# Patient Record
Sex: FEMALE | Race: WHITE | Employment: OTHER | URBAN - METROPOLITAN AREA
[De-identification: names, ages, dates, MRNs, and addresses within clinical notes are randomized per-mention and may not be internally consistent; named-entity substitution may affect disease eponyms.]

---

## 2017-01-17 RX ORDER — FOLIC ACID 1 MG/1
TABLET ORAL
Qty: 90 TABLET | Refills: 3 | Status: SHIPPED | OUTPATIENT
Start: 2017-01-17 | End: 2017-07-10 | Stop reason: SDUPTHER

## 2017-01-17 RX ORDER — FOLIC ACID 1 MG/1
TABLET ORAL
Qty: 90 TABLET | Refills: 3 | Status: SHIPPED | OUTPATIENT
Start: 2017-01-17 | End: 2017-01-17 | Stop reason: SDUPTHER

## 2017-02-06 ENCOUNTER — TELEPHONE (OUTPATIENT)
Dept: FAMILY MEDICINE CLINIC | Age: 77
End: 2017-02-06

## 2017-02-06 RX ORDER — OSELTAMIVIR PHOSPHATE 75 MG/1
75 CAPSULE ORAL DAILY
Qty: 10 CAPSULE | Refills: 0 | Status: SHIPPED | OUTPATIENT
Start: 2017-02-06 | End: 2017-02-16

## 2017-03-06 RX ORDER — LOSARTAN POTASSIUM AND HYDROCHLOROTHIAZIDE 12.5; 5 MG/1; MG/1
TABLET ORAL
Qty: 90 TABLET | Refills: 1 | Status: SHIPPED | OUTPATIENT
Start: 2017-03-06 | End: 2017-07-10 | Stop reason: SDUPTHER

## 2017-04-24 RX ORDER — LEVOTHYROXINE SODIUM 0.03 MG/1
TABLET ORAL
Qty: 90 TABLET | Refills: 1 | Status: SHIPPED | OUTPATIENT
Start: 2017-04-24 | End: 2017-07-10 | Stop reason: SDUPTHER

## 2017-06-14 ENCOUNTER — OFFICE VISIT (OUTPATIENT)
Dept: ORTHOPEDIC SURGERY | Age: 77
End: 2017-06-14

## 2017-06-14 VITALS
SYSTOLIC BLOOD PRESSURE: 138 MMHG | HEART RATE: 74 BPM | BODY MASS INDEX: 20.39 KG/M2 | WEIGHT: 122.36 LBS | DIASTOLIC BLOOD PRESSURE: 76 MMHG | HEIGHT: 65 IN

## 2017-06-14 DIAGNOSIS — M06.9 RHEUMATOID ARTHRITIS INVOLVING MULTIPLE JOINTS (HCC): Primary | ICD-10-CM

## 2017-06-14 DIAGNOSIS — L84 FOOT CALLUS: ICD-10-CM

## 2017-06-14 PROCEDURE — G8427 DOCREV CUR MEDS BY ELIG CLIN: HCPCS | Performed by: PODIATRIST

## 2017-06-14 PROCEDURE — 1123F ACP DISCUSS/DSCN MKR DOCD: CPT | Performed by: PODIATRIST

## 2017-06-14 PROCEDURE — 1036F TOBACCO NON-USER: CPT | Performed by: PODIATRIST

## 2017-06-14 PROCEDURE — G8420 CALC BMI NORM PARAMETERS: HCPCS | Performed by: PODIATRIST

## 2017-06-14 PROCEDURE — 1090F PRES/ABSN URINE INCON ASSESS: CPT | Performed by: PODIATRIST

## 2017-06-14 PROCEDURE — 11055 PARING/CUTG B9 HYPRKER LES 1: CPT | Performed by: PODIATRIST

## 2017-06-14 PROCEDURE — 99202 OFFICE O/P NEW SF 15 MIN: CPT | Performed by: PODIATRIST

## 2017-06-14 PROCEDURE — G8400 PT W/DXA NO RESULTS DOC: HCPCS | Performed by: PODIATRIST

## 2017-06-14 PROCEDURE — 4040F PNEUMOC VAC/ADMIN/RCVD: CPT | Performed by: PODIATRIST

## 2017-06-20 ENCOUNTER — TELEPHONE (OUTPATIENT)
Dept: FAMILY MEDICINE CLINIC | Age: 77
End: 2017-06-20

## 2017-06-20 DIAGNOSIS — M06.9 RHEUMATOID ARTHRITIS INVOLVING MULTIPLE JOINTS (HCC): Primary | ICD-10-CM

## 2017-06-26 ENCOUNTER — TELEPHONE (OUTPATIENT)
Dept: ORTHOPEDIC SURGERY | Age: 77
End: 2017-06-26

## 2017-06-28 ENCOUNTER — HOSPITAL ENCOUNTER (OUTPATIENT)
Dept: OTHER | Age: 77
Discharge: HOME | End: 2017-06-28
Attending: FAMILY MEDICINE | Admitting: FAMILY MEDICINE

## 2017-06-28 LAB
A/G RATIO: 1.1 (ref 1.1–2.2)
ALBUMIN SERPL-MCNC: 3.8 G/DL (ref 3.4–5)
ALP BLD-CCNC: 72 U/L (ref 40–129)
ALT SERPL-CCNC: 15 U/L (ref 10–40)
ANION GAP SERPL CALCULATED.3IONS-SCNC: 14 MMOL/L (ref 3–16)
AST SERPL-CCNC: 18 U/L (ref 15–37)
BASOPHILS ABSOLUTE: 0 K/UL (ref 0–0.2)
BASOPHILS RELATIVE PERCENT: 0.6 %
BILIRUB SERPL-MCNC: 0.3 MG/DL (ref 0–1)
BUN BLDV-MCNC: 13 MG/DL (ref 7–20)
C-REACTIVE PROTEIN: 6.7 MG/L (ref 0–5.1)
CALCIUM SERPL-MCNC: 9.4 MG/DL (ref 8.3–10.6)
CHLORIDE BLD-SCNC: 98 MMOL/L (ref 99–110)
CO2: 26 MMOL/L (ref 21–32)
CREAT SERPL-MCNC: 0.7 MG/DL (ref 0.6–1.2)
EOSINOPHILS ABSOLUTE: 0.3 K/UL (ref 0–0.6)
EOSINOPHILS RELATIVE PERCENT: 4.3 %
GFR AFRICAN AMERICAN: >60
GFR NON-AFRICAN AMERICAN: >60
GLOBULIN: 3.6 G/DL
GLUCOSE BLD-MCNC: 84 MG/DL (ref 70–99)
HCT VFR BLD CALC: 39.2 % (ref 36–48)
HEMOGLOBIN: 13.1 G/DL (ref 12–16)
LYMPHOCYTES ABSOLUTE: 1.4 K/UL (ref 1–5.1)
LYMPHOCYTES RELATIVE PERCENT: 23.2 %
MCH RBC QN AUTO: 33.6 PG (ref 26–34)
MCHC RBC AUTO-ENTMCNC: 33.4 G/DL (ref 31–36)
MCV RBC AUTO: 100.5 FL (ref 80–100)
MONOCYTES ABSOLUTE: 0.7 K/UL (ref 0–1.3)
MONOCYTES RELATIVE PERCENT: 11.9 %
NEUTROPHILS ABSOLUTE: 3.6 K/UL (ref 1.7–7.7)
NEUTROPHILS RELATIVE PERCENT: 60 %
PDW BLD-RTO: 14.2 % (ref 12.4–15.4)
PLATELET # BLD: 255 K/UL (ref 135–450)
PMV BLD AUTO: 8 FL (ref 5–10.5)
POTASSIUM SERPL-SCNC: 3.9 MMOL/L (ref 3.5–5.1)
RBC # BLD: 3.9 M/UL (ref 4–5.2)
SEDIMENTATION RATE, ERYTHROCYTE: 29 MM/HR (ref 0–30)
SODIUM BLD-SCNC: 138 MMOL/L (ref 136–145)
TOTAL PROTEIN: 7.4 G/DL (ref 6.4–8.2)
WBC # BLD: 5.9 K/UL (ref 4–11)

## 2017-07-10 RX ORDER — LOSARTAN POTASSIUM AND HYDROCHLOROTHIAZIDE 12.5; 5 MG/1; MG/1
TABLET ORAL
Qty: 90 TABLET | Refills: 1 | Status: SHIPPED | OUTPATIENT
Start: 2017-07-10 | End: 2018-01-09 | Stop reason: SDUPTHER

## 2017-07-10 RX ORDER — FOLIC ACID 1 MG/1
TABLET ORAL
Qty: 90 TABLET | Refills: 1 | Status: SHIPPED | OUTPATIENT
Start: 2017-07-10 | End: 2018-02-12 | Stop reason: SDUPTHER

## 2017-07-10 RX ORDER — LEVOTHYROXINE SODIUM 0.03 MG/1
TABLET ORAL
Qty: 90 TABLET | Refills: 1 | Status: SHIPPED | OUTPATIENT
Start: 2017-07-10 | End: 2018-01-18 | Stop reason: SDUPTHER

## 2017-10-24 ENCOUNTER — HOSPITAL ENCOUNTER (OUTPATIENT)
Dept: MAMMOGRAPHY | Age: 77
Discharge: HOME | End: 2017-10-24
Attending: FAMILY MEDICINE | Admitting: FAMILY MEDICINE

## 2017-10-24 DIAGNOSIS — Z12.31 ENCOUNTER FOR SCREENING MAMMOGRAM FOR BREAST CANCER: ICD-10-CM

## 2017-11-28 RX ORDER — TRETINOIN 0.5 MG/G
CREAM TOPICAL
Qty: 45 G | Refills: 1 | Status: SHIPPED | OUTPATIENT
Start: 2017-11-28 | End: 2021-04-07

## 2017-11-29 ENCOUNTER — TELEPHONE (OUTPATIENT)
Dept: FAMILY MEDICINE CLINIC | Age: 77
End: 2017-11-29

## 2018-01-08 ENCOUNTER — TELEPHONE (OUTPATIENT)
Dept: FAMILY MEDICINE CLINIC | Age: 78
End: 2018-01-08

## 2018-01-09 RX ORDER — LOSARTAN POTASSIUM AND HYDROCHLOROTHIAZIDE 12.5; 5 MG/1; MG/1
TABLET ORAL
Qty: 90 TABLET | Refills: 1 | Status: SHIPPED | OUTPATIENT
Start: 2018-01-09 | End: 2018-08-06 | Stop reason: SDUPTHER

## 2018-01-10 RX ORDER — LEVOTHYROXINE SODIUM 0.03 MG/1
TABLET ORAL
Qty: 90 TABLET | Refills: 1 | OUTPATIENT
Start: 2018-01-10

## 2018-01-15 RX ORDER — LEVOTHYROXINE SODIUM 0.03 MG/1
TABLET ORAL
Qty: 90 TABLET | Refills: 1 | OUTPATIENT
Start: 2018-01-15

## 2018-01-18 RX ORDER — LEVOTHYROXINE SODIUM 0.03 MG/1
TABLET ORAL
Qty: 90 TABLET | Refills: 1 | OUTPATIENT
Start: 2018-01-18

## 2018-01-18 RX ORDER — LEVOTHYROXINE SODIUM 0.03 MG/1
TABLET ORAL
Qty: 30 TABLET | Refills: 0 | Status: SHIPPED | OUTPATIENT
Start: 2018-01-18 | End: 2018-02-15 | Stop reason: SDUPTHER

## 2018-02-12 RX ORDER — FOLIC ACID 1 MG/1
TABLET ORAL
Qty: 90 TABLET | Refills: 1 | Status: SHIPPED | OUTPATIENT
Start: 2018-02-12 | End: 2018-08-06 | Stop reason: SDUPTHER

## 2018-02-15 RX ORDER — LEVOTHYROXINE SODIUM 0.03 MG/1
TABLET ORAL
Qty: 30 TABLET | Refills: 0 | Status: SHIPPED | OUTPATIENT
Start: 2018-02-15 | End: 2018-02-21 | Stop reason: SDUPTHER

## 2018-02-20 ENCOUNTER — OFFICE VISIT (OUTPATIENT)
Dept: FAMILY MEDICINE CLINIC | Age: 78
End: 2018-02-20

## 2018-02-20 VITALS
TEMPERATURE: 98.6 F | DIASTOLIC BLOOD PRESSURE: 82 MMHG | HEART RATE: 64 BPM | WEIGHT: 135 LBS | OXYGEN SATURATION: 97 % | BODY MASS INDEX: 22.49 KG/M2 | HEIGHT: 65 IN | SYSTOLIC BLOOD PRESSURE: 138 MMHG

## 2018-02-20 DIAGNOSIS — E03.9 HYPOTHYROIDISM (ACQUIRED): ICD-10-CM

## 2018-02-20 DIAGNOSIS — I10 ESSENTIAL HYPERTENSION: ICD-10-CM

## 2018-02-20 DIAGNOSIS — E78.00 PURE HYPERCHOLESTEROLEMIA: ICD-10-CM

## 2018-02-20 DIAGNOSIS — M06.9 RHEUMATOID ARTHRITIS INVOLVING MULTIPLE JOINTS (HCC): Primary | ICD-10-CM

## 2018-02-20 DIAGNOSIS — Z23 NEED FOR PNEUMOCOCCAL VACCINATION: ICD-10-CM

## 2018-02-20 LAB
A/G RATIO: 1.3 (ref 1.1–2.2)
ALBUMIN SERPL-MCNC: 4.2 G/DL (ref 3.4–5)
ALP BLD-CCNC: 70 U/L (ref 40–129)
ALT SERPL-CCNC: 16 U/L (ref 10–40)
ANION GAP SERPL CALCULATED.3IONS-SCNC: 15 MMOL/L (ref 3–16)
AST SERPL-CCNC: 21 U/L (ref 15–37)
BASOPHILS ABSOLUTE: 0 K/UL (ref 0–0.2)
BASOPHILS RELATIVE PERCENT: 0.3 %
BILIRUB SERPL-MCNC: 0.7 MG/DL (ref 0–1)
BUN BLDV-MCNC: 12 MG/DL (ref 7–20)
C-REACTIVE PROTEIN: 6.3 MG/L (ref 0–5.1)
CALCIUM SERPL-MCNC: 9.7 MG/DL (ref 8.3–10.6)
CHLORIDE BLD-SCNC: 99 MMOL/L (ref 99–110)
CO2: 24 MMOL/L (ref 21–32)
CREAT SERPL-MCNC: 0.5 MG/DL (ref 0.6–1.2)
EOSINOPHILS ABSOLUTE: 0.1 K/UL (ref 0–0.6)
EOSINOPHILS RELATIVE PERCENT: 1.4 %
GFR AFRICAN AMERICAN: >60
GFR NON-AFRICAN AMERICAN: >60
GLOBULIN: 3.2 G/DL
GLUCOSE BLD-MCNC: 107 MG/DL (ref 70–99)
HCT VFR BLD CALC: 41.2 % (ref 36–48)
HEMOGLOBIN: 13.8 G/DL (ref 12–16)
LYMPHOCYTES ABSOLUTE: 1.2 K/UL (ref 1–5.1)
LYMPHOCYTES RELATIVE PERCENT: 16 %
MCH RBC QN AUTO: 32.9 PG (ref 26–34)
MCHC RBC AUTO-ENTMCNC: 33.4 G/DL (ref 31–36)
MCV RBC AUTO: 98.5 FL (ref 80–100)
MONOCYTES ABSOLUTE: 0.7 K/UL (ref 0–1.3)
MONOCYTES RELATIVE PERCENT: 9.2 %
NEUTROPHILS ABSOLUTE: 5.6 K/UL (ref 1.7–7.7)
NEUTROPHILS RELATIVE PERCENT: 73.1 %
PDW BLD-RTO: 14.4 % (ref 12.4–15.4)
PLATELET # BLD: 259 K/UL (ref 135–450)
PMV BLD AUTO: 8.1 FL (ref 5–10.5)
POTASSIUM SERPL-SCNC: 4 MMOL/L (ref 3.5–5.1)
RBC # BLD: 4.18 M/UL (ref 4–5.2)
SEDIMENTATION RATE, ERYTHROCYTE: 24 MM/HR (ref 0–30)
SODIUM BLD-SCNC: 138 MMOL/L (ref 136–145)
TOTAL PROTEIN: 7.4 G/DL (ref 6.4–8.2)
TSH REFLEX: 2.67 UIU/ML (ref 0.27–4.2)
WBC # BLD: 7.7 K/UL (ref 4–11)

## 2018-02-20 PROCEDURE — G8510 SCR DEP NEG, NO PLAN REQD: HCPCS | Performed by: FAMILY MEDICINE

## 2018-02-20 PROCEDURE — G0009 ADMIN PNEUMOCOCCAL VACCINE: HCPCS | Performed by: FAMILY MEDICINE

## 2018-02-20 PROCEDURE — 36415 COLL VENOUS BLD VENIPUNCTURE: CPT | Performed by: FAMILY MEDICINE

## 2018-02-20 PROCEDURE — 3288F FALL RISK ASSESSMENT DOCD: CPT | Performed by: FAMILY MEDICINE

## 2018-02-20 PROCEDURE — 90670 PCV13 VACCINE IM: CPT | Performed by: FAMILY MEDICINE

## 2018-02-20 PROCEDURE — 99214 OFFICE O/P EST MOD 30 MIN: CPT | Performed by: FAMILY MEDICINE

## 2018-02-20 ASSESSMENT — PATIENT HEALTH QUESTIONNAIRE - PHQ9
SUM OF ALL RESPONSES TO PHQ9 QUESTIONS 1 & 2: 0
2. FEELING DOWN, DEPRESSED OR HOPELESS: 0
1. LITTLE INTEREST OR PLEASURE IN DOING THINGS: 0
SUM OF ALL RESPONSES TO PHQ QUESTIONS 1-9: 0

## 2018-02-20 NOTE — PATIENT INSTRUCTIONS
Please read the healthy family handout that you were given and share it with your family. Please compare this printed medication list with your medications at home to be sure they are the same. If you have any medications that are different please contact us immediately at [phone]. Also review your allergies that we have listed, these may also include medications that you have not been able to tolerate, make sure everything listed is correct. If you have any allergies that are different please contact us immediately at [phone].

## 2018-02-20 NOTE — PROGRESS NOTES
Subjective:   She presents for follow up of her rheumatoid arthritis high blood pressure thyroid disease high cholesterol. She has not seen a rheumatologist for over year since she is no longer in [place]. She wants to go back to the rheumatologist she used to see an [facility] Dr. Leonie Vazquez. She is still methotrexate weekly. Her son states rheumatoid is getting worse in her hands and he wants her to try something different. She also has had some loose stools lately but otherwise doing fine blood pressure and thyroid have been fine she forgot to fast for cholesterol        She has No Known Allergies. She   reports that she has quit smoking. She has never used smokeless tobacco. Review of systems negative for chest pain shortness breath wheezing abdominal pain rectal bleeding positive for some swelling in her ankles and hand joints  Objective:   /82 (Site: Left Arm, Position: Sitting, Cuff Size: Large Adult)   Pulse 64   Temp 98.6 °F (37 °C) (Oral)   Ht 5' 4.75\" (1.645 m) Comment: With shoes  Wt 135 lb (61.2 kg)   SpO2 97%   BMI 22.64 kg/m²   BP Readings from Last 3 Encounters:   02/20/18 138/82   06/14/17 138/76   12/05/16 134/72     Physical Exam   Constitutional: She is oriented to person, place, and time. She appears well-developed and well-nourished. Non-toxic appearance. HENT:   Head: Normocephalic. Eyes: Conjunctivae are normal. Right eye exhibits no discharge. Left eye exhibits no discharge. No scleral icterus. Neck: Neck supple. Carotid bruit is not present. No thyroid mass and no thyromegaly present. Cardiovascular: Normal rate, regular rhythm and normal heart sounds. No murmur heard. Pulmonary/Chest: Breath sounds normal. No respiratory distress. She has no wheezes. She has no rales. Abdominal: Soft. She exhibits no distension and no mass. There is no hepatosplenomegaly. There is no tenderness. There is no rebound and no guarding. Musculoskeletal: She exhibits no edema.

## 2018-02-21 ENCOUNTER — TELEPHONE (OUTPATIENT)
Dept: FAMILY MEDICINE CLINIC | Age: 78
End: 2018-02-21

## 2018-02-21 RX ORDER — LEVOTHYROXINE SODIUM 0.03 MG/1
TABLET ORAL
Qty: 90 TABLET | Refills: 3 | Status: SHIPPED | OUTPATIENT
Start: 2018-02-21 | End: 2019-03-11 | Stop reason: SDUPTHER

## 2018-08-06 RX ORDER — LOSARTAN POTASSIUM AND HYDROCHLOROTHIAZIDE 12.5; 5 MG/1; MG/1
TABLET ORAL
Qty: 90 TABLET | Refills: 1 | Status: SHIPPED | OUTPATIENT
Start: 2018-08-06 | End: 2019-01-22 | Stop reason: SDUPTHER

## 2018-08-06 RX ORDER — FOLIC ACID 1 MG/1
TABLET ORAL
Qty: 90 TABLET | Refills: 1 | Status: SHIPPED | OUTPATIENT
Start: 2018-08-06 | End: 2019-03-29 | Stop reason: SDUPTHER

## 2018-08-09 ENCOUNTER — TELEPHONE (OUTPATIENT)
Dept: FAMILY MEDICINE CLINIC | Age: 78
End: 2018-08-09

## 2018-08-09 NOTE — TELEPHONE ENCOUNTER
Patient notified, she will talk with her daughter and see if she can take her to urgent care to be seen

## 2018-08-09 NOTE — TELEPHONE ENCOUNTER
Patient c/o diarrhea for over a week now. She has been taking Imodium but it does not seem to be improving. She wanted to see if you could call something in for her, she states she does not have a way to come in for appt today.

## 2018-12-04 ENCOUNTER — OFFICE VISIT (OUTPATIENT)
Dept: FAMILY MEDICINE CLINIC | Age: 78
End: 2018-12-04
Payer: COMMERCIAL

## 2018-12-04 VITALS
DIASTOLIC BLOOD PRESSURE: 76 MMHG | SYSTOLIC BLOOD PRESSURE: 130 MMHG | WEIGHT: 135 LBS | OXYGEN SATURATION: 97 % | BODY MASS INDEX: 22.64 KG/M2 | HEART RATE: 60 BPM | TEMPERATURE: 97.2 F

## 2018-12-04 DIAGNOSIS — I10 ESSENTIAL HYPERTENSION: ICD-10-CM

## 2018-12-04 DIAGNOSIS — M06.9 RHEUMATOID ARTHRITIS INVOLVING MULTIPLE JOINTS (HCC): Primary | ICD-10-CM

## 2018-12-04 DIAGNOSIS — E03.9 HYPOTHYROIDISM (ACQUIRED): ICD-10-CM

## 2018-12-04 LAB — TSH REFLEX: 2.83 UIU/ML (ref 0.27–4.2)

## 2018-12-04 PROCEDURE — 36415 COLL VENOUS BLD VENIPUNCTURE: CPT | Performed by: FAMILY MEDICINE

## 2018-12-04 PROCEDURE — 99214 OFFICE O/P EST MOD 30 MIN: CPT | Performed by: FAMILY MEDICINE

## 2018-12-04 NOTE — PROGRESS NOTES
Subjective:   She presents for follow up of her rheumatoid arthritis hypertension and hypothyroidism she is going to Dr. Jacy Romo used on all of her blood work for her rheumatoid but she wanted to come here for her thyroid labs she saw Dr. Jacy Romo back in May and I was able to see his lab work on care everywhere. She had one liver enzymes slightly elevated she states she still drinks some alcohol on the weekends and her daughter and Dr. Jacy Romo told her not to drink alcohol but she states she likes to have a beer or glass of wine on the weekend. She is still on methotrexate for her rheumatoid. Her CRP was noted. She has not had a TSH since last February. Her blood pressures have been good on the current blood pressure medication. Her  passed away about one and half years ago so she lives with her son now. She has No Known Allergies. She   reports that she has quit smoking. She has never used smokeless tobacco. Review of systems negative for chest pain swelling shortness of breath wheezing abdominal pain and rectal bleeding  Objective:   /76   Pulse 60   Temp 97.2 °F (36.2 °C) (Oral)   Wt 135 lb (61.2 kg)   SpO2 97%   BMI 22.64 kg/m²   BP Readings from Last 3 Encounters:   12/04/18 130/76   02/20/18 138/82   06/14/17 138/76     Physical Exam   Constitutional: She is oriented to person, place, and time. She appears well-developed and well-nourished. Non-toxic appearance. HENT:   Head: Normocephalic. Eyes: Conjunctivae are normal. Right eye exhibits no discharge. Left eye exhibits no discharge. No scleral icterus. Neck: Neck supple. Carotid bruit is not present. No thyroid mass and no thyromegaly present. Cardiovascular: Normal rate, regular rhythm and normal heart sounds. No murmur heard. Pulmonary/Chest: Breath sounds normal. No respiratory distress. She has no wheezes. She has no rales. Abdominal: Soft. She exhibits no distension and no mass. There is no hepatosplenomegaly.

## 2019-01-16 ENCOUNTER — OFFICE VISIT (OUTPATIENT)
Dept: ORTHOPEDIC SURGERY | Age: 79
End: 2019-01-16
Payer: COMMERCIAL

## 2019-01-16 VITALS — BODY MASS INDEX: 22.48 KG/M2 | WEIGHT: 134.92 LBS | HEIGHT: 65 IN

## 2019-01-16 DIAGNOSIS — M20.12 VALGUS DEFORMITY OF BOTH GREAT TOES: ICD-10-CM

## 2019-01-16 DIAGNOSIS — M20.41 HAMMERTOES OF BOTH FEET: ICD-10-CM

## 2019-01-16 DIAGNOSIS — L84 FOOT CALLUS: Primary | ICD-10-CM

## 2019-01-16 DIAGNOSIS — M20.42 HAMMERTOES OF BOTH FEET: ICD-10-CM

## 2019-01-16 DIAGNOSIS — M06.9 RHEUMATOID ARTHRITIS INVOLVING MULTIPLE JOINTS (HCC): ICD-10-CM

## 2019-01-16 DIAGNOSIS — M20.11 VALGUS DEFORMITY OF BOTH GREAT TOES: ICD-10-CM

## 2019-01-16 PROCEDURE — 99213 OFFICE O/P EST LOW 20 MIN: CPT | Performed by: PODIATRIST

## 2019-01-16 RX ORDER — LEVOTHYROXINE SODIUM 0.03 MG/1
25 TABLET ORAL
COMMUNITY
End: 2019-03-11 | Stop reason: SDUPTHER

## 2019-01-16 RX ORDER — FOLIC ACID 1 MG/1
1000 TABLET ORAL
COMMUNITY
End: 2019-10-01 | Stop reason: SDUPTHER

## 2019-01-23 RX ORDER — LOSARTAN POTASSIUM AND HYDROCHLOROTHIAZIDE 12.5; 5 MG/1; MG/1
TABLET ORAL
Qty: 90 TABLET | Refills: 0 | Status: SHIPPED | OUTPATIENT
Start: 2019-01-23 | End: 2019-04-24 | Stop reason: SDUPTHER

## 2019-03-11 RX ORDER — LEVOTHYROXINE SODIUM 0.03 MG/1
TABLET ORAL
Qty: 30 TABLET | Refills: 2 | Status: SHIPPED | OUTPATIENT
Start: 2019-03-11 | End: 2019-05-15 | Stop reason: SDUPTHER

## 2019-03-21 ENCOUNTER — TELEPHONE (OUTPATIENT)
Dept: FAMILY MEDICINE CLINIC | Age: 79
End: 2019-03-21

## 2019-03-22 RX ORDER — BENZONATATE 200 MG/1
CAPSULE ORAL
Qty: 30 CAPSULE | Refills: 0 | Status: SHIPPED | OUTPATIENT
Start: 2019-03-22 | End: 2019-10-01

## 2019-03-28 ENCOUNTER — TELEPHONE (OUTPATIENT)
Dept: FAMILY MEDICINE CLINIC | Age: 79
End: 2019-03-28

## 2019-03-29 RX ORDER — FOLIC ACID 1 MG/1
TABLET ORAL
Qty: 90 TABLET | Refills: 1 | Status: SHIPPED | OUTPATIENT
Start: 2019-03-29 | End: 2019-09-29 | Stop reason: SDUPTHER

## 2019-04-24 RX ORDER — LOSARTAN POTASSIUM AND HYDROCHLOROTHIAZIDE 12.5; 5 MG/1; MG/1
TABLET ORAL
Qty: 90 TABLET | Refills: 0 | Status: SHIPPED | OUTPATIENT
Start: 2019-04-24 | End: 2019-05-07 | Stop reason: RX

## 2019-04-24 NOTE — TELEPHONE ENCOUNTER
Refilled medication per verbal order from provider.   Future appt scheduled 06/18/2019  Last appt 12/04/2018

## 2019-05-06 NOTE — TELEPHONE ENCOUNTER
Patient is on Hyzaar and it is now on long term back order and needs a different medication called in. Patient is completely out of her medication.

## 2019-05-07 RX ORDER — VALSARTAN AND HYDROCHLOROTHIAZIDE 80; 12.5 MG/1; MG/1
1 TABLET, FILM COATED ORAL EVERY MORNING
Qty: 30 TABLET | Refills: 5 | Status: SHIPPED | OUTPATIENT
Start: 2019-05-07 | End: 2019-09-03 | Stop reason: SDUPTHER

## 2019-05-15 RX ORDER — LEVOTHYROXINE SODIUM 0.03 MG/1
TABLET ORAL
Qty: 90 TABLET | Refills: 1 | Status: SHIPPED | OUTPATIENT
Start: 2019-05-15 | End: 2019-09-03 | Stop reason: SDUPTHER

## 2019-06-18 ENCOUNTER — OFFICE VISIT (OUTPATIENT)
Dept: FAMILY MEDICINE CLINIC | Age: 79
End: 2019-06-18
Payer: COMMERCIAL

## 2019-06-18 VITALS
TEMPERATURE: 97.7 F | HEART RATE: 63 BPM | BODY MASS INDEX: 22.55 KG/M2 | DIASTOLIC BLOOD PRESSURE: 77 MMHG | OXYGEN SATURATION: 92 % | SYSTOLIC BLOOD PRESSURE: 125 MMHG | WEIGHT: 136 LBS

## 2019-06-18 DIAGNOSIS — Z51.81 MEDICATION MONITORING ENCOUNTER: ICD-10-CM

## 2019-06-18 DIAGNOSIS — H10.32 ACUTE BACTERIAL CONJUNCTIVITIS OF LEFT EYE: ICD-10-CM

## 2019-06-18 DIAGNOSIS — E03.9 HYPOTHYROIDISM (ACQUIRED): Primary | ICD-10-CM

## 2019-06-18 DIAGNOSIS — E55.9 VITAMIN D DEFICIENCY: ICD-10-CM

## 2019-06-18 DIAGNOSIS — M06.9 RHEUMATOID ARTHRITIS INVOLVING MULTIPLE JOINTS (HCC): ICD-10-CM

## 2019-06-18 DIAGNOSIS — E78.00 PURE HYPERCHOLESTEROLEMIA: ICD-10-CM

## 2019-06-18 LAB
ALBUMIN SERPL-MCNC: 4.3 G/DL (ref 3.4–5)
ALP BLD-CCNC: 70 U/L (ref 40–129)
ALT SERPL-CCNC: 15 U/L (ref 10–40)
ANION GAP SERPL CALCULATED.3IONS-SCNC: 16 MMOL/L (ref 3–16)
AST SERPL-CCNC: 21 U/L (ref 15–37)
BASOPHILS ABSOLUTE: 0 K/UL (ref 0–0.2)
BASOPHILS RELATIVE PERCENT: 0.4 %
BILIRUB SERPL-MCNC: 0.5 MG/DL (ref 0–1)
BILIRUBIN DIRECT: <0.2 MG/DL (ref 0–0.3)
BILIRUBIN, INDIRECT: NORMAL MG/DL (ref 0–1)
BUN BLDV-MCNC: 10 MG/DL (ref 7–20)
C-REACTIVE PROTEIN: 54.1 MG/L (ref 0–5.1)
CALCIUM SERPL-MCNC: 9.9 MG/DL (ref 8.3–10.6)
CHLORIDE BLD-SCNC: 97 MMOL/L (ref 99–110)
CHOLESTEROL, TOTAL: 185 MG/DL (ref 0–199)
CO2: 22 MMOL/L (ref 21–32)
CREAT SERPL-MCNC: <0.5 MG/DL (ref 0.6–1.2)
EOSINOPHILS ABSOLUTE: 0.2 K/UL (ref 0–0.6)
EOSINOPHILS RELATIVE PERCENT: 3.3 %
FOLATE: >20 NG/ML (ref 4.78–24.2)
GFR AFRICAN AMERICAN: >60
GFR NON-AFRICAN AMERICAN: >60
GLUCOSE BLD-MCNC: 93 MG/DL (ref 70–99)
HCT VFR BLD CALC: 39.3 % (ref 36–48)
HDLC SERPL-MCNC: 58 MG/DL (ref 40–60)
HEMOGLOBIN: 13.5 G/DL (ref 12–16)
LDL CHOLESTEROL CALCULATED: 109 MG/DL
LYMPHOCYTES ABSOLUTE: 1.2 K/UL (ref 1–5.1)
LYMPHOCYTES RELATIVE PERCENT: 18.1 %
MCH RBC QN AUTO: 34.2 PG (ref 26–34)
MCHC RBC AUTO-ENTMCNC: 34.3 G/DL (ref 31–36)
MCV RBC AUTO: 99.7 FL (ref 80–100)
MONOCYTES ABSOLUTE: 0.7 K/UL (ref 0–1.3)
MONOCYTES RELATIVE PERCENT: 10.5 %
NEUTROPHILS ABSOLUTE: 4.6 K/UL (ref 1.7–7.7)
NEUTROPHILS RELATIVE PERCENT: 67.7 %
PDW BLD-RTO: 13.7 % (ref 12.4–15.4)
PLATELET # BLD: 310 K/UL (ref 135–450)
PMV BLD AUTO: 7.7 FL (ref 5–10.5)
POTASSIUM SERPL-SCNC: 4.3 MMOL/L (ref 3.5–5.1)
RBC # BLD: 3.94 M/UL (ref 4–5.2)
SEDIMENTATION RATE, ERYTHROCYTE: 64 MM/HR (ref 0–30)
SODIUM BLD-SCNC: 135 MMOL/L (ref 136–145)
TOTAL PROTEIN: 7.8 G/DL (ref 6.4–8.2)
TRIGL SERPL-MCNC: 91 MG/DL (ref 0–150)
TSH REFLEX: 3.33 UIU/ML (ref 0.27–4.2)
VITAMIN B-12: 1559 PG/ML (ref 211–911)
VITAMIN D 25-HYDROXY: 69.4 NG/ML
VLDLC SERPL CALC-MCNC: 18 MG/DL
WBC # BLD: 6.8 K/UL (ref 4–11)

## 2019-06-18 PROCEDURE — 36415 COLL VENOUS BLD VENIPUNCTURE: CPT | Performed by: FAMILY MEDICINE

## 2019-06-18 PROCEDURE — 3288F FALL RISK ASSESSMENT DOCD: CPT | Performed by: FAMILY MEDICINE

## 2019-06-18 PROCEDURE — G8510 SCR DEP NEG, NO PLAN REQD: HCPCS | Performed by: FAMILY MEDICINE

## 2019-06-18 PROCEDURE — 99214 OFFICE O/P EST MOD 30 MIN: CPT | Performed by: FAMILY MEDICINE

## 2019-06-18 RX ORDER — TOBRAMYCIN 3 MG/ML
2 SOLUTION/ DROPS OPHTHALMIC EVERY 4 HOURS
Qty: 5 ML | Refills: 0 | Status: SHIPPED | OUTPATIENT
Start: 2019-06-18 | End: 2019-06-25

## 2019-06-18 ASSESSMENT — PATIENT HEALTH QUESTIONNAIRE - PHQ9
2. FEELING DOWN, DEPRESSED OR HOPELESS: 0
1. LITTLE INTEREST OR PLEASURE IN DOING THINGS: 0
SUM OF ALL RESPONSES TO PHQ QUESTIONS 1-9: 0
SUM OF ALL RESPONSES TO PHQ QUESTIONS 1-9: 0
SUM OF ALL RESPONSES TO PHQ9 QUESTIONS 1 & 2: 0

## 2019-06-18 NOTE — PROGRESS NOTES
Subjective:   She presents for follow up of her thyroid disease rheumatoid arthritis high cholesterol vitamin D deficiency. She is also having some drainage and redness of left eye more than her usual allergies. She was seen in urgent care for constipation and gas issues but she states her stomach seems to be getting better she is starting yogurt and trying to increase fiber intake she denies hard stools or bleeding last colonoscopy was 15 years ago she states. She is still on 8 methotrexate once a week. Still on her thyroid medication and vitamin D supplements as directed still taking folic acid. She has No Known Allergies. She   reports that she has quit smoking. She has never used smokeless tobacco. Review of systems negative for chest pain swelling shortness of breath wheezing rectal bleeding  Objective:   /77   Pulse 63   Temp 97.7 °F (36.5 °C) (Oral)   Wt 136 lb (61.7 kg)   SpO2 92%   BMI 22.55 kg/m²   BP Readings from Last 3 Encounters:   06/18/19 125/77   12/04/18 130/76   02/20/18 138/82     Physical Exam   Constitutional: She is oriented to person, place, and time. She appears well-developed and well-nourished. Non-toxic appearance. HENT:   Head: Normocephalic. Eyes: Conjunctivae are normal. Right eye exhibits no discharge. Left eye exhibits no discharge. No scleral icterus. Neck: Neck supple. Carotid bruit is not present. No thyroid mass and no thyromegaly present. Cardiovascular: Normal rate, regular rhythm and normal heart sounds. No murmur heard. Pulmonary/Chest: Breath sounds normal. No respiratory distress. She has no wheezes. She has no rales. Abdominal: Soft. She exhibits no distension and no mass. There is no hepatosplenomegaly. There is no tenderness. There is no rebound and no guarding. Musculoskeletal: She exhibits no edema. Lymphadenopathy:     She has no cervical adenopathy. Right: No supraclavicular adenopathy present.    Neurological: She is alert and oriented to person, place, and time. Skin: Skin is warm. No cyanosis. Psychiatric: She has a normal mood and affect. Her behavior is normal. Judgment and thought content normal.   Vitals reviewed. For many of finger secondary to rheumatoid arthritis  Left eye conjunctival inflammation  Assessment and Plan:   Diagnosis Orders   1. Hypothyroidism (acquired)  TSH with Reflex   2. Rheumatoid arthritis involving multiple joints (HCC)  Sedimentation Rate    C-Reactive Protein    CBC Auto Differential   3. Medication monitoring encounter  Basic Metabolic Panel    Hepatic Function Panel    Vitamin B12    Folate   4. Pure hypercholesterolemia  Lipid Panel   5. Vitamin D deficiency  Vitamin D 25 Hydroxy   6. Acute bacterial conjunctivitis of left eye  tobramycin (TOBREX) 0.3 % ophthalmic solution     The problems listed in the assessment are stable unless otherwise indicated. She  was instructed to continue their current medications and treatment for the aboveproblems unless otherwise indicated above. Age-specific preventative medicine recommendations were reviewed with patient today and the Healthy Family Handout was given to patient. Avoid tobacco products exposure. Follow up in 6mo. Call or return to office for any problems that develop before the next scheduled follow-up appointment. Mena Ceja M.D. Parts of this note were completed using voice recognition transcription. Every effort was made to ensure accuracy; however, inadvertent transcription errors may be present.

## 2019-06-18 NOTE — PATIENT INSTRUCTIONS
Please read the healthy family handout that you were given and share it with your family. Please compare this printed medication list with your medications at home to be sure they are the same. If you have any medications that are different please contact us immediately at [phone]. Also review your allergies that we have listed, these may also include medications that you have not been able to tolerate, make sure everything listed is correct. If you have any allergies that are different please contact us immediately at [phone].

## 2019-06-27 ENCOUNTER — TELEPHONE (OUTPATIENT)
Dept: FAMILY MEDICINE CLINIC | Age: 79
End: 2019-06-27

## 2019-06-27 NOTE — TELEPHONE ENCOUNTER
Patient received a copy of her blood work, she wanted to know why her b12 level is so high. She said she takes a gummy vitamin but there is not much in it.

## 2019-09-03 RX ORDER — LEVOTHYROXINE SODIUM 0.03 MG/1
TABLET ORAL
Qty: 90 TABLET | Refills: 0 | Status: SHIPPED | OUTPATIENT
Start: 2019-09-03 | End: 2019-11-27 | Stop reason: SDUPTHER

## 2019-09-03 RX ORDER — VALSARTAN AND HYDROCHLOROTHIAZIDE 80; 12.5 MG/1; MG/1
TABLET, FILM COATED ORAL
Qty: 90 TABLET | Refills: 1 | Status: SHIPPED | OUTPATIENT
Start: 2019-09-03 | End: 2019-12-03 | Stop reason: ALTCHOICE

## 2019-10-01 ENCOUNTER — HOSPITAL ENCOUNTER (EMERGENCY)
Age: 79
Discharge: HOME | End: 2019-10-02
Attending: EMERGENCY MEDICINE
Payer: COMMERCIAL

## 2019-10-01 ENCOUNTER — APPOINTMENT (OUTPATIENT)
Dept: GENERAL RADIOLOGY | Age: 79
End: 2019-10-01
Payer: COMMERCIAL
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
## 2019-10-01 DIAGNOSIS — R74.8 ELEVATED CK: ICD-10-CM

## 2019-10-01 DIAGNOSIS — R78.0 ELEVATED BLOOD ALCOHOL LEVEL, BLOOD ALCOHOL LEVEL NOT SPECIFIED: ICD-10-CM

## 2019-10-01 DIAGNOSIS — T07.XXXA MULTIPLE CONTUSIONS: Primary | ICD-10-CM

## 2019-10-01 LAB
A/G RATIO: 1.1 (ref 1.1–2.2)
ALBUMIN SERPL-MCNC: 4.1 G/DL (ref 3.4–5)
ALP BLD-CCNC: 72 U/L (ref 40–129)
ALT SERPL-CCNC: 19 U/L (ref 10–40)
ANION GAP SERPL CALCULATED.3IONS-SCNC: 14 MMOL/L (ref 3–16)
AST SERPL-CCNC: 40 U/L (ref 15–37)
BASOPHILS ABSOLUTE: 0 K/UL (ref 0–0.2)
BASOPHILS RELATIVE PERCENT: 0.2 %
BILIRUB SERPL-MCNC: 0.4 MG/DL (ref 0–1)
BILIRUBIN URINE: NEGATIVE
BLOOD, URINE: ABNORMAL
BUN BLDV-MCNC: 9 MG/DL (ref 7–20)
CALCIUM SERPL-MCNC: 9.3 MG/DL (ref 8.3–10.6)
CHLORIDE BLD-SCNC: 97 MMOL/L (ref 99–110)
CLARITY: CLEAR
CO2: 22 MMOL/L (ref 21–32)
COLOR: YELLOW
CREAT SERPL-MCNC: <0.5 MG/DL (ref 0.6–1.2)
EOSINOPHILS ABSOLUTE: 0 K/UL (ref 0–0.6)
EOSINOPHILS RELATIVE PERCENT: 0.3 %
ETHANOL: 156 MG/DL (ref 0–0.08)
GFR AFRICAN AMERICAN: >60
GFR NON-AFRICAN AMERICAN: >60
GLOBULIN: 3.6 G/DL
GLUCOSE BLD-MCNC: 126 MG/DL (ref 70–99)
GLUCOSE URINE: NEGATIVE MG/DL
HCT VFR BLD CALC: 40.3 % (ref 36–48)
HEMOGLOBIN: 13.5 G/DL (ref 12–16)
KETONES, URINE: NEGATIVE MG/DL
LEUKOCYTE ESTERASE, URINE: NEGATIVE
LYMPHOCYTES ABSOLUTE: 1 K/UL (ref 1–5.1)
LYMPHOCYTES RELATIVE PERCENT: 12.2 %
MCH RBC QN AUTO: 33.4 PG (ref 26–34)
MCHC RBC AUTO-ENTMCNC: 33.6 G/DL (ref 31–36)
MCV RBC AUTO: 99.4 FL (ref 80–100)
MICROSCOPIC EXAMINATION: YES
MONOCYTES ABSOLUTE: 0.5 K/UL (ref 0–1.3)
MONOCYTES RELATIVE PERCENT: 6.4 %
NEUTROPHILS ABSOLUTE: 6.9 K/UL (ref 1.7–7.7)
NEUTROPHILS RELATIVE PERCENT: 80.9 %
NITRITE, URINE: NEGATIVE
PDW BLD-RTO: 14.4 % (ref 12.4–15.4)
PH UA: 6 (ref 5–8)
PLATELET # BLD: 253 K/UL (ref 135–450)
PMV BLD AUTO: 7.2 FL (ref 5–10.5)
POTASSIUM SERPL-SCNC: 3.8 MMOL/L (ref 3.5–5.1)
PROTEIN UA: 30 MG/DL
RBC # BLD: 4.05 M/UL (ref 4–5.2)
RBC UA: NORMAL /HPF (ref 0–2)
SODIUM BLD-SCNC: 133 MMOL/L (ref 136–145)
SPECIFIC GRAVITY UA: <=1.005 (ref 1–1.03)
TOTAL CK: 834 U/L (ref 26–192)
TOTAL PROTEIN: 7.7 G/DL (ref 6.4–8.2)
URINE REFLEX TO CULTURE: ABNORMAL
URINE TYPE: ABNORMAL
UROBILINOGEN, URINE: 0.2 E.U./DL
WBC # BLD: 8.5 K/UL (ref 4–11)
WBC UA: NORMAL /HPF (ref 0–5)

## 2019-10-01 PROCEDURE — 2580000003 HC RX 258: Performed by: EMERGENCY MEDICINE

## 2019-10-01 PROCEDURE — 96360 HYDRATION IV INFUSION INIT: CPT

## 2019-10-01 PROCEDURE — 81001 URINALYSIS AUTO W/SCOPE: CPT

## 2019-10-01 PROCEDURE — 85025 COMPLETE CBC W/AUTO DIFF WBC: CPT

## 2019-10-01 PROCEDURE — 93005 ELECTROCARDIOGRAM TRACING: CPT | Performed by: EMERGENCY MEDICINE

## 2019-10-01 PROCEDURE — 73501 X-RAY EXAM HIP UNI 1 VIEW: CPT

## 2019-10-01 PROCEDURE — G0480 DRUG TEST DEF 1-7 CLASSES: HCPCS

## 2019-10-01 PROCEDURE — 99284 EMERGENCY DEPT VISIT MOD MDM: CPT

## 2019-10-01 PROCEDURE — 82550 ASSAY OF CK (CPK): CPT

## 2019-10-01 PROCEDURE — 80053 COMPREHEN METABOLIC PANEL: CPT

## 2019-10-01 RX ORDER — FOLIC ACID 1 MG/1
TABLET ORAL
Qty: 90 TABLET | Refills: 0 | Status: SHIPPED | OUTPATIENT
Start: 2019-10-01 | End: 2020-01-08

## 2019-10-01 RX ORDER — 0.9 % SODIUM CHLORIDE 0.9 %
1000 INTRAVENOUS SOLUTION INTRAVENOUS ONCE
Status: COMPLETED | OUTPATIENT
Start: 2019-10-01 | End: 2019-10-02

## 2019-10-01 RX ADMIN — SODIUM CHLORIDE 1000 ML: 9 INJECTION, SOLUTION INTRAVENOUS at 23:40

## 2019-10-01 ASSESSMENT — PAIN DESCRIPTION - PAIN TYPE: TYPE: ACUTE PAIN

## 2019-10-01 ASSESSMENT — PAIN DESCRIPTION - DESCRIPTORS: DESCRIPTORS: ACHING

## 2019-10-01 ASSESSMENT — PAIN DESCRIPTION - ORIENTATION: ORIENTATION: RIGHT

## 2019-10-01 ASSESSMENT — PAIN SCALES - WONG BAKER: WONGBAKER_NUMERICALRESPONSE: 0

## 2019-10-01 ASSESSMENT — PAIN SCALES - GENERAL: PAINLEVEL_OUTOF10: 5

## 2019-10-01 ASSESSMENT — PAIN DESCRIPTION - LOCATION: LOCATION: HIP

## 2019-10-01 ASSESSMENT — PAIN DESCRIPTION - FREQUENCY: FREQUENCY: CONTINUOUS

## 2019-10-02 ENCOUNTER — APPOINTMENT (OUTPATIENT)
Dept: GENERAL RADIOLOGY | Age: 79
End: 2019-10-02
Payer: COMMERCIAL

## 2019-10-02 VITALS
HEIGHT: 65 IN | TEMPERATURE: 97.4 F | OXYGEN SATURATION: 95 % | BODY MASS INDEX: 21.66 KG/M2 | WEIGHT: 130 LBS | HEART RATE: 65 BPM | SYSTOLIC BLOOD PRESSURE: 133 MMHG | DIASTOLIC BLOOD PRESSURE: 68 MMHG | RESPIRATION RATE: 14 BRPM

## 2019-10-02 LAB
EKG ATRIAL RATE: 57 BPM
EKG DIAGNOSIS: NORMAL
EKG P AXIS: 88 DEGREES
EKG P-R INTERVAL: 144 MS
EKG Q-T INTERVAL: 496 MS
EKG QRS DURATION: 138 MS
EKG QTC CALCULATION (BAZETT): 482 MS
EKG R AXIS: 51 DEGREES
EKG T AXIS: 112 DEGREES
EKG VENTRICULAR RATE: 57 BPM

## 2019-10-02 PROCEDURE — 93010 ELECTROCARDIOGRAM REPORT: CPT | Performed by: INTERNAL MEDICINE

## 2019-10-02 PROCEDURE — 73030 X-RAY EXAM OF SHOULDER: CPT

## 2019-10-02 PROCEDURE — 73080 X-RAY EXAM OF ELBOW: CPT

## 2019-11-27 RX ORDER — LEVOTHYROXINE SODIUM 0.03 MG/1
TABLET ORAL
Qty: 90 TABLET | Refills: 0 | Status: SHIPPED | OUTPATIENT
Start: 2019-11-27 | End: 2020-02-25

## 2019-12-02 ENCOUNTER — TELEPHONE (OUTPATIENT)
Dept: FAMILY MEDICINE CLINIC | Age: 79
End: 2019-12-02

## 2019-12-03 RX ORDER — OLMESARTAN MEDOXOMIL AND HYDROCHLOROTHIAZIDE 40/12.5 40; 12.5 MG/1; MG/1
1 TABLET ORAL DAILY
Qty: 30 TABLET | Refills: 5 | Status: SHIPPED | OUTPATIENT
Start: 2019-12-03 | End: 2020-06-26

## 2020-01-06 RX ORDER — FOLIC ACID 1 MG/1
TABLET ORAL
Qty: 90 TABLET | Refills: 0 | OUTPATIENT
Start: 2020-01-06

## 2020-01-08 ENCOUNTER — OFFICE VISIT (OUTPATIENT)
Dept: ORTHOPEDIC SURGERY | Age: 80
End: 2020-01-08
Payer: MEDICARE

## 2020-01-08 ENCOUNTER — TELEPHONE (OUTPATIENT)
Dept: FAMILY MEDICINE CLINIC | Age: 80
End: 2020-01-08

## 2020-01-08 VITALS — BODY MASS INDEX: 21.67 KG/M2 | HEIGHT: 65 IN | WEIGHT: 130.07 LBS

## 2020-01-08 PROBLEM — M75.101 ROTATOR CUFF TEAR ARTHROPATHY OF RIGHT SHOULDER: Status: ACTIVE | Noted: 2020-01-08

## 2020-01-08 PROBLEM — M19.011 ARTHRITIS OF RIGHT ACROMIOCLAVICULAR JOINT: Status: ACTIVE | Noted: 2020-01-08

## 2020-01-08 PROBLEM — M12.811 ROTATOR CUFF TEAR ARTHROPATHY OF RIGHT SHOULDER: Status: ACTIVE | Noted: 2020-01-08

## 2020-01-08 PROCEDURE — 99214 OFFICE O/P EST MOD 30 MIN: CPT | Performed by: ORTHOPAEDIC SURGERY

## 2020-01-08 PROCEDURE — 20611 DRAIN/INJ JOINT/BURSA W/US: CPT | Performed by: ORTHOPAEDIC SURGERY

## 2020-01-08 RX ORDER — FOLIC ACID 1 MG/1
TABLET ORAL
Qty: 90 TABLET | Refills: 0 | Status: SHIPPED | OUTPATIENT
Start: 2020-01-08 | End: 2021-02-24 | Stop reason: SDUPTHER

## 2020-01-08 RX ORDER — AZITHROMYCIN 250 MG/1
TABLET, FILM COATED ORAL
Qty: 6 TABLET | Refills: 0 | Status: SHIPPED | OUTPATIENT
Start: 2020-01-08 | End: 2020-05-29 | Stop reason: ALTCHOICE

## 2020-01-08 RX ORDER — METHYLPREDNISOLONE ACETATE 40 MG/ML
80 INJECTION, SUSPENSION INTRA-ARTICULAR; INTRALESIONAL; INTRAMUSCULAR; SOFT TISSUE ONCE
Status: COMPLETED | OUTPATIENT
Start: 2020-01-08 | End: 2020-01-08

## 2020-01-08 RX ADMIN — METHYLPREDNISOLONE ACETATE 80 MG: 40 INJECTION, SUSPENSION INTRA-ARTICULAR; INTRALESIONAL; INTRAMUSCULAR; SOFT TISSUE at 14:18

## 2020-01-08 NOTE — PROGRESS NOTES
CHIEF COMPLAINT:    Chief Complaint   Patient presents with    Shoulder Pain     RIGHT SHOULDER PAIN, [address] IN OCTOBER. HX OF RA. HISTORY OF PRESENT ILLNESS:                The patient is a 78 y.o. female to clinic for evaluation of right shoulder pain. She states she is had pain in the shoulder for many years but has had worsening symptoms over the past several months. She did have a fall back in October which seemed to aggravate her symptoms. She was seen in the emergency room and had normal x-rays at that time. She does have severe rheumatoid arthritis affecting multiple joints.     Past Medical History:   Diagnosis Date    Allergic rhinitis     Hyperlipidemia     Hypertension     Hypothyroidism     probable autoimmune thyroiditis    LBBB (left bundle branch block)     ON EKG, had normal GXT    Rheumatoid arthritis(714.0)     Rheumatologist in [place]        The pain assessment was noted & is as follows:  Pain Assessment  Location of Pain: Shoulder  Location Modifiers: Right  Severity of Pain: 8  Quality of Pain: Aching  Duration of Pain: Persistent  Frequency of Pain: Constant]      Work Status/Functionality:     Past Medical History: Medical history form was reviewed today & can be found in the media tab  Past Medical History:   Diagnosis Date    Allergic rhinitis     Hyperlipidemia     Hypertension     Hypothyroidism     probable autoimmune thyroiditis    LBBB (left bundle branch block)     ON EKG, had normal GXT    Rheumatoid arthritis(714.0)     Rheumatologist in [place]      Past Surgical History:     Past Surgical History:   Procedure Laterality Date     SECTION      x2    COLONOSCOPY  2005    JOINT REPLACEMENT Left 13    left total knee replacement     Current Medications:     Current Outpatient Medications:     olmesartan-hydrochlorothiazide (BENICAR HCT) 40-12.5 MG per tablet, Take 1 tablet by mouth daily Discontinue the Diovan, Disp: 30 tablet, Rfl: 5   levothyroxine (SYNTHROID) 25 MCG tablet, TAKE 1 TABLET BY MOUTH EVERY DAY, Disp: 90 tablet, Rfl: 0    folic acid (FOLVITE) 1 MG tablet, TAKE 1 TABLET BY MOUTH EVERY DAY, Disp: 90 tablet, Rfl: 0    methotrexate (RHEUMATREX) 2.5 MG chemo tablet, TAKE 10 TABLETS EVERY MONDAY, Disp: 130 tablet, Rfl: 3    KRILL OIL PO, Take by mouth, Disp: , Rfl:     tretinoin (RETIN-A) 0.05 % cream, APPLY TOPICALLY NIGHTLY, Disp: 45 g, Rfl: 1    fish oil-omega-3 fatty acids 1000 MG capsule, Take 5 g by mouth daily. , Disp: , Rfl:   Allergies:  Patient has no known allergies. Social History:    reports that she has quit smoking. She has never used smokeless tobacco. She reports current alcohol use of about 2.0 standard drinks of alcohol per week. She reports that she does not use drugs. Family History:   Family History   Problem Relation Age of Onset    Heart Disease Mother         very slow heart beat    Thyroid Disease Father     Heart Disease Father        REVIEW OF SYSTEMS:   For new problems, a full review of systems will be found scanned in the patient's chart. CONSTITUTIONAL: Denies unexplained weight loss, fevers, chills   NEUROLOGICAL: Denies unsteady gait or progressive weakness  SKIN: Denies skin changes, delayed healing, rash, itching       PHYSICAL EXAM:    Vitals: Height 5' 5\" (1.651 m), weight 130 lb 1.1 oz (59 kg). GENERAL EXAM:  · General Apparence: Patient is adequately groomed with no evidence of malnutrition. · Orientation: The patient is oriented to time, place and person. · Mood & Affect:The patient's mood and affect are appropriate       Right shoulder PHYSICAL EXAMINATION:  · Inspection: She has visible rheumatoid deformities of her hands.   No visible deformity of the shoulder    · Palpation: Tenderness to palpation at the subacromial space and acromioclavicular joint    · Range of Motion: She is able to get her arm overhead however, has to perform passive motion with assistance from her other hand during overhead motion. · Strength: Severe supraspinatus weakness    · Special Tests: Positive drop arm testing. No pain with crossarm testing. · Skin:  There are no rashes, ulcerations or lesions. · There are no dysvascular changes     Gait & station: Normal gait      Additional Examinations:        Left Upper Extremity: Examination of the left upper extremity does not show any tenderness, deformity or injury. Range of motion is unremarkable. There is no gross instability. There are no rashes, ulcerations or lesions. Strength and tone are normal.      Diagnostic Testing: The following x rays were read and interpreted by myself      1.  4 x-rays of the right shoulder were taken today and reveal a high riding humeral head with significant acromioclavicular arthritic changes    Orders     Orders Placed This Encounter   Procedures    XR SHOULDER RIGHT (MIN 2 VIEWS)     Standing Status:   Future     Number of Occurrences:   1     Standing Expiration Date:   1/8/2021         Assessment / Treatment Plan:     1. Right shoulder rotator cuff tear arthropathy    2. Right shoulder acromioclavicular arthritis    I discussed with the patient the chronic nature of this injury and treatment options. She is interested in attempting a cortisone injection to help with her pain. She understands that this will likely not help improve her function however, she does not seem terribly limited with this. She will return to the clinic if her symptoms are not improved with this treatment. I discussed in detail the risk, benefits, and complications of an injection which included but are not limited to infection, skin reactions, hot swollen joints, and anaphylaxis with the patient. The patient verbalized good understanding and gave informed consent for the injection. The skin was prepped using sterile alcohol solution.  A sterile 22-gauge needle was inserted into the subacromial space and a mixture of 4 mL of 2% Carbocaine, 4 mL of 0.25% Marcaine, and 80 mg of Depo-Medrol was injected under sterile technique. The needle was withdrawn and the puncture site sealed with a Band-Aid. Technique: Under sterile conditions a SonKiio ultrasound unit with a variable frequency (6.0-15.0 MHz) linear transducer was used to localize the placement of a 22-gauge needle into the right subacromial space. Findings: Successful needle placement for  subacromial space injection. Final images were taken and saved for permanent record. The patient tolerated the injection well. The patient was instructed to call the office immediately if there is any pain, redness, warmth, fever, or chills. I have personally performed and/or participated in the history, exam and medical decision making and agree with all pertinent clinical information. I have also reviewed and agree with the past medical, family and social history unless otherwise noted. This dictation was performed with a verbal recognition program (DRAGON) and it was checked for errors. It is possible that there are still dictated errors within this office note. If so, please bring any errors to my attention for an addendum. All efforts were made to ensure that this office note is accurate.           Riky Ty MD

## 2020-01-08 NOTE — PROGRESS NOTES
Administrations This Visit     methylPREDNISolone acetate (DEPO-MEDROL) injection 80 mg     Admin Date  01/08/2020  14:18 Action  Given Dose  80 mg Route  Intra-articular Site  Shoulder Right Administered By  Jordi Britton MD    Ordering Provider:  Jordi Britton MD    NDC:  8996-0502-27    Lot#:  [id number]    :  Alicia Wang. Patient Supplied?:  No    Comments:  Lavena Adri QEU:[id number]  GZX#:[id number]    EXP:11/2022LIDOCAINE  PAB:6829-2897-26   LOT#:[id number]    EXP:2/2021

## 2020-01-17 ENCOUNTER — TELEPHONE (OUTPATIENT)
Dept: FAMILY MEDICINE CLINIC | Age: 80
End: 2020-01-17

## 2020-01-17 RX ORDER — AMOXICILLIN AND CLAVULANATE POTASSIUM 875; 125 MG/1; MG/1
1 TABLET, FILM COATED ORAL 2 TIMES DAILY
Qty: 20 TABLET | Refills: 0 | Status: SHIPPED | OUTPATIENT
Start: 2020-01-17 | End: 2020-01-27

## 2020-01-17 NOTE — TELEPHONE ENCOUNTER
Patient still with cough, congestion, head congestion, she did get a little better with zpak, she still has thick green drainage and still has cough. She is unable to come in because of living in [place] and has to depend on her grand daughter to bring her in.

## 2020-02-13 ENCOUNTER — TELEPHONE (OUTPATIENT)
Dept: ORTHOPEDIC SURGERY | Age: 80
End: 2020-02-13

## 2020-02-25 RX ORDER — LEVOTHYROXINE SODIUM 0.03 MG/1
TABLET ORAL
Qty: 90 TABLET | Refills: 0 | Status: SHIPPED | OUTPATIENT
Start: 2020-02-25 | End: 2020-05-19 | Stop reason: SDUPTHER

## 2020-03-05 RX ORDER — VALSARTAN AND HYDROCHLOROTHIAZIDE 80; 12.5 MG/1; MG/1
TABLET, FILM COATED ORAL
Qty: 90 TABLET | Refills: 1 | OUTPATIENT
Start: 2020-03-05

## 2020-03-10 RX ORDER — LEVOTHYROXINE SODIUM 0.03 MG/1
TABLET ORAL
Qty: 30 TABLET | Refills: 2 | OUTPATIENT
Start: 2020-03-10

## 2020-03-31 RX ORDER — VALSARTAN AND HYDROCHLOROTHIAZIDE 80; 12.5 MG/1; MG/1
TABLET, FILM COATED ORAL
Qty: 90 TABLET | Refills: 1 | OUTPATIENT
Start: 2020-03-31

## 2020-04-06 ENCOUNTER — TELEPHONE (OUTPATIENT)
Dept: FAMILY MEDICINE CLINIC | Age: 80
End: 2020-04-06

## 2020-04-07 NOTE — TELEPHONE ENCOUNTER
Pt called and stated she thinks she missed a call from her provider, I explained her provider is at lunch currently, however I will leave message for him to try her again.

## 2020-05-19 RX ORDER — LEVOTHYROXINE SODIUM 0.03 MG/1
TABLET ORAL
Qty: 90 TABLET | Refills: 0 | Status: SHIPPED | OUTPATIENT
Start: 2020-05-19 | End: 2020-08-17

## 2020-05-19 RX ORDER — LEVOTHYROXINE SODIUM 0.03 MG/1
TABLET ORAL
Qty: 90 TABLET | Refills: 0 | OUTPATIENT
Start: 2020-05-19

## 2020-05-29 ENCOUNTER — VIRTUAL VISIT (OUTPATIENT)
Dept: FAMILY MEDICINE CLINIC | Age: 80
End: 2020-05-29
Payer: MEDICARE

## 2020-05-29 PROCEDURE — 99214 OFFICE O/P EST MOD 30 MIN: CPT | Performed by: FAMILY MEDICINE

## 2020-05-29 NOTE — PROGRESS NOTES
2020    TELEHEALTH EVALUATION -- Audio/Visual (During XAHGK-27 public health emergency) using [employer]. me video visit. Patient identification was verified at the start of this visit. History:    Kevyn Chapin (:  1940) has requested an audio/video evaluation for follow up of her rheumatoid arthritis hypertension hypothyroidism and hyperlipidemia her last blood work was 1 year ago she no longer lives in [facility] so it is difficult for her to come in for a visit and blood work. Overall she is doing well. She needed refill of her rheumatoid medication. She still sees rheumatology occasionally but not recently. She has occasional issues with allergies otherwise doing okay  Her medication list, problem list, allergies and most recent labs were reviewed. She  reports that she has quit smoking. She has never used smokeless tobacco.   She is taking her medications as directed without side effects     Review of systems:                  Chest pain or discomfort - no                  Shortness of breath - no                  Wheezing - no                  New swelling - no    PHYSICAL EXAMINATION:    Vital Signs: (As obtained by patient/caregiver or practitioner observation) No vitals taken today    Constitutional:   Appears well-developed and well-nourished   No apparent distress                          Mental status:  Alert and awake   Oriented to person/place/time   Able to follow commands      Psychiatric:              mood and affect are normal               speech and thought processes are normal.  No hallucinations. No bizarre ideation. appearance and behavior are unremarkable  Eyes:  EOM are intact  Sclera, lids and lashes are clear                 No discharge noted    HENT:   Normocephalic, atraumatic.     Mouth/Throat: Mucous membranes appear moist.     External Ears:  Normal      Neck:   No visualized mass     Pulmonary/Chest:   Respiratory effort normal, no visualized signs

## 2020-06-26 RX ORDER — OLMESARTAN MEDOXOMIL AND HYDROCHLOROTHIAZIDE 40/12.5 40; 12.5 MG/1; MG/1
1 TABLET ORAL DAILY
Qty: 90 TABLET | Refills: 1 | Status: SHIPPED | OUTPATIENT
Start: 2020-06-26 | End: 2020-12-11 | Stop reason: SDUPTHER

## 2020-08-05 RX ORDER — FEXOFENADINE HCL 180 MG/1
180 TABLET ORAL DAILY
Qty: 30 TABLET | Refills: 1 | Status: SHIPPED | OUTPATIENT
Start: 2020-08-05 | End: 2020-09-04

## 2020-08-12 ENCOUNTER — OFFICE VISIT (OUTPATIENT)
Dept: ORTHOPEDIC SURGERY | Age: 80
End: 2020-08-12
Payer: MEDICARE

## 2020-08-12 VITALS — HEIGHT: 65 IN | WEIGHT: 130 LBS | BODY MASS INDEX: 21.66 KG/M2

## 2020-08-12 PROCEDURE — 99212 OFFICE O/P EST SF 10 MIN: CPT | Performed by: PODIATRIST

## 2020-08-12 NOTE — PROGRESS NOTES
This is a follow-up for bilateral forefoot pain. She is redeveloped pain with a callus primarily on her right foot. She denies any open sore drainage. This is aggravated with activity and resolved mainly with rest.    She has a classic rheumatoid foot. She has severe flexion contractures and divergence of the toes on both feet. She has severe hallux valgus deformities with a subluxed hallux bilateral.  No open lesions are noted bilateral.  She has a well nucleated hyperkeratotic lesion underneath the third MTP of the right foot. She has palpable pedal pulses bilateral.  Her sensation is grossly intact bilateral.    Rheumatoid arthritis, hammertoes with severe hallux valgus bilateral, foot callus    We discussed the regular maintenance of the callus and accommodative padding options. I also recommended use of a custom foot orthotic. We briefly discussed surgical treatment. I will see her back as needed.

## 2020-08-17 RX ORDER — LEVOTHYROXINE SODIUM 0.03 MG/1
TABLET ORAL
Qty: 90 TABLET | Refills: 1 | Status: SHIPPED | OUTPATIENT
Start: 2020-08-17 | End: 2020-12-11 | Stop reason: SDUPTHER

## 2020-08-17 NOTE — TELEPHONE ENCOUNTER
Future appt scheduled due in 11/19  Last appt 5/29  Refilled medication per verbal order from provider.

## 2020-09-10 RX ORDER — FEXOFENADINE HCL 180 MG/1
TABLET ORAL
Qty: 30 TABLET | Refills: 5 | Status: SHIPPED | OUTPATIENT
Start: 2020-09-10 | End: 2020-12-11 | Stop reason: SDUPTHER

## 2020-09-18 ENCOUNTER — TELEPHONE (OUTPATIENT)
Dept: FAMILY MEDICINE CLINIC | Age: 80
End: 2020-09-18

## 2020-09-18 RX ORDER — AZITHROMYCIN 250 MG/1
250 TABLET, FILM COATED ORAL SEE ADMIN INSTRUCTIONS
Qty: 6 TABLET | Refills: 0 | Status: SHIPPED | OUTPATIENT
Start: 2020-09-18 | End: 2020-09-23

## 2020-09-18 NOTE — TELEPHONE ENCOUNTER
Called pt to get more info. Pt states she has had a dry cough that seems to be getting a little worse over past couple days. Yesterday was sneezing, and has runny/dripping nose. Pt stated her throat has begun to become irritated. Pt has taken/used no otc meds. Pt is asking for advise from her pcp. Please Advise. Thank you.

## 2020-09-18 NOTE — TELEPHONE ENCOUNTER
----- Message from Bettie Doug sent at 9/18/2020 10:49 AM EDT -----  Subject: Message to Provider    QUESTIONS  Information for Provider? pt refused to be transferred to nurse triage and   has a cough and runny nose and would like to speak to   ---------------------------------------------------------------------------  --------------  CALL BACK INFO  What is the best way for the office to contact you? OK to leave message on   voicemail  Preferred Call Back Phone Number? [phone]  ---------------------------------------------------------------------------  --------------  SCRIPT ANSWERS  Relationship to Patient?  Self

## 2020-09-25 ENCOUNTER — TELEPHONE (OUTPATIENT)
Dept: FAMILY MEDICINE CLINIC | Age: 80
End: 2020-09-25

## 2020-09-25 RX ORDER — FLUTICASONE PROPIONATE 50 MCG
1 SPRAY, SUSPENSION (ML) NASAL DAILY
Qty: 1 BOTTLE | Refills: 0 | Status: SHIPPED | OUTPATIENT
Start: 2020-09-25 | End: 2020-10-09 | Stop reason: SDUPTHER

## 2020-09-25 NOTE — TELEPHONE ENCOUNTER
----- Message from Mita Neerajks sent at 9/25/2020 10:06 AM EDT -----  Subject: Message to Provider    QUESTIONS  Information for Provider? Patient has completed antibiotics on 9/22/2020   and is still coughing up clear phlegm. She wants to know if she should be   seen or given more antibiotics.  ---------------------------------------------------------------------------  --------------  CALL BACK INFO  What is the best way for the office to contact you? OK to leave message on   voicemail  Preferred Call Back Phone Number? [phone]  ---------------------------------------------------------------------------  --------------  SCRIPT ANSWERS  Relationship to Patient?  Self

## 2020-10-05 NOTE — TELEPHONE ENCOUNTER
Patient concerned if she should take her methotrexate because it can lower her immune system and she has allergies.

## 2020-10-06 NOTE — TELEPHONE ENCOUNTER
She should stay on it because her RA will flare up off of it.   She needs to avoid being around others except her close family until pandemic better

## 2020-10-07 ENCOUNTER — TELEPHONE (OUTPATIENT)
Dept: FAMILY MEDICINE CLINIC | Age: 80
End: 2020-10-07

## 2020-10-09 RX ORDER — FLUTICASONE PROPIONATE 50 MCG
1-2 SPRAY, SUSPENSION (ML) NASAL DAILY
Qty: 1 BOTTLE | Refills: 2 | Status: SHIPPED | OUTPATIENT
Start: 2020-10-09 | End: 2020-11-10

## 2020-10-19 ENCOUNTER — TELEPHONE (OUTPATIENT)
Dept: FAMILY MEDICINE CLINIC | Age: 80
End: 2020-10-19

## 2020-10-19 NOTE — TELEPHONE ENCOUNTER
Patient wanted you to know. She is going to test for COVID because of a family member that lives with her tested positive. She also stated she was going to hold off this week on taking her methotrexate.

## 2020-11-10 RX ORDER — FLUTICASONE PROPIONATE 50 MCG
SPRAY, SUSPENSION (ML) NASAL
Qty: 1 BOTTLE | Refills: 3 | Status: SHIPPED | OUTPATIENT
Start: 2020-11-10 | End: 2020-11-16

## 2020-11-10 NOTE — TELEPHONE ENCOUNTER
Refilled medication per verbal order from provider.   Future appt scheduled 12/11/2020  Last appt 05/29/2020

## 2020-11-16 RX ORDER — FLUTICASONE PROPIONATE 50 MCG
SPRAY, SUSPENSION (ML) NASAL
Qty: 1 BOTTLE | Refills: 2 | Status: SHIPPED | OUTPATIENT
Start: 2020-11-16 | End: 2020-12-11 | Stop reason: SDUPTHER

## 2020-12-07 ENCOUNTER — TELEPHONE (OUTPATIENT)
Dept: FAMILY MEDICINE CLINIC | Age: 80
End: 2020-12-07

## 2020-12-07 NOTE — TELEPHONE ENCOUNTER
----- Message from Natasha Wolf sent at 12/7/2020 12:48 PM EST -----  Subject: Message to Provider    QUESTIONS  Information for Provider? Pt would like to see if apt on 12/11 at 11am can   be switched to a VV apt. if so pt would like to do that instead of coming   in the office. If not pt would like to reschedule for 12/16 in the am if   anything is available. Call back [facility] at [phone]. VV   ---------------------------------------------------------------------------  --------------  CALL BACK INFO  What is the best way for the office to contact you? OK to leave message on   voicemail  Preferred Call Back Phone Number? [phone]  ---------------------------------------------------------------------------  --------------  SCRIPT ANSWERS  Relationship to Patient? Other  Representative Name? [place]  Is the Representative on the appropriate HIPAA document in Epic?  Yes

## 2020-12-09 ENCOUNTER — TELEPHONE (OUTPATIENT)
Dept: FAMILY MEDICINE CLINIC | Age: 80
End: 2020-12-09

## 2020-12-09 NOTE — TELEPHONE ENCOUNTER
----- Message from Leonardo Lugo sent at 12/9/2020 12:29 PM EST -----  Subject: Message to Provider    QUESTIONS  Information for Provider? Deysi Solo - Pt would like for Dr. Keila Alexander to give   her a call on her cell. Pt would like to talk to him prior to seeing Chandutyrell Izquierdo   on Friday 12/11 at [address] [facility] [phone]  ---------------------------------------------------------------------------  --------------  Roya Grass INFO  What is the best way for the office to contact you? OK to leave message on   voicemail  Preferred Call Back Phone Number? [phone]  ---------------------------------------------------------------------------  --------------  SCRIPT ANSWERS  Relationship to Patient? Other  Representative Name? Deysi Solo  Is the Representative on the appropriate HIPAA document in Epic?  Yes

## 2020-12-09 NOTE — TELEPHONE ENCOUNTER
----- Message from Iron Nagel sent at 12/9/2020 12:29 PM EST -----  Subject: Message to Provider    QUESTIONS  Information for Provider? Meghan Castro - Pt would like for Dr. Catalina Martino to give   her a call on her cell. Pt would like to talk to him prior to seeing Howard Iyernino   on Friday 12/11 at [address] [facility] [phone]  ---------------------------------------------------------------------------  --------------  Adelaide COSTELLO  What is the best way for the office to contact you? OK to leave message on   voicemail  Preferred Call Back Phone Number? [phone]  ---------------------------------------------------------------------------  --------------  SCRIPT ANSWERS  Relationship to Patient? Other  Representative Name? Meghan Castro  Is the Representative on the appropriate HIPAA document in Epic?  Yes

## 2020-12-11 ENCOUNTER — VIRTUAL VISIT (OUTPATIENT)
Dept: FAMILY MEDICINE CLINIC | Age: 80
End: 2020-12-11
Payer: MEDICARE

## 2020-12-11 PROCEDURE — 99214 OFFICE O/P EST MOD 30 MIN: CPT | Performed by: FAMILY MEDICINE

## 2020-12-11 RX ORDER — FLUTICASONE PROPIONATE 50 MCG
SPRAY, SUSPENSION (ML) NASAL
Qty: 3 BOTTLE | Refills: 2 | Status: SHIPPED | OUTPATIENT
Start: 2020-12-11 | End: 2021-01-15 | Stop reason: SDUPTHER

## 2020-12-11 RX ORDER — OLMESARTAN MEDOXOMIL AND HYDROCHLOROTHIAZIDE 40/12.5 40; 12.5 MG/1; MG/1
1 TABLET ORAL DAILY
Qty: 90 TABLET | Refills: 1 | Status: SHIPPED | OUTPATIENT
Start: 2020-12-11 | End: 2020-12-18

## 2020-12-11 RX ORDER — LEVOTHYROXINE SODIUM 0.03 MG/1
TABLET ORAL
Qty: 90 TABLET | Refills: 1 | Status: SHIPPED | OUTPATIENT
Start: 2020-12-11 | End: 2021-01-15 | Stop reason: SDUPTHER

## 2020-12-11 RX ORDER — FEXOFENADINE HCL 180 MG/1
TABLET ORAL
Qty: 90 TABLET | Refills: 3 | Status: SHIPPED | OUTPATIENT
Start: 2020-12-11 | End: 2021-01-15 | Stop reason: SDUPTHER

## 2020-12-11 ASSESSMENT — PATIENT HEALTH QUESTIONNAIRE - PHQ9
SUM OF ALL RESPONSES TO PHQ QUESTIONS 1-9: 0
1. LITTLE INTEREST OR PLEASURE IN DOING THINGS: 0
SUM OF ALL RESPONSES TO PHQ9 QUESTIONS 1 & 2: 0
SUM OF ALL RESPONSES TO PHQ QUESTIONS 1-9: 0
SUM OF ALL RESPONSES TO PHQ QUESTIONS 1-9: 0
2. FEELING DOWN, DEPRESSED OR HOPELESS: 0

## 2020-12-11 NOTE — PATIENT INSTRUCTIONS
Please read the healthy family handout that you were given and share it with your family. Please compare this printed medication list with your medications at home to be sure they are the same. If you have any medications that are different please contact us immediately at [phone]. Also review your allergies that we have listed, these may also include medications that you have not been able to tolerate, make sure everything listed is correct. If you have any allergies that are different please contact us immediately at [phone].

## 2020-12-11 NOTE — PROGRESS NOTES
2020    TELEHEALTH EVALUATION -- Audio/Visual (During [id number]-60 public health emergency) using [employer]. me video visit. Patient identification was verified at the start of this visit. History:    Connie Chapin (:  1940) has requested an audio/video evaluation for follow up of rheumatoid arthritis hypertension and hypothyroidism. She had blood work back in  and she states she saw Dr. Yao Shaw her rheumatologist in October and he did blood work on her again for her rheumatoid arthritis. Overall she is doing well her daughter is concerned about her going up and down stairs because she cannot  with her hands because of her severe deformities from rheumatoid arthritis. Dr. Yao Shaw back in  told her she could go to Dr. Markel Spivey the hand surgeon at [facility] to possibly have surgery but they have been holding off of that because of pandemic. They are trying to fix the second floor of the home where she lives so that she can have a small kitchen and bathroom and everything on one floor to avoid going up and down stairs. She currently lives with daughter. And granddaughter. Her medication list, problem list, allergies and most recent labs were reviewed. She  reports that she has quit smoking.  She has never used smokeless tobacco.   She is taking her medications as directed without side effects     Review of systems:                  Chest pain or discomfort - no                  Shortness of breath - no                  Wheezing - no                  New swelling - no    PHYSICAL EXAMINATION:    Vital Signs: (As obtained by patient/caregiver or practitioner observation) No vitals taken today    Constitutional:   Appears well-developed and well-nourished   No apparent distress                          Mental status:  Alert and awake   Oriented to person/place/time   Able to follow commands      Psychiatric:              mood and affect are normal               speech and thought processes are normal.  No hallucinations. No bizarre ideation. appearance and behavior are unremarkable  Eyes:  EOM are intact  Sclera, lids and lashes are clear                 No discharge noted    HENT:   Normocephalic, atraumatic. Mouth/Throat: Mucous membranes appear moist.     External Ears:  Normal      Neck:   No visualized mass     Pulmonary/Chest:   Respiratory effort normal, no visualized signs of difficulty breathing or respiratory distress              Neurological:          No Facial Asymmetry (Cranial nerve 7 motor function) (limited exam to video visit)                              No gaze palsy              Skin:   No significant exanthematous lesions or discoloration noted on facial skin        Other pertinent observable physical exam findings: Severe deformity and rheumatoid nodules noted on hand exam over video. Due to this being a TeleHealth encounter, evaluation of the following organ systems is limited: Vitals/Constitutional/EENT/Resp/CV/GI//MS/Neuro/Skin/Heme-Lymph-Imm. Assessment  1. Rheumatoid arthritis involving multiple joints ([address]    2. Hypothyroidism (acquired)    3. Essential hypertension      Worsening of her debility due to rheumatoid arthritis. Plan  Discussed use, benefit, and side effects of prescribed medications. Barriers to medication compliance addressed. All patient questions answered. Pt voiced understanding. Avoid tobacco products. Health maintenance and preventive medicine recommendations were reviewed with the patient. She was advised to call the office for any problems which may occur before the next scheduled appointment.    We will need to get lab work done before next visit  Orders Placed This Encounter   Medications    methotrexate (RHEUMATREX) 2.5 MG chemo tablet     Sig: TAKE 8 TABLETS EVERY MONDAY     Dispense:  96 tablet     Refill:  1    fluticasone (FLONASE) 50 MCG/ACT nasal spray     Sig: USE 1 SPRAY BY EACH NOSTRIL ROUTE DAILY

## 2020-12-18 RX ORDER — OLMESARTAN MEDOXOMIL AND HYDROCHLOROTHIAZIDE 40/12.5 40; 12.5 MG/1; MG/1
1 TABLET ORAL DAILY
Qty: 90 TABLET | Refills: 1 | Status: SHIPPED | OUTPATIENT
Start: 2020-12-18 | End: 2021-01-15 | Stop reason: SDUPTHER

## 2020-12-18 NOTE — TELEPHONE ENCOUNTER
Refilled medication per verbal order from provider.   Future appt is due 06/2021  Last appt 12/11/2020

## 2021-01-14 DIAGNOSIS — I10 ESSENTIAL HYPERTENSION: ICD-10-CM

## 2021-01-14 DIAGNOSIS — M06.9 RHEUMATOID ARTHRITIS INVOLVING MULTIPLE JOINTS (HCC): ICD-10-CM

## 2021-01-14 DIAGNOSIS — E03.9 HYPOTHYROIDISM (ACQUIRED): ICD-10-CM

## 2021-01-15 RX ORDER — FLUTICASONE PROPIONATE 50 MCG
SPRAY, SUSPENSION (ML) NASAL
Qty: 3 BOTTLE | Refills: 1 | Status: SHIPPED | OUTPATIENT
Start: 2021-01-15 | End: 2021-02-12 | Stop reason: SDUPTHER

## 2021-01-15 RX ORDER — OLMESARTAN MEDOXOMIL AND HYDROCHLOROTHIAZIDE 40/12.5 40; 12.5 MG/1; MG/1
1 TABLET ORAL DAILY
Qty: 90 TABLET | Refills: 1 | Status: SHIPPED | OUTPATIENT
Start: 2021-01-15 | End: 2021-02-12 | Stop reason: SDUPTHER

## 2021-01-15 RX ORDER — FEXOFENADINE HCL 180 MG/1
TABLET ORAL
Qty: 90 TABLET | Refills: 1 | Status: SHIPPED | OUTPATIENT
Start: 2021-01-15 | End: 2021-02-12 | Stop reason: SDUPTHER

## 2021-01-15 RX ORDER — LEVOTHYROXINE SODIUM 0.03 MG/1
TABLET ORAL
Qty: 90 TABLET | Refills: 1 | Status: SHIPPED | OUTPATIENT
Start: 2021-01-15 | End: 2021-02-12 | Stop reason: SDUPTHER

## 2021-01-18 ENCOUNTER — TELEPHONE (OUTPATIENT)
Dept: FAMILY MEDICINE CLINIC | Age: 81
End: 2021-01-18

## 2021-01-20 ENCOUNTER — TELEPHONE (OUTPATIENT)
Dept: FAMILY MEDICINE CLINIC | Age: 81
End: 2021-01-20

## 2021-01-20 NOTE — LETTER
[address] [id number]  Phone: [phone]  Fax: [phone]          January 20, 2021     Patient: Kal Chapin   YOB: 1940   Date of Visit: 1/20/2021       To Whom It May Concern: It is my medical opinion that Mary Chapin requires a disability parking placard for the following reasons:  She cannot walk 200 feet without stopping to rest.  Duration of need: 5 years    If you have any questions or concerns, please don't hesitate to call.     Sincerely,          Marcelo Keyes, CNP

## 2021-02-11 DIAGNOSIS — I10 ESSENTIAL HYPERTENSION: ICD-10-CM

## 2021-02-11 RX ORDER — OLMESARTAN MEDOXOMIL AND HYDROCHLOROTHIAZIDE 40/12.5 40; 12.5 MG/1; MG/1
1 TABLET ORAL DAILY
Qty: 90 TABLET | Refills: 1 | OUTPATIENT
Start: 2021-02-11

## 2021-02-12 DIAGNOSIS — I10 ESSENTIAL HYPERTENSION: ICD-10-CM

## 2021-02-12 DIAGNOSIS — E03.9 HYPOTHYROIDISM (ACQUIRED): ICD-10-CM

## 2021-02-12 DIAGNOSIS — M06.9 RHEUMATOID ARTHRITIS INVOLVING MULTIPLE JOINTS (HCC): ICD-10-CM

## 2021-02-12 RX ORDER — FLUTICASONE PROPIONATE 50 MCG
SPRAY, SUSPENSION (ML) NASAL
Qty: 3 BOTTLE | Refills: 5 | Status: SHIPPED | OUTPATIENT
Start: 2021-02-12 | End: 2021-02-24 | Stop reason: DRUGHIGH

## 2021-02-12 RX ORDER — LEVOTHYROXINE SODIUM 0.03 MG/1
TABLET ORAL
Qty: 90 TABLET | Refills: 5 | Status: SHIPPED | OUTPATIENT
Start: 2021-02-12 | End: 2021-02-24 | Stop reason: SDUPTHER

## 2021-02-12 RX ORDER — FEXOFENADINE HCL 180 MG/1
TABLET ORAL
Qty: 90 TABLET | Refills: 5 | Status: SHIPPED | OUTPATIENT
Start: 2021-02-12 | End: 2021-02-24 | Stop reason: SDUPTHER

## 2021-02-12 RX ORDER — OLMESARTAN MEDOXOMIL AND HYDROCHLOROTHIAZIDE 40/12.5 40; 12.5 MG/1; MG/1
1 TABLET ORAL DAILY
Qty: 90 TABLET | Refills: 5 | Status: SHIPPED | OUTPATIENT
Start: 2021-02-12 | End: 2021-02-24 | Stop reason: DRUGHIGH

## 2021-02-24 DIAGNOSIS — E03.9 HYPOTHYROIDISM (ACQUIRED): ICD-10-CM

## 2021-02-24 DIAGNOSIS — I10 ESSENTIAL HYPERTENSION: ICD-10-CM

## 2021-02-24 RX ORDER — FLUTICASONE PROPIONATE 50 MCG
SPRAY, SUSPENSION (ML) NASAL
Qty: 3 BOTTLE | Refills: 1 | Status: SHIPPED
Start: 2021-02-24 | End: 2021-04-07

## 2021-02-24 RX ORDER — LEVOTHYROXINE SODIUM 0.03 MG/1
TABLET ORAL
Qty: 90 TABLET | Refills: 1 | Status: SHIPPED | OUTPATIENT
Start: 2021-02-24 | End: 2021-05-14 | Stop reason: SDUPTHER

## 2021-02-24 RX ORDER — LEVOTHYROXINE SODIUM 0.03 MG/1
TABLET ORAL
Qty: 10 TABLET | Refills: 0 | Status: SHIPPED | OUTPATIENT
Start: 2021-02-24 | End: 2021-02-24

## 2021-02-24 RX ORDER — FOLIC ACID 1 MG/1
TABLET ORAL
Qty: 90 TABLET | Refills: 1 | Status: SHIPPED | OUTPATIENT
Start: 2021-02-24 | End: 2021-05-14 | Stop reason: SDUPTHER

## 2021-02-24 RX ORDER — FEXOFENADINE HCL 180 MG/1
TABLET ORAL
Qty: 90 TABLET | Refills: 1 | Status: SHIPPED | OUTPATIENT
Start: 2021-02-24 | End: 2021-07-09 | Stop reason: DRUGHIGH

## 2021-02-24 RX ORDER — OLMESARTAN MEDOXOMIL AND HYDROCHLOROTHIAZIDE 40/12.5 40; 12.5 MG/1; MG/1
1 TABLET ORAL DAILY
Qty: 90 TABLET | Refills: 1 | Status: ON HOLD
Start: 2021-02-24 | End: 2021-03-20 | Stop reason: HOSPADM

## 2021-02-24 NOTE — TELEPHONE ENCOUNTER
Future appt scheduled 0 appt scheduled-Return in about 6 months (around 6/11/2021) for Fasting. Last appt 12/11/2020      Last Written 02/24/2021    levothyroxine (SYNTHROID) 25 MCG tablet  #10 tablets   0 RF         Previous sent was only for #10          Refilled medication per verbal order from provider.

## 2021-03-18 ENCOUNTER — APPOINTMENT (OUTPATIENT)
Dept: CT IMAGING | Age: 81
DRG: 392 | End: 2021-03-18
Payer: MEDICARE

## 2021-03-18 ENCOUNTER — HOSPITAL ENCOUNTER (INPATIENT)
Age: 81
LOS: 2 days | Discharge: HOME | DRG: 392 | End: 2021-03-20
Attending: EMERGENCY MEDICINE | Admitting: HOSPITALIST
Payer: MEDICARE

## 2021-03-18 ENCOUNTER — APPOINTMENT (OUTPATIENT)
Dept: GENERAL RADIOLOGY | Age: 81
DRG: 392 | End: 2021-03-18
Payer: MEDICARE

## 2021-03-18 DIAGNOSIS — N73.9 PELVIC ABSCESS IN FEMALE: ICD-10-CM

## 2021-03-18 DIAGNOSIS — R19.00 PELVIC MASS: Primary | ICD-10-CM

## 2021-03-18 PROBLEM — K61.1 PERIRECTAL ABSCESS: Status: ACTIVE | Noted: 2021-03-18

## 2021-03-18 PROBLEM — N94.89 ADNEXAL MASS: Status: ACTIVE | Noted: 2021-03-18

## 2021-03-18 PROBLEM — N94.9 ADNEXAL CYST: Status: ACTIVE | Noted: 2021-03-18

## 2021-03-18 PROBLEM — E87.1 HYPONATREMIA: Status: ACTIVE | Noted: 2021-03-18

## 2021-03-18 PROBLEM — K65.1 INTRA-ABDOMINAL ABSCESS (HCC): Status: ACTIVE | Noted: 2021-03-18

## 2021-03-18 LAB
A/G RATIO: 1 (ref 1.1–2.2)
ABO/RH: NORMAL
ALBUMIN SERPL-MCNC: 4 G/DL (ref 3.4–5)
ALP BLD-CCNC: 72 U/L (ref 40–129)
ALT SERPL-CCNC: 15 U/L (ref 10–40)
ANION GAP SERPL CALCULATED.3IONS-SCNC: 12 MMOL/L (ref 3–16)
ANTIBODY SCREEN: NORMAL
APTT: 26.7 SEC (ref 24.2–36.2)
AST SERPL-CCNC: 21 U/L (ref 15–37)
BASOPHILS ABSOLUTE: 0 K/UL (ref 0–0.2)
BASOPHILS RELATIVE PERCENT: 0.1 %
BILIRUB SERPL-MCNC: 0.9 MG/DL (ref 0–1)
BILIRUBIN URINE: NEGATIVE
BLOOD, URINE: NEGATIVE
BUN BLDV-MCNC: 10 MG/DL (ref 7–20)
C DIFF TOXIN/ANTIGEN: NORMAL
CALCIUM SERPL-MCNC: 9.6 MG/DL (ref 8.3–10.6)
CHLORIDE BLD-SCNC: 92 MMOL/L (ref 99–110)
CLARITY: CLEAR
CO2: 24 MMOL/L (ref 21–32)
COLOR: YELLOW
CREAT SERPL-MCNC: <0.5 MG/DL (ref 0.6–1.2)
EOSINOPHILS ABSOLUTE: 0 K/UL (ref 0–0.6)
EOSINOPHILS RELATIVE PERCENT: 0.2 %
GFR AFRICAN AMERICAN: >60
GFR NON-AFRICAN AMERICAN: >60
GLOBULIN: 3.9 G/DL
GLUCOSE BLD-MCNC: 122 MG/DL (ref 70–99)
GLUCOSE URINE: NEGATIVE MG/DL
HCT VFR BLD CALC: 38.5 % (ref 36–48)
HEMOGLOBIN: 12.9 G/DL (ref 12–16)
INR BLD: 1.21 (ref 0.86–1.14)
KETONES, URINE: 40 MG/DL
LACTIC ACID: 0.8 MMOL/L (ref 0.4–2)
LEUKOCYTE ESTERASE, URINE: NEGATIVE
LIPASE: 25 U/L (ref 13–60)
LYMPHOCYTES ABSOLUTE: 0.7 K/UL (ref 1–5.1)
LYMPHOCYTES RELATIVE PERCENT: 5.2 %
MCH RBC QN AUTO: 32.7 PG (ref 26–34)
MCHC RBC AUTO-ENTMCNC: 33.5 G/DL (ref 31–36)
MCV RBC AUTO: 97.7 FL (ref 80–100)
MICROSCOPIC EXAMINATION: ABNORMAL
MONOCYTES ABSOLUTE: 1.1 K/UL (ref 0–1.3)
MONOCYTES RELATIVE PERCENT: 8.4 %
NEUTROPHILS ABSOLUTE: 11.6 K/UL (ref 1.7–7.7)
NEUTROPHILS RELATIVE PERCENT: 86.1 %
NITRITE, URINE: NEGATIVE
PDW BLD-RTO: 14.2 % (ref 12.4–15.4)
PH UA: 6.5 (ref 5–8)
PLATELET # BLD: 323 K/UL (ref 135–450)
PMV BLD AUTO: 6.8 FL (ref 5–10.5)
POTASSIUM SERPL-SCNC: 3.8 MMOL/L (ref 3.5–5.1)
PROCALCITONIN: 0.06 NG/ML (ref 0–0.15)
PROTEIN UA: NEGATIVE MG/DL
PROTHROMBIN TIME: 14.1 SEC (ref 10–13.2)
RBC # BLD: 3.95 M/UL (ref 4–5.2)
SODIUM BLD-SCNC: 128 MMOL/L (ref 136–145)
SPECIFIC GRAVITY UA: 1.01 (ref 1–1.03)
TOTAL PROTEIN: 7.9 G/DL (ref 6.4–8.2)
URINE TYPE: ABNORMAL
UROBILINOGEN, URINE: 0.2 E.U./DL
WBC # BLD: 13.4 K/UL (ref 4–11)
WHITE BLOOD CELLS (WBC), STOOL: ABNORMAL

## 2021-03-18 PROCEDURE — G0378 HOSPITAL OBSERVATION PER HR: HCPCS

## 2021-03-18 PROCEDURE — 87449 NOS EACH ORGANISM AG IA: CPT

## 2021-03-18 PROCEDURE — 96366 THER/PROPH/DIAG IV INF ADDON: CPT

## 2021-03-18 PROCEDURE — 86850 RBC ANTIBODY SCREEN: CPT

## 2021-03-18 PROCEDURE — 84145 PROCALCITONIN (PCT): CPT

## 2021-03-18 PROCEDURE — 36415 COLL VENOUS BLD VENIPUNCTURE: CPT

## 2021-03-18 PROCEDURE — 86900 BLOOD TYPING SEROLOGIC ABO: CPT

## 2021-03-18 PROCEDURE — 85610 PROTHROMBIN TIME: CPT

## 2021-03-18 PROCEDURE — 96361 HYDRATE IV INFUSION ADD-ON: CPT

## 2021-03-18 PROCEDURE — 6360000002 HC RX W HCPCS: Performed by: EMERGENCY MEDICINE

## 2021-03-18 PROCEDURE — 81003 URINALYSIS AUTO W/O SCOPE: CPT

## 2021-03-18 PROCEDURE — 6360000004 HC RX CONTRAST MEDICATION: Performed by: EMERGENCY MEDICINE

## 2021-03-18 PROCEDURE — 2580000003 HC RX 258: Performed by: EMERGENCY MEDICINE

## 2021-03-18 PROCEDURE — 71045 X-RAY EXAM CHEST 1 VIEW: CPT

## 2021-03-18 PROCEDURE — 74177 CT ABD & PELVIS W/CONTRAST: CPT

## 2021-03-18 PROCEDURE — 83690 ASSAY OF LIPASE: CPT

## 2021-03-18 PROCEDURE — 1200000000 HC SEMI PRIVATE

## 2021-03-18 PROCEDURE — 83605 ASSAY OF LACTIC ACID: CPT

## 2021-03-18 PROCEDURE — 87040 BLOOD CULTURE FOR BACTERIA: CPT

## 2021-03-18 PROCEDURE — 83630 LACTOFERRIN FECAL (QUAL): CPT

## 2021-03-18 PROCEDURE — 86901 BLOOD TYPING SEROLOGIC RH(D): CPT

## 2021-03-18 PROCEDURE — 85730 THROMBOPLASTIN TIME PARTIAL: CPT

## 2021-03-18 PROCEDURE — 96365 THER/PROPH/DIAG IV INF INIT: CPT

## 2021-03-18 PROCEDURE — 83930 ASSAY OF BLOOD OSMOLALITY: CPT

## 2021-03-18 PROCEDURE — 85025 COMPLETE CBC W/AUTO DIFF WBC: CPT

## 2021-03-18 PROCEDURE — 99284 EMERGENCY DEPT VISIT MOD MDM: CPT

## 2021-03-18 PROCEDURE — 87505 NFCT AGENT DETECTION GI: CPT

## 2021-03-18 PROCEDURE — 86304 IMMUNOASSAY TUMOR CA 125: CPT

## 2021-03-18 PROCEDURE — 80053 COMPREHEN METABOLIC PANEL: CPT

## 2021-03-18 PROCEDURE — 2580000003 HC RX 258: Performed by: HOSPITALIST

## 2021-03-18 PROCEDURE — 87324 CLOSTRIDIUM AG IA: CPT

## 2021-03-18 RX ORDER — SODIUM CHLORIDE, SODIUM LACTATE, POTASSIUM CHLORIDE, CALCIUM CHLORIDE 600; 310; 30; 20 MG/100ML; MG/100ML; MG/100ML; MG/100ML
1000 INJECTION, SOLUTION INTRAVENOUS ONCE
Status: COMPLETED | OUTPATIENT
Start: 2021-03-18 | End: 2021-03-18

## 2021-03-18 RX ORDER — SODIUM CHLORIDE 9 MG/ML
INJECTION, SOLUTION INTRAVENOUS CONTINUOUS
Status: ACTIVE | OUTPATIENT
Start: 2021-03-18 | End: 2021-03-19

## 2021-03-18 RX ORDER — FLUTICASONE PROPIONATE 50 MCG
1 SPRAY, SUSPENSION (ML) NASAL DAILY
Status: DISCONTINUED | OUTPATIENT
Start: 2021-03-18 | End: 2021-03-20 | Stop reason: HOSPADM

## 2021-03-18 RX ORDER — ACETAMINOPHEN 500 MG
1000 TABLET ORAL EVERY 6 HOURS PRN
Status: DISCONTINUED | OUTPATIENT
Start: 2021-03-18 | End: 2021-03-19

## 2021-03-18 RX ORDER — LEVOTHYROXINE SODIUM 0.03 MG/1
25 TABLET ORAL DAILY
Status: DISCONTINUED | OUTPATIENT
Start: 2021-03-18 | End: 2021-03-20 | Stop reason: HOSPADM

## 2021-03-18 RX ORDER — MORPHINE SULFATE 2 MG/ML
2 INJECTION, SOLUTION INTRAMUSCULAR; INTRAVENOUS
Status: DISCONTINUED | OUTPATIENT
Start: 2021-03-18 | End: 2021-03-20 | Stop reason: HOSPADM

## 2021-03-18 RX ADMIN — SODIUM CHLORIDE, POTASSIUM CHLORIDE, SODIUM LACTATE AND CALCIUM CHLORIDE 1000 ML: 600; 310; 30; 20 INJECTION, SOLUTION INTRAVENOUS at 14:36

## 2021-03-18 RX ADMIN — IOPAMIDOL 75 ML: 755 INJECTION, SOLUTION INTRAVENOUS at 12:56

## 2021-03-18 RX ADMIN — PIPERACILLIN AND TAZOBACTAM 3375 MG: 3; .375 INJECTION, POWDER, LYOPHILIZED, FOR SOLUTION INTRAVENOUS at 14:36

## 2021-03-18 RX ADMIN — SODIUM CHLORIDE: 9 INJECTION, SOLUTION INTRAVENOUS at 18:58

## 2021-03-18 ASSESSMENT — PAIN DESCRIPTION - PAIN TYPE: TYPE: ACUTE PAIN

## 2021-03-18 ASSESSMENT — ENCOUNTER SYMPTOMS
RHINORRHEA: 0
VOMITING: 0
SHORTNESS OF BREATH: 0
WHEEZING: 0
COUGH: 0
CONSTIPATION: 1
ABDOMINAL DISTENTION: 0
BACK PAIN: 0
PHOTOPHOBIA: 0
DIARRHEA: 1
NAUSEA: 0

## 2021-03-18 ASSESSMENT — PAIN DESCRIPTION - LOCATION: LOCATION: ABDOMEN

## 2021-03-18 NOTE — ED NOTES
Called general surgery consult @[phone]  Re: pararectal abscess vs phlegmon per [email or web address] [facility] [employer]  03/18/21 9933

## 2021-03-18 NOTE — H&P
HOSPITALISTS HISTORY AND PHYSICAL    3/18/2021 11:16 PM    Patient Information:  Naresh Ba is a 80 y.o. female [id number]  PCP:  Whit Barney MD (Tel: [phone] )    Chief complaint:    Chief Complaint   Patient presents with    Abdominal Pain     per squad report pt c/o abdominal pain x 2 weeks. pt denies any vomiting but reports she has had some diarrhea. took immodium x 1 week ago. Pt reports \"I shouldnt have done that because now I have hard turds\"     Diarrhea        History of Present Illness:  Basil Chapin is a 80 y.o. female who presented to the ED to be evaluated for increasing abdominal pain ongoing x2 weeks PTA. Patient endorses initial diarrhea but now reports constipation with decreased stool output following Imodium usage. CT scan of the abdomen pelvis was obtained and notable for possible abscess and phlegmon with soft tissue mass; perforated diverticulitis or rectal mass suspected  Additional notation of 4 cm bilateral cystic adnexal masses in this postmenopausal patient make gynecologic origin another possibility. Labs were obtained and notable for hyponatremia as well as leukocytosis with left shift. Blood and urine cultures were collected, and patient was placed on IV Zosyn. Of note, patient is receiving MTX for RA therefore has an immunocompromise status. Consultation placed to general surgery with plans to admit to hospitalist team for intra-abdominal abscess. History obtained from patient and review of Epic chart      REVIEW OF SYSTEMS:   Constitutional: Negative for fever,chills,weight loss, and generalized weakness  ENT: Negative for rhinorrhea, epistaxis, hoarseness, and sore throat.   Respiratory: Negative for shortness of breath, wheezing, and cough  Cardiovascular: Negative for chest pain, palpitations, and peripheral edema; no orthopnea or PND  Gastrointestinal: Positive abdominal pain with overflow liquid stool with tenesmus; no hematemesis, hematochezia, or melena; no anorexia  Genitourinary: Negative for dysuria, frequency, hesitancy, and urgency; no incontinence  Hematologic/Lymphatic: Negative for bleeding tendency, excessive bruising, and enlarged LN  Musculoskeletal: Positive for myalgias and arthalgias; able to ambulate without difficulty  Neurologic: Negative for LOC, seizure activity, paresthesias, dysarthria, vertigo, and gait disturbance  Skin: Negative for itching,rash  Psychiatric: Negative for depression,anxiety, and agitation; denies SI/HI  Endocrine: Negative for polyuria/polydipsia/polyphagia; no heat/cold intolerance    Past Medical History:   has a past medical history of Allergic rhinitis, Hyperlipidemia, Hypertension, Hypothyroidism, LBBB (left bundle branch block), and Rheumatoid arthritis(714.0). Past Surgical History:   has a past surgical history that includes Colonoscopy ();  section; and joint replacement (Left, 13). Medications:  No current facility-administered medications on file prior to encounter.       Current Outpatient Medications on File Prior to Encounter   Medication Sig Dispense Refill    levothyroxine (SYNTHROID) 25 MCG tablet TAKE 1 TABLET BY MOUTH EVERY DAY 90 tablet 1    olmesartan-hydroCHLOROthiazide (BENICAR HCT) 40-12.5 MG per tablet Take 1 tablet by mouth daily Discontinue the Diovan 90 tablet 1    fluticasone (FLONASE) 50 MCG/ACT nasal spray USE 1 SPRAY BY EACH NOSTRIL ROUTE DAILY 3 Bottle 1    folic acid (FOLVITE) 1 MG tablet TAKE 1 TABLET BY MOUTH EVERY DAY 90 tablet 1    fexofenadine (ALLEGRA) 180 MG tablet TAKE 1 TABLET BY MOUTH EVERY DAY 90 tablet 1    methotrexate (RHEUMATREX) 2.5 MG chemo tablet TAKE 8 TABLETS EVERY MONDAY 96 tablet 5    diphenhydrAMINE HCl (ALLERGY MED PO) Take by mouth      VITAMIN D PO Take by mouth      Probiotic Product (PROBIOTIC PO) Take by mouth      Ascorbic Acid (VITAMIN C PO) Take by mouth      fish oil-omega-3 fatty acids 1000 MG capsule Take 5 g by mouth daily.  tretinoin (RETIN-A) 0.05 % cream APPLY TOPICALLY NIGHTLY (Patient taking differently: APPLY TOPICALLY NIGHTLY. TAKING PRN) 45 g 1       Allergies:  No Known Allergies     Social History:   reports that she has quit smoking. She has never used smokeless tobacco. She reports current alcohol use of about 2.0 standard drinks of alcohol per week. She reports that she does not use drugs. Family History:  family history includes Heart Disease in her father and mother; Thyroid Disease in her father.      Physical Exam:  /80   Pulse 80   Temp 97.9 °F (36.6 °C) (Oral)   Resp 16   Ht 5' 6\" (1.676 m)   Wt 130 lb (59 kg)   SpO2 97%   BMI 20.98 kg/m²     General appearance: Frail elderly female who appears mildly uncomfortable  Eyes: Sclera clear without conjunctival injection; PERRLA; EOMI  ENT: Mucous membranes moist without thrush; normal dentition  Neck: Supple without meningismus; no goiter; no carotid bruit bilaterally  Cardiovascular: Regular rhythm without ectopy; normal S1-S2 with no murmurs; no peripheral edema; no JVD  Respiratory: No tachypnea; CTAB with adequate air exchange, no wheeze, rhonchi or rales; I:E intact  Gastrointestinal: Abdomen with B LQ tenderness; no rebound or guarding;  bowel sounds normal   Musculoskeletal: FROM spine and extremities x4; no gross deformity  Neurology: A&O x3; cranial nerves 2-12 grossly intact; motor 5/5  BUE/BLE  Psychiatry: Appropriate affect  Skin: Warm, dry, normal turgor, no rash  PV: 2/4 radial and dorsalis pedis bilaterally; brisk capillary refill    Labs:  CBC:   Lab Results   Component Value Date    WBC 13.4 03/18/2021    RBC 3.95 03/18/2021    HGB 12.9 03/18/2021    HCT 38.5 03/18/2021    MCV 97.7 03/18/2021    MCH 32.7 03/18/2021    MCHC 33.5 03/18/2021    RDW 14.2 03/18/2021     03/18/2021    MPV 6.8 03/18/2021     BMP:    Lab Results   Component Value Date     03/18/2021    K 3.8 03/18/2021    CL 92 03/18/2021    CO2 24 03/18/2021    BUN 10 03/18/2021    CREATININE <0.5 03/18/2021    CALCIUM 9.6 03/18/2021    GFRAA >60 03/18/2021    GFRAA >60 03/01/2013    LABGLOM >60 03/18/2021    GLUCOSE 122 03/18/2021     XR CHEST PORTABLE   Final Result   Clear lungs. Cardiomegaly. No acute cardiopulmonary abnormality. CT ABDOMEN PELVIS W IV CONTRAST Additional Contrast? None   Final Result   1. Complex collection in the pelvis is nonspecific. This likely represents a   combination of abscess and phlegmon or abscess and soft tissue mass. The   exact origin is unclear. Perforated diverticulitis or perforated rectal mass   is suspected. Gynecologic origin cannot be excluded. Surgical evaluation is   suggested. 2. 4 cm bilateral adnexal cysts. In this age group this is an abnormal   finding. Further evaluation with pelvic ultrasound and gynecologic   evaluation is suggested. EKG: Pending at time of dictation    I visualized CXR images and EKG strips personally and agree with documented interpretation    Discussed case  with ED provider    Problem List  Principal Problem:    Intra-abdominal abscess ([address]  Active Problems:    Essential hypertension    Adnexal mass    Hyponatremia    Hypothyroidism (acquired)    Rheumatoid arthritis involving multiple joints ([address]  Resolved Problems:    * No resolved hospital problems.  *        Assessment/Plan:     Intra-abdominal abscess/phlegmon with concerns for colonic perforation  -Patient admitted to telemetry floor for continuous observation and monitoring  -Appropriate isolation measures in place  -Crystalloid IVF resuscitation at initiated in ED and will be continued overnight r  -Strict I's and O's   -Blood, urine, and stool cultures ordered STAT  -Patient initiated on IV Zosyn empirically to provide broad spread coverage  -IV morphine scheduled sparingly for pain control  -Serial CBC, CMP, lactate, procalcitonin, and other inflammatory markers to monitor treatment progression  -Consultation placed to general surgery for further recommendations; patient n.p.o.     Bilateral cystic adnexal masses in postmenopausal patient  -Consultation placed to gynecology/oncology for further recommendations  -CA-125 level added to blood work    Hyponatremia  -Likely due to home HCTZ therapy which has been placed on temporary hold  -Strict I's and O's  -Serum/urine osmolality as well as urine electrolytes pending  -Normal saline administered overnight for repletion    Hypothyroidism  -TSH and free T4 pending  -Adjust levothyroxine replacement accordingly      DVT prophylaxis--Lovenox 40 mg subcu daily  Code status-full code  Diet-n.p.o. until cleared by surgery  IV access-PIV established in ED      Admit as inpatient. I anticipate hospitalization spanning more than two midnights for investigation and treatment of the above medically necessary diagnoses. Please note that some part of this chart was generated using Dragon dictation software. Although every effort was made to ensure the accuracy of this automated transcription, some errors in transcription may have occurred inadvertently. If you may need any clarification, please do not hesitate to contact me through [facility].        Nenita Gorman MD    3/18/2021 11:16 PM

## 2021-03-18 NOTE — ED NOTES
Bed: 07  Expected date:   Expected time:   Means of arrival: CONNOR  Comments:  [address], RN  03/18/21 1010

## 2021-03-18 NOTE — ED PROVIDER NOTES
Emergency Department Provider Note  Location: [address] [place] [address]  ED  3/18/2021     Patient Identification  Kayley Chapin is a 80 y.o. female    Chief Complaint  Abdominal Pain (per squad report pt c/o abdominal pain x 2 weeks. pt denies any vomiting but reports she has had some diarrhea. took immodium x 1 week ago. Pt reports \"I shouldnt have done that because now I have hard turds\" ) and Diarrhea           HPI  (History provided by patient)  Patient is an 59-year-old female with history of hypertension, thyroid disease, rheumatoid arthritis on methotrexate diverticulosis, who presents with watery stools and pelvic discomfort over the past 2 to 3 weeks. Patient reports she feels like she is constipated however she is has a water coming out. Feels that she has a strain. She denies any urinary symptoms no nausea no vomiting no other abdominal pain other than pelvic discomfort. No abnormal vaginal bleeding. I have reviewed the following nursing documentation:  Allergies: No Known Allergies    Past medical history:  has a past medical history of Allergic rhinitis, Hyperlipidemia, Hypertension, Hypothyroidism, LBBB (left bundle branch block), and Rheumatoid arthritis(714.0). Past surgical history:  has a past surgical history that includes Colonoscopy ();  section; and joint replacement (Left, 13). Home medications:   Prior to Admission medications    Medication Sig Start Date End Date Taking?  Authorizing Provider   levothyroxine (SYNTHROID) 25 MCG tablet TAKE 1 TABLET BY MOUTH EVERY DAY 21   Marcelino Hoffman MD   olmesartan-hydroCHLOROthiazide [facility] HCT) 40-12.5 MG per tablet Take 1 tablet by mouth daily Discontinue the Diovan 21   Marcelino Hoffman MD   fluticasone [facility]) 50 MCG/ACT nasal spray USE 1 SPRAY BY EACH NOSTRIL ROUTE DAILY 21   Marcelino Hoffman MD   folic acid (FOLVITE) 1 MG tablet TAKE 1 TABLET BY MOUTH EVERY DAY 21   Bobby Villafuerte Martha Alvarado MD   fexofenadine [facility]) 180 MG tablet TAKE 1 TABLET BY MOUTH EVERY DAY 2/24/21   Estella Aponte MD   methotrexate ([address]) 2.5 MG chemo tablet TAKE 8 TABLETS EVERY MONDAY 2/12/21   Estella Aponte MD   diphenhydrAMINE HCl (ALLERGY MED PO) Take by mouth    Nery Provider, MD   VITAMIN D PO Take by mouth    Historical Provider, MD   Probiotic Product (PROBIOTIC PO) Take by mouth    Historical Provider, MD   Ascorbic Acid (VITAMIN C PO) Take by mouth    Historical Provider, MD   tretinoin (RETIN-A) 0.05 % cream APPLY TOPICALLY NIGHTLY  Patient taking differently: APPLY TOPICALLY NIGHTLY. TAKING PRN 11/28/17   Estella Aponte MD   fish oil-omega-3 fatty acids 1000 MG capsule Take 5 g by mouth daily. Historical Provider, MD       Social history:  reports that she has quit smoking. She has never used smokeless tobacco. She reports current alcohol use of about 2.0 standard drinks of alcohol per week. She reports that she does not use drugs. Family history:    Family History   Problem Relation Age of Onset    Heart Disease Mother         very slow heart beat    Thyroid Disease Father     Heart Disease Father          ROS  Review of Systems   Constitutional: Negative for chills and fever. HENT: Negative for congestion and rhinorrhea. Eyes: Negative for photophobia and visual disturbance. Respiratory: Negative for cough, shortness of breath and wheezing. Cardiovascular: Negative for chest pain and palpitations. Gastrointestinal: Positive for constipation and diarrhea. Negative for abdominal distention, nausea and vomiting. Genitourinary: Positive for pelvic pain. Negative for dysuria, hematuria, menstrual problem, vaginal bleeding, vaginal discharge and vaginal pain. Musculoskeletal: Negative for back pain and neck pain. Skin: Negative for rash and wound. Neurological: Negative for syncope and weakness.    Psychiatric/Behavioral: Negative for agitation and confusion. Exam  ED Triage Vitals   BP Temp Temp Source Pulse Resp SpO2 Height Weight   03/18/21 1018 03/18/21 1018 03/18/21 1018 03/18/21 1018 03/18/21 1018 03/18/21 1018 03/18/21 1015 03/18/21 1015   (!) 123/56 98.3 °F (36.8 °C) Oral 71 18 95 % 5' 6\" (1.676 m) 130 lb (59 kg)       Physical Exam  Vitals signs and nursing note reviewed. Constitutional:       General: She is not in acute distress. Appearance: She is well-developed. HENT:      Head: Normocephalic and atraumatic. Nose: Nose normal. No congestion. Eyes:      Extraocular Movements: Extraocular movements intact. Pupils: Pupils are equal, round, and reactive to light. Neck:      Musculoskeletal: Normal range of motion and neck supple. Cardiovascular:      Rate and Rhythm: Normal rate and regular rhythm. Heart sounds: No murmur. Pulmonary:      Effort: Pulmonary effort is normal.      Breath sounds: Normal breath sounds. Abdominal:      General: There is no distension. Palpations: Abdomen is soft. Tenderness: There is no abdominal tenderness. Comments: Mild lower quadrant tenderness without guarding or rebound   Musculoskeletal: Normal range of motion. General: No deformity. Skin:     General: Skin is warm. Findings: No rash. Neurological:      Mental Status: She is alert and oriented to person, place, and time. Motor: No abnormal muscle tone.       Coordination: Coordination normal.   Psychiatric:         Mood and Affect: Mood normal.         Behavior: Behavior normal.           ED Course    ED Medication Orders (From admission, onward)    Start Ordered     Status Ordering Provider    03/18/21 1400 03/18/21 1349  lactated ringers infusion 1,000 mL  ONCE      Last MAR action: New Bag - by Omar Hiss on 03/18/21 at 1436 [facility]    03/18/21 1400 03/18/21 1349  piperacillin-tazobactam (ZOSYN) 3,375 mg in dextrose 5 % 50 mL IVPB (mini-bag)  ONCE     Question: Antimicrobial Indications  Answer:  Intra-Abdominal Infection    Last MAR action: New Bag - by Evangelista Whitehead on 03/18/21 at 1436 [facility]    03/18/21 1247 03/18/21 1247  iopamidol (ISOVUE-370) 76 % injection 75 mL  IMG ONCE PRN      Last MAR action: Given - by Clarice Tran on 03/18/21 at 1765 [facility]            Radiology  Ct Abdomen Pelvis W Iv Contrast Additional Contrast? None    Result Date: 3/18/2021  EXAMINATION: CT OF THE ABDOMEN AND PELVIS WITH CONTRAST 3/18/2021 12:47 pm TECHNIQUE: CT of the abdomen and pelvis was performed with the administration of intravenous contrast. Multiplanar reformatted images are provided for review. Dose modulation, iterative reconstruction, and/or weight based adjustment of the mA/kV was utilized to reduce the radiation dose to as low as reasonably achievable. COMPARISON: None HISTORY: ORDERING SYSTEM PROVIDED HISTORY: rectal pain, ?constipation TECHNOLOGIST PROVIDED HISTORY: Additional Contrast?->None Reason for exam:->rectal pain, ?constipation Decision Support Exception->Emergency Medical Condition (MA) Reason for Exam: abd pain, rectal pain, feels gassy Acuity: Acute Type of Exam: Initial Relevant Medical/Surgical History: c section x 2, high bp FINDINGS: Lower Chest:The lung bases are clear. Organs: The liver, spleen, adrenal glands, kidneys, and pancreas demonstrate no acute abnormality. Pelvis:  No abnormally dilated loops of small are seen. There is extensive sigmoid diverticulosis. There is a complex fluid and soft tissue density collection adjacent to rectum and sigmoid. This is difficult to measure but measures approximately 7 x 6 cm. Additionally there are cystic structures in the adnexa bilaterally measuring nearly 4 cm each. 1. Complex collection in the pelvis is nonspecific. This likely represents a combination of abscess and phlegmon or abscess and soft tissue mass. The exact origin is unclear.   Perforated diverticulitis or perforated rectal mass is suspected. Gynecologic origin cannot be excluded. Surgical evaluation is suggested. 2. 4 cm bilateral adnexal cysts. In this age group this is an abnormal finding. Further evaluation with pelvic ultrasound and gynecologic evaluation is suggested.          Labs  Results for orders placed or performed during the hospital encounter of 03/18/21   CBC auto differential   Result Value Ref Range    WBC 13.4 (H) 4.0 - 11.0 K/uL    RBC 3.95 (L) 4.00 - 5.20 M/uL    Hemoglobin 12.9 12.0 - 16.0 g/dL    Hematocrit 38.5 36.0 - 48.0 %    MCV 97.7 80.0 - 100.0 fL    MCH 32.7 26.0 - 34.0 pg    MCHC 33.5 31.0 - 36.0 g/dL    RDW 14.2 12.4 - 15.4 %    Platelets 576 073 - 867 K/uL    MPV 6.8 5.0 - 10.5 fL    Neutrophils % 86.1 %    Lymphocytes % 5.2 %    Monocytes % 8.4 %    Eosinophils % 0.2 %    Basophils % 0.1 %    Neutrophils Absolute 11.6 (H) 1.7 - 7.7 K/uL    Lymphocytes Absolute 0.7 (L) 1.0 - 5.1 K/uL    Monocytes Absolute 1.1 0.0 - 1.3 K/uL    Eosinophils Absolute 0.0 0.0 - 0.6 K/uL    Basophils Absolute 0.0 0.0 - 0.2 K/uL   Comprehensive metabolic panel   Result Value Ref Range    Sodium 128 (L) 136 - 145 mmol/L    Potassium 3.8 3.5 - 5.1 mmol/L    Chloride 92 (L) 99 - 110 mmol/L    CO2 24 21 - 32 mmol/L    Anion Gap 12 3 - 16    Glucose 122 (H) 70 - 99 mg/dL    BUN 10 7 - 20 mg/dL    CREATININE <0.5 (L) 0.6 - 1.2 mg/dL    GFR Non-African American >60 >60    GFR African American >60 >60    Calcium 9.6 8.3 - 10.6 mg/dL    Total Protein 7.9 6.4 - 8.2 g/dL    Albumin 4.0 3.4 - 5.0 g/dL    Albumin/Globulin Ratio 1.0 (L) 1.1 - 2.2    Total Bilirubin 0.9 0.0 - 1.0 mg/dL    Alkaline Phosphatase 72 40 - 129 U/L    ALT 15 10 - 40 U/L    AST 21 15 - 37 U/L    Globulin 3.9 g/dL   Lipase   Result Value Ref Range    Lipase 25.0 13.0 - 60.0 U/L   Urinalysis, reflex to microscopic   Result Value Ref Range    Color, UA Yellow Straw/Yellow    Clarity, UA Clear Clear    Glucose, Ur Negative Negative mg/dL Bilirubin Urine Negative Negative    Ketones, Urine 40 (A) Negative mg/dL    Specific Gravity, UA 1.015 1.005 - 1.030    Blood, Urine Negative Negative    pH, UA 6.5 5.0 - 8.0    Protein, UA Negative Negative mg/dL    Urobilinogen, Urine 0.2 <2.0 E.U./dL    Nitrite, Urine Negative Negative    Leukocyte Esterase, Urine Negative Negative    Microscopic Examination Not Indicated     Urine Type NotGiven          [facility]  Patient seen and evaluated. Relevant records reviewed. 80-year-old female who presents with stool changes and pelvic discomfort. On exam she is overall well-appearing no acute distress her vitals are reassuring. She has some lower quadrant tenderness. JOSE insufficient stool sample no stool impaction noted. Sent for CT abdomen and pelvis which shows a large mass questionable abscess versus phlegmon. In the setting of her diverticulitis could be a complication of previous diverticulitis. She has mild leukocytosis. We will plan to start IV antibiotics. Discussed case with Dr. Duane Muscat general surgery who reviewed the images feels that patient would benefit from surgical and GI consult inpatient. Agrees with antibiotics. May be candidate for IR intervention in the future. Plan to admit to medicine. Discussed with Dr. Gosia Villeda. Clinical Impression:  1. Pelvic mass    2. Pelvic abscess in female          Disposition:  Admit to med/surg floor in stable condition. Blood pressure (!) 123/56, pulse 71, temperature 98.3 °F (36.8 °C), temperature source Oral, resp. rate 18, height 5' 6\" (1.676 m), weight 130 lb (59 kg), SpO2 100 %. Patient was given scripts for the following medications. I counseled patient how to take these medications. New Prescriptions    No medications on file       Disposition referral (if applicable):  No follow-up provider specified. Total critical care time is 10 minutes, which excludes separately billable procedures and updating family.  Time spent is specifically for management of the presenting complaint and symptoms initially, direct bedside care, reevaluation, review of records, and consultation. There was a high probability of clinically significant life-threatening deterioration in the patient's condition, which required my urgent intervention. This chart was generated in part by using Dragon Dictation system and may contain errors related to that system including errors in grammar, punctuation, and spelling, as well as words and phrases that may be inappropriate. If there are any questions or concerns please feel free to contact the dictating provider for clarification.      Deion Leo MD  [address] MD  03/18/21 0310

## 2021-03-19 PROBLEM — R19.00 PELVIC MASS: Status: ACTIVE | Noted: 2021-03-18

## 2021-03-19 LAB
ANION GAP SERPL CALCULATED.3IONS-SCNC: 9 MMOL/L (ref 3–16)
BUN BLDV-MCNC: 13 MG/DL (ref 7–20)
CA 125: 13 U/ML (ref 0–35)
CALCIUM SERPL-MCNC: 8.9 MG/DL (ref 8.3–10.6)
CEA: 1.1 NG/ML (ref 0–5)
CHLORIDE BLD-SCNC: 100 MMOL/L (ref 99–110)
CO2: 24 MMOL/L (ref 21–32)
CREAT SERPL-MCNC: <0.5 MG/DL (ref 0.6–1.2)
EKG ATRIAL RATE: 63 BPM
EKG DIAGNOSIS: NORMAL
EKG P AXIS: 82 DEGREES
EKG P-R INTERVAL: 152 MS
EKG Q-T INTERVAL: 510 MS
EKG QRS DURATION: 144 MS
EKG QTC CALCULATION (BAZETT): 521 MS
EKG R AXIS: 16 DEGREES
EKG T AXIS: 65 DEGREES
EKG VENTRICULAR RATE: 63 BPM
GFR AFRICAN AMERICAN: >60
GFR NON-AFRICAN AMERICAN: >60
GLUCOSE BLD-MCNC: 94 MG/DL (ref 70–99)
HCT VFR BLD CALC: 33.7 % (ref 36–48)
HEMOGLOBIN: 11.3 G/DL (ref 12–16)
MAGNESIUM: 2.1 MG/DL (ref 1.8–2.4)
MCH RBC QN AUTO: 32.6 PG (ref 26–34)
MCHC RBC AUTO-ENTMCNC: 33.5 G/DL (ref 31–36)
MCV RBC AUTO: 97.2 FL (ref 80–100)
OCCULT BLOOD SCREENING: ABNORMAL
OSMOLALITY: 278 MOSM/KG (ref 280–301)
PDW BLD-RTO: 13.7 % (ref 12.4–15.4)
PLATELET # BLD: 280 K/UL (ref 135–450)
PMV BLD AUTO: 6.7 FL (ref 5–10.5)
POTASSIUM REFLEX MAGNESIUM: 3.3 MMOL/L (ref 3.5–5.1)
RBC # BLD: 3.47 M/UL (ref 4–5.2)
SODIUM BLD-SCNC: 133 MMOL/L (ref 136–145)
WBC # BLD: 8.7 K/UL (ref 4–11)

## 2021-03-19 PROCEDURE — G0378 HOSPITAL OBSERVATION PER HR: HCPCS

## 2021-03-19 PROCEDURE — 6360000002 HC RX W HCPCS: Performed by: HOSPITALIST

## 2021-03-19 PROCEDURE — 1200000000 HC SEMI PRIVATE

## 2021-03-19 PROCEDURE — 2580000003 HC RX 258: Performed by: HOSPITALIST

## 2021-03-19 PROCEDURE — 96366 THER/PROPH/DIAG IV INF ADDON: CPT

## 2021-03-19 PROCEDURE — 85027 COMPLETE CBC AUTOMATED: CPT

## 2021-03-19 PROCEDURE — 93005 ELECTROCARDIOGRAM TRACING: CPT | Performed by: HOSPITALIST

## 2021-03-19 PROCEDURE — 6370000000 HC RX 637 (ALT 250 FOR IP): Performed by: HOSPITALIST

## 2021-03-19 PROCEDURE — 96372 THER/PROPH/DIAG INJ SC/IM: CPT

## 2021-03-19 PROCEDURE — 93010 ELECTROCARDIOGRAM REPORT: CPT | Performed by: INTERNAL MEDICINE

## 2021-03-19 PROCEDURE — 83735 ASSAY OF MAGNESIUM: CPT

## 2021-03-19 PROCEDURE — 99222 1ST HOSP IP/OBS MODERATE 55: CPT | Performed by: SURGERY

## 2021-03-19 PROCEDURE — 82378 CARCINOEMBRYONIC ANTIGEN: CPT

## 2021-03-19 PROCEDURE — 80048 BASIC METABOLIC PNL TOTAL CA: CPT

## 2021-03-19 PROCEDURE — 36415 COLL VENOUS BLD VENIPUNCTURE: CPT

## 2021-03-19 PROCEDURE — G0328 FECAL BLOOD SCRN IMMUNOASSAY: HCPCS

## 2021-03-19 RX ORDER — SENNA PLUS 8.6 MG/1
1 TABLET ORAL DAILY PRN
Status: DISCONTINUED | OUTPATIENT
Start: 2021-03-19 | End: 2021-03-20 | Stop reason: HOSPADM

## 2021-03-19 RX ORDER — ACETAMINOPHEN 650 MG/1
650 SUPPOSITORY RECTAL EVERY 6 HOURS PRN
Status: DISCONTINUED | OUTPATIENT
Start: 2021-03-19 | End: 2021-03-20 | Stop reason: HOSPADM

## 2021-03-19 RX ORDER — ONDANSETRON 2 MG/ML
4 INJECTION INTRAMUSCULAR; INTRAVENOUS EVERY 6 HOURS PRN
Status: DISCONTINUED | OUTPATIENT
Start: 2021-03-19 | End: 2021-03-20 | Stop reason: HOSPADM

## 2021-03-19 RX ORDER — ACETAMINOPHEN 325 MG/1
650 TABLET ORAL EVERY 6 HOURS PRN
Status: DISCONTINUED | OUTPATIENT
Start: 2021-03-19 | End: 2021-03-20 | Stop reason: HOSPADM

## 2021-03-19 RX ORDER — POTASSIUM CHLORIDE 7.45 MG/ML
10 INJECTION INTRAVENOUS PRN
Status: DISCONTINUED | OUTPATIENT
Start: 2021-03-19 | End: 2021-03-20 | Stop reason: HOSPADM

## 2021-03-19 RX ORDER — POTASSIUM CHLORIDE 20 MEQ/1
40 TABLET, EXTENDED RELEASE ORAL PRN
Status: DISCONTINUED | OUTPATIENT
Start: 2021-03-19 | End: 2021-03-20 | Stop reason: HOSPADM

## 2021-03-19 RX ORDER — SODIUM CHLORIDE 0.9 % (FLUSH) 0.9 %
10 SYRINGE (ML) INJECTION EVERY 12 HOURS SCHEDULED
Status: DISCONTINUED | OUTPATIENT
Start: 2021-03-19 | End: 2021-03-20 | Stop reason: HOSPADM

## 2021-03-19 RX ORDER — SODIUM CHLORIDE 0.9 % (FLUSH) 0.9 %
10 SYRINGE (ML) INJECTION PRN
Status: DISCONTINUED | OUTPATIENT
Start: 2021-03-19 | End: 2021-03-20 | Stop reason: HOSPADM

## 2021-03-19 RX ORDER — PROMETHAZINE HYDROCHLORIDE 25 MG/1
12.5 TABLET ORAL EVERY 6 HOURS PRN
Status: DISCONTINUED | OUTPATIENT
Start: 2021-03-19 | End: 2021-03-20 | Stop reason: HOSPADM

## 2021-03-19 RX ORDER — MAGNESIUM SULFATE IN WATER 40 MG/ML
2000 INJECTION, SOLUTION INTRAVENOUS PRN
Status: DISCONTINUED | OUTPATIENT
Start: 2021-03-19 | End: 2021-03-20 | Stop reason: HOSPADM

## 2021-03-19 RX ORDER — SODIUM CHLORIDE 9 MG/ML
INJECTION, SOLUTION INTRAVENOUS CONTINUOUS
Status: ACTIVE | OUTPATIENT
Start: 2021-03-19 | End: 2021-03-19

## 2021-03-19 RX ADMIN — PIPERACILLIN SODIUM,TAZOBACTAM SODIUM 4500 MG: 4; .5 INJECTION, POWDER, FOR SOLUTION INTRAVENOUS at 15:35

## 2021-03-19 RX ADMIN — ENOXAPARIN SODIUM 40 MG: 40 INJECTION SUBCUTANEOUS at 11:10

## 2021-03-19 RX ADMIN — LEVOTHYROXINE SODIUM 25 MCG: 0.03 TABLET ORAL at 11:10

## 2021-03-19 RX ADMIN — ACETAMINOPHEN 325 MG: 325 TABLET ORAL at 16:50

## 2021-03-19 RX ADMIN — SODIUM CHLORIDE: 9 INJECTION, SOLUTION INTRAVENOUS at 14:24

## 2021-03-19 RX ADMIN — Medication 10 ML: at 11:11

## 2021-03-19 RX ADMIN — PIPERACILLIN SODIUM,TAZOBACTAM SODIUM 4500 MG: 4; .5 INJECTION, POWDER, FOR SOLUTION INTRAVENOUS at 02:48

## 2021-03-19 NOTE — CONSULTS
Juan Carlos Hardin MD                                                  Gynecologic Oncology                                                        ([phone]      Asked to evaluate this 80 y.o admitted with diarrhea and abdominal with finding of bilateral simple ovarian cysts. More pertinent to her admission, she has a pelvic abscess and a WBC of 18.4  Chart and imaging reviewed. The cysts appear to be well circumscribed and simple on CT. The likelihood that they are malignant is low. Pelvic ultrasound might be of benefit for further definition  Spoke with Dr. Trev Rocha and we agree that this picture is most likely diverticulitis with abscess and that the ovarian cysts are most likely incidental and unrelated to the cause of the patient's complaints. At this point the plan is for IR drainage, antibiotics and not to go to the operating room for primary management. The ovaries can be addressed if and/or when the patient goes to the operating room for management of her diverticular disease. If her treatment proceeds without surgical intervention, follow-up on the ovarian cysts can be discussed and arranged in the office    Will follow      Skeeter Dancer.  Bismark Hardin M.D., [address] [facility]  Consultative Gynecology  [phone]

## 2021-03-19 NOTE — PLAN OF CARE
Problem: Safety:  Goal: Free from accidental physical injury  Description: Free from accidental physical injury  Outcome: Ongoing  Goal: Free from intentional harm  Description: Free from intentional harm  Outcome: Ongoing     Problem: Safety:  Intervention: Assess risk factors for falls  Note: Pt will remain free from falls    No falls sustained thus far this shift. Intervention: Provide a safe environment  Note: Call light within reach. Bed side table within reach. Wheels locked. Bed in lowest position. Bed check in place. Pt instructed to call out for assistance. Pt expressed understanding & calls out appropriately.

## 2021-03-19 NOTE — CARE COORDINATION
CASE MANAGEMENT INITIAL ASSESSMENT      Reviewed chart and completed assessment with:patient  Explained Case Management role/services. Primary [facility] information[facility]    Health Care Decision Maker :     [place]      Can this person be reached and be able to respond quickly, such as within a few minutes or hours? Yes  Who would be your back-up decision maker? Name Roman Alcantara  Phone Number:[phone]    Admit date/status:3/18/21  Diagnosis:perirectal abscess   Is this a Readmission?:  No      Insurance:BCBS   Precert required for SNF: Yes       3 night stay required: No    Living arrangements, Adls, care needs, prior to admission:lives in 2 story home with son    Roly Díaz at home:  Walker__Cane__RTS__ BSC__Shower Chair__  02__ HHN__ CPAP__  BiPap__  Hospital Bed__ W/C___ Other__________    Services in the home and/or outpatient, prior to admission:private pay aide 3x/week x1hr. Personal care    Dialysis Facility (if applicable)   · Name:  · Address:  · Dialysis Schedule:  · Phone:  · Fax:    PT/OT recs:none    Hospital Exemption Notification (HEN):needed for SNF    Barriers to discharge:none    Plan/comments:spoke with patient. Reports lives in her sons home. IPTA. Daughter provides transportation. Denied any DCP needs. Please notify should needs arise.  Nguyen Palacio RN      ECOC on chart for MD signature

## 2021-03-19 NOTE — CONSULTS
Department of General Surgery Consult    PATIENT NAME: Saint Der Mullinnix   YOB: 1940    ADMISSION DATE: 3/18/2021 10:13 AM      TODAY'S DATE: 3/19/2021    Reason for Consult:  Pelvic abscess     Chief Complaint: Abdominal pain; Diarrhea     Requesting Physician:  Dr. Sohan Galindo:  The patient is a 80 y.o. female who presents with 2 weeks of abdominal pain and diarrhea. She states that her son had diarrhea and then she developed the same symptoms. She denies any blood in her stool. She then noted to have constipation. She states her stool has been very hard. She has some abdominal pain with bowel movements. The pain is located in the pelvis bilaterally. She describes this as a pressure. She denies any fevers. Denies any nausea or vomiting. She has had some chills.  Colonoscopy done approximately 15 years ago in [place] which was normal.        Past Medical History:        Diagnosis Date    Allergic rhinitis     Hyperlipidemia     Hypertension     Hypothyroidism     probable autoimmune thyroiditis    LBBB (left bundle branch block)     ON EKG, had normal GXT    Rheumatoid arthritis(714.0)     Rheumatologist in [place]       Past Surgical History:        Procedure Laterality Date     SECTION      x2    COLONOSCOPY  2005    JOINT REPLACEMENT Left 13    left total knee replacement       Current Medications:   Current Facility-Administered Medications: 0.9 % sodium chloride infusion, , Intravenous, Continuous  sodium chloride flush 0.9 % injection 10 mL, 10 mL, Intravenous, 2 times per day  sodium chloride flush 0.9 % injection 10 mL, 10 mL, Intravenous, PRN  potassium chloride (KLOR-CON M) extended release tablet 40 mEq, 40 mEq, Oral, PRN **OR** potassium bicarb-citric acid (EFFER-K) effervescent tablet 40 mEq, 40 mEq, Oral, PRN **OR** potassium chloride 10 mEq/100 mL IVPB (Peripheral Line), 10 mEq, Intravenous, PRN  magnesium sulfate 2000 mg in 50 mL IVPB premix, 2,000 mg, Intravenous, PRN  enoxaparin (LOVENOX) injection 40 mg, 40 mg, Subcutaneous, Daily  promethazine (PHENERGAN) tablet 12.5 mg, 12.5 mg, Oral, Q6H PRN **OR** ondansetron (ZOFRAN) injection 4 mg, 4 mg, Intravenous, Q6H PRN  senna (SENOKOT) tablet 8.6 mg, 1 tablet, Oral, Daily PRN  acetaminophen (TYLENOL) tablet 650 mg, 650 mg, Oral, Q6H PRN **OR** acetaminophen (TYLENOL) suppository 650 mg, 650 mg, Rectal, Q6H PRN  levothyroxine (SYNTHROID) tablet 25 mcg, 25 mcg, Oral, Daily  fluticasone (FLONASE) 50 MCG/ACT nasal spray 1 spray, 1 spray, Each Nostril, Daily  piperacillin-tazobactam (ZOSYN) 4,500 mg in dextrose 5 % 100 mL IVPB (mini-bag), 4,500 mg, Intravenous, Q12H  acetaminophen (TYLENOL) tablet 1,000 mg, 1,000 mg, Oral, Q6H PRN  morphine (PF) injection 2 mg, 2 mg, Intravenous, Q3H PRN  Prior to Admission medications    Medication Sig Start Date End Date Taking?  Authorizing Provider   levothyroxine (SYNTHROID) 25 MCG tablet TAKE 1 TABLET BY MOUTH EVERY DAY 2/24/21  Yes Patricia Carrillo MD   olmesartan-hydroCHLOROthiazide [facility] HCT) 40-12.5 MG per tablet Take 1 tablet by mouth daily Discontinue the Diovan 2/24/21  Yes Patricia Carrillo MD   fluticasone [facility]) 50 MCG/ACT nasal spray USE 1 SPRAY BY EACH NOSTRIL ROUTE DAILY 2/24/21  Yes Patricia Carrillo MD   folic acid (FOLVITE) 1 MG tablet TAKE 1 TABLET BY MOUTH EVERY DAY 2/24/21  Yes Patricia Carrillo MD   fexofenadine (ALLEGRA) 180 MG tablet TAKE 1 TABLET BY MOUTH EVERY DAY 2/24/21  Yes Patricia Carrillo MD   methotrexate ([address]) 2.5 MG chemo tablet TAKE 8 TABLETS EVERY MONDAY 2/12/21  Yes Patricia Carrillo MD   diphenhydrAMINE HCl (ALLERGY MED PO) Take by mouth   Yes Historical Provider, MD   VITAMIN D PO Take by mouth   Yes Historical Provider, MD   Probiotic Product (PROBIOTIC PO) Take by mouth   Yes Historical Provider, MD   Ascorbic Acid (VITAMIN C PO) Take by mouth   Yes Historical Provider, MD   fish oil-omega-3 fatty acids 1000 MG capsule Take 5 g by mouth daily. Yes Historical Provider, MD   tretinoin (RETIN-A) 0.05 % cream APPLY TOPICALLY NIGHTLY  Patient taking differently: APPLY TOPICALLY NIGHTLY. TAKING PRN 11/28/17   Honey Hines MD        Allergies:  Patient has no known allergies. Social History:   TOBACCO:  Former smoker  ETOH:  Social  DRUGS:  Never used recreational drugs    Family History:        Problem Relation Age of Onset    Heart Disease Mother         very slow heart beat    Thyroid Disease Father     Heart Disease Father        REVIEW OF SYSTEMS:  CONSTITUTIONAL:  negative  HEENT:  negative  RESPIRATORY:  negative  CARDIOVASCULAR:  negative  GASTROINTESTINAL:  negative except for constipation and abdominal pain  GENITOURINARY:  negative  HEMATOLOGIC/LYMPHATIC:  negative  NEUROLOGICAL:  Negative  * All other ROS reviewed and negative. PHYSICAL EXAM:  VITALS:  /66   Pulse 76   Temp 98.1 °F (36.7 °C) (Oral)   Resp 16   Ht 5' 6\" (1.676 m)   Wt 130 lb (59 kg)   SpO2 96%   BMI 20.98 kg/m²   24HR INTAKE/OUTPUT:    I/O last 3 completed shifts:  In: -   Out: 100 [Urine:100]  No intake/output data recorded.     CONSTITUTIONAL:  alert, no apparent distress and normal weight  EYES:  PERRL, sclera clear  ENT:  Normocephalic,atraumatic, without obvious abnormality  NECK:  supple, symmetrical, trachea midline  LUNGS: Resp effort easy and unlabored, symmetric chest rise  CARDIOVASCULAR:  NO JVD, regular rate and rhythm   ABDOMEN: soft, non-distended, non-tender, voluntary guarding absent, no masses palpated  MUSCULOSKELETAL: No clubbing or cyanosis, 0+ pitting edema lower extremities  NEUROLOGIC:  Mental Status Exam:  Level of Alertness:   awake  PSYCHIATRIC:   person, place, time  SKIN:  normal skin color, texture, turgor    DATA:    CBC:   Recent Labs     03/18/21  1026   WBC 13.4*   HGB 12.9   HCT 38.5        BMP:    Recent Labs     03/18/21  1026   *   K 3.8   CL 92*   CO2 24 BUN 10   CREATININE <0.5*   GLUCOSE 122*     Hepatic:   Recent Labs     03/18/21  1026   AST 21   ALT 15   BILITOT 0.9   ALKPHOS 72     Mag:    No results for input(s): MG in the last 72 hours. Phos:   No results for input(s): PHOS in the last 72 hours. INR:   Recent Labs     03/18/21 2004   INR 1.21*       Radiology Review: Images personally reviewed by me. CT AP 3/18/2021  1. Complex collection in the pelvis is nonspecific.  This likely represents a   combination of abscess and phlegmon or abscess and soft tissue mass.  The   exact origin is unclear.  Perforated diverticulitis or perforated rectal mass   is suspected.  Gynecologic origin cannot be excluded.  Surgical evaluation is   suggested. 2. 4 cm bilateral adnexal cysts.  In this age group this is an abnormal   finding.  Further evaluation with pelvic ultrasound and gynecologic   evaluation is suggested. IMPRESSION/RECOMMENDATIONS:    Patient is an 80year old female who presented with constipation and pelvic pressure. CT A/P shows a complex fluid collection in the pelvis which could be secondary to perforated diverticulitis although history does not sound consistent with this. Will continue antibiotics and hold off on drainage for now. Okay for diet. Electronically signed by Alvaro Stark, [address] [facility] Surgery  [facility]  Patient seen and agree with above. Two weeks of diarrhea and minor pelvic pain. No blood in stool but thinner in size. No fevers or chills. Reports prior diverticulitis and colonoscopy 15 years ago. Possible causes include diverticulitis and malignancy. Currently not amenable to perc drainage and small fluid collection so will continue with abx. Ok for liquid diet. If symptoms remain minimal would continue with course of abx and repeat CT in the future. Ideally would get colonoscopy at some point to rule out malignancy.     Adeola Shields MD

## 2021-03-20 VITALS
WEIGHT: 130 LBS | HEIGHT: 66 IN | OXYGEN SATURATION: 98 % | TEMPERATURE: 97.4 F | RESPIRATION RATE: 16 BRPM | SYSTOLIC BLOOD PRESSURE: 145 MMHG | BODY MASS INDEX: 20.89 KG/M2 | DIASTOLIC BLOOD PRESSURE: 89 MMHG | HEART RATE: 79 BPM

## 2021-03-20 LAB
A/G RATIO: 1.1 (ref 1.1–2.2)
ALBUMIN SERPL-MCNC: 3.1 G/DL (ref 3.4–5)
ALP BLD-CCNC: 62 U/L (ref 40–129)
ALT SERPL-CCNC: 11 U/L (ref 10–40)
ANION GAP SERPL CALCULATED.3IONS-SCNC: 8 MMOL/L (ref 3–16)
AST SERPL-CCNC: 16 U/L (ref 15–37)
BASOPHILS ABSOLUTE: 0 K/UL (ref 0–0.2)
BASOPHILS RELATIVE PERCENT: 0.4 %
BILIRUB SERPL-MCNC: 0.5 MG/DL (ref 0–1)
BUN BLDV-MCNC: 8 MG/DL (ref 7–20)
CALCIUM SERPL-MCNC: 8.4 MG/DL (ref 8.3–10.6)
CHLORIDE BLD-SCNC: 103 MMOL/L (ref 99–110)
CO2: 24 MMOL/L (ref 21–32)
CREAT SERPL-MCNC: <0.5 MG/DL (ref 0.6–1.2)
EOSINOPHILS ABSOLUTE: 0.2 K/UL (ref 0–0.6)
EOSINOPHILS RELATIVE PERCENT: 3.6 %
GFR AFRICAN AMERICAN: >60
GFR NON-AFRICAN AMERICAN: >60
GI BACTERIAL PATHOGENS BY PCR: NORMAL
GLOBULIN: 2.9 G/DL
GLUCOSE BLD-MCNC: 122 MG/DL (ref 70–99)
HCT VFR BLD CALC: 31.8 % (ref 36–48)
HEMOGLOBIN: 11 G/DL (ref 12–16)
LYMPHOCYTES ABSOLUTE: 1.1 K/UL (ref 1–5.1)
LYMPHOCYTES RELATIVE PERCENT: 17.9 %
MAGNESIUM: 2 MG/DL (ref 1.8–2.4)
MCH RBC QN AUTO: 33.4 PG (ref 26–34)
MCHC RBC AUTO-ENTMCNC: 34.5 G/DL (ref 31–36)
MCV RBC AUTO: 97 FL (ref 80–100)
MONOCYTES ABSOLUTE: 0.7 K/UL (ref 0–1.3)
MONOCYTES RELATIVE PERCENT: 12.1 %
NEUTROPHILS ABSOLUTE: 3.9 K/UL (ref 1.7–7.7)
NEUTROPHILS RELATIVE PERCENT: 66 %
PDW BLD-RTO: 14.1 % (ref 12.4–15.4)
PLATELET # BLD: 250 K/UL (ref 135–450)
PMV BLD AUTO: 7.1 FL (ref 5–10.5)
POTASSIUM REFLEX MAGNESIUM: 3.3 MMOL/L (ref 3.5–5.1)
RBC # BLD: 3.28 M/UL (ref 4–5.2)
SODIUM BLD-SCNC: 135 MMOL/L (ref 136–145)
TOTAL PROTEIN: 6 G/DL (ref 6.4–8.2)
TSH REFLEX: 3.09 UIU/ML (ref 0.27–4.2)
WBC # BLD: 6 K/UL (ref 4–11)

## 2021-03-20 PROCEDURE — 83735 ASSAY OF MAGNESIUM: CPT

## 2021-03-20 PROCEDURE — 85025 COMPLETE CBC W/AUTO DIFF WBC: CPT

## 2021-03-20 PROCEDURE — 84443 ASSAY THYROID STIM HORMONE: CPT

## 2021-03-20 PROCEDURE — 6370000000 HC RX 637 (ALT 250 FOR IP): Performed by: HOSPITALIST

## 2021-03-20 PROCEDURE — 80053 COMPREHEN METABOLIC PANEL: CPT

## 2021-03-20 PROCEDURE — 96372 THER/PROPH/DIAG INJ SC/IM: CPT

## 2021-03-20 PROCEDURE — 6360000002 HC RX W HCPCS: Performed by: HOSPITALIST

## 2021-03-20 PROCEDURE — 99232 SBSQ HOSP IP/OBS MODERATE 35: CPT | Performed by: SURGERY

## 2021-03-20 PROCEDURE — 2580000003 HC RX 258: Performed by: HOSPITALIST

## 2021-03-20 PROCEDURE — 96366 THER/PROPH/DIAG IV INF ADDON: CPT

## 2021-03-20 PROCEDURE — 36415 COLL VENOUS BLD VENIPUNCTURE: CPT

## 2021-03-20 PROCEDURE — G0378 HOSPITAL OBSERVATION PER HR: HCPCS

## 2021-03-20 RX ORDER — AMOXICILLIN AND CLAVULANATE POTASSIUM 875; 125 MG/1; MG/1
1 TABLET, FILM COATED ORAL 2 TIMES DAILY
Qty: 14 TABLET | Refills: 0 | Status: SHIPPED | OUTPATIENT
Start: 2021-03-20 | End: 2021-03-27

## 2021-03-20 RX ORDER — POTASSIUM CHLORIDE 20 MEQ/1
40 TABLET, EXTENDED RELEASE ORAL 2 TIMES DAILY
Status: DISCONTINUED | OUTPATIENT
Start: 2021-03-20 | End: 2021-03-20 | Stop reason: HOSPADM

## 2021-03-20 RX ADMIN — POTASSIUM BICARBONATE 40 MEQ: 782 TABLET, EFFERVESCENT ORAL at 08:21

## 2021-03-20 RX ADMIN — PIPERACILLIN SODIUM,TAZOBACTAM SODIUM 4500 MG: 4; .5 INJECTION, POWDER, FOR SOLUTION INTRAVENOUS at 03:22

## 2021-03-20 RX ADMIN — PIPERACILLIN SODIUM,TAZOBACTAM SODIUM 4500 MG: 4; .5 INJECTION, POWDER, FOR SOLUTION INTRAVENOUS at 13:46

## 2021-03-20 RX ADMIN — Medication 10 ML: at 08:17

## 2021-03-20 RX ADMIN — LEVOTHYROXINE SODIUM 25 MCG: 0.03 TABLET ORAL at 08:15

## 2021-03-20 RX ADMIN — ENOXAPARIN SODIUM 40 MG: 40 INJECTION SUBCUTANEOUS at 08:16

## 2021-03-20 NOTE — PROGRESS NOTES
[facility] SURGERY    PATIENT NAME: Donovan Chapin     TODAY'S DATE: 3/20/2021    CHIEF COMPLAINT: Abdominal pain    INTERVAL HISTORY/HPI:    Pt states her pain is better today. She is tolerating liquids without nausea or vomiting. Cara Fonseca REVIEW OF SYSTEMS:  Pertinent positives and negatives as per interval history section    OBJECTIVE:  VITALS:  BP (!) 145/69   Pulse 71   Temp 97.6 °F (36.4 °C) (Oral)   Resp 16   Ht 5' 6\" (1.676 m)   Wt 130 lb (59 kg)   SpO2 99%   BMI 20.98 kg/m²     INTAKE/OUTPUT:    I/O last 3 completed shifts: In: 1355 [I.V.:1387]  Out: -   I/O this shift:  In: 10 [I.V.:10]  Out: -     CONSTITUTIONAL:  awake and alert  LUNGS:  Respirations easy and unlabored, clear to auscultation  CARD:  regular rate and rhythm  ABDOMEN:  normal bowel sounds, soft, non-distended, non-tender     Data:  CBC:   Recent Labs     03/18/21  1026 03/19/21  1228 03/20/21  0605   WBC 13.4* 8.7 6.0   HGB 12.9 11.3* 11.0*   HCT 38.5 33.7* 31.8*    280 250     BMP:    Recent Labs     03/18/21  1026 03/19/21  1228 03/20/21  0606   * 133* 135*   K 3.8 3.3* 3.3*   CL 92* 100 103   CO2 24 24 24   BUN 10 13 8   CREATININE <0.5* <0.5* <0.5*   GLUCOSE 122* 94 122*     Hepatic:   Recent Labs     03/18/21  1026 03/20/21  0606   AST 21 16   ALT 15 11   BILITOT 0.9 0.5   ALKPHOS 72 62     Mag:      Recent Labs     03/19/21  1228 03/20/21  0606   MG 2.10 2.00      Phos:   No results for input(s): PHOS in the last 72 hours. INR:   Recent Labs     03/18/21 2004   INR 1.21*       Radiology Review:  *Imaging personally reviewed by me. No new imaging      ASSESSMENT AND PLAN:  Small pelvic abscess not amenable to percutaneous drainage. She is clinically stable with no fevers overnight. Advance diet. Okay for discharge home from surgical standpoint with office follow-up in outpatient CT.   She will need to get a colonoscopy prior to any surgical intervention     Electronically signed by Alla HERRERA,

## 2021-03-20 NOTE — DISCHARGE SUMMARY
Hospital Medicine Discharge Summary    Patient ID: Jesica Chapin      Patient's PCP: Idris Davis MD    Admit Date: 3/18/2021     Discharge Date: 3/20/2021      Admitting Physician: Nenita Gorman MD     Discharge Physician: Eliazbeth Quintanilla MD     Discharge Diagnoses: Active Hospital Problems    Diagnosis    Pelvic mass [R19.00]    Hyponatremia [E87.1]    Intra-abdominal abscess (HCC) [K65.1]    Essential hypertension [I10]    Rheumatoid arthritis involving multiple joints (HCC) [M06.9]    Hypothyroidism (acquired) [E03.9]       The patient was seen and examined on day of discharge and this discharge summary is in conjunction with any daily progress note from day of discharge. Hospital Course:   1. This is a 80-year-old female with a history of her rheumatoid arthritis on methotrexate admitted with abdominal pain and diarrhea CT of the abdomen and pelvis with diverticulitis with abscess versus phlegmon general surgery consulted continue with Zosyn leukocytosis improving, blood cultures no growth. 2.  Bilateral cystic adnexal mass in postmenopausal patient GYN/ONC was consulted patient was seen evaluated CA-1 25 normal, recommended to follow-up on ovarian cyst as outpatient. 3.  Hyponatremia present on admission improving. Hydrochlorothiazide on hold. 4.  Hypokalemia supplement with the p.o. potassium. 5.  Hypothyroidism continue with the Synthroid. 6.  Rheumatoid arthritis with hand deformity followed by rheumatologist on methotrexate. 7.  Hypertension stable home medication on hold. Benicar/hydrochlorothiazide were discontinued for hypokalemia, hyponatremia on admission blood pressure has remained stable. Further adjustment of antihypertensive medication per PCP.   Per surgery recommendation patient is okay to be discharged home on p.o. antibiotic to follow-up with surgery and GYN/oncology as instructed to follow-up on ovarian cyst.   8.  History of rheumatoid arthritis ABDOMEN PELVIS W IV CONTRAST Additional Contrast? None   Final Result   1. Complex collection in the pelvis is nonspecific. This likely represents a   combination of abscess and phlegmon or abscess and soft tissue mass. The   exact origin is unclear. Perforated diverticulitis or perforated rectal mass   is suspected. Gynecologic origin cannot be excluded. Surgical evaluation is   suggested. 2. 4 cm bilateral adnexal cysts. In this age group this is an abnormal   finding. Further evaluation with pelvic ultrasound and gynecologic   evaluation is suggested.                 Consults:     IP CONSULT TO GENERAL SURGERY  IP CONSULT TO HOSPITALIST  IP CONSULT TO GYNECOLOGIC ONCOLOGY    Disposition: Home    Condition at Discharge: Stable    Discharge Instructions/Follow-up: Follow-up with surgery and Gyn/ oncology as instructed    Code Status:  Full Code     Activity: activity as tolerated    Diet: Low fiber      Discharge Medications:     Discharge Medication List as of 3/20/2021  2:49 PM           Details   amoxicillin-clavulanate (AUGMENTIN) 875-125 MG per tablet Take 1 tablet by mouth 2 times daily for 7 days, Disp-14 tablet, R-0Normal              Details   levothyroxine (SYNTHROID) 25 MCG tablet TAKE 1 TABLET BY MOUTH EVERY DAY, Disp-90 tablet, R-1Normal      fluticasone (FLONASE) 50 MCG/ACT nasal spray USE 1 SPRAY BY EACH NOSTRIL ROUTE DAILY, Disp-3 Bottle, [id number] Sig      folic acid (FOLVITE) 1 MG tablet TAKE 1 TABLET BY MOUTH EVERY DAY, Disp-90 tablet, R-1Normal      fexofenadine (ALLEGRA) 180 MG tablet TAKE 1 TABLET BY MOUTH EVERY DAY, Disp-90 tablet, R-1Normal      methotrexate (RHEUMATREX) 2.5 MG chemo tablet TAKE 8 TABLETS EVERY MONDAY, Disp-96 tablet, R-5Normal      diphenhydrAMINE HCl (ALLERGY MED PO) Take by mouthHistorical Med      VITAMIN D PO Take by mouthHistorical Med      Probiotic Product (PROBIOTIC PO) Take by mouthHistorical Med      Ascorbic Acid (VITAMIN C PO) Take by mouthHistorical Med tretinoin (RETIN-A) 0.05 % cream APPLY TOPICALLY NIGHTLY, Disp-45 g, R-1, Normal      fish oil-omega-3 fatty acids 1000 MG capsule Take 5 g by mouth daily. Time Spent on discharge is more than 35 minutes in the examination, evaluation, counseling and review of medications and discharge plan. Signed:    Elizabeth Quintanilla MD   3/20/2021      Thank you [place] MD Ryan for the opportunity to be involved in this patient's care. If you have any questions or concerns please feel free to contact me at [phone].

## 2021-03-20 NOTE — PROGRESS NOTES
Hospitalist Progress Note      PCP: Seema Acosta MD    Date of Admission: 3/18/2021    Chief Complaint: Abdominal pain and diarrhea    Hospital Course: This is a 55-year-old female with a history of rheumatoid arthritis, hypertension admitted with a abdominal pain for 2 weeks, diarrhea CT of the abdomen for possible abscess and phlegmon with a soft tissue mass, perforated diverticulitis or rectus mass suspected with additional notation of 4 cm bilateral cystic adnexal masses, gynecology consulted less likely gynecological cancer, surgery consulted and following treated with IV Zosyn. She with a history of rheumatoid arthritis treated with methotrexate    Subjective: Patient denies any abdominal pain no nausea vomiting no chest pain no shortness of breath planes of some diarrhea. Medications:  Reviewed    Infusion Medications   Scheduled Medications    potassium chloride  40 mEq Oral BID    sodium chloride flush  10 mL Intravenous 2 times per day    enoxaparin  40 mg Subcutaneous Daily    levothyroxine  25 mcg Oral Daily    fluticasone  1 spray Each Nostril Daily    piperacillin-tazobactam  4,500 mg Intravenous Q12H     PRN Meds: sodium chloride flush, potassium chloride **OR** potassium alternative oral replacement **OR** potassium chloride, magnesium sulfate, promethazine **OR** ondansetron, senna, acetaminophen **OR** acetaminophen, morphine      Intake/Output Summary (Last 24 hours) at 3/20/2021 1352  Last data filed at 3/20/2021 1321  Gross per 24 hour   Intake 1587 ml   Output --   Net 1587 ml       Physical Exam Performed:    BP (!) 145/69   Pulse 71   Temp 97.6 °F (36.4 °C) (Oral)   Resp 16   Ht 5' 6\" (1.676 m)   Wt 130 lb (59 kg)   SpO2 99%   BMI 20.98 kg/m²     General appearance: No apparent distress, appears stated age and cooperative. HEENT: Pupils equal, round, and reactive to light. Conjunctivae/corneas clear. Neck: Supple, with full range of motion.  No jugular venous distention. Trachea midline. Respiratory:  Normal respiratory effort. Clear to auscultation, bilaterally without Rales/Wheezes/Rhonchi. Cardiovascular: Regular rate and rhythm with normal S1/S2 without murmurs, rubs or gallops. Abdomen: Soft, non-tender, non-distended with normal bowel sounds. Musculoskeletal: No clubbing, cyanosis or edema bilaterally. Full range of motion without deformity. Rheumatoid arthritis associated deformity and bilateral hands. Skin: Skin color, texture, turgor normal.  No rashes or lesions. Neurologic:  Neurovascularly intact without any focal sensory/motor deficits. Cranial nerves: II-XII intact, grossly non-focal.  Psychiatric: Alert and oriented, thought content appropriate, normal insight  Capillary Refill: Brisk,< 3 seconds   Peripheral Pulses: +2 palpable, equal bilaterally       Labs:   Recent Labs     03/18/21  1026 03/19/21  1228 03/20/21  0605   WBC 13.4* 8.7 6.0   HGB 12.9 11.3* 11.0*   HCT 38.5 33.7* 31.8*    280 250     Recent Labs     03/18/21  1026 03/19/21  1228 03/20/21  0606   * 133* 135*   K 3.8 3.3* 3.3*   CL 92* 100 103   CO2 24 24 24   BUN 10 13 8   CREATININE <0.5* <0.5* <0.5*   CALCIUM 9.6 8.9 8.4     Recent Labs     03/18/21  1026 03/20/21  0606   AST 21 16   ALT 15 11   BILITOT 0.9 0.5   ALKPHOS 72 62     Recent Labs     03/18/21 2004   INR 1.21*     No results for input(s): Lee Scrivener in the last 72 hours. Urinalysis:      Lab Results   Component Value Date    NITRU Negative 03/18/2021    WBCUA None seen 10/01/2019    RBCUA 0-2 10/01/2019    BLOODU Negative 03/18/2021    SPECGRAV 1.015 03/18/2021    GLUCOSEU Negative 03/18/2021       Radiology:  XR CHEST PORTABLE   Final Result   Clear lungs. Cardiomegaly. No acute cardiopulmonary abnormality. CT ABDOMEN PELVIS W IV CONTRAST Additional Contrast? None   Final Result   1. Complex collection in the pelvis is nonspecific.   This likely represents a   combination of abscess and phlegmon or abscess and soft tissue mass. The   exact origin is unclear. Perforated diverticulitis or perforated rectal mass   is suspected. Gynecologic origin cannot be excluded. Surgical evaluation is   suggested. 2. 4 cm bilateral adnexal cysts. In this age group this is an abnormal   finding. Further evaluation with pelvic ultrasound and gynecologic   evaluation is suggested. Assessment/Plan:    Active Hospital Problems    Diagnosis    Pelvic mass [R19.00]    Hyponatremia [E87.1]    Intra-abdominal abscess (HCC) [K65.1]    Essential hypertension [I10]    Rheumatoid arthritis involving multiple joints (HCC) [M06.9]    Hypothyroidism (acquired) [E03.9]     1. This is a 80-year-old female with a history of her rheumatoid arthritis on methotrexate admitted with abdominal pain and diarrhea CT of the abdomen and pelvis with diverticulitis with abscess versus phlegmon general surgery consulted continue with Zosyn leukocytosis improving, blood cultures no growth. 2.  Bilateral cystic adnexal mass in postmenopausal patient GYN/ONC was consulted patient was seen evaluated CA-1 25 normal, recommended to follow-up on ovarian cyst as outpatient. 3.  Hyponatremia present on admission improving. Hydrochlorothiazide on hold. 4.  Hypokalemia supplement with the p.o. potassium. 5.  Hypothyroidism continue with the Synthroid. 6.  Rheumatoid arthritis with hand deformity followed by rheumatologist on methotrexate. 7.  Hypertension stable home medication on hold.     DVT Prophylaxis: Lovenox subcu  Diet: DIET LOW FIBER;  Code Status: Full Code    PT/OT Eval Status:     Sahara Geronimo MD

## 2021-03-20 NOTE — PROGRESS NOTES
Pt a/o. Pt ate 50% of solid lunch. MD Omari Chinchilla notified. Complains of no pain, no nausea. No emesis. Bathed. Ambulating around room. Call light within reach; will continue to monitor.

## 2021-03-20 NOTE — PROGRESS NOTES
Hospitalist Progress Note      PCP: Nataliia Lozano MD    Date of Admission: 3/18/2021    Chief Complaint: Abdominal pain, diarrhea     Hospital Course:   Audrey Chapin is a 80 y.o. female who presented to the ED to be evaluated for increasing abdominal pain ongoing x2 weeks PTA. Patient endorses initial diarrhea but now reports constipation with decreased stool output following Imodium usage. CT scan of the abdomen pelvis was obtained and notable for possible abscess and phlegmon with soft tissue mass; perforated diverticulitis or rectal mass suspected with additional notation of 4 cm bilateral cystic adnexal masses in this postmenopausal patient make gynecologic origin another possibility. Labs were obtained and notable for hyponatremia as well as leukocytosis with left shift. Blood and urine cultures were collected, and patient was placed on IV Zosyn. Of note, patient is receiving MTX for RA therefore has an immunocompromise status. Consultation placed to general surgery with plans to admit to hospitalist team for intra-abdominal abscess. Subjective:   Pt is on RA. Afebrile. VSS. No complaints currently. Pt's daughter is at bedside.      Medications:  Reviewed    Infusion Medications   Scheduled Medications    sodium chloride flush  10 mL Intravenous 2 times per day    enoxaparin  40 mg Subcutaneous Daily    levothyroxine  25 mcg Oral Daily    fluticasone  1 spray Each Nostril Daily    piperacillin-tazobactam  4,500 mg Intravenous Q12H     PRN Meds: sodium chloride flush, potassium chloride **OR** potassium alternative oral replacement **OR** potassium chloride, magnesium sulfate, promethazine **OR** ondansetron, senna, acetaminophen **OR** acetaminophen, acetaminophen, morphine      Intake/Output Summary (Last 24 hours) at 3/19/2021 2129  Last data filed at 3/19/2021 1424  Gross per 24 hour   Intake 1387 ml   Output --   Net 1387 ml       Physical Exam Performed:    /73   Pulse 72 Temp 98 °F (36.7 °C) (Oral)   Resp 16   Ht 5' 6\" (1.676 m)   Wt 130 lb (59 kg)   SpO2 99%   BMI 20.98 kg/m²     General appearance: No apparent distress, appears stated age and cooperative. HEENT: Pupils equal, round, and reactive to light. Conjunctivae/corneas clear. Neck: Supple, with full range of motion. No jugular venous distention. Trachea midline. Respiratory:  Normal respiratory effort. Clear to auscultation, bilaterally without Rales/Wheezes/Rhonchi. Cardiovascular: Regular rate and rhythm with normal S1/S2 without murmurs, rubs or gallops. Abdomen: Soft, non-tender, non-distended with normal bowel sounds. Musculoskeletal: No clubbing, cyanosis or edema bilaterally. RA changes to hands. Skin: Skin color, texture, turgor normal.  No rashes or lesions. Neurologic:  Neurovascularly intact without any focal sensory/motor deficits. Cranial nerves: II-XII intact, grossly non-focal.  Psychiatric: Alert and oriented, thought content appropriate, normal insight  Capillary Refill: Brisk,< 3 seconds   Peripheral Pulses: +2 palpable, equal bilaterally       Labs:   Recent Labs     03/18/21  1026 03/19/21  1228   WBC 13.4* 8.7   HGB 12.9 11.3*   HCT 38.5 33.7*    280     Recent Labs     03/18/21  1026 03/19/21  1228   * 133*   K 3.8 3.3*   CL 92* 100   CO2 24 24   BUN 10 13   CREATININE <0.5* <0.5*   CALCIUM 9.6 8.9     Recent Labs     03/18/21  1026   AST 21   ALT 15   BILITOT 0.9   ALKPHOS 72     Recent Labs     03/18/21 2004   INR 1.21*     Urinalysis:      Lab Results   Component Value Date    NITRU Negative 03/18/2021    WBCUA None seen 10/01/2019    RBCUA 0-2 10/01/2019    BLOODU Negative 03/18/2021    SPECGRAV 1.015 03/18/2021    GLUCOSEU Negative 03/18/2021       Radiology:  XR CHEST PORTABLE   Final Result   Clear lungs. Cardiomegaly. No acute cardiopulmonary abnormality. CT ABDOMEN PELVIS W IV CONTRAST Additional Contrast? None   Final Result   1.  Complex collection in the pelvis is nonspecific. This likely represents a   combination of abscess and phlegmon or abscess and soft tissue mass. The   exact origin is unclear. Perforated diverticulitis or perforated rectal mass   is suspected. Gynecologic origin cannot be excluded. Surgical evaluation is   suggested. 2. 4 cm bilateral adnexal cysts. In this age group this is an abnormal   finding. Further evaluation with pelvic ultrasound and gynecologic   evaluation is suggested. Assessment/Plan:    Active Hospital Problems    Diagnosis    Pelvic mass [R19.00]    Hyponatremia [E87.1]    Intra-abdominal abscess (HCC) [K65.1]    Essential hypertension [I10]    Rheumatoid arthritis involving multiple joints (HCC) [M06.9]    Hypothyroidism (acquired) [E03.9]       Diverticulitis with abscess vs phlegmon   - General surgery consulted/following. Plan to continue IV Zosyn. Fluid collection not amenable to drainage. Blood cx's are NGTD. Potential plan to repeat CT in the near future with eventual colonoscopy. Defer diet advancement to surgery.      Bilateral cystic adnexal masses in postmenopausal patient  - Gyn/Onc consulted. Plan for outpt follow-up. - CA-125 was normal.     Hyponatremia (improved)  - Likely due to home HCTZ therapy which has been placed on temporary hold. - Improved with IV fluid hydration.  - Repeat BMP in am.      Hypothyroidism  - Continue levothyroxine. Check TSH.        DVT Prophylaxis: Lovenox   Diet: DIET FULL LIQUID;  Code Status: Full Code    PT/OT Eval Status: Not indicated     Dispo - 1-2 days pending clinical course     GRACIELA Quiñonez - CNP

## 2021-03-20 NOTE — PROGRESS NOTES
Pt a/o. PIV removed. Discharge instructions given to pt and pt's daughter. All questions answered. Pt has discharge paperwork and belongings. Daughter driving pt home. Pt discharged home with daughter in stable condition.

## 2021-03-22 ENCOUNTER — CARE COORDINATION (OUTPATIENT)
Dept: CASE MANAGEMENT | Age: 81
End: 2021-03-22

## 2021-03-22 ENCOUNTER — TELEPHONE (OUTPATIENT)
Dept: FAMILY MEDICINE CLINIC | Age: 81
End: 2021-03-22

## 2021-03-22 LAB
BLOOD CULTURE, ROUTINE: NORMAL
CULTURE, BLOOD 2: NORMAL

## 2021-03-22 NOTE — ADT AUTH CERT
Abdominal Pain, Undiagnosed - Care Day 3 (3/20/2021) by Erickson Lamar, RN       Review Status Review Entered   Completed 3/22/2021 13:46      Criteria Review      Care Day: 3 Care Date: 3/20/2021 Level of Care: Inpatient Floor    Guideline Day 2    Level Of Care    (X) Floor to discharge    (X) Complete discharge planning    Clinical Status    (X) * Hemodynamic stability    (X) * Pain absent or managed    (X) * Etiology or finding requiring inpatient treatment absent    3/22/2021 1:46 PM EDT by Yaneth Ozuna      K 3.3  Na 135  hgb 11.0  wbc 6.0    97.4  16  79  145/89  98%ra    (X) * Liquid or advanced diet tolerated    (X) * Discharge plans and education understood    3/22/2021 1:46 PM EDT by Yaneth Ozuna      dc'd home today    Activity    (X) * Ambulatory or acceptable for next level of care [D]    Routes    (X) * Oral hydration, medications, and diet    (X) Liquid or advanced diet    3/22/2021 1:46 PM EDT by Yaneth Ozuna      adv to low fiber diet    * Milestone   Additional Notes   3/20   Per Hospitalist:   1.  This is a 57-year-old female with a history of her rheumatoid arthritis on methotrexate admitted with abdominal pain and diarrhea CT of the abdomen and pelvis with diverticulitis with abscess versus phlegmon general surgery consulted continue with Zosyn leukocytosis improving, blood cultures no growth. 2.  Bilateral cystic adnexal mass in postmenopausal patient GYN/ONC was consulted patient was seen evaluated CA-1 25 normal, recommended to follow-up on ovarian cyst as outpatient. 3.  Hyponatremia present on admission improving.  Hydrochlorothiazide on hold. 4.  Hypokalemia supplement with the p.o. potassium. 5.  Hypothyroidism continue with the Synthroid. 6.  Rheumatoid arthritis with hand deformity followed by rheumatologist on methotrexate.    7.  Hypertension stable home medication on hold.      Abdominal Pain, Undiagnosed - Care Day 2 (3/19/2021) by Erickson Lamar, RN       Review Status Review Entered   Completed 3/22/2021 13:43      Criteria Review      Care Day: 2 Care Date: 3/19/2021 Level of Care: Inpatient Floor    Guideline Day 2    Clinical Status    (X) * Hemodynamic stability    3/22/2021 1:43 PM EDT by Misha Ramos      98.3  16  70  119/58  97%ra    rates pain 5/10    (X) * Pain absent or managed    ( ) * Etiology or finding requiring inpatient treatment absent    3/22/2021 1:43 PM EDT by Misha Ramos      Na 133  K  3.3wbc 8.7  hgb  11.3    gynecology consult    (X) * Liquid or advanced diet tolerated    3/22/2021 1:43 PM EDT by Misha Ramos      full  liquids    ( ) * Discharge plans and education understood    Activity    (X) * Ambulatory or acceptable for next level of care [D]    Routes    (X) * Oral hydration, medications, and diet    3/22/2021 1:43 PM EDT by Misha Ramos      zosyn 4500mg iv q8hrs  lovenox 40 mg sc qd    ivf @ 75 cc/hr    (X) Liquid or advanced diet    * Milestone   Additional Notes   3/19   Per Hospitalist:   Diverticulitis with abscess vs phlegmon    - General surgery consulted/following. Plan to continue IV Zosyn. Fluid collection not amenable to drainage. Blood cx's are NGTD. Potential plan to repeat CT in the near future with eventual colonoscopy. Defer diet advancement to surgery.        Bilateral cystic adnexal masses in postmenopausal patient   - Gyn/Onc consulted. Plan for outpt follow-up. - CA-125 was normal.       Hyponatremia (improved)   - Likely due to home HCTZ therapy which has been placed on temporary hold. - Improved with IV fluid hydration.   - Repeat BMP in am.        Hypothyroidism   - Continue levothyroxine. Check TSH.

## 2021-03-22 NOTE — CARE COORDINATION
Patient contacted regarding [facility]. Call within 2 business days of discharge: Yes  Discharge Date: 3/20/21   RARS: Readmission Risk Score: 14       Discussed COVID-19 related testing which was not done at this time. Patient has following risk factors of: immunocompromised. Care Transition Nurse contacted the patient by telephone to perform post discharge assessment. Verified name and  with patient as identifiers. Provided introduction to self, and explanation of the CTN role, and reason for call due to risk factors for infection and/or exposure to COVID-19. Symptoms reviewed with patient who verbalized the following symptoms: no new symptoms and no worsening symptoms. Due to no new or worsening symptoms encounter was not routed to provider for escalation. CTN reviewed discharge instructions, medical action plan and red flags such as increased shortness of breath, increasing fever and signs of decompensation with patient who verbalized understanding. Reviewed and educated patient on any new and changed medications related to discharge diagnosis. Discussed exposure protocols and quarantine with CDC Guidelines What to do if you are sick with coronavirus disease .  Patient was given an opportunity for questions and concerns. The patient can contact the [facility] exposure line [phone], [facility] [place]: ([phone]) and/or the PCP office for questions related to their healthcare. Advance Care Planning:   Does patient have an Advance Directive:  reviewed and current. Discussed follow-up appointments. If no appointment was previously scheduled, appointment scheduling offered: No  [address] follow up appointment(s): No future appointments.   Non-[facility] follow up appointment(s): NA    Non-face-to-face services provided:  Obtained and reviewed discharge summary and/or continuity of care documents  Assessment and support for treatment adherence and medication management-[id number] completed    Feels better. No longer with diarrhea. Tolerating PO. Denies fever, chills. Eating small meals. Holding her methotrexate while on abx. She will call PCP for follow up appointment. States she already completed both steps of COVID-19 vaccine about a month ago. Denies needs. CTN provided contact information for future needs. Plan for follow-up call in 7-14 days\" based on severity of symptoms and risk factors.     Kaylie Villalpando RN  Care Transitions Nurse  [phone] mobile

## 2021-03-22 NOTE — TELEPHONE ENCOUNTER
Patient's daughter ask that you call her regarding her Mom being in the hospital. Her Mom had diverituclitis and developed an abscess. Also a ovarian cyst. She needs a hopsital follow up. I have no available openings.

## 2021-03-24 ENCOUNTER — TELEPHONE (OUTPATIENT)
Dept: SURGERY | Age: 81
End: 2021-03-24

## 2021-03-24 DIAGNOSIS — K57.92 DIVERTICULITIS: Primary | ICD-10-CM

## 2021-03-24 NOTE — TELEPHONE ENCOUNTER
Spoke with the patient, states that her daughter will deal with the scheduling since she is transporting her. Spoke with the patient's daughter who states that she will call scheduling today and will bring her mother in next Thursday for a follow up.

## 2021-03-24 NOTE — TELEPHONE ENCOUNTER
Will need follow up CT this week and a follow up next week in office. 86M PMH DM, HLD presents to ED with near syncope and fall.  EKG, labs, CT head,neck, CXR, wrist and hand xray, and lumbar xray, tetanus, reassess. 86M PMH DM, HLD presents to ED with near syncope and fall.  EKG, labs, CT head, neck, CXR, wrist and hand xray, and lumbar xray, tetanus, reassess.

## 2021-03-25 NOTE — TELEPHONE ENCOUNTER
I discussed with daughter patient's recent hospitalization and issue she is having. She is having a repeat CAT scan coming up to make sure her abscess has resolved and she has to follow-up with GI for possible colonoscopy. Unfortunately her hands have gotten worse and daughter states she has problems just pulling up her pants or opening things to eat. She is still living with her son and grandchildren but is becoming more of an issue and daughter is worried she will have to go into a extended care facility at some point but she has been resistant to this. The daughter will make sure she goes to specialist follow-ups and keep me up-to-date on her status.

## 2021-03-30 ENCOUNTER — HOSPITAL ENCOUNTER (OUTPATIENT)
Dept: CT IMAGING | Age: 81
Discharge: HOME | End: 2021-03-30
Payer: MEDICARE

## 2021-03-30 DIAGNOSIS — K57.92 DIVERTICULITIS: ICD-10-CM

## 2021-03-30 PROCEDURE — 6360000004 HC RX CONTRAST MEDICATION: Performed by: SURGERY

## 2021-03-30 PROCEDURE — 74177 CT ABD & PELVIS W/CONTRAST: CPT

## 2021-03-30 RX ADMIN — IOPAMIDOL 75 ML: 755 INJECTION, SOLUTION INTRAVENOUS at 12:44

## 2021-03-30 RX ADMIN — IOHEXOL 50 ML: 240 INJECTION, SOLUTION INTRATHECAL; INTRAVASCULAR; INTRAVENOUS; ORAL at 12:45

## 2021-04-01 ENCOUNTER — OFFICE VISIT (OUTPATIENT)
Dept: SURGERY | Age: 81
End: 2021-04-01
Payer: MEDICARE

## 2021-04-01 VITALS
BODY MASS INDEX: 20.89 KG/M2 | HEIGHT: 66 IN | TEMPERATURE: 97.2 F | SYSTOLIC BLOOD PRESSURE: 129 MMHG | DIASTOLIC BLOOD PRESSURE: 72 MMHG | HEART RATE: 71 BPM | WEIGHT: 130 LBS

## 2021-04-01 DIAGNOSIS — K57.92 DIVERTICULITIS: Primary | ICD-10-CM

## 2021-04-01 DIAGNOSIS — N94.9 ADNEXAL CYST: ICD-10-CM

## 2021-04-01 PROCEDURE — 99213 OFFICE O/P EST LOW 20 MIN: CPT | Performed by: SURGERY

## 2021-04-01 NOTE — PROGRESS NOTES
HPI: Nursing notes reviewed. Patient reports no pain. Some diarrhea with abx. No nausea. No fevers or chills. Energy good. ROS:  10 point review of systems performed with pertinent positives in HPI    Phys:    Abd - soft, nontender, nondistended       Assesment: 81 yo with pelvic abscesses    Plan: 1. Fluid collections and inflammation resolved on follow up CT. At this point will refer to GI for colonoscopy as it has been 15 years since last one and initial CT said that mass could not be ruled out as cause (none seen on current imaging). If colonoscopy is negative for mass then would not plan on operation for diverticulitis. Discussed dietary changes. 2.  Referral to Gynecology made as persistent bilateral adnexal cysts seen on both CTs.

## 2021-04-02 ENCOUNTER — CARE COORDINATION (OUTPATIENT)
Dept: CASE MANAGEMENT | Age: 81
End: 2021-04-02

## 2021-04-02 NOTE — CARE COORDINATION
[facility] Transitions Follow Up Call    2021    Patient: Rudy Chapin  Patient : 1940   MRN: [id number]  Reason for Admission: Diarrhea   Discharge Date: 3/20/21 RARS: Readmission Risk Score: 14         Spoke with: Kalyan Chapin    Spoke with patient who reported she is doing great and everything has resolved. Patient had follow up apt and reported her abscess has resolved and denied any concerns at this time. Patient denied any further needs at this time. CTN advised patient of use of urgent care or physicians 24 hr access line if assistance is needed after hours. Care Transitions Subsequent and Final Call    Subsequent and Final Calls  Do you have any ongoing symptoms?: No  Have your medications changed?: No  Do you have any questions related to your medications?: No  Do you currently have any active services?: No  Do you have any needs or concerns that I can assist you with?: No  Identified Barriers: None  Care Transitions Interventions  Other Interventions: Follow Up  No future appointments.     Kan Solomon RN

## 2021-04-07 ENCOUNTER — TELEPHONE (OUTPATIENT)
Dept: PHARMACY | Facility: CLINIC | Age: 81
End: 2021-04-07

## 2021-04-07 DIAGNOSIS — R19.00 PELVIC MASS: Primary | ICD-10-CM

## 2021-04-07 PROCEDURE — 1111F DSCHRG MED/CURRENT MED MERGE: CPT

## 2021-04-07 RX ORDER — FLUTICASONE PROPIONATE 50 MCG
1 SPRAY, SUSPENSION (ML) NASAL DAILY PRN
COMMUNITY

## 2021-04-07 RX ORDER — TRETINOIN 0.5 MG/G
CREAM TOPICAL NIGHTLY
COMMUNITY
End: 2021-09-13

## 2021-04-07 NOTE — TELEPHONE ENCOUNTER
[facility] CLINICAL PHARMACY REVIEW: Post-Discharge Transitions of Care (MAGAN)  Subjective/Objective:  Dana Chapin is a 80 y.o. female. Patient was discharged from [facility] on 3/20/21. Patient outreach to review discharge medications and provide medication review and management. Spoke with patient. No Known Allergies    Discharge Medications (as per discharging medication list found in AVS): There are NEW medications for you. START taking them after you leave the hospital:  Augmentin 875-125 mg tablet: Take 1 tablet PO daily for 7 days. · States had picked up medication and completed. You told us you were taking these medications at home, but the amount or how often you take this medication has CHANGED:   tretinoin (RETIN-A) 0.05 % cream APPLY TOPICALLY NIGHTLY (Patient taking differently: APPLY TOPICALLY NIGHTLY. TAKING PRN) States she takes Prn rash     These are medications you told us you were taking at home, CONTINUE taking them after you leave the hospital:  Medication Sig Notes    levothyroxine (SYNTHROID) 25 MCG tablet TAKE 1 TABLET BY MOUTH EVERY DAY     fluticasone (FLONASE) 50 MCG/ACT nasal spray USE 1 SPRAY BY EACH NOSTRIL ROUTE DAILY Prn allergies    folic acid (FOLVITE) 1 MG tablet TAKE 1 TABLET BY MOUTH EVERY DAY     fexofenadine (ALLEGRA) 180 MG tablet TAKE 1 TABLET BY MOUTH EVERY DAY     methotrexate (RHEUMATREX) 2.5 MG chemo tablet TAKE 8 TABLETS EVERY MONDAY     diphenhydrAMINE HCl (ALLERGY MED PO) Take by mouth States does not take this medication and can remove from list.    VITAMIN D PO Take by mouth Patient unsure the strength she is taking, did not want to reference bottle.  Probiotic Product (PROBIOTIC PO) Take by mouth     Ascorbic Acid (VITAMIN C PO) Take by mouth Patient unsure the strength she is taking, did not want to reference bottle.  fish oil-omega-3 fatty acids 1000 MG capsule Take 5 g by mouth daily.    States 4 grams     These are the medications you have told us you were taking at home, STOP taking them after you leave the hospital:  olmesartan-hydroCHLOROthiazide 40-12.5 MG per  tablet (BENICAR HCT)    Additional Medications:   None reported    CrCl cannot be calculated (This lab value cannot be used to calculate CrCl because it is not a number: <0.5). Assessment/Plan:  - Medication reconciliation completed. Patient has filled new medications ordered after this hospital discharge and is taking medications as directed by discharging provider. - Instructions per discharge list provided except per below documentation. Identified medication discrepancies/issues:   · Medication list updated as appropriate/noted    - Identified Potential Medication Interactions: No clinically significant interactions identified via Pipedrive Interaction Analysis as category D or higher.    - Renal Dosing: No renal adjustments necessary.     Conchis Pal, PharmD, [facility], [address]  Direct: [phone]  Department, toll free: [phone], option 7    CLINICAL PHARMACY NOTE   POST-DISCHARGE TELEPHONE FOLLOW-UP ADDENDUM    For Pharmacy Admin Tracking Only    TCM Call Made?: No  [facility]) Select Patient?: Yes  Total # of Interventions Recommended: 1 -   - Updated Order #: 1  Total # Interventions Accepted: 1  Intervention Severity:   - Level 1 Intervention Present?: No   - Level 2 #: 0   - Level 3 #: 0  Outreach Status: Review Complete  Care Coordinator Outreach to Patient?: Yes  Provider Contacted?: No  Time Spent (min): 10

## 2021-04-07 NOTE — TELEPHONE ENCOUNTER
CLINICAL PHARMACY NOTE  Post-Discharge Transitions of Care [facility])    Patient appears to have been discharged from [facility]   on 3/20/21. Please follow-up with patient to review medications.       30 days since discharge = 4/19/21     [address]  [phone] or [phone] (Option 7)

## 2021-04-30 ENCOUNTER — TELEPHONE (OUTPATIENT)
Dept: FAMILY MEDICINE CLINIC | Age: 81
End: 2021-04-30

## 2021-04-30 NOTE — TELEPHONE ENCOUNTER
Tell her we would need to see the patient and examine her first if patient is refusing to go to assisted living since they do not have guardianship.

## 2021-04-30 NOTE — TELEPHONE ENCOUNTER
Spoke to daughter and advise her that patient needs an appt to be seen.  She will call back and schedule an appt

## 2021-04-30 NOTE — TELEPHONE ENCOUNTER
Daughter is calling and wanting to speak to you regarding her mother. She is wanting to move her into a senior assisted home because her arthritis is really bad and she is having a lot of trouble with her hands. She is asking about a letter from you to execute the [facility]. However we do not have a copy of the POA.   I believe she is wanting to do declare incompentence on her mother

## 2021-05-12 ENCOUNTER — TELEPHONE (OUTPATIENT)
Dept: FAMILY MEDICINE CLINIC | Age: 81
End: 2021-05-12

## 2021-05-12 DIAGNOSIS — I10 ESSENTIAL HYPERTENSION: ICD-10-CM

## 2021-05-12 RX ORDER — OLMESARTAN MEDOXOMIL AND HYDROCHLOROTHIAZIDE 40/12.5 40; 12.5 MG/1; MG/1
0.5 TABLET ORAL DAILY
Qty: 90 TABLET | Refills: 1
Start: 2021-05-12 | End: 2021-05-24 | Stop reason: ALTCHOICE

## 2021-05-12 NOTE — TELEPHONE ENCOUNTER
Patient called about her BP was 144/88. P 107. She has been sweating a lot. Denies Chest pain or headache, she is sob. She had been on BenicarHCT 40/12.5mg daily but that was stopped because BP were running low. She is scheduled to see Dr. Jose Juan Roman on Friday. She wants to know if she should take a Benicar?

## 2021-05-12 NOTE — TELEPHONE ENCOUNTER
Recommend she start on 1/2 tab of the Benicar/HCTZ and f/u with Dr. Elenita Brannon as scheduled.  Thank you

## 2021-05-14 ENCOUNTER — OFFICE VISIT (OUTPATIENT)
Dept: FAMILY MEDICINE CLINIC | Age: 81
End: 2021-05-14
Payer: MEDICARE

## 2021-05-14 VITALS
TEMPERATURE: 97.7 F | HEART RATE: 100 BPM | BODY MASS INDEX: 20.27 KG/M2 | WEIGHT: 125.6 LBS | SYSTOLIC BLOOD PRESSURE: 128 MMHG | OXYGEN SATURATION: 97 % | DIASTOLIC BLOOD PRESSURE: 71 MMHG

## 2021-05-14 DIAGNOSIS — M06.9 RHEUMATOID ARTHRITIS INVOLVING MULTIPLE JOINTS (HCC): ICD-10-CM

## 2021-05-14 DIAGNOSIS — I10 ESSENTIAL HYPERTENSION: Primary | ICD-10-CM

## 2021-05-14 DIAGNOSIS — E03.9 HYPOTHYROIDISM (ACQUIRED): ICD-10-CM

## 2021-05-14 DIAGNOSIS — R94.31 ABNORMAL EKG: ICD-10-CM

## 2021-05-14 DIAGNOSIS — R06.02 SOB (SHORTNESS OF BREATH) ON EXERTION: ICD-10-CM

## 2021-05-14 DIAGNOSIS — R53.82 CHRONIC FATIGUE: ICD-10-CM

## 2021-05-14 PROCEDURE — 36415 COLL VENOUS BLD VENIPUNCTURE: CPT | Performed by: FAMILY MEDICINE

## 2021-05-14 PROCEDURE — 99214 OFFICE O/P EST MOD 30 MIN: CPT | Performed by: FAMILY MEDICINE

## 2021-05-14 PROCEDURE — 93000 ELECTROCARDIOGRAM COMPLETE: CPT | Performed by: FAMILY MEDICINE

## 2021-05-14 RX ORDER — FOLIC ACID 1 MG/1
TABLET ORAL
Qty: 90 TABLET | Refills: 1 | Status: SHIPPED | OUTPATIENT
Start: 2021-05-14 | End: 2021-07-09

## 2021-05-14 RX ORDER — LEVOTHYROXINE SODIUM 0.03 MG/1
TABLET ORAL
Qty: 90 TABLET | Refills: 1 | Status: SHIPPED | OUTPATIENT
Start: 2021-05-14 | End: 2021-07-16 | Stop reason: SDUPTHER

## 2021-05-14 ASSESSMENT — PATIENT HEALTH QUESTIONNAIRE - PHQ9
SUM OF ALL RESPONSES TO PHQ QUESTIONS 1-9: 0
1. LITTLE INTEREST OR PLEASURE IN DOING THINGS: 0

## 2021-05-14 NOTE — PROGRESS NOTES
She presents for follow up of her hypertension, rheumatoid arthritis and hypothyroidism and she has been having episodes of shortness of breath with exertion recently she denies any chest pain. Sometimes she feels like her heart is going fast.  She has broken out in a sweat also with exertion. She was in the hospital back in March for an abdominal abscess which they felt was related to diverticulitis which resolved on repeat CAT scan. There was also ovarian cyst noted and they recommended she follow-up with GYN but patient has been reluctant to do that because of her age. They also recommended follow-up colonoscopy because there is thickening of the sigmoid colon and her daughter states they will schedule that. Her daughter talked about her moving to [facility] in June. Patient is somewhat reluctant to do this. She is currently living with son. She has significant impairment using her hands because of deformity from rheumatoid arthritis. He did get injection in her knee by orthopedic surgeon which did help her. She is back on half of Benicar daily for her blood pressure. Her blood pressure was good today here in the office. Tests and documents reviewed: EKG last blood work hospital notes CT scan report     Objective:   /71   Pulse 100   Temp 97.7 °F (36.5 °C) (Oral)   Wt 125 lb 9.6 oz (57 kg)   SpO2 97%   BMI 20.27 kg/m²   BP Readings from Last 3 Encounters:   05/14/21 128/71   04/01/21 129/72   03/20/21 (!) 145/89     Physical Exam  Vitals signs reviewed. Constitutional:       Appearance: She is well-developed. She is not toxic-appearing. HENT:      Head: Normocephalic. Neck:      Musculoskeletal: Neck supple. Thyroid: No thyroid mass or thyromegaly. Cardiovascular:      Rate and Rhythm: Normal rate and regular rhythm. Heart sounds: Normal heart sounds. No murmur. Pulmonary:      Effort: No respiratory distress.       Breath sounds: Normal breath sounds. No wheezing or rales. Abdominal:      General: There is no distension. Palpations: Abdomen is soft. There is no mass. Tenderness: There is no abdominal tenderness. There is no guarding or rebound. Lymphadenopathy:      Cervical: No cervical adenopathy. Upper Body:      Right upper body: No supraclavicular adenopathy. Skin:     General: Skin is warm. Neurological:      Mental Status: She is alert and oriented to person, place, and time. Psychiatric:         Behavior: Behavior normal.         Thought Content: Thought content normal.         Judgment: Judgment normal.     EKG today shows left bundle branch block and possible atrial fibrillation however difficult to read with some artifect. Assessment and Plan:   Diagnosis Orders   1. Essential hypertension  Comprehensive Metabolic Panel    Lipid Panel   2. Hypothyroidism (acquired)  levothyroxine (SYNTHROID) 25 MCG tablet    TSH with Reflex   3. Rheumatoid arthritis involving multiple joints (HCC)  CBC Auto Differential    Sedimentation Rate    C-Reactive Protein   4. SOB (shortness of breath) on exertion  EKG 12 Lead    Raya Colunga MD, Cardiology, [facility]   5. Chronic fatigue  Vitamin B12   6. Abnormal EKG  Kingsley Pandya MD, Cardiology, [facility]   Given shortness of breath and abnormal EKG I recommended that she be evaluated by cardiology. Patient's pulse seems regular on exam and she does not appear to be in any acute distress currently. They would like to see cardiology down at [facility]. I also recommended she follow-up for colonoscopy after seeing cardiology. We will check labs today to rule out other causes of her shortness of breath and fatigue. The problems listed in the assessment are stable unless otherwise indicated. She  was instructed to continue their current medications and treatment for the above problems unless otherwise indicated above.    Age-specific preventative medicine recommendations were reviewed with patient today and the Healthy Family Handout was given to patient. Avoid tobacco products exposure. I have recommended that the patient follow CDC guidelines for prevention of COVID-19 infection. Follow up in 6mo. Call or return to office for any problems that develop before the next scheduled follow-up appointment. Frank Wilder M.D. Parts of this note were completed using voice recognition transcription. Every effort was made to ensure accuracy; however, inadvertent transcription errors may be present.

## 2021-05-15 LAB
A/G RATIO: 1.6 (ref 1.1–2.2)
ALBUMIN SERPL-MCNC: 4.2 G/DL (ref 3.4–5)
ALP BLD-CCNC: 96 U/L (ref 40–129)
ALT SERPL-CCNC: 78 U/L (ref 10–40)
ANION GAP SERPL CALCULATED.3IONS-SCNC: 15 MMOL/L (ref 3–16)
AST SERPL-CCNC: 52 U/L (ref 15–37)
BASOPHILS ABSOLUTE: 0 K/UL (ref 0–0.2)
BASOPHILS RELATIVE PERCENT: 0.4 %
BILIRUB SERPL-MCNC: 0.3 MG/DL (ref 0–1)
BUN BLDV-MCNC: 13 MG/DL (ref 7–20)
C-REACTIVE PROTEIN: 12.6 MG/L (ref 0–5.1)
CALCIUM SERPL-MCNC: 9.6 MG/DL (ref 8.3–10.6)
CHLORIDE BLD-SCNC: 100 MMOL/L (ref 99–110)
CHOLESTEROL, TOTAL: 201 MG/DL (ref 0–199)
CO2: 24 MMOL/L (ref 21–32)
CREAT SERPL-MCNC: 0.7 MG/DL (ref 0.6–1.2)
EOSINOPHILS ABSOLUTE: 0.2 K/UL (ref 0–0.6)
EOSINOPHILS RELATIVE PERCENT: 3.8 %
GFR AFRICAN AMERICAN: >60
GFR NON-AFRICAN AMERICAN: >60
GLOBULIN: 2.7 G/DL
GLUCOSE BLD-MCNC: 105 MG/DL (ref 70–99)
HCT VFR BLD CALC: 40.9 % (ref 36–48)
HDLC SERPL-MCNC: 82 MG/DL (ref 40–60)
HEMOGLOBIN: 13.8 G/DL (ref 12–16)
LDL CHOLESTEROL CALCULATED: 103 MG/DL
LYMPHOCYTES ABSOLUTE: 1.5 K/UL (ref 1–5.1)
LYMPHOCYTES RELATIVE PERCENT: 23.3 %
MCH RBC QN AUTO: 34 PG (ref 26–34)
MCHC RBC AUTO-ENTMCNC: 33.8 G/DL (ref 31–36)
MCV RBC AUTO: 100.7 FL (ref 80–100)
MONOCYTES ABSOLUTE: 0.5 K/UL (ref 0–1.3)
MONOCYTES RELATIVE PERCENT: 7.7 %
NEUTROPHILS ABSOLUTE: 4.2 K/UL (ref 1.7–7.7)
NEUTROPHILS RELATIVE PERCENT: 64.8 %
PDW BLD-RTO: 16.8 % (ref 12.4–15.4)
PLATELET # BLD: 239 K/UL (ref 135–450)
PMV BLD AUTO: 8.1 FL (ref 5–10.5)
POTASSIUM SERPL-SCNC: 4.2 MMOL/L (ref 3.5–5.1)
RBC # BLD: 4.06 M/UL (ref 4–5.2)
SEDIMENTATION RATE, ERYTHROCYTE: 14 MM/HR (ref 0–30)
SODIUM BLD-SCNC: 139 MMOL/L (ref 136–145)
TOTAL PROTEIN: 6.9 G/DL (ref 6.4–8.2)
TRIGL SERPL-MCNC: 78 MG/DL (ref 0–150)
TSH REFLEX: 3.06 UIU/ML (ref 0.27–4.2)
VITAMIN B-12: 750 PG/ML (ref 211–911)
VLDLC SERPL CALC-MCNC: 16 MG/DL
WBC # BLD: 6.4 K/UL (ref 4–11)

## 2021-05-17 DIAGNOSIS — R06.02 SHORTNESS OF BREATH: Primary | ICD-10-CM

## 2021-05-17 DIAGNOSIS — R94.31 ABNORMAL EKG: ICD-10-CM

## 2021-05-17 DIAGNOSIS — I10 ESSENTIAL HYPERTENSION: ICD-10-CM

## 2021-05-18 ENCOUNTER — TELEPHONE (OUTPATIENT)
Dept: FAMILY MEDICINE CLINIC | Age: 81
End: 2021-05-18

## 2021-05-18 NOTE — TELEPHONE ENCOUNTER
Patient is asking to speak with you, she said she needs to ask you a quick question about her labs  Her phone number is [phone]

## 2021-05-24 ENCOUNTER — VIRTUAL VISIT (OUTPATIENT)
Dept: FAMILY MEDICINE CLINIC | Age: 81
End: 2021-05-24
Payer: MEDICARE

## 2021-05-24 ENCOUNTER — TELEPHONE (OUTPATIENT)
Dept: FAMILY MEDICINE CLINIC | Age: 81
End: 2021-05-24

## 2021-05-24 DIAGNOSIS — L50.9 HIVES: Primary | ICD-10-CM

## 2021-05-24 PROCEDURE — 99213 OFFICE O/P EST LOW 20 MIN: CPT | Performed by: FAMILY MEDICINE

## 2021-05-24 RX ORDER — DILTIAZEM HYDROCHLORIDE 120 MG/1
120 CAPSULE, EXTENDED RELEASE ORAL DAILY
Status: ON HOLD | COMMUNITY
Start: 2021-05-19 | End: 2021-06-03

## 2021-05-24 RX ORDER — PREDNISONE 20 MG/1
20 TABLET ORAL 2 TIMES DAILY
Qty: 14 TABLET | Refills: 0 | Status: SHIPPED | OUTPATIENT
Start: 2021-05-24 | End: 2021-05-31

## 2021-05-24 NOTE — TELEPHONE ENCOUNTER
Tell her I am with patients and if her SOB and wheezing have gotten worse she needs to go to ER for acute eval

## 2021-05-24 NOTE — PROGRESS NOTES
2021    TELEHEALTH EVALUATION -- Audio/Visual (During IXCFU-26 public health emergency) using Krush. me video visit. Patient identification was verified at the start of the visit. History:    Joesph Chapin (:  1940) has requested an audio/video evaluation for the following concern(s): hives and some wheezing. She was prescribed Eliquis and diltiazem by cardiology but she states she did not start those medicines until about 2 days ago and this started before that so she does not think it is related to her new medicines she tried Benadryl but it makes her too sleepy. She states that it is on her legs arms and back. Her medication list, allergies and problem list were reviewed. She  reports that she has quit smoking. She has never used smokeless tobacco.  She recently was diagnosed with new onset atrial fibrillation being treated by cardiology    Review of systems:                  positive for mild wheezing. PHYSICAL EXAMINATION:    Vital Signs: (As obtained by patient/caregiver or practitioner observation) Bp 137/91 and Pulse 101    Constitutional:   Appears well-developed and well-nourished   No apparent distress                          Mental status:  Alert and awake   Oriented to person/place/time   Able to follow commands      Psychiatric:              mood and affect are normal               speech and thought processes are normal.  No hallucinations. No bizarre ideation. appearance and behavior are unremarkable  Eyes:  EOM are intact  Sclera, lids and lashes are clear                 No discharge noted    HENT:   Normocephalic, atraumatic.     Mouth/Throat: Mucous membranes appear moist.     External Ears:  Normal      Neck:   No visualized mass     Pulmonary/Chest:   Respiratory effort normal, no visualized signs of difficulty breathing or respiratory distress              Neurological:          No Facial Asymmetry (Cranial nerve 7 motor function) (limited exam to video visit)                              No gaze palsy              Skin:   Red bumps notes on legs            Due to this being a TeleHealth encounter, evaluation of the following organ systems is limited: Vitals/Constitutional/EENT/Resp/CV/GI//MS/Neuro/Skin/Heme-Lymph-Imm. Assessment  1. Hives        Plan  Patient did not tolerate Benadryl because sedating we will try short course of prednisone 20 mg twice daily for 7 days to see if helps rash. Discussed use, benefit, and side effects of prescribed medications. All patient questions answered. Pt voiced understanding. She was advised to call the office for any problems which may occur before the next scheduled appointment. Orders Placed This Encounter   Medications    predniSONE (DELTASONE) 20 MG tablet     Sig: Take 1 tablet by mouth 2 times daily for 7 days     Dispense:  14 tablet     Refill:  0        Follow up in office if no better. Pursuant to the emergency declaration under the [address] [place] [address], Counts include 234 beds at the [facility] waiver authority and the [employer] and [employer] Act, this virtual visit was conducted, with the patient's consent, through a video synchronous encounter. Total time spent on this encounter: 16 minutes  --Micah López MD on 5/24/2021 at 12:20 PM    An  electronic signature was used to authenticate this note.

## 2021-06-03 ENCOUNTER — HOSPITAL ENCOUNTER (INPATIENT)
Age: 81
LOS: 2 days | Discharge: HOME | DRG: 308 | End: 2021-06-05
Attending: EMERGENCY MEDICINE | Admitting: INTERNAL MEDICINE
Payer: MEDICARE

## 2021-06-03 ENCOUNTER — APPOINTMENT (OUTPATIENT)
Dept: GENERAL RADIOLOGY | Age: 81
DRG: 308 | End: 2021-06-03
Payer: MEDICARE

## 2021-06-03 DIAGNOSIS — I48.91 ATRIAL FIBRILLATION WITH RVR (HCC): Primary | ICD-10-CM

## 2021-06-03 DIAGNOSIS — R06.02 SHORTNESS OF BREATH: ICD-10-CM

## 2021-06-03 DIAGNOSIS — I48.20 CHRONIC ATRIAL FIBRILLATION (HCC): ICD-10-CM

## 2021-06-03 LAB
A/G RATIO: 1.3 (ref 1.1–2.2)
ALBUMIN SERPL-MCNC: 3.5 G/DL (ref 3.4–5)
ALP BLD-CCNC: 73 U/L (ref 40–129)
ALT SERPL-CCNC: 121 U/L (ref 10–40)
ANION GAP SERPL CALCULATED.3IONS-SCNC: 8 MMOL/L (ref 3–16)
AST SERPL-CCNC: 90 U/L (ref 15–37)
BASOPHILS ABSOLUTE: 0 K/UL (ref 0–0.2)
BASOPHILS RELATIVE PERCENT: 0.1 %
BILIRUB SERPL-MCNC: 0.9 MG/DL (ref 0–1)
BILIRUBIN URINE: NEGATIVE
BLOOD, URINE: NEGATIVE
BUN BLDV-MCNC: 15 MG/DL (ref 7–20)
CALCIUM SERPL-MCNC: 8.6 MG/DL (ref 8.3–10.6)
CHLORIDE BLD-SCNC: 103 MMOL/L (ref 99–110)
CLARITY: CLEAR
CO2: 26 MMOL/L (ref 21–32)
COLOR: YELLOW
CREAT SERPL-MCNC: <0.5 MG/DL (ref 0.6–1.2)
EKG ATRIAL RATE: 340 BPM
EKG ATRIAL RATE: 58 BPM
EKG DIAGNOSIS: NORMAL
EKG DIAGNOSIS: NORMAL
EKG P AXIS: 60 DEGREES
EKG P-R INTERVAL: 132 MS
EKG Q-T INTERVAL: 372 MS
EKG Q-T INTERVAL: 452 MS
EKG QRS DURATION: 128 MS
EKG QRS DURATION: 132 MS
EKG QTC CALCULATION (BAZETT): 443 MS
EKG QTC CALCULATION (BAZETT): 507 MS
EKG R AXIS: 10 DEGREES
EKG R AXIS: 33 DEGREES
EKG T AXIS: 120 DEGREES
EKG T AXIS: 129 DEGREES
EKG VENTRICULAR RATE: 112 BPM
EKG VENTRICULAR RATE: 58 BPM
EOSINOPHILS ABSOLUTE: 0.1 K/UL (ref 0–0.6)
EOSINOPHILS RELATIVE PERCENT: 1.4 %
GFR AFRICAN AMERICAN: >60
GFR NON-AFRICAN AMERICAN: >60
GLOBULIN: 2.7 G/DL
GLUCOSE BLD-MCNC: 118 MG/DL (ref 70–99)
GLUCOSE URINE: NEGATIVE MG/DL
HCT VFR BLD CALC: 38.7 % (ref 36–48)
HEMOGLOBIN: 13 G/DL (ref 12–16)
KETONES, URINE: NEGATIVE MG/DL
LEUKOCYTE ESTERASE, URINE: NEGATIVE
LYMPHOCYTES ABSOLUTE: 1.5 K/UL (ref 1–5.1)
LYMPHOCYTES RELATIVE PERCENT: 18.7 %
MCH RBC QN AUTO: 33.9 PG (ref 26–34)
MCHC RBC AUTO-ENTMCNC: 33.7 G/DL (ref 31–36)
MCV RBC AUTO: 100.7 FL (ref 80–100)
MICROSCOPIC EXAMINATION: NORMAL
MONOCYTES ABSOLUTE: 0.9 K/UL (ref 0–1.3)
MONOCYTES RELATIVE PERCENT: 10.8 %
NEUTROPHILS ABSOLUTE: 5.7 K/UL (ref 1.7–7.7)
NEUTROPHILS RELATIVE PERCENT: 69 %
NITRITE, URINE: NEGATIVE
PDW BLD-RTO: 17.4 % (ref 12.4–15.4)
PH UA: 7 (ref 5–8)
PLATELET # BLD: 223 K/UL (ref 135–450)
PMV BLD AUTO: 7.3 FL (ref 5–10.5)
POTASSIUM SERPL-SCNC: 4 MMOL/L (ref 3.5–5.1)
PRO-BNP: 1216 PG/ML (ref 0–449)
PROTEIN UA: NEGATIVE MG/DL
RBC # BLD: 3.84 M/UL (ref 4–5.2)
SODIUM BLD-SCNC: 137 MMOL/L (ref 136–145)
SPECIFIC GRAVITY UA: 1.02 (ref 1–1.03)
TOTAL PROTEIN: 6.2 G/DL (ref 6.4–8.2)
TROPONIN: <0.01 NG/ML
TROPONIN: <0.01 NG/ML
URINE TYPE: NORMAL
UROBILINOGEN, URINE: 0.2 E.U./DL
WBC # BLD: 8.3 K/UL (ref 4–11)

## 2021-06-03 PROCEDURE — 6370000000 HC RX 637 (ALT 250 FOR IP): Performed by: REGISTERED NURSE

## 2021-06-03 PROCEDURE — 71045 X-RAY EXAM CHEST 1 VIEW: CPT

## 2021-06-03 PROCEDURE — 96374 THER/PROPH/DIAG INJ IV PUSH: CPT

## 2021-06-03 PROCEDURE — 2580000003 HC RX 258: Performed by: PHYSICIAN ASSISTANT

## 2021-06-03 PROCEDURE — 81003 URINALYSIS AUTO W/O SCOPE: CPT

## 2021-06-03 PROCEDURE — 6360000002 HC RX W HCPCS: Performed by: REGISTERED NURSE

## 2021-06-03 PROCEDURE — 93005 ELECTROCARDIOGRAM TRACING: CPT | Performed by: PHYSICIAN ASSISTANT

## 2021-06-03 PROCEDURE — 80053 COMPREHEN METABOLIC PANEL: CPT

## 2021-06-03 PROCEDURE — 93010 ELECTROCARDIOGRAM REPORT: CPT | Performed by: INTERNAL MEDICINE

## 2021-06-03 PROCEDURE — 2500000003 HC RX 250 WO HCPCS: Performed by: PHYSICIAN ASSISTANT

## 2021-06-03 PROCEDURE — 85025 COMPLETE CBC W/AUTO DIFF WBC: CPT

## 2021-06-03 PROCEDURE — 99285 EMERGENCY DEPT VISIT HI MDM: CPT

## 2021-06-03 PROCEDURE — 84484 ASSAY OF TROPONIN QUANT: CPT

## 2021-06-03 PROCEDURE — 93005 ELECTROCARDIOGRAM TRACING: CPT | Performed by: EMERGENCY MEDICINE

## 2021-06-03 PROCEDURE — 1200000000 HC SEMI PRIVATE

## 2021-06-03 PROCEDURE — 83880 ASSAY OF NATRIURETIC PEPTIDE: CPT

## 2021-06-03 PROCEDURE — 2580000003 HC RX 258: Performed by: REGISTERED NURSE

## 2021-06-03 RX ORDER — ONDANSETRON 4 MG/1
4 TABLET, ORALLY DISINTEGRATING ORAL EVERY 8 HOURS PRN
Status: DISCONTINUED | OUTPATIENT
Start: 2021-06-03 | End: 2021-06-05 | Stop reason: HOSPADM

## 2021-06-03 RX ORDER — POLYETHYLENE GLYCOL 3350 17 G/17G
17 POWDER, FOR SOLUTION ORAL DAILY PRN
Status: DISCONTINUED | OUTPATIENT
Start: 2021-06-03 | End: 2021-06-05 | Stop reason: HOSPADM

## 2021-06-03 RX ORDER — FLUTICASONE PROPIONATE 50 MCG
1 SPRAY, SUSPENSION (ML) NASAL DAILY PRN
Status: DISCONTINUED | OUTPATIENT
Start: 2021-06-03 | End: 2021-06-05 | Stop reason: HOSPADM

## 2021-06-03 RX ORDER — LEVOTHYROXINE SODIUM 0.03 MG/1
25 TABLET ORAL DAILY
Status: DISCONTINUED | OUTPATIENT
Start: 2021-06-03 | End: 2021-06-05 | Stop reason: HOSPADM

## 2021-06-03 RX ORDER — DILTIAZEM HYDROCHLORIDE 5 MG/ML
10 INJECTION INTRAVENOUS ONCE
Status: COMPLETED | OUTPATIENT
Start: 2021-06-03 | End: 2021-06-03

## 2021-06-03 RX ORDER — SODIUM CHLORIDE 9 MG/ML
25 INJECTION, SOLUTION INTRAVENOUS PRN
Status: DISCONTINUED | OUTPATIENT
Start: 2021-06-03 | End: 2021-06-05 | Stop reason: HOSPADM

## 2021-06-03 RX ORDER — OMEGA-3-ACID ETHYL ESTERS 1 G/1
1 CAPSULE, LIQUID FILLED ORAL DAILY
Status: DISCONTINUED | OUTPATIENT
Start: 2021-06-03 | End: 2021-06-05 | Stop reason: HOSPADM

## 2021-06-03 RX ORDER — SODIUM CHLORIDE 0.9 % (FLUSH) 0.9 %
5-40 SYRINGE (ML) INJECTION EVERY 12 HOURS SCHEDULED
Status: DISCONTINUED | OUTPATIENT
Start: 2021-06-03 | End: 2021-06-05 | Stop reason: HOSPADM

## 2021-06-03 RX ORDER — FOLIC ACID 1 MG/1
1 TABLET ORAL DAILY
Status: DISCONTINUED | OUTPATIENT
Start: 2021-06-03 | End: 2021-06-05 | Stop reason: HOSPADM

## 2021-06-03 RX ORDER — ACETAMINOPHEN 650 MG/1
650 SUPPOSITORY RECTAL EVERY 6 HOURS PRN
Status: DISCONTINUED | OUTPATIENT
Start: 2021-06-03 | End: 2021-06-05 | Stop reason: HOSPADM

## 2021-06-03 RX ORDER — ACETAMINOPHEN 325 MG/1
650 TABLET ORAL EVERY 6 HOURS PRN
Status: DISCONTINUED | OUTPATIENT
Start: 2021-06-03 | End: 2021-06-05 | Stop reason: HOSPADM

## 2021-06-03 RX ORDER — FUROSEMIDE 10 MG/ML
20 INJECTION INTRAMUSCULAR; INTRAVENOUS ONCE
Status: COMPLETED | OUTPATIENT
Start: 2021-06-03 | End: 2021-06-03

## 2021-06-03 RX ORDER — SODIUM CHLORIDE 0.9 % (FLUSH) 0.9 %
5-40 SYRINGE (ML) INJECTION PRN
Status: DISCONTINUED | OUTPATIENT
Start: 2021-06-03 | End: 2021-06-05 | Stop reason: HOSPADM

## 2021-06-03 RX ORDER — ONDANSETRON 2 MG/ML
4 INJECTION INTRAMUSCULAR; INTRAVENOUS EVERY 6 HOURS PRN
Status: DISCONTINUED | OUTPATIENT
Start: 2021-06-03 | End: 2021-06-05 | Stop reason: HOSPADM

## 2021-06-03 RX ADMIN — DILTIAZEM HYDROCHLORIDE 10 MG: 5 INJECTION INTRAVENOUS at 13:22

## 2021-06-03 RX ADMIN — APIXABAN 5 MG: 5 TABLET, FILM COATED ORAL at 20:56

## 2021-06-03 RX ADMIN — Medication 10 ML: at 20:56

## 2021-06-03 RX ADMIN — FUROSEMIDE 20 MG: 10 INJECTION, SOLUTION INTRAMUSCULAR; INTRAVENOUS at 21:43

## 2021-06-03 RX ADMIN — DILTIAZEM HYDROCHLORIDE 5 MG/HR: 5 INJECTION INTRAVENOUS at 13:27

## 2021-06-03 ASSESSMENT — ENCOUNTER SYMPTOMS
COUGH: 0
EYES NEGATIVE: 1
ABDOMINAL PAIN: 0
COLOR CHANGE: 0
BACK PAIN: 0
SHORTNESS OF BREATH: 1

## 2021-06-03 ASSESSMENT — PAIN SCALES - GENERAL
PAINLEVEL_OUTOF10: 0

## 2021-06-03 NOTE — PROGRESS NOTES
4 Eyes Skin Assessment     The patient is being assess for   Admission    I agree that 2 RN's have performed a thorough Head to Toe Skin Assessment on the patient. ALL assessment sites listed below have been assessed. Areas assessed for pressure by both nurses:   [x]   Head, Face, and Ears   [x]   Shoulders, Back, and Chest, Abdomen  [x]   Arms, Elbows, and Hands   [x]   Coccyx, Sacrum, and Ischium  [x]   Legs, Feet, and Heels        Skin Assessed Under all Medical Devices by both nurses:  none              All Mepilex Borders were peeled back and area peeked at by both nurses:  No: n/a  Please list where Mepilex Borders are located:  n/a             **SHARE this note so that the co-signing nurse is able to place an eSignature**    Co-signer eSignature: Electronically signed by Mushtaq Farmer RN on 6/4/21 at 3:15 AM EDT    Does the Patient have Skin Breakdown related to pressure?   No     (Insert Photo here n/a)         Alok Prevention initiated:  NA   Wound Care Orders initiated:  NA      WOC nurse consulted for Pressure Injury (Stage 3,4, Unstageable, DTI, NWPT, Complex wounds)and New or Established Ostomies:  NA      Primary Nurse eSignature: Electronically signed by Dino Moran RN on 6/3/21 at 4:49 PM EDT

## 2021-06-03 NOTE — ED NOTES
Bed: 19  Expected date:   Expected time:   Means of arrival:   Comments:  Paula Urias RN  06/03/21 [id number]

## 2021-06-03 NOTE — PROGRESS NOTES
Patient admitted to 355 from ER. Patient Aox4. Ambulates SBA due to IV pole. VS and assessment completed. Right AC infusing Dilt gtt per orders. HR is not well controlled- RN titrating per orders. Afib RVR on tele- CMU verified upon transfer. Skin assessment completed and WNL. All belongings placed in closet, black bag with the patient- no medications with the patient. Patient oriented to the room and POC, ordered dinner, updated Daughter as to the [address]. Call light and bedside table within reach, bed in lowest position and locked with 2/4 rails raised. Patient denies any needs. RN will continue to monitor.      Patient reports she had a LUCY with Cardioversion yesterday at [facility].

## 2021-06-03 NOTE — ED NOTES

## 2021-06-03 NOTE — ED NOTES
Repeat troponin drawn from saline lock after 5cc waste and sent to lab. Saline lock flushes easily. Pt bernardino well.        Pastor White RN  06/03/21 [id number]

## 2021-06-03 NOTE — PLAN OF CARE
Problem: Falls - Risk of:  Goal: Will remain free from falls  Description: Will remain free from falls  Outcome: Ongoing  Goal: Absence of physical injury  Description: Absence of physical injury  Outcome: Ongoing     Problem: Infection:  Goal: Will remain free from infection  Description: Will remain free from infection  Outcome: Ongoing     Problem: Safety:  Goal: Free from accidental physical injury  Description: Free from accidental physical injury  Outcome: Ongoing  Goal: Free from intentional harm  Description: Free from intentional harm  Outcome: Ongoing     Problem: Daily Care:  Goal: Daily care needs are met  Description: Daily care needs are met  Outcome: Ongoing     Problem: Pain:  Goal: Patient's pain/discomfort is manageable  Description: Patient's pain/discomfort is manageable  Outcome: Ongoing     Problem: Discharge Planning:  Goal: Patients continuum of care needs are met  Description: Patients continuum of care needs are met  Outcome: Ongoing

## 2021-06-03 NOTE — PROGRESS NOTES
RN went to ER to take patient to B3- patient does not want to be admitted. ER MD will be speaking with pt. ED RN will call if/when patient is ready to be taken to the unit.

## 2021-06-03 NOTE — ED NOTES
Bedside report given to [facility] from B-3. CMU confirmed tele box 56.        Brian Rajput RN  06/03/21 [id number]

## 2021-06-03 NOTE — H&P
Hospital Medicine History & Physical      PCP: Radha Yun MD    Date of Admission: 6/3/2021    Date of Service: Pt seen/examined on 6/3/2021 and Admitted to Inpatient with expected LOS greater than two midnights due to medical therapy. Chief Complaint:  SOB    History Of Present Illness:     80 y.o. female with PMH of HLD, HTN and RA who presented to [facility] with complaints of SOB that started this morning after she woke up. SOB has been ongoing for the last several weeks but was acutely worse this morning. No chest pain or palpitations. No cough/sputum production. No fever/chills. No N/V/D. No lower extremity swelling. Pt is a limited historian. History obtained from the EMR. Pt has been having SOB for several weeks, she saw her PCP on . EKG obtained which showed atrial tachycardia vs atrial flutter. She was referred to Cardiology. She was seen By Dr. Celi Barron at [place] and was recommended to start taking cardizem and eliquis with plan for LUCY/cardioversion in which she underwent yesterday. ED course: CXR showed pulmonary vascular congestion. Lab work significant for elevated Pro-BNP, mild transaminitis. EKG showed Afib with RVR. She was started on cardizem infusion. Hospitalist service was consulted for admission. Past Medical History:          Diagnosis Date    Allergic rhinitis     Hyperlipidemia     Hypertension     Hypothyroidism     probable autoimmune thyroiditis    LBBB (left bundle branch block)     ON EKG, had normal GXT    Rheumatoid arthritis(714.0)     Rheumatologist in [place]       Past Surgical History:          Procedure Laterality Date     SECTION      x2    COLONOSCOPY  2005    JOINT REPLACEMENT Left 13    left total knee replacement       Medications Prior to Admission:      Prior to Admission medications    Medication Sig Start Date End Date Taking?  Authorizing Provider   apixaban (ELIQUIS) 5 MG TABS tablet Take 5 mg by mouth 2 times daily 5/19/21   Historical Provider, MD   dilTIAZem [facility]) 120 MG extended release capsule Take 120 mg by mouth daily 5/19/21   Historical Provider, MD   folic acid (Elise James) 1 MG tablet TAKE 1 TABLET BY MOUTH EVERY DAY  Patient taking differently: TAKE 1 TABLET BY MOUTH EVERY DAY EXCEPT ON MONDAY 5/14/21   Bárbara Garcia MD   levothyroxine (SYNTHROID) 25 MCG tablet TAKE 1 TABLET BY MOUTH EVERY DAY 5/14/21   Bárbara Garcia MD   tretinoin (RETIN-A) 0.05 % cream Apply topically nightly Apply topically nightly as needed for rash    Historical Provider, MD   fluticasone (FLONASE) 50 MCG/ACT nasal spray 1 spray by Each Nostril route daily as needed for Allergies    Historical Provider, MD   fexofenadine (ALLEGRA) 180 MG tablet TAKE 1 TABLET BY MOUTH EVERY DAY  Patient taking differently: TAKE 1 TABLET BY MOUTH EVERY DAY taking prn 2/24/21   Bárbara Garcia MD   methotrexate (RHEUMATREX) 2.5 MG chemo tablet TAKE 8 TABLETS EVERY MONDAY 2/12/21   Bárbara Garcia MD   VITAMIN D PO Take by mouth    Historical Provider, MD   Probiotic Product (PROBIOTIC PO) Take by mouth    Historical Provider, MD   Ascorbic Acid (VITAMIN C PO) Take by mouth    Historical Provider, MD   fish oil-omega-3 fatty acids 1000 MG capsule Take 4 g by mouth daily     Historical Provider, MD       Allergies:  Patient has no known allergies. Social History:      The patient currently lives at home. TOBACCO:   reports that she has quit smoking. She has never used smokeless tobacco.  ETOH:   reports current alcohol use of about 2.0 standard drinks of alcohol per week. E-Cigarettes/Vaping Use     Questions Responses    E-Cigarette/Vaping Use Never User    Start Date     Passive Exposure     Quit Date     Counseling Given     Comments             Family History:      Reviewed in detail.  Positive as follows:        Problem Relation Age of Onset    Heart Disease Mother         very slow heart beat    Thyroid Disease Father     Heart Disease Father        REVIEW OF SYSTEMS COMPLETED:   Pertinent positives as noted in the HPI. All other systems reviewed and negative. PHYSICAL EXAM PERFORMED:    BP (!) 122/111   Pulse 108   Temp 98 °F (36.7 °C) (Oral)   Resp 21   Wt 126 lb (57.2 kg)   SpO2 93%   BMI 20.34 kg/m²     General appearance:  No apparent distress, appears stated age and cooperative. HEENT:  Normal cephalic, atraumatic without obvious deformity. Pupils equal, round, and reactive to light. Extra ocular muscles intact. Conjunctivae/corneas clear. Neck: Supple, with full range of motion. No jugular venous distention. Trachea midline. Respiratory:  Normal respiratory effort. Diminished bilateral breath sounds. Cardiovascular:  Irregular rate and rhythm with normal S1/S2 without murmurs, rubs or gallops. Abdomen: Soft, non-tender, non-distended with normal bowel sounds. Musculoskeletal:  No clubbing, cyanosis or edema bilaterally. Full range of motion without deformity. Skin: Skin color, texture, turgor normal.  No rashes or lesions. Neurologic:  Neurovascularly intact without any focal sensory/motor deficits. Cranial nerves: II-XII intact, grossly non-focal.  Psychiatric:  Alert and oriented, thought content appropriate, normal insight  Capillary Refill: Brisk,3 seconds, normal  Peripheral Pulses: +2 palpable, equal bilaterally       Labs:     Recent Labs     06/03/21  0820   WBC 8.3   HGB 13.0   HCT 38.7        Recent Labs     06/03/21  0820      K 4.0      CO2 26   BUN 15   CREATININE <0.5*   CALCIUM 8.6     Recent Labs     06/03/21  0820   AST 90*   *   BILITOT 0.9   ALKPHOS 73     No results for input(s): INR in the last 72 hours.   Recent Labs     06/03/21  0820 06/03/21  1152   TROPONINI <0.01 <0.01       Urinalysis:      Lab Results   Component Value Date    NITRU Negative 06/03/2021    WBCUA None seen 10/01/2019    RBCUA 0-2 10/01/2019    BLOODU Negative 06/03/2021    SPECGRAV 1.020 06/03/2021    GLUCOSEU Negative 06/03/2021       Radiology:     XR CHEST PORTABLE   Final Result   Pulmonary vascular congestion/interstitial edema likely superimposed on   chronic interstitial changes. Cardiomegaly. Small left pleural effusion and/or basilar atelectasis. ASSESSMENT/PLAN:    Active Hospital Problems    Diagnosis Date Noted   Lynnann Burkitt [facility]) [I48.91] 06/03/2021       Atrial fibrillation with RVR  - Status post cardioversion at [facility] on 6/2.  - Pt started on cardizem infusion in the ED.   - Continue her home eliquis. - Consult Cardiology/EP for further assistance. - Monitor pt on telemetry.   - Recent TSH was normal.   - Obtain ECHO. Dyspnea:  - Possibly due to CHF, ? Tachycardia induced from above. - CXR showed findings consistent with CHF.   - Start gentle diuresis with IV lasix. - Obtain ECHO.   - Daily weights, close I's and O's. Hypothyroidism:  - Continue levothyroxine.   - Recent TSH was normal.     Hypertension:  - Sub-optimal control.   - Continue her home meds. - Monitor closely. Rheumatoid arthritis:  - Stable. On methotrexate. DVT Prophylaxis: On eliquis   Diet: No diet orders on file  Code Status: Prior    PT/OT Eval Status: Not indicated     Dispo - > 2 days        [address], APRN - CNP    Thank you Becky Mcknight MD for the opportunity to be involved in this patient's care. If you have any questions or concerns please feel free to contact me at [phone].

## 2021-06-03 NOTE — PROGRESS NOTES
Consult called to [email or web address] [email or web address] and left a message on voice mail  RN aware of it  Batsheva Medeiros

## 2021-06-03 NOTE — ED PROVIDER NOTES
[address] [facility]  ED  EMERGENCY DEPARTMENT ENCOUNTER        Pt Name: Phoebe Mccormack  MRN: [id number]  [place] 1940  Date of evaluation: 6/3/2021  Provider: Lavern Kee PA-C  PCP: Rafat Pickett MD  ED Attending: Dorothea Benavides MD      This patient was was seen by the attending provider     History provided by the patient    CHIEF COMPLAINT:     Chief Complaint   Patient presents with    Shortness of Breath     cardioverted yesterday at [facility], feeling sob today       HISTORY OF PRESENT ILLNESS:      Teresa Chapin is a 80 y.o. female who arrives to the ED by [facility]. Patient is somewhat of a poor historian when it comes to her medical history. She is here reporting shortness of breath that she states has been present for about 3 weeks. Symptoms are more often present with activity and are rarely noticed at rest.  She has no chest pain, dizziness or nausea. Patient was at [facility] yesterday with atrial fibrillation and was cardioverted. She is on Eliquis and is to start metoprolol but is not taking. Patient states that she told her her doctors about that the nurse about her shortness of breath and she states they did a thorough work-up without the testing. She was told she was in good health. She had another episode of shortness of breath at 5:30 AM today however which is what prompted her visit to the ED. Nursing Notes were reviewed     REVIEW OF SYSTEMS:     Review of Systems   Constitutional: Negative for activity change, appetite change, chills and fever. HENT: Negative. Eyes: Negative. Respiratory: Positive for shortness of breath. Negative for cough. Cardiovascular: Negative for chest pain and leg swelling. Gastrointestinal: Negative for abdominal pain. Genitourinary: Negative. Musculoskeletal: Negative for back pain and neck pain. Skin: Negative for color change. Neurological: Negative for dizziness, weakness and headaches.    All other systems reviewed and are negative. Except as noted above in the ROS, all other systems were reviewed and negative. PAST MEDICAL HISTORY:     Past Medical History:   Diagnosis Date    Allergic rhinitis     Hyperlipidemia     Hypertension     Hypothyroidism     probable autoimmune thyroiditis    LBBB (left bundle branch block)     ON EKG, had normal GXT    Rheumatoid arthritis(714.0)     Rheumatologist in [address] [place], [place] [address]vard:      Past Surgical History:   Procedure Laterality Date     SECTION      x2    COLONOSCOPY  2005    JOINT REPLACEMENT Left 13    left total knee replacement         CURRENT MEDICATIONS:       Previous Medications    APIXABAN (ELIQUIS) 5 MG TABS TABLET    Take 5 mg by mouth 2 times daily    ASCORBIC ACID (VITAMIN C PO)    Take by mouth    DILTIAZEM (TIAZAC) 120 MG EXTENDED RELEASE CAPSULE    Take 120 mg by mouth daily    FEXOFENADINE (ALLEGRA) 180 MG TABLET    TAKE 1 TABLET BY MOUTH EVERY DAY    FISH OIL-OMEGA-3 FATTY ACIDS 1000 MG CAPSULE    Take 4 g by mouth daily     FLUTICASONE (FLONASE) 50 MCG/ACT NASAL SPRAY    1 spray by Each Nostril route daily as needed for Allergies    FOLIC ACID (FOLVITE) 1 MG TABLET    TAKE 1 TABLET BY MOUTH EVERY DAY    LEVOTHYROXINE (SYNTHROID) 25 MCG TABLET    TAKE 1 TABLET BY MOUTH EVERY DAY    METHOTREXATE (RHEUMATREX) 2.5 MG CHEMO TABLET    TAKE 8 TABLETS EVERY MONDAY    PROBIOTIC PRODUCT (PROBIOTIC PO)    Take by mouth    TRETINOIN (RETIN-A) 0.05 % CREAM    Apply topically nightly Apply topically nightly as needed for rash    VITAMIN D PO    Take by mouth         ALLERGIES:    Patient has no known allergies. FAMILY HISTORY:       Family History   Problem Relation Age of Onset    Heart Disease Mother         very slow heart beat    Thyroid Disease Father     Heart Disease Father           SOCIAL HISTORY:       Social History     Socioeconomic History    Marital status:       Spouse name: None    icterus. PERRL. EOM's grossly intact. NECK: Supple. Normal ROM. CARDIOVASCULAR: Initial exam reveals the patient to have a normal rate with irregularly irregular rhythm. No Murmer. Intact distal pulses. PULMONARY/CHEST WALL: Effort normal. No tachypnea. Lungs clear to ausculation. ABDOMEN: Normal BS. Soft. Nondistended. No tenderness to palpate. No guarding. /ANORECTAL: Not assessed  MUSKULOSKELETAL: Normal ROM. No acute deformities. No edema. No tenderness to palpate. SKIN: Warm and dry. No rash. NEUROLOGICAL: Alert and oriented x 3. GCS 15. CN II-XII grossly intact. Strength is 5/5 in all extremities and sensation is intact. Normal gait.    PSYCHIATRIC: Normal affect        DIAGNOSTICRESULTS:     LABS:    Results for orders placed or performed during the hospital encounter of 06/03/21   CBC Auto Differential   Result Value Ref Range    WBC 8.3 4.0 - 11.0 K/uL    RBC 3.84 (L) 4.00 - 5.20 M/uL    Hemoglobin 13.0 12.0 - 16.0 g/dL    Hematocrit 38.7 36.0 - 48.0 %    .7 (H) 80.0 - 100.0 fL    MCH 33.9 26.0 - 34.0 pg    MCHC 33.7 31.0 - 36.0 g/dL    RDW 17.4 (H) 12.4 - 15.4 %    Platelets 213 687 - 026 K/uL    MPV 7.3 5.0 - 10.5 fL    Neutrophils % 69.0 %    Lymphocytes % 18.7 %    Monocytes % 10.8 %    Eosinophils % 1.4 %    Basophils % 0.1 %    Neutrophils Absolute 5.7 1.7 - 7.7 K/uL    Lymphocytes Absolute 1.5 1.0 - 5.1 K/uL    Monocytes Absolute 0.9 0.0 - 1.3 K/uL    Eosinophils Absolute 0.1 0.0 - 0.6 K/uL    Basophils Absolute 0.0 0.0 - 0.2 K/uL   Comprehensive Metabolic Panel   Result Value Ref Range    Sodium 137 136 - 145 mmol/L    Potassium 4.0 3.5 - 5.1 mmol/L    Chloride 103 99 - 110 mmol/L    CO2 26 21 - 32 mmol/L    Anion Gap 8 3 - 16    Glucose 118 (H) 70 - 99 mg/dL    BUN 15 7 - 20 mg/dL    CREATININE <0.5 (L) 0.6 - 1.2 mg/dL    GFR Non-African American >60 >60    GFR African American >60 >60    Calcium 8.6 8.3 - 10.6 mg/dL    Total Protein 6.2 (L) 6.4 - 8.2 g/dL    Albumin 3.5 3.4 - 5.0 g/dL    Albumin/Globulin Ratio 1.3 1.1 - 2.2    Total Bilirubin 0.9 0.0 - 1.0 mg/dL    Alkaline Phosphatase 73 40 - 129 U/L     (H) 10 - 40 U/L    AST 90 (H) 15 - 37 U/L    Globulin 2.7 g/dL   Urinalysis   Result Value Ref Range    Color, UA Yellow Straw/Yellow    Clarity, UA Clear Clear    Glucose, Ur Negative Negative mg/dL    Bilirubin Urine Negative Negative    Ketones, Urine Negative Negative mg/dL    Specific Gravity, UA 1.020 1.005 - 1.030    Blood, Urine Negative Negative    pH, UA 7.0 5.0 - 8.0    Protein, UA Negative Negative mg/dL    Urobilinogen, Urine 0.2 <2.0 E.U./dL    Nitrite, Urine Negative Negative    Leukocyte Esterase, Urine Negative Negative    Microscopic Examination Not Indicated     Urine Type NotGiven    Troponin   Result Value Ref Range    Troponin <0.01 <0.01 ng/mL   Brain Natriuretic Peptide   Result Value Ref Range    Pro-BNP 1,216 (H) 0 - 449 pg/mL   Troponin   Result Value Ref Range    Troponin <0.01 <0.01 ng/mL   EKG 12 Lead   Result Value Ref Range    Ventricular Rate 58 BPM    Atrial Rate 58 BPM    P-R Interval 132 ms    QRS Duration 128 ms    Q-T Interval 452 ms    QTc Calculation (Bazett) 443 ms    P Axis 60 degrees    R Axis 33 degrees    T Axis 120 degrees    Diagnosis       Sinus bradycardia with marked sinus arrhythmiaLeft bundle branch blockAbnormal ECGWhen compared with ECG of 19-MAR-2021 06:07,QT has shortened   EKG 12 Lead   Result Value Ref Range    Ventricular Rate 112 BPM    Atrial Rate 340 BPM    QRS Duration 132 ms    Q-T Interval 372 ms    QTc Calculation (Bazett) 507 ms    R Axis 10 degrees    T Axis 129 degrees    Diagnosis       Atrial fibrillation with rapid ventricular responseNon-specific intra-ventricular conduction blockCannot rule out Septal infarct , age undeterminedAbnormal ECGWhen compared with ECG of 03-JUN-2021 08:14,Atrial fibrillation has replaced Sinus rhythmVent.  rate has increased BY  54 BPMNonspecific T wave abnormality, improved in Lateral leads         RADIOLOGY:  All x-ray studies areviewed/reviewed by me. Formal interpretations per the radiologist are as follows:      XR CHEST PORTABLE    Result Date: 6/3/2021  EXAMINATION: ONE XRAY VIEW OF THE CHEST 6/3/2021 8:14 am COMPARISON: Chest x-ray dated 03/18/2021 HISTORY: ORDERING SYSTEM PROVIDED HISTORY: chest pain TECHNOLOGIST PROVIDED HISTORY: Reason for exam:->chest pain Reason for Exam: chest and upper belly pain Acuity: Unknown Type of Exam: Unknown FINDINGS: HEART/MEDIASTINUM: The cardiac silhouette is enlarged, but stable. The mediastinal silhouette is stable. PLEURA/LUNGS: There are increased interstitial markings reflecting pulmonary vascular congestion/interstitial edema superimposed on chronic interstitial changes. Small left pleural effusion and/or basilar atelectasis. There is no appreciable pneumothorax. BONES/SOFT TISSUE: No acute abnormality. Pulmonary vascular congestion/interstitial edema likely superimposed on chronic interstitial changes. Cardiomegaly. Small left pleural effusion and/or basilar atelectasis. EKG: The Ekg interpreted by me in the absence of a cardiologist shows. Atrial fibrillation with rate 58 bpm    See also interpretation by Elias Knowles MD.    Repeat EKG shows A. fib with RVR, rate 112 bpm      PROCEDURES:   N/A    CRITICAL CARE TIME:       Due to the immediate potential for life-threatening deterioration due to A. fib with RVR requiring Cardizem bolus and infusion, I spent 32 minutes providing critical care. This time is excluding time spent performing procedures.       CONSULTS:  IP CONSULT TO HOSPITALIST      EMERGENCY DEPARTMENT COURSE and DIFFERENTIAL DIAGNOSIS/MDM:   Vitals:    Vitals:    06/03/21 1109 06/03/21 1212 06/03/21 1246 06/03/21 1309   BP: (!) 140/75 (!) 140/85  (!) 122/111   Pulse: 64 82 126 108   Resp: 25 24  21   Temp:       TempSrc:       SpO2: 94% 94%  93%   Weight:           Patient was given the following medications:  Medications   dilTIAZem injection 10 mg (has no administration in time range)     Followed by   dilTIAZem 125 mg in dextrose 5 % 125 mL infusion (has no administration in time range)         Patient was evaluated by both myself and Julissa Salinas MD.   Old records were reviewed. Patient arrives to the emergency department by EMS with shortness of breath. She is a history of A. fib. She was cardioverted yesterday. On arrival patient has an EKG done that shows bradycardia but patient appears to be in atrial fibrillation. Throughout most of her stay her rate has been controlled in the 60s primarily. Still, due to her shortness of breath a cardiac work-up was initiated. CBC White count 8.3 with H&H 13.0 and 38.7  CMP reveals mild transaminitis with  and AST 90  Troponin <0.01 x2 draws, 3 hours apart  proBNP 1216  Urinalysis normal  Portable chest x-ray shows pulmonary vascular congestion/interstitial edema likely superimposed on chronic interstitial changes. Cardiomegaly. Small left pleural effusion and right basilar atelectasis. Patient has been hemodynamically stable in the majority of her time in the ED. I went to reassess her a third time and plans of discharging her after 2 - troponins. However upon reassessing the patient she is now in atrial fibrillation with RVR. When I was in the room heart rate was mainly in the 120s to 130s. Given this finding/developments since arrival she warrants hospitalization to further manage the rate. I spoke with [facility] hospitalists. We thoroughly discussed the history, physical exam, laboratory and imaging studies, as well as, emergency department course. Based upon that discussion, we've decided to admit Mary Chapin for further observation and evaluation of Cara Chapin's shortness of breath and atrial fibrillation with RVR.   As I have deemed necessary from their history, physical, and studies, I have considered and evaluated Mary Sunshine TI Chapin for the following diagnoses:  ACUTE CORONARY SYNDROME, PERICARDIAL TAMPONADE, CHF, SEPSIS, COPD EXACERBATION, PNEUMONIA, PNEUMOTHORAX, PULMONARY EMBOLISM, and THORACIC DISSECTION. FINAL IMPRESSION:      1. Atrial fibrillation with RVR ([address]    2.  Shortness of breath          DISPOSITION/PLAN:   DISPOSITION Decision To Admit                   (Please note thatportions of this note were completed with a voice recognition program.  Efforts were made to edit the dictations, but occasionally words are mis-transcribed.)    Mehran Alicia PA-C (electronicallysigned)              VOLODYMYR Pérez  06/03/21 3398

## 2021-06-04 LAB
A/G RATIO: 1.2 (ref 1.1–2.2)
ALBUMIN SERPL-MCNC: 3.2 G/DL (ref 3.4–5)
ALP BLD-CCNC: 62 U/L (ref 40–129)
ALT SERPL-CCNC: 144 U/L (ref 10–40)
ANION GAP SERPL CALCULATED.3IONS-SCNC: 8 MMOL/L (ref 3–16)
AST SERPL-CCNC: 92 U/L (ref 15–37)
BILIRUB SERPL-MCNC: 0.8 MG/DL (ref 0–1)
BUN BLDV-MCNC: 9 MG/DL (ref 7–20)
CALCIUM SERPL-MCNC: 8.4 MG/DL (ref 8.3–10.6)
CHLORIDE BLD-SCNC: 103 MMOL/L (ref 99–110)
CO2: 25 MMOL/L (ref 21–32)
CREAT SERPL-MCNC: <0.5 MG/DL (ref 0.6–1.2)
GFR AFRICAN AMERICAN: >60
GFR NON-AFRICAN AMERICAN: >60
GLOBULIN: 2.7 G/DL
GLUCOSE BLD-MCNC: 125 MG/DL (ref 70–99)
HCT VFR BLD CALC: 41 % (ref 36–48)
HEMOGLOBIN: 13.8 G/DL (ref 12–16)
MAGNESIUM: 2.3 MG/DL (ref 1.8–2.4)
MCH RBC QN AUTO: 33.8 PG (ref 26–34)
MCHC RBC AUTO-ENTMCNC: 33.7 G/DL (ref 31–36)
MCV RBC AUTO: 100.5 FL (ref 80–100)
PDW BLD-RTO: 16.8 % (ref 12.4–15.4)
PLATELET # BLD: 209 K/UL (ref 135–450)
PMV BLD AUTO: 7.7 FL (ref 5–10.5)
POTASSIUM REFLEX MAGNESIUM: 3.5 MMOL/L (ref 3.5–5.1)
RBC # BLD: 4.08 M/UL (ref 4–5.2)
SODIUM BLD-SCNC: 136 MMOL/L (ref 136–145)
TOTAL PROTEIN: 5.9 G/DL (ref 6.4–8.2)
WBC # BLD: 7.7 K/UL (ref 4–11)

## 2021-06-04 PROCEDURE — 1200000000 HC SEMI PRIVATE

## 2021-06-04 PROCEDURE — 6360000002 HC RX W HCPCS: Performed by: INTERNAL MEDICINE

## 2021-06-04 PROCEDURE — 80053 COMPREHEN METABOLIC PANEL: CPT

## 2021-06-04 PROCEDURE — 99223 1ST HOSP IP/OBS HIGH 75: CPT | Performed by: INTERNAL MEDICINE

## 2021-06-04 PROCEDURE — 85027 COMPLETE CBC AUTOMATED: CPT

## 2021-06-04 PROCEDURE — 2580000003 HC RX 258: Performed by: REGISTERED NURSE

## 2021-06-04 PROCEDURE — 83735 ASSAY OF MAGNESIUM: CPT

## 2021-06-04 PROCEDURE — 2500000003 HC RX 250 WO HCPCS: Performed by: REGISTERED NURSE

## 2021-06-04 PROCEDURE — 6370000000 HC RX 637 (ALT 250 FOR IP): Performed by: INTERNAL MEDICINE

## 2021-06-04 PROCEDURE — 6370000000 HC RX 637 (ALT 250 FOR IP): Performed by: REGISTERED NURSE

## 2021-06-04 PROCEDURE — 83036 HEMOGLOBIN GLYCOSYLATED A1C: CPT

## 2021-06-04 RX ORDER — FUROSEMIDE 10 MG/ML
40 INJECTION INTRAMUSCULAR; INTRAVENOUS ONCE
Status: COMPLETED | OUTPATIENT
Start: 2021-06-04 | End: 2021-06-04

## 2021-06-04 RX ORDER — POTASSIUM CHLORIDE 20 MEQ/1
20 TABLET, EXTENDED RELEASE ORAL ONCE
Status: COMPLETED | OUTPATIENT
Start: 2021-06-04 | End: 2021-06-04

## 2021-06-04 RX ORDER — METOPROLOL SUCCINATE 25 MG/1
25 TABLET, EXTENDED RELEASE ORAL 2 TIMES DAILY
Status: DISCONTINUED | OUTPATIENT
Start: 2021-06-04 | End: 2021-06-05 | Stop reason: HOSPADM

## 2021-06-04 RX ORDER — FUROSEMIDE 20 MG/1
20 TABLET ORAL DAILY
Status: DISCONTINUED | OUTPATIENT
Start: 2021-06-05 | End: 2021-06-05 | Stop reason: HOSPADM

## 2021-06-04 RX ADMIN — METOPROLOL SUCCINATE 25 MG: 25 TABLET, EXTENDED RELEASE ORAL at 12:01

## 2021-06-04 RX ADMIN — DILTIAZEM HYDROCHLORIDE 5 MG/HR: 5 INJECTION INTRAVENOUS at 04:33

## 2021-06-04 RX ADMIN — APIXABAN 2.5 MG: 2.5 TABLET, FILM COATED ORAL at 20:12

## 2021-06-04 RX ADMIN — Medication 10 ML: at 08:24

## 2021-06-04 RX ADMIN — LEVOTHYROXINE SODIUM 25 MCG: 0.03 TABLET ORAL at 08:23

## 2021-06-04 RX ADMIN — FOLIC ACID 1 MG: 1 TABLET ORAL at 08:23

## 2021-06-04 RX ADMIN — POTASSIUM CHLORIDE 20 MEQ: 1500 TABLET, EXTENDED RELEASE ORAL at 08:56

## 2021-06-04 RX ADMIN — METOPROLOL SUCCINATE 25 MG: 25 TABLET, EXTENDED RELEASE ORAL at 20:12

## 2021-06-04 RX ADMIN — OMEGA-3-ACID ETHYL ESTERS 1 CAPSULE: 1 CAPSULE, LIQUID FILLED ORAL at 08:23

## 2021-06-04 RX ADMIN — APIXABAN 5 MG: 5 TABLET, FILM COATED ORAL at 08:23

## 2021-06-04 RX ADMIN — FUROSEMIDE 40 MG: 10 INJECTION, SOLUTION INTRAMUSCULAR; INTRAVENOUS at 12:34

## 2021-06-04 ASSESSMENT — PAIN SCALES - GENERAL
PAINLEVEL_OUTOF10: 0

## 2021-06-04 NOTE — PLAN OF CARE
Problem: Falls - Risk of:  Goal: Will remain free from falls  Description: Will remain free from falls  6/4/2021 0701 by Lon Foster RN  Note: Fall risk band on patient. Orange light on outside of room. Non skid footwear in place. Alarms used appropriately. Patient instructed to call and wait for staff before getting up. Rounding done to anticipate needs. Appropriate safety devices used for transfers.        Problem: Daily Care:  Goal: Daily care needs are met  Description: Daily care needs are met  6/4/2021 0701 by Lon Foster RN  Outcome: Ongoing  6/3/2021 1715 by Lesa Crane RN  Outcome: Ongoing

## 2021-06-04 NOTE — CONSULTS
Electrophysiology Consultation   Date: 6/4/2021  Admit Date:  6/3/2021  Reason for Consultation: Paroxysmal atrial fibrillation  Consult Requesting Physician: Sukhdeep Sifuentes MD     Chief Complaint   Patient presents with    Shortness of Breath     cardioverted yesterday at [facility], feeling sob today     HPI:   Mrs. Quiroz is a pleasant 80year old female with a medical history significant for symptomatic paroxysmal atrial fibrillation, known left bundle branch block (negative stress test from [facility]), hypertension, rheumoatoid arthritis, hypothyroidism, and complicated diverticulitis who presents from home with symptomatic atrial fibrillation with RVR having recently undergone LUCY cardioversion. According to patient and her daughter she recently began to suffer from dyspnea on exertion and shortness of breath. She is unable to move from her bed to her recliner without becoming short of breath. She was seen by her PCP who directed her to a cardiologist.  Patient was found to be in atrial fibrillation with RVR. She underwent a LUCY cardioversion on 06/02/2021. She was discharged home on rate control and anticoagulation. Unfortunately patient began to suffer from worsening shortness of breath and dyspnea on exertion shortly thereafter and was brought to [facility] for further evaluation and care last night. Patient denies fevers, chest pain, orthopnea, PND, lower extremity edema, abdominal swelling, chills, visual changes, headaches, sore throat, cough, abdominal pain, nausea, vomiting, bleeding, bruising, dysuria, muscle/joint pain, confusion, depression, anxiety, skin lesions, etc.    Emergency Room/Hospital Course:  Patient was evaluated emergency room. Her CMP was reassuring outside of hypoalbuminemia and hypoproteinemia along with elevated AST and ALT. Her CBC was reassuring. Her BNP was elevated at 1216.   Her chest radiograph showed pulmonary vascular congestion with chronic changes, cardiomegaly, small left pleural effusion. Electrophysiology was consulted. Current rhythm: Atrial fibrillation with RVR  Known history of atrial fibrillation: yes -new diagnosis  Valvular disease: No  Associated symptoms: shortness of breath  Heart rate is not controlled  Previous cardioversion and/or ablation: yes - 2021  History of CAD: No  History of sleep apnea: No  History of ETOH abuse/drug abuse: No  History of thyroid disease: Yes  Elevated BNP: Yes  Left atrial size is Abnormal  Severely dilated    Past Medical History:   Diagnosis Date    Allergic rhinitis     Hyperlipidemia     Hypertension     Hypothyroidism     probable autoimmune thyroiditis    LBBB (left bundle branch block)     ON EKG, had normal GXT    Rheumatoid arthritis(714.0)     Rheumatologist in [place]        Past Surgical History:   Procedure Laterality Date     SECTION      x2    COLONOSCOPY  2005    JOINT REPLACEMENT Left 13    left total knee replacement       No Known Allergies    Social History:  Reviewed. reports that she has quit smoking. She has never used smokeless tobacco. She reports current alcohol use of about 2.0 standard drinks of alcohol per week. She reports that she does not use drugs. Family History:  Reviewed. family history includes Heart Disease in her father and mother; Thyroid Disease in her father. No premature CAD. Review of System:  All other systems reviewed except for that noted above.  Pertinent negatives and positives are:     · General: negative for fever, chills   · Ophthalmic ROS: negative for - eye pain or loss of vision  · ENT ROS: negative for - headaches, sore throat   · Respiratory: negative for - cough, sputum  · Cardiovascular: Reviewed in HPI  · Gastrointestinal: negative for - abdominal pain, diarrhea, N/V  · Hematology: negative for - bleeding, blood clots, bruising or jaundice  · Genito-Urinary:  negative for - Dysuria or incontinence  · Musculoskeletal: negative for - Joint swelling, muscle pain  · Neurological: negative for - confusion, dizziness, headaches   · Psychiatric: No anxiety, no depression. · Dermatological: negative for - rash    Physical Examination:  Vitals:    21 1130   BP: 126/88   Pulse: 94   Resp: 17   Temp: 98 °F (36.7 °C)   SpO2: 95%        Intake/Output Summary (Last 24 hours) at 2021 1153  Last data filed at 2021 0906  Gross per 24 hour   Intake 600 ml   Output 3175 ml   Net -2575 ml     In: 600 [P.O.:600]  Out: 3175    Wt Readings from Last 3 Encounters:   21 122 lb 1.6 oz (55.4 kg)   21 125 lb 9.6 oz (57 kg)   21 130 lb (59 kg)     Temp  Av °F (36.7 °C)  Min: 97.5 °F (36.4 °C)  Max: 98.6 °F (37 °C)  Pulse  Av.1  Min: 80  Max: 126  BP  Min: 117/81  Max: 142/69  SpO2  Av.8 %  Min: 92 %  Max: 97 %    · Telemetry: Atrial fibrillation with RVR    · Constitutional: Alert. Oriented to person, place, and time. No distress. · Head: Normocephalic and atraumatic. · Mouth/Throat: Lips appear moist. Oropharynx is clear and moist.  · Eyes: Conjunctivae normal. EOM are normal.   · Neck: Neck supple. No lymphadenopathy. No rigidity. No JVD present. · Cardiovascular: Normal rate, regular rhythm. Normal S1&S2. · Pulmonary/Chest: Bilateral respiratory sounds present. No respiratory accessory muscle use. · Abdominal: Soft. Normal bowel sounds present. No distension, No tenderness. No splenomegaly. No hernia. · Musculoskeletal: No tenderness. No edema    · Neurological: Alert and oriented. Cranial nerve II-XII grossly intact. · Skin: Skin is warm and dry. No rash, lesions, ulcerations noted. · Psychiatric: No anxiety nor agitation. Labs:  Reviewed.    Recent Labs     21  0820 21  0630    136   K 4.0 3.5    103   CO2 26 25   BUN 15 9   CREATININE <0.5* <0.5*     Recent Labs     21  0820 21  0630   WBC 8.3 7.7   HGB 13.0 13.8   HCT 38.7 41.0   .7* 100.5*   PLT 223 209     Lab Results   Component Value Date    DXNDSUN 257 10/01/2019    TROPONINI <0.01 06/03/2021     No results found for: BNP  Lab Results   Component Value Date    PROTIME 14.1 03/18/2021    PROTIME 9.8 02/21/2013    INR 1.21 03/18/2021    INR 0.85 02/21/2013     Lab Results   Component Value Date    CHOL 201 05/14/2021    HDL 82 05/14/2021    HDL 72.8 11/14/2011    TRIG 78 05/14/2021       Diagnostic and imaging results reviewed. ECG: Atrial fibrillation with RVR. LBBB. Echo: LUCY (06/02/2021)  Summary:   LUCY performed by [facility]   No left atrial clot detected. The left atrial size is severely dilated. The right atrium is severely dilated. The right ventricle is borderline dilated. Trace pericardial effusion. The left ventricular function is severely reduced. There is severe global hypokinesis of the left ventricle. There is mild mitral regurgitation. Cath: None (reportedly had normal stress test in [place] approximately 3 years ago). I independently reviewed the ECG and telemetry.     Scheduled Meds:   apixaban  2.5 mg Oral BID    omega-3 acid ethyl esters  1 capsule Oral Daily    folic acid  1 mg Oral Daily    levothyroxine  25 mcg Oral Daily    sodium chloride flush  5-40 mL Intravenous 2 times per day     Continuous Infusions:   dilTIAZem 5 mg/hr (06/04/21 0433)    sodium chloride       PRN Meds:.fluticasone, sodium chloride flush, sodium chloride, ondansetron **OR** ondansetron, polyethylene glycol, acetaminophen **OR** acetaminophen, perflutren lipid microspheres     Assessment:   Patient Active Problem List    Diagnosis Date Noted    A-fib ([address] 06/03/2021    Pelvic mass 03/18/2021    Hyponatremia 03/18/2021    Intra-abdominal abscess ([address] 03/18/2021    Arthritis of right acromioclavicular joint 01/08/2020    Rotator cuff tear arthropathy of right shoulder 01/08/2020    Hammertoes of both feet 01/16/2019    Valgus deformity of both great toes 01/16/2019    Foot callus 06/14/2017    Rheumatoid arthritis involving multiple joints ([facility] 12/05/2016    Essential hypertension 12/05/2016    Trigger finger, acquired 10/14/2013    left TKR 02/28/2013    LBBB (left bundle branch block)     Hyperlipidemia 12/07/2011    Hypothyroidism (acquired) 12/07/2011      Active Hospital Problems    Diagnosis Date Noted   [facility]) [I48.91] 06/03/2021     Mrs. Quiroz is a pleasant 80year old female with a medical history significant for symptomatic paroxysmal atrial fibrillation, known left bundle branch block (negative stress test from [facility]), hypertension, rheumoatoid arthritis, hypothyroidism, and complicated diverticulitis who presents from home with symptomatic atrial fibrillation with RVR having recently undergone LUCY cardioversion. Problem List:  1. Atrial fibrillation (new diagnosis). 2. Cardiomyopathy with severely depressed LVEF. 3. Hypertension. 4. Rheumatoid arthritis. Assessment and Plan:  1. Atrial fibrillation (new diagnosis). Patient is a pleasant 66-year-old female with a medical history significant for atrial fibrillation, hypertension, newly diagnosed cardiomyopathy, rheumatoid arthritis, and diverticulitis who presents from home with symptomatic atrial fibrillation with RVR. Patient follows at [address] [place] [address]. Work-up at [facility] unclear as far as etiology of her cardiomyopathy. Based on symptoms suspect most likely related to tachycardia cardiomyopathy however indices to be proved with ischemic evaluation. Afib risk factors including age, HTN, obesity, inactivity and AUNDREA were discussed with patient. Risk factor modification recommended. Patient's [id number] score is 5 with a stroke risk of 7.2%. We discussed oral anticoagulation to decrease the risk of thromboembolic events including stroke. Benefits and alternatives were discussed with patient. Risk of bleeding was discussed. Patient verbalized understanding.  Different forms of anticoagulants including warfarin (Coumadin), Dabigatran (Pradaxa), Rivaroxaban (Xarelto), and Apixaban (Eliquis) were discussed. Patient opted to continue with apixaban (age and weight dosing). Rate control strategy options including cardioversion, atrial fibrillation ablation, pacemaker with AVN ablation, anti-arrhythmic strategy, and rate control strategy were discussed with patient. Risks, benefits and alternative of each treatment options were explained. All questions were answered. Patient has opted for rate control.  - Start on metoprolol succinate 25 mg BID and wean diltiazem. - Hold ARB for now as titrate up rate control. Goal HR < 90.  - Continue apixaban 2.5 mg BID (age and weight dosing). - I will order hemoglobin A1c.  - I will order echocardiogram.   - Ischemic evaluation as outpatient. Has chronic LBBB but defer to primary cardiologist ischemic work up. - Two week monitor at time of discharge. - Happy to see as outpatient if patient and family would like or may follow at [facility]. - Outpatient referral to pulmonology for sleep apnea testing. 2. Cardiomyopathy with severely depressed LVEF. Etiology unclear. Likely tachycardia induced however requires ischemic evaluation.  - Outpatient ischemic evaluation. 3. Hypertension.  - Plan as per above. 4. Rheumatoid arthritis. - Stable. Thank you for allowing me to participate in the care of Mary Chapin . If you have any questions/comments, please do not hesitate to contact us.     Mitchell Jeffries MD  Cardiac Electrophysiology  [address] [facility]  [phone] [facility]

## 2021-06-04 NOTE — PROGRESS NOTES
Hospitalist Progress Note      PCP: Salbador Nelson MD    Date of Admission: 6/3/2021    Chief Complaint: SOB    Hospital Course:   80 y.o. female with PMH of HLD, HTN and RA who presented to [facility] with complaints of SOB that started this morning after she woke up. SOB has been ongoing for the last several weeks but was acutely worse this morning. No chest pain or palpitations. No cough/sputum production. No fever/chills. No N/V/D. No lower extremity swelling. Pt is a limited historian. History obtained from the EMR. Pt has been having SOB for several weeks, she saw her PCP on 5/14. EKG obtained which showed atrial tachycardia vs atrial flutter. She was referred to Cardiology. She was seen By Dr. Krys Ruiz at [address] [place] [address] and was recommended to start taking cardizem and eliquis with plan for LUCY/cardioversion in which she underwent yesterday. ED course: CXR showed pulmonary vascular congestion. Lab work significant for elevated Pro-BNP, mild transaminitis. EKG showed Afib with RVR. She was started on cardizem infusion. Hospitalist service was consulted for admission. Subjective:   Pt is on RA. Afebrile. VSS. No chest pain or dyspnea. No N/V/D. Is anxious to go home.      Medications:  Reviewed    Infusion Medications    dilTIAZem 5 mg/hr (06/04/21 0433)    sodium chloride       Scheduled Medications    apixaban  5 mg Oral BID    omega-3 acid ethyl esters  1 capsule Oral Daily    folic acid  1 mg Oral Daily    levothyroxine  25 mcg Oral Daily    sodium chloride flush  5-40 mL Intravenous 2 times per day     PRN Meds: fluticasone, sodium chloride flush, sodium chloride, ondansetron **OR** ondansetron, polyethylene glycol, acetaminophen **OR** acetaminophen, perflutren lipid microspheres      Intake/Output Summary (Last 24 hours) at 6/4/2021 0847  Last data filed at 6/4/2021 0307  Gross per 24 hour   Intake 240 ml   Output 2400 ml   Net -2160 ml       Physical Exam Performed:    /82 Pulse 90   Temp 97.5 °F (36.4 °C) (Oral)   Resp 17   Ht 5' 4\" (1.626 m)   Wt 122 lb 1.6 oz (55.4 kg)   SpO2 92%   BMI 20.96 kg/m²     General appearance: No apparent distress, appears stated age and cooperative. HEENT: Pupils equal, round, and reactive to light. Conjunctivae/corneas clear. Neck: Supple, with full range of motion. No jugular venous distention. Trachea midline. Respiratory:  Normal respiratory effort. Diminished bibasilar breath sounds. Cardiovascular: Irregular rate and rhythm with normal S1/S2 without murmurs, rubs or gallops. Abdomen: Soft, non-tender, non-distended with normal bowel sounds. Musculoskeletal: No clubbing, cyanosis or edema bilaterally. Full range of motion without deformity. Skin: Skin color, texture, turgor normal.  No rashes or lesions. Neurologic:  Neurovascularly intact without any focal sensory/motor deficits.  Cranial nerves: II-XII intact, grossly non-focal.  Psychiatric: Alert and oriented, thought content appropriate, normal insight  Capillary Refill: Brisk,3 seconds, normal   Peripheral Pulses: +2 palpable, equal bilaterally       Labs:   Recent Labs     06/03/21  0820 06/04/21  0630   WBC 8.3 7.7   HGB 13.0 13.8   HCT 38.7 41.0    209     Recent Labs     06/03/21  0820 06/04/21  0630    136   K 4.0 3.5    103   CO2 26 25   BUN 15 9   CREATININE <0.5* <0.5*   CALCIUM 8.6 8.4     Recent Labs     06/03/21  0820 06/04/21  0630   AST 90* 92*   * 144*   BILITOT 0.9 0.8   ALKPHOS 73 62     Recent Labs     06/03/21  0820 06/03/21  1152   TROPONINI <0.01 <0.01       Urinalysis:      Lab Results   Component Value Date    NITRU Negative 06/03/2021    WBCUA None seen 10/01/2019    RBCUA 0-2 10/01/2019    BLOODU Negative 06/03/2021    SPECGRAV 1.020 06/03/2021    GLUCOSEU Negative 06/03/2021       Radiology:  XR CHEST PORTABLE   Final Result   Pulmonary vascular congestion/interstitial edema likely superimposed on   chronic interstitial changes. Cardiomegaly. Small left pleural effusion and/or basilar atelectasis. Assessment/Plan:    Active Hospital Problems    Diagnosis     A-fib [facility]) [I48.91]        Atrial fibrillation with RVR POA, now with rate control:  - Status post cardioversion at [facility] on 6/2.  - Pt started on cardizem infusion in the ED.   - Continue her home eliquis. - Consult Cardiology/EP for further assistance. - Monitor pt on telemetry.   - Recent TSH was normal.      Acute on chronic systolic CHF:  - CXR showed findings consistent with CHF. Pro-BNP elevated. - LUCY on 6/2 showed reduced LVEF of 30% with several global kinesis of the left ventricle. - Status post x1 dose of IV lasix. - Monitor daily weights, close I's and O's, daily BMP. Elevated LFTs:  - Possibly due to hepatic congestion.   - Monitor closely.      Hypothyroidism:  - Continue levothyroxine.   - Recent TSH was normal.     Hypokalemia:  - Secondary to diuresis. - Monitor and replete as needed.      Hypertension:  - Sub-optimal control.   - Continue her home meds. - Monitor closely.     Rheumatoid arthritis:  - Stable. On methotrexate. DVT Prophylaxis: Lovenox   Diet: ADULT DIET;  Regular  Code Status: Full Code    PT/OT Eval Status: not indicated     Dispo - ~2 days pending cardiology recommendations     [address], APRN - CNP

## 2021-06-04 NOTE — CARE COORDINATION
CASE MANAGEMENT INITIAL ASSESSMENT      Reviewed chart and completed assessment at bedside with: patient  Explained Case Management role/services. Health Care Decision Maker :   Primary Decision Maker: Sorin Jerez - Child - [phone]    Secondary Decision Maker: Constantino Chapin - Child - [phone]          Can this person be reached and be able to respond quickly, such as within a few minutes or hours? No  Who would be your back-up decision maker? Name  Roman Goel  Phone Number: above    Admit date/status: 6/3; Inpatient  Diagnosis: A-Fib   Is this a Readmission?:  No      Insurance:  BCBS Medicare   Precert required for SNF: Yes       3 night stay required: No    Living arrangements, Adls, care needs, prior to admission:  Home by self. Has friend that helps with showers and cleaning who works for a home care company. Transportation: family     Durable Medical Equipment at home:  none    Services in the home and/or outpatient, prior to admission: none    Dialysis Facility (if applicable)   · Name:  · Address:  · Dialysis Schedule:  · Phone:  · Fax:    PT/OT recs: not ordered    Hospital Exemption Notification (HEN): if SNF needed    Barriers to discharge: none    Plan/comments: Pt lives at home, plans to return. Pt has assistance at home. Does not have any other needs for discharge at this time.       ECOC on chart for MD signature

## 2021-06-05 VITALS
HEART RATE: 75 BPM | DIASTOLIC BLOOD PRESSURE: 84 MMHG | OXYGEN SATURATION: 94 % | RESPIRATION RATE: 16 BRPM | BODY MASS INDEX: 20.84 KG/M2 | TEMPERATURE: 97.7 F | WEIGHT: 122.1 LBS | SYSTOLIC BLOOD PRESSURE: 128 MMHG | HEIGHT: 64 IN

## 2021-06-05 LAB
ALBUMIN SERPL-MCNC: 3.3 G/DL (ref 3.4–5)
ALP BLD-CCNC: 66 U/L (ref 40–129)
ALT SERPL-CCNC: 95 U/L (ref 10–40)
ANION GAP SERPL CALCULATED.3IONS-SCNC: 8 MMOL/L (ref 3–16)
AST SERPL-CCNC: 39 U/L (ref 15–37)
BILIRUB SERPL-MCNC: 0.5 MG/DL (ref 0–1)
BILIRUBIN DIRECT: <0.2 MG/DL (ref 0–0.3)
BILIRUBIN, INDIRECT: ABNORMAL MG/DL (ref 0–1)
BUN BLDV-MCNC: 16 MG/DL (ref 7–20)
CALCIUM SERPL-MCNC: 8.9 MG/DL (ref 8.3–10.6)
CHLORIDE BLD-SCNC: 103 MMOL/L (ref 99–110)
CO2: 24 MMOL/L (ref 21–32)
CREAT SERPL-MCNC: 0.6 MG/DL (ref 0.6–1.2)
ESTIMATED AVERAGE GLUCOSE: 125.5 MG/DL
GFR AFRICAN AMERICAN: >60
GFR NON-AFRICAN AMERICAN: >60
GLUCOSE BLD-MCNC: 109 MG/DL (ref 70–99)
HBA1C MFR BLD: 6 %
LV EF: 25 %
LVEF MODALITY: NORMAL
POTASSIUM REFLEX MAGNESIUM: 4.1 MMOL/L (ref 3.5–5.1)
SODIUM BLD-SCNC: 135 MMOL/L (ref 136–145)
TOTAL PROTEIN: 6 G/DL (ref 6.4–8.2)

## 2021-06-05 PROCEDURE — 99232 SBSQ HOSP IP/OBS MODERATE 35: CPT | Performed by: NURSE PRACTITIONER

## 2021-06-05 PROCEDURE — 2580000003 HC RX 258: Performed by: REGISTERED NURSE

## 2021-06-05 PROCEDURE — 93306 TTE W/DOPPLER COMPLETE: CPT

## 2021-06-05 PROCEDURE — 6370000000 HC RX 637 (ALT 250 FOR IP): Performed by: REGISTERED NURSE

## 2021-06-05 PROCEDURE — 36415 COLL VENOUS BLD VENIPUNCTURE: CPT

## 2021-06-05 PROCEDURE — 2500000003 HC RX 250 WO HCPCS: Performed by: REGISTERED NURSE

## 2021-06-05 PROCEDURE — 6370000000 HC RX 637 (ALT 250 FOR IP): Performed by: INTERNAL MEDICINE

## 2021-06-05 PROCEDURE — 80076 HEPATIC FUNCTION PANEL: CPT

## 2021-06-05 PROCEDURE — 80048 BASIC METABOLIC PNL TOTAL CA: CPT

## 2021-06-05 RX ORDER — METOPROLOL SUCCINATE 25 MG/1
25 TABLET, EXTENDED RELEASE ORAL 2 TIMES DAILY
Qty: 30 TABLET | Refills: 3 | Status: SHIPPED | OUTPATIENT
Start: 2021-06-05 | End: 2021-07-09 | Stop reason: DRUGHIGH

## 2021-06-05 RX ORDER — FUROSEMIDE 20 MG/1
20 TABLET ORAL DAILY
Qty: 60 TABLET | Refills: 3 | Status: ON HOLD | OUTPATIENT
Start: 2021-06-06 | End: 2021-06-18 | Stop reason: SDUPTHER

## 2021-06-05 RX ADMIN — OMEGA-3-ACID ETHYL ESTERS 1 CAPSULE: 1 CAPSULE, LIQUID FILLED ORAL at 07:59

## 2021-06-05 RX ADMIN — Medication 10 ML: at 07:59

## 2021-06-05 RX ADMIN — FUROSEMIDE 20 MG: 20 TABLET ORAL at 07:59

## 2021-06-05 RX ADMIN — DILTIAZEM HYDROCHLORIDE 5 MG/HR: 5 INJECTION INTRAVENOUS at 04:45

## 2021-06-05 RX ADMIN — FOLIC ACID 1 MG: 1 TABLET ORAL at 07:59

## 2021-06-05 RX ADMIN — LEVOTHYROXINE SODIUM 25 MCG: 0.03 TABLET ORAL at 07:59

## 2021-06-05 RX ADMIN — APIXABAN 2.5 MG: 2.5 TABLET, FILM COATED ORAL at 07:59

## 2021-06-05 RX ADMIN — METOPROLOL SUCCINATE 25 MG: 25 TABLET, EXTENDED RELEASE ORAL at 07:59

## 2021-06-05 ASSESSMENT — PAIN SCALES - GENERAL
PAINLEVEL_OUTOF10: 0
PAINLEVEL_OUTOF10: 0

## 2021-06-05 NOTE — DISCHARGE SUMMARY
Hospital Medicine Discharge Summary    Patient ID: Nicole Chapin      Patient's PCP: Marii De La Rosa MD    Admit Date: 6/3/2021     Discharge Date: 6/5/2021      Admitting Physician: Joe Anderson MD     Discharge Physician: GRACIELA Jaimes CNP     Discharge Diagnoses: Active Hospital Problems    Diagnosis     A-fib [facility]) [I48.91]        The patient was seen and examined on day of discharge and this discharge summary is in conjunction with any daily progress note from day of discharge. Hospital Course:   80 y.o. female with PMH of HLD, HTN and RA who presented to [facility] with complaints of SOB that started this morning after she woke up. SOB has been ongoing for the last several weeks but was acutely worse this morning. No chest pain or palpitations. No cough/sputum production. No fever/chills. No N/V/D. No lower extremity swelling. Pt is a limited historian. History obtained from the EMR. Pt has been having SOB for several weeks, she saw her PCP on 5/14. EKG obtained which showed atrial tachycardia vs atrial flutter. She was referred to Cardiology. She was seen By Dr. Caridad Connor at [place] and was recommended to start taking cardizem and eliquis with plan for LUCY/cardioversion in which she underwent yesterday. ED course: CXR showed pulmonary vascular congestion. Lab work significant for elevated Pro-BNP, mild transaminitis. EKG showed Afib with RVR. She was started on cardizem infusion. Hospitalist service was consulted for admission. Atrial fibrillation with RVR POA, now with rate control:  - Status post outpt cardioversion at [place] on 6/2.  - Pt started on cardizem infusion in the ED -- now dc'd and on PO metoprolol.   - Continue her home eliquis 2.5 mg BID (dose adjusted for age and weight). - Cardiology/EP consulted, recommended 2 week cardiac event monitor at [place]. - Recent TSH was normal.   - Needs outpt referral to sleep medicine for AUNDREA evaluation.    Acute systolic CHF / cardiomyopathy -- new diagnosis:  - CXR showed findings consistent with CHF. Pro-BNP elevated. - LUCY on 6/2 showed reduced LVEF of 30% with severe global kinesis of the left ventricle. - Etiology: unclear, possibly tachycardic induced cardiomyopathy secondary to above. Pt will need ischemic eval at some point, to be completed as an outpt per Cardiology. Pt has chronic LBBB, had stress test several years ago which was normal. She is to f/u with her primary cardiologist.  - Status post x2 doses of IV lasix. Started on 20 mg PO lasix daily.   - Obtained ECHO -- results pending at time of dc, however okay to dc from Cards standpoint.     Elevated LFTs:  - Possibly due to hepatic congestion from CHF. Stable/improved.     Hypothyroidism:  - Continue levothyroxine.   - Recent TSH was normal.      Hypokalemia:  - Secondary to diuresis. - Monitor and replete as needed.      Hypertension:  - Sub-optimal control on admission -- improved now. - Continue her home meds.      Rheumatoid arthritis:  - Stable. On methotrexate.       Physical Exam Performed:     /84   Pulse 75   Temp 97.7 °F (36.5 °C) (Oral)   Resp 16   Ht 5' 4\" (1.626 m)   Wt 122 lb 1.6 oz (55.4 kg)   SpO2 94%   BMI 20.96 kg/m²       General appearance:  Elderly female in no apparent distress, appears stated age and cooperative. HEENT:  Normal cephalic, atraumatic without obvious deformity. Pupils equal, round, and reactive to light. Extra ocular muscles intact. Conjunctivae/corneas clear. Neck: Supple, with full range of motion. No jugular venous distention. Trachea midline. Respiratory:  Normal respiratory effort. Diminished bibasilar breath sounds. Cardiovascular:  Regular rate and rhythm with normal S1/S2 without murmurs, rubs or gallops. Abdomen: Soft, non-tender, non-distended with normal bowel sounds. Musculoskeletal:  No clubbing, cyanosis or edema bilaterally. Arthritic changes noted to hands.    Skin: (SYNTHROID) 25 MCG tablet TAKE 1 TABLET BY MOUTH EVERY DAY, Disp-90 tablet, R-1Normal      tretinoin (RETIN-A) 0.05 % cream Apply topically nightly Apply topically nightly as needed for rash, Topical, NIGHTLY, Historical Med      fluticasone (FLONASE) 50 MCG/ACT nasal spray 1 spray by Each Nostril route daily as needed for AllergiesHistorical Med      fexofenadine (ALLEGRA) 180 MG tablet TAKE 1 TABLET BY MOUTH EVERY DAY, Disp-90 tablet, R-1Normal      methotrexate (RHEUMATREX) 2.5 MG chemo tablet TAKE 8 TABLETS EVERY MONDAY, Disp-96 tablet, R-5Normal      VITAMIN D PO Take by mouthHistorical Med      Probiotic Product (PROBIOTIC PO) Take by mouthHistorical Med      Ascorbic Acid (VITAMIN C PO) Take by mouthHistorical Med      fish oil-omega-3 fatty acids 1000 MG capsule Take 4 g by mouth daily Historical Med             Time Spent on discharge is more than 45 minutes in the examination, evaluation, counseling and review of medications and discharge plan. Signed:    GRACIELA Kimball - CNP   6/6/2021      Thank you Radha Yun MD for the opportunity to be involved in this patient's care. If you have any questions or concerns please feel free to contact me at [phone].

## 2021-06-05 NOTE — PROGRESS NOTES
Hospitalist Progress Note      PCP: Micah López MD    Date of Admission: 6/3/2021    Chief Complaint: SOB    Hospital Course:   80 y.o. female with PMH of HLD, HTN and RA who presented to [facility] with complaints of SOB that started this morning after she woke up. SOB has been ongoing for the last several weeks but was acutely worse this morning. No chest pain or palpitations. No cough/sputum production. No fever/chills. No N/V/D. No lower extremity swelling. Pt is a limited historian. History obtained from the EMR. Pt has been having SOB for several weeks, she saw her PCP on 5/14. EKG obtained which showed atrial tachycardia vs atrial flutter. She was referred to Cardiology. She was seen By Dr. Marc Fountain at [address] [place] [address] and was recommended to start taking cardizem and eliquis with plan for LUCY/cardioversion in which she underwent yesterday. ED course: CXR showed pulmonary vascular congestion. Lab work significant for elevated Pro-BNP, mild transaminitis. EKG showed Afib with RVR. She was started on cardizem infusion. Hospitalist service was consulted for admission. Subjective:   Pt is on RA. Afebrile. VSS. No chest pain or dyspnea. No N/V/D. Is anxious to go home.      Medications:  Reviewed    Infusion Medications    sodium chloride       Scheduled Medications    apixaban  2.5 mg Oral BID    metoprolol succinate  25 mg Oral BID    furosemide  20 mg Oral Daily    omega-3 acid ethyl esters  1 capsule Oral Daily    folic acid  1 mg Oral Daily    levothyroxine  25 mcg Oral Daily    sodium chloride flush  5-40 mL Intravenous 2 times per day     PRN Meds: perflutren lipid microspheres, fluticasone, sodium chloride flush, sodium chloride, ondansetron **OR** ondansetron, polyethylene glycol, acetaminophen **OR** acetaminophen, perflutren lipid microspheres      Intake/Output Summary (Last 24 hours) at 6/5/2021 1129  Last data filed at 6/5/2021 1120  Gross per 24 hour   Intake 480 ml   Output 1550 ml   Net -1070 ml       Physical Exam Performed:    /84   Pulse 75   Temp 97.7 °F (36.5 °C) (Oral)   Resp 16   Ht 5' 4\" (1.626 m)   Wt 122 lb 1.6 oz (55.4 kg)   SpO2 94%   BMI 20.96 kg/m²     General appearance: No apparent distress, appears stated age and cooperative. HEENT: Pupils equal, round, and reactive to light. Conjunctivae/corneas clear. Neck: Supple, with full range of motion. No jugular venous distention. Trachea midline. Respiratory:  Normal respiratory effort. Diminished bibasilar breath sounds. Cardiovascular: Irregular rate and rhythm with normal S1/S2 without murmurs, rubs or gallops. Abdomen: Soft, non-tender, non-distended with normal bowel sounds. Musculoskeletal: No clubbing, cyanosis or edema bilaterally. Full range of motion without deformity. Skin: Skin color, texture, turgor normal.  No rashes or lesions. Neurologic:  Neurovascularly intact without any focal sensory/motor deficits.  Cranial nerves: II-XII intact, grossly non-focal.  Psychiatric: Alert and oriented, thought content appropriate, normal insight  Capillary Refill: Brisk,3 seconds, normal   Peripheral Pulses: +2 palpable, equal bilaterally       Labs:   Recent Labs     06/03/21  0820 06/04/21  0630   WBC 8.3 7.7   HGB 13.0 13.8   HCT 38.7 41.0    209     Recent Labs     06/03/21  0820 06/04/21  0630    136   K 4.0 3.5    103   CO2 26 25   BUN 15 9   CREATININE <0.5* <0.5*   CALCIUM 8.6 8.4     Recent Labs     06/03/21  0820 06/04/21  0630   AST 90* 92*   * 144*   BILITOT 0.9 0.8   ALKPHOS 73 62     Recent Labs     06/03/21  0820 06/03/21  1152   TROPONINI <0.01 <0.01       Urinalysis:      Lab Results   Component Value Date    NITRU Negative 06/03/2021    WBCUA None seen 10/01/2019    RBCUA 0-2 10/01/2019    BLOODU Negative 06/03/2021    SPECGRAV 1.020 06/03/2021    GLUCOSEU Negative 06/03/2021       Radiology:  XR CHEST PORTABLE   Final Result   Pulmonary vascular congestion/interstitial edema likely superimposed on   chronic interstitial changes. Cardiomegaly. Small left pleural effusion and/or basilar atelectasis. Assessment/Plan:    Active Hospital Problems    Diagnosis     A-fib [facility]) [I48.91]        Atrial fibrillation with RVR POA, now with rate control:  - Status post outpt cardioversion at [facility] on 6/2.  - Pt started on cardizem infusion in the ED -- now dc'd and on PO metoprolol.   - Continue her home eliquis 2.5 mg BID (dose adjusted for age and weight). - Cardiology/EP consulted/following.   - Monitor pt on telemetry. Plan for 2 week cardiac event monitor after dc. - Recent TSH was normal.   - Needs outpt referral for sleep medicine for AUNDREA evaluation.      Acute systolic CHF / cardiomyopathy -- new diagnosis:  - CXR showed findings consistent with CHF. Pro-BNP elevated. - LUCY on 6/2 showed reduced LVEF of 30% with several global kinesis of the left ventricle. - Etiology: unclear, possibly tachycardic induced myopathy secondary to above. Pt will need ischemic eval at some point, to be completed as an outpt. Pt has chronic LBBB, had stress test several years ago which was normal.   - Status post x2 doses of IV lasix. Currently on 20 mg PO lasix daily.   - Monitor daily weights, close I's and O's, daily BMP.   - Obtain ECHO -- pending. Elevated LFTs:  - Possibly due to hepatic congestion.   - Monitor closely.      Hypothyroidism:  - Continue levothyroxine.   - Recent TSH was normal.     Hypokalemia:  - Secondary to diuresis. - Monitor and replete as needed.      Hypertension:  - Sub-optimal control on admission -- improved now. - Continue her home meds. - Monitor closely.     Rheumatoid arthritis:  - Stable. On methotrexate. DVT Prophylaxis: Lovenox   Diet: ADULT DIET; Regular  Code Status: Full Code    PT/OT Eval Status: not indicated     Dispo - Possibly today if okay from Cardiology standpoint.      Mauro Chappell

## 2021-06-05 NOTE — PROGRESS NOTES
[address]     Electrophysiology                                     Progress Note    Admission date:  6/3/2021    Reason for follow up visit: AF    HPI/CC: Pranay Chapin was admitted on 6/3/2021 with SOB. EKG showed AF with RVR. She was s/p LUCY/CV 2021 for similar complaints. Has also been treated for CHF. Rhythm has been AF. Subjective: Denies chest pain, palpitations, shortness of breath, and dizziness. Vitals:  Blood pressure 128/84, pulse 75, temperature 97.7 °F (36.5 °C), temperature source Oral, resp. rate 16, height 5' 4\" (1.626 m), weight 122 lb 1.6 oz (55.4 kg), SpO2 94 %.   Temp  Av °F (36.7 °C)  Min: 97.7 °F (36.5 °C)  Max: 98.2 °F (36.8 °C)  Pulse  Av.8  Min: 72  Max: 94  BP  Min: 115/72  Max: 132/85  SpO2  Av.8 %  Min: 94 %  Max: 98 %    24 hour I/O    Intake/Output Summary (Last 24 hours) at 2021 1406  Last data filed at 2021 1120  Gross per 24 hour   Intake 480 ml   Output 1300 ml   Net -820 ml     Current Facility-Administered Medications   Medication Dose Route Frequency Provider Last Rate Last Admin    apixaban (ELIQUIS) tablet 2.5 mg  2.5 mg Oral BID GRACIELA Gilbert CNP   2.5 mg at 21 0759    metoprolol succinate (TOPROL XL) extended release tablet 25 mg  25 mg Oral BID PAZ Perkins MD   25 mg at 21 0759    perflutren lipid microspheres (DEFINITY) injection 1.65 mg  1.5 mL Intravenous ONCE PRN PAZ Perkins MD        furosemide (LASIX) tablet 20 mg  20 mg Oral Daily PAZ Perkins MD   20 mg at 21 0759    omega-3 acid ethyl esters (LOVAZA) capsule 1 capsule  1 capsule Oral Daily GRACIELA Suárez CNP   1 capsule at 21 0759    fluticasone (FLONASE) 50 MCG/ACT nasal spray 1 spray  1 spray Each Nostril Daily PRN GRACIELA Gilbert CNP        folic acid (FOLVITE) tablet 1 mg  1 mg Oral Daily GRACIELA Suárez CNP   1 mg at 21 5635    levothyroxine (SYNTHROID) tablet 25 mcg  25 mcg Oral Daily [address]ian, APRN - CNP   25 mcg at 06/05/21 0759    sodium chloride flush 0.9 % injection 5-40 mL  5-40 mL Intravenous 2 times per day [facility], APRN - CNP   10 mL at 06/05/21 0759    sodium chloride flush 0.9 % injection 5-40 mL  5-40 mL Intravenous  [facility], APRN - CNP        0.9 % sodium chloride infusion  25 mL Intravenous  [facility], APRN - CNP        ondansetron (ZOFRAN-ODT) disintegrating tablet 4 mg  4 mg Oral Q8H PRN Muna Missy Dec, APRN - CNP        Or    ondansetron [facility]) injection 4 mg  4 mg Intravenous Q6H PRN Muna Missy Dec, APRN - CNP        polyethylene glycol (GLYCOLAX) packet 17 g  17 g Oral Daily PRN Muna Missy Dec, APRN - CNP        acetaminophen (TYLENOL) tablet 650 mg  650 mg Oral Q6H PRN Muna Missy Dec, APRN - CNP        Or    acetaminophen (TYLENOL) suppository 650 mg  650 mg Rectal Q6H PRN Muna Missy Dec, APRN - CNP        perflutren lipid microspheres (DEFINITY) injection 1.65 mg  1.5 mL Intravenous ONCE PRN Muna Missy Dec, APRN - CNP           Objective:     Telemetry monitor: AF    Physical Exam:  Constitutional and general appearance: alert, cooperative, no distress and appears stated age  [de-identified]: PERRL, no cervical lymphadenopathy. No masses palpable.  Normal oral mucosa  Respiratory:  · Normal excursion and expansion without use of accessory muscles  · Resp auscultation: Normal breath sounds without wheezing, rhonchi, and rales  Cardiovascular:  · The apical impulse is not displaced  · Heart tones are crisp and normal. irregular S1 and S2.  · Jugular venous pulsation Normal  · The carotid upstroke is normal in amplitude and contour without delay or bruit  · Peripheral pulses are symmetrical and full   Abdomen:  · No masses or tenderness  · Bowel sounds present  Extremities:  ·  No cyanosis or clubbing  ·  No lower extremity edema  ·  Skin: warm and dry  Neurological:  · Alert and oriented  · Moves all extremities well  · No abnormalities of mood, affect, memory, mentation, or behavior are noted    Data  EKG 6/3/2021:  AF with RVR, LBBB      LUCY 6/2/2021:  Echo: LUCY (06/02/2021)  Summary:   LUCY performed by Xiang   No left atrial clot detected. The left atrial size is severely dilated. The right atrium is severely dilated. The right ventricle is borderline dilated. Trace pericardial effusion. The left ventricular function is severely reduced. There is severe global hypokinesis of the left ventricle. There is mild mitral regurgitation. All labs and testing reviewed.   Lab Review     Renal Profile:   Lab Results   Component Value Date    CREATININE 0.6 06/05/2021    BUN 16 06/05/2021     06/05/2021    K 4.1 06/05/2021     06/05/2021    CO2 24 06/05/2021     CBC:    Lab Results   Component Value Date    WBC 7.7 06/04/2021    RBC 4.08 06/04/2021    HGB 13.8 06/04/2021    HCT 41.0 06/04/2021    .5 06/04/2021    RDW 16.8 06/04/2021     06/04/2021     BNP:  No results found for: BNP  Fasting Lipid Panel:    Lab Results   Component Value Date    CHOL 201 05/14/2021    HDL 82 05/14/2021    HDL 72.8 11/14/2011    TRIG 78 05/14/2021     Cardiac Enzymes:  CK/MbTroponin  Lab Results   Component Value Date    CKTOTAL 834 10/01/2019    TROPONINI <0.01 06/03/2021     PT/ INR   Lab Results   Component Value Date    INR 1.21 03/18/2021    INR 0.85 02/21/2013    PROTIME 14.1 03/18/2021    PROTIME 9.8 02/21/2013     PTT No results found for: PTT   Lab Results   Component Value Date    MG 2.30 06/04/2021      Lab Results   Component Value Date    TSH 2.09 03/01/2013       Assessment:  Paroxsymal atrial fibrillation: stable     -[id number] score >2    -s/p LUCY/CV 6/2/2021 [facility])   Shortness of breath: improved   LBBB: chronic   Cardiomyopathy: ongoing    -unclear cause at this time, thought to be tachycardia induced   -severely reduced LVEF on LUCY 6/2/2021   Biatrial enlargement   Mild MR  HTN: controlled   Hypothyroid  RA  Diverticulitis       Plan:   Continue Eliquis (reduced dose for age and weight), metoprolol and Lasix  Transthoracic echo pending   Will need ischemia evaluation as an outpatient   Outpatient pulmonology evaluation for sleep apnea   Patient to follow up with primary cardiologist. Would recommend event monitor to evaluate AF burden and rates.    Okay for discharge from an EP standput      Brianna New, [address] [address]  [phone]

## 2021-06-07 ENCOUNTER — CARE COORDINATION (OUTPATIENT)
Dept: CASE MANAGEMENT | Age: 81
End: 2021-06-07

## 2021-06-07 DIAGNOSIS — I48.0 PAROXYSMAL ATRIAL FIBRILLATION (HCC): Primary | ICD-10-CM

## 2021-06-07 PROCEDURE — 1111F DSCHRG MED/CURRENT MED MERGE: CPT | Performed by: FAMILY MEDICINE

## 2021-06-07 NOTE — CARE COORDINATION
happy to be back home. Patient confirmed that she has picked up new prescriptions and taking all medicine as directed. Full medication reconiliation completed and [id number] added to system. CTN educated patient on purpose and possible side effects of new medication. Patient stated understanding. Patient has follow up appt with Dr Teresa Villeda later this week and child will take her to that appt. ACP reviewed and current in system. Denies any acute needs at present time. Agreeable to f/u calls. Educated on the use of urgent care or physicians 24 hr access line if assistance is needed after hours. CTN provided contact information. Plan for follow-up call in 5-7 days based on severity of symptoms and risk factors.   Chicho Frias RN   Care Transition Nurse  [phone]          Care Transitions 24 Hour Call    Do you have any ongoing symptoms?: No  Do you have a copy of your discharge instructions?: Yes  Do you have all of your prescriptions and are they filled?: Yes  Have you been contacted by a [address]?: No  Have you scheduled your follow up appointment?: Yes  How are you going to get to your appointment?: Car - family or friend to transport  Were you discharged with any Home Care or Post Acute Services: No  Do you feel like you have everything you need to keep you well at home?: Yes  Care Transitions Interventions         Follow Up  Future Appointments   Date Time Provider Kathy Diez   6/11/2021  2:20 PM Edgardo Lynch MD [address]   11/18/2021  2:00 PM MD Shruti [address], RN

## 2021-06-14 ENCOUNTER — CARE COORDINATION (OUTPATIENT)
Dept: CASE MANAGEMENT | Age: 81
End: 2021-06-14

## 2021-06-14 NOTE — CARE COORDINATION
Final Calls  Care Transitions Interventions  Other Interventions:            Follow Up  Future Appointments   Date Time Provider Kathy Diez   11/18/2021  2:00 PM MD Kevin Newberry. Ana Rosa [address], RN

## 2021-06-17 ENCOUNTER — HOSPITAL ENCOUNTER (INPATIENT)
Age: 81
LOS: 1 days | Discharge: HOME HEALTH | DRG: 641 | End: 2021-06-18
Attending: EMERGENCY MEDICINE | Admitting: INTERNAL MEDICINE
Payer: MEDICARE

## 2021-06-17 ENCOUNTER — APPOINTMENT (OUTPATIENT)
Dept: CT IMAGING | Age: 81
DRG: 641 | End: 2021-06-17
Payer: MEDICARE

## 2021-06-17 ENCOUNTER — APPOINTMENT (OUTPATIENT)
Dept: GENERAL RADIOLOGY | Age: 81
DRG: 641 | End: 2021-06-17
Payer: MEDICARE

## 2021-06-17 DIAGNOSIS — I50.9 ACUTE ON CHRONIC CONGESTIVE HEART FAILURE, UNSPECIFIED HEART FAILURE TYPE (HCC): Primary | ICD-10-CM

## 2021-06-17 DIAGNOSIS — R77.8 ELEVATED TROPONIN: ICD-10-CM

## 2021-06-17 PROBLEM — I50.43 CHF (CONGESTIVE HEART FAILURE), NYHA CLASS I, ACUTE ON CHRONIC, COMBINED (HCC): Status: ACTIVE | Noted: 2021-06-17

## 2021-06-17 LAB
A/G RATIO: 1.3 (ref 1.1–2.2)
ALBUMIN SERPL-MCNC: 3.9 G/DL (ref 3.4–5)
ALP BLD-CCNC: 74 U/L (ref 40–129)
ALT SERPL-CCNC: 59 U/L (ref 10–40)
ANION GAP SERPL CALCULATED.3IONS-SCNC: 11 MMOL/L (ref 3–16)
AST SERPL-CCNC: 40 U/L (ref 15–37)
BASOPHILS ABSOLUTE: 0 K/UL (ref 0–0.2)
BASOPHILS RELATIVE PERCENT: 0.3 %
BILIRUB SERPL-MCNC: 1.1 MG/DL (ref 0–1)
BUN BLDV-MCNC: 19 MG/DL (ref 7–20)
CALCIUM SERPL-MCNC: 9.2 MG/DL (ref 8.3–10.6)
CHLORIDE BLD-SCNC: 96 MMOL/L (ref 99–110)
CO2: 26 MMOL/L (ref 21–32)
CREAT SERPL-MCNC: 0.6 MG/DL (ref 0.6–1.2)
EOSINOPHILS ABSOLUTE: 0.1 K/UL (ref 0–0.6)
EOSINOPHILS RELATIVE PERCENT: 0.8 %
GFR AFRICAN AMERICAN: >60
GFR NON-AFRICAN AMERICAN: >60
GLOBULIN: 3.1 G/DL
GLUCOSE BLD-MCNC: 119 MG/DL (ref 70–99)
HCT VFR BLD CALC: 41.1 % (ref 36–48)
HEMOGLOBIN: 13.8 G/DL (ref 12–16)
LYMPHOCYTES ABSOLUTE: 1.3 K/UL (ref 1–5.1)
LYMPHOCYTES RELATIVE PERCENT: 16.6 %
MCH RBC QN AUTO: 33.9 PG (ref 26–34)
MCHC RBC AUTO-ENTMCNC: 33.4 G/DL (ref 31–36)
MCV RBC AUTO: 101.4 FL (ref 80–100)
MONOCYTES ABSOLUTE: 0.5 K/UL (ref 0–1.3)
MONOCYTES RELATIVE PERCENT: 6.4 %
NEUTROPHILS ABSOLUTE: 5.9 K/UL (ref 1.7–7.7)
NEUTROPHILS RELATIVE PERCENT: 75.9 %
PDW BLD-RTO: 16.6 % (ref 12.4–15.4)
PLATELET # BLD: 226 K/UL (ref 135–450)
PMV BLD AUTO: 8.1 FL (ref 5–10.5)
POTASSIUM SERPL-SCNC: 3.8 MMOL/L (ref 3.5–5.1)
PRO-BNP: 4600 PG/ML (ref 0–449)
RBC # BLD: 4.06 M/UL (ref 4–5.2)
SARS-COV-2, NAAT: NOT DETECTED
SODIUM BLD-SCNC: 133 MMOL/L (ref 136–145)
TOTAL PROTEIN: 7 G/DL (ref 6.4–8.2)
TROPONIN: 0.01 NG/ML
TROPONIN: <0.01 NG/ML
WBC # BLD: 7.7 K/UL (ref 4–11)

## 2021-06-17 PROCEDURE — G0378 HOSPITAL OBSERVATION PER HR: HCPCS

## 2021-06-17 PROCEDURE — 84439 ASSAY OF FREE THYROXINE: CPT

## 2021-06-17 PROCEDURE — 6360000004 HC RX CONTRAST MEDICATION: Performed by: EMERGENCY MEDICINE

## 2021-06-17 PROCEDURE — 93005 ELECTROCARDIOGRAM TRACING: CPT | Performed by: EMERGENCY MEDICINE

## 2021-06-17 PROCEDURE — 85025 COMPLETE CBC W/AUTO DIFF WBC: CPT

## 2021-06-17 PROCEDURE — 6370000000 HC RX 637 (ALT 250 FOR IP): Performed by: NURSE PRACTITIONER

## 2021-06-17 PROCEDURE — 71260 CT THORAX DX C+: CPT

## 2021-06-17 PROCEDURE — 84484 ASSAY OF TROPONIN QUANT: CPT

## 2021-06-17 PROCEDURE — 99285 EMERGENCY DEPT VISIT HI MDM: CPT

## 2021-06-17 PROCEDURE — 96374 THER/PROPH/DIAG INJ IV PUSH: CPT

## 2021-06-17 PROCEDURE — 71045 X-RAY EXAM CHEST 1 VIEW: CPT

## 2021-06-17 PROCEDURE — 87635 SARS-COV-2 COVID-19 AMP PRB: CPT

## 2021-06-17 PROCEDURE — 6360000002 HC RX W HCPCS: Performed by: EMERGENCY MEDICINE

## 2021-06-17 PROCEDURE — 2580000003 HC RX 258: Performed by: INTERNAL MEDICINE

## 2021-06-17 PROCEDURE — 84443 ASSAY THYROID STIM HORMONE: CPT

## 2021-06-17 PROCEDURE — 1200000000 HC SEMI PRIVATE

## 2021-06-17 PROCEDURE — 80053 COMPREHEN METABOLIC PANEL: CPT

## 2021-06-17 PROCEDURE — 83880 ASSAY OF NATRIURETIC PEPTIDE: CPT

## 2021-06-17 PROCEDURE — 6370000000 HC RX 637 (ALT 250 FOR IP): Performed by: INTERNAL MEDICINE

## 2021-06-17 RX ORDER — LEVOTHYROXINE SODIUM 0.03 MG/1
25 TABLET ORAL DAILY
Status: DISCONTINUED | OUTPATIENT
Start: 2021-06-18 | End: 2021-06-18 | Stop reason: HOSPADM

## 2021-06-17 RX ORDER — SODIUM CHLORIDE 0.9 % (FLUSH) 0.9 %
5-40 SYRINGE (ML) INJECTION EVERY 12 HOURS SCHEDULED
Status: DISCONTINUED | OUTPATIENT
Start: 2021-06-17 | End: 2021-06-18 | Stop reason: HOSPADM

## 2021-06-17 RX ORDER — ONDANSETRON 2 MG/ML
4 INJECTION INTRAMUSCULAR; INTRAVENOUS EVERY 6 HOURS PRN
Status: DISCONTINUED | OUTPATIENT
Start: 2021-06-17 | End: 2021-06-17

## 2021-06-17 RX ORDER — ONDANSETRON 4 MG/1
4 TABLET, ORALLY DISINTEGRATING ORAL EVERY 8 HOURS PRN
Status: DISCONTINUED | OUTPATIENT
Start: 2021-06-17 | End: 2021-06-17

## 2021-06-17 RX ORDER — ASPIRIN 81 MG/1
81 TABLET, CHEWABLE ORAL ONCE
Status: DISCONTINUED | OUTPATIENT
Start: 2021-06-17 | End: 2021-06-18 | Stop reason: HOSPADM

## 2021-06-17 RX ORDER — METOPROLOL SUCCINATE 50 MG/1
25 TABLET, EXTENDED RELEASE ORAL 2 TIMES DAILY
Status: DISCONTINUED | OUTPATIENT
Start: 2021-06-17 | End: 2021-06-18 | Stop reason: HOSPADM

## 2021-06-17 RX ORDER — ACETAMINOPHEN 650 MG/1
650 SUPPOSITORY RECTAL EVERY 6 HOURS PRN
Status: DISCONTINUED | OUTPATIENT
Start: 2021-06-17 | End: 2021-06-18 | Stop reason: HOSPADM

## 2021-06-17 RX ORDER — FUROSEMIDE 10 MG/ML
40 INJECTION INTRAMUSCULAR; INTRAVENOUS ONCE
Status: COMPLETED | OUTPATIENT
Start: 2021-06-17 | End: 2021-06-17

## 2021-06-17 RX ORDER — FUROSEMIDE 10 MG/ML
40 INJECTION INTRAMUSCULAR; INTRAVENOUS 2 TIMES DAILY
Status: DISCONTINUED | OUTPATIENT
Start: 2021-06-18 | End: 2021-06-18 | Stop reason: HOSPADM

## 2021-06-17 RX ORDER — ACETAMINOPHEN 325 MG/1
650 TABLET ORAL EVERY 6 HOURS PRN
Status: DISCONTINUED | OUTPATIENT
Start: 2021-06-17 | End: 2021-06-18 | Stop reason: HOSPADM

## 2021-06-17 RX ORDER — SODIUM CHLORIDE 0.9 % (FLUSH) 0.9 %
5-40 SYRINGE (ML) INJECTION PRN
Status: DISCONTINUED | OUTPATIENT
Start: 2021-06-17 | End: 2021-06-18 | Stop reason: HOSPADM

## 2021-06-17 RX ORDER — POLYETHYLENE GLYCOL 3350 17 G/17G
17 POWDER, FOR SOLUTION ORAL DAILY PRN
Status: DISCONTINUED | OUTPATIENT
Start: 2021-06-17 | End: 2021-06-18 | Stop reason: HOSPADM

## 2021-06-17 RX ORDER — LISINOPRIL 10 MG/1
5 TABLET ORAL DAILY
Status: DISCONTINUED | OUTPATIENT
Start: 2021-06-17 | End: 2021-06-18 | Stop reason: HOSPADM

## 2021-06-17 RX ORDER — SODIUM CHLORIDE 9 MG/ML
25 INJECTION, SOLUTION INTRAVENOUS PRN
Status: DISCONTINUED | OUTPATIENT
Start: 2021-06-17 | End: 2021-06-18 | Stop reason: HOSPADM

## 2021-06-17 RX ORDER — AMIODARONE HYDROCHLORIDE 200 MG/1
200 TABLET ORAL 2 TIMES DAILY
COMMUNITY
End: 2021-07-08 | Stop reason: SDUPTHER

## 2021-06-17 RX ORDER — AMIODARONE HYDROCHLORIDE 200 MG/1
200 TABLET ORAL 2 TIMES DAILY
Status: DISCONTINUED | OUTPATIENT
Start: 2021-06-17 | End: 2021-06-18 | Stop reason: HOSPADM

## 2021-06-17 RX ADMIN — AMIODARONE HYDROCHLORIDE 200 MG: 200 TABLET ORAL at 23:15

## 2021-06-17 RX ADMIN — FUROSEMIDE 40 MG: 10 INJECTION, SOLUTION INTRAMUSCULAR; INTRAVENOUS at 18:46

## 2021-06-17 RX ADMIN — APIXABAN 2.5 MG: 5 TABLET, FILM COATED ORAL at 22:22

## 2021-06-17 RX ADMIN — IOPAMIDOL 75 ML: 755 INJECTION, SOLUTION INTRAVENOUS at 15:24

## 2021-06-17 RX ADMIN — METOPROLOL SUCCINATE 25 MG: 50 TABLET, EXTENDED RELEASE ORAL at 22:21

## 2021-06-17 RX ADMIN — Medication 10 ML: at 22:23

## 2021-06-17 ASSESSMENT — ENCOUNTER SYMPTOMS
DIARRHEA: 0
SHORTNESS OF BREATH: 1
COUGH: 1
WHEEZING: 0
ABDOMINAL PAIN: 0
STRIDOR: 0
VOMITING: 0
CHEST TIGHTNESS: 0

## 2021-06-17 ASSESSMENT — PAIN SCALES - GENERAL
PAINLEVEL_OUTOF10: 0
PAINLEVEL_OUTOF10: 0

## 2021-06-17 NOTE — ED NOTES
Bed: 06  Expected date:   Expected time:   Means of arrival: [facility]  Comments:  [address], RN  06/17/21 6245

## 2021-06-17 NOTE — ED PROVIDER NOTES
[facility] A2 CARD TELEMETRY  EMERGENCYDEPARTMENT ENCOUNTER      Pt Name: Lizeth De La Paz  MRN: [id number]  [place] 1940  Date of evaluation: 6/17/2021  Sarahy Bullard MD    CHIEF COMPLAINT       Chief Complaint   Patient presents with    Shortness of Breath     Recent ER visits for same symptoms. Pt started of new medication that helps symptoms, amniodarone. HISTORY OF PRESENT ILLNESS   (Location/Symptom, Timing/Onset,Context/Setting, Quality, Duration, Modifying Factors, Severity)  Note limiting factors. Georganne Habermann Mullinnix is a 80 y.o. female who presents to the emergency department for shortness of breath. Patient states she as started on a new medication saturday morning BID (amiodarone). Sates it seems to be helping with her afib, HR has been better.   -Patient states she's been experiencing sob x 3 days, associated with 'a little' cough, depending on her position. States when she sits straight it is better, hen she lays [facility] down she has a little bit. Denies fever, cp. Denies swelling to bilateral LE. Reports shortness of breath when ambulating. States when she was discharged from the hospital several weeks ago was feeling well. Patient states she is concerned for pneumonia. HPI    Nursing Notes were reviewed. REVIEW OF SYSTEMS    (2-9 systems for level 4, 10 or more for level 5)     Review of Systems   Constitutional: Negative for activity change, appetite change, diaphoresis and fever. Respiratory: Positive for cough and shortness of breath. Negative for chest tightness, wheezing and stridor. Cardiovascular: Negative for chest pain and palpitations. Gastrointestinal: Negative for abdominal pain, diarrhea and vomiting. Genitourinary: Negative for dysuria and flank pain. Skin: Negative for rash and wound. Neurological: Negative for dizziness, weakness, light-headedness, numbness and headaches.        Except as noted above the remainder of the review of systems was reviewedand negative.        PAST MEDICAL HISTORY     Past Medical History:   Diagnosis Date    Allergic rhinitis     CHF (congestive heart failure), NYHA class I, acute on chronic, combined ([facility] [address] 2021    Hyperlipidemia     Hypertension     Hypothyroidism     probable autoimmune thyroiditis    LBBB (left bundle branch block)     ON EKG, had normal GXT    Rheumatoid arthritis(714.0)     Rheumatologist in [address] [place] [address]       Past Surgical History:   Procedure Laterality Date     SECTION      x2    COLONOSCOPY  2005    JOINT REPLACEMENT Left 13    left total knee replacement         CURRENT MEDICATIONS       Current Discharge Medication List      CONTINUE these medications which have NOT CHANGED    Details   amiodarone (CORDARONE) 200 MG tablet Take 200 mg by mouth 2 times daily      apixaban (ELIQUIS) 2.5 MG TABS tablet Take 1 tablet by mouth 2 times daily  Qty: 60 tablet, Refills: 0      furosemide (LASIX) 20 MG tablet Take 1 tablet by mouth daily  Qty: 60 tablet, Refills: 3      metoprolol succinate (TOPROL XL) 25 MG extended release tablet Take 1 tablet by mouth 2 times daily  Qty: 30 tablet, Refills: 3      folic acid (FOLVITE) 1 MG tablet TAKE 1 TABLET BY MOUTH EVERY DAY  Qty: 90 tablet, Refills: 1      levothyroxine (SYNTHROID) 25 MCG tablet TAKE 1 TABLET BY MOUTH EVERY DAY  Qty: 90 tablet, Refills: 1    Associated Diagnoses: Hypothyroidism (acquired)      tretinoin (RETIN-A) 0.05 % cream Apply topically nightly Apply topically nightly as needed for rash      fluticasone (FLONASE) 50 MCG/ACT nasal spray 1 spray by Each Nostril route daily as needed for Allergies      fexofenadine (ALLEGRA) 180 MG tablet TAKE 1 TABLET BY MOUTH EVERY DAY  Qty: 90 tablet, Refills: 1      methotrexate (RHEUMATREX) 2.5 MG chemo tablet TAKE 8 TABLETS EVERY MONDAY  Qty: 96 tablet, Refills: 5    Associated Diagnoses: Rheumatoid arthritis involving multiple joints (HCC)      VITAMIN D PO Physically Abused:     Sexually Abused:        SCREENINGS             PHYSICAL EXAM    (up to 7 for level 4, 8 ormore for level 5)     ED Triage Vitals [06/17/21 1328]   BP Temp Temp Source Pulse Resp SpO2 Height Weight   117/83 97.7 °F (36.5 °C) Oral 69 20 95 % 5' 5\" (1.651 m) 126 lb (57.2 kg)       Physical Exam  Constitutional:       General: She is not in acute distress. Appearance: Normal appearance. She is well-developed. She is not ill-appearing or toxic-appearing. Comments: Sitting in bed comfortably, speaking in full sentences, following verbal commands appropriately. Not in acute distress     HENT:      Head: Normocephalic and atraumatic. Eyes:      Conjunctiva/sclera: Conjunctivae normal.      Pupils: Pupils are equal, round, and reactive to light. Cardiovascular:      Rate and Rhythm: Normal rate and regular rhythm. Heart sounds: Normal heart sounds. No murmur heard. No friction rub. No gallop. Pulmonary:      Effort: Pulmonary effort is normal. No respiratory distress. Breath sounds: Normal breath sounds. No decreased breath sounds, wheezing, rhonchi or rales. Abdominal:      General: Bowel sounds are normal. There is no distension. Palpations: Abdomen is soft. Tenderness: There is no abdominal tenderness. There is no guarding or rebound. Musculoskeletal:         General: No tenderness or deformity. Normal range of motion. Cervical back: Normal range of motion and neck supple. Right lower leg: No edema. Left lower leg: No edema. Skin:     General: Skin is warm and dry. Findings: No rash. Neurological:      Mental Status: She is alert and oriented to person, place, and time. GCS: GCS eye subscore is 4. GCS verbal subscore is 5. GCS motor subscore is 6. Psychiatric:         Behavior: Behavior is cooperative.          DIAGNOSTIC RESULTS     EKG: All EKG's are interpreted by the Emergency Department Physicianwho either signs or Co-signs this chart in the absence of a cardiologist.    The Ekg interpreted by me shows  normal sinus rhythm with a rate of 65  Axis is   Normal  QTc is  559  Intervals and Durations are unremarkable. ST Segments: biphasic t waves   Changes seen from prior ekg    RADIOLOGY:   Non-plain film images such as CT, Ultrasound and MRI are read by the radiologist. Plain radiographic images are visualized and preliminarily interpreted by the emergency physician with the below findings:      Interpretation per the Radiologist below, if available at the time of this note:    CT CHEST PULMONARY EMBOLISM W CONTRAST   Final Result   1. No evidence of pulmonary embolism   2. Cardiomegaly with interstitial edema and right larger than left pleural   effusions   3. Multifocal ground-glass and interstitial opacities in the upper lobes have   an appearance compatible with atypical/viral pneumonia. Patchy asymmetric   distribution of edema also considered         XR CHEST PORTABLE   Final Result   Pulmonary edema with pleuroparenchymal disease at the lung bases.                ED BEDSIDE ULTRASOUND:   Performed by ED Physician - none    LABS:  Labs Reviewed   CBC WITH AUTO DIFFERENTIAL - Abnormal; Notable for the following components:       Result Value    .4 (*)     RDW 16.6 (*)     All other components within normal limits    Narrative:     Performed at:  [place] [address] [address]   Phone [phone]   COMPREHENSIVE METABOLIC PANEL - Abnormal; Notable for the following components:    Sodium 133 (*)     Chloride 96 (*)     Glucose 119 (*)     Total Bilirubin 1.1 (*)     ALT 59 (*)     AST 40 (*)     All other components within normal limits    Narrative:     Performed at:  [facility]) - [address], [facility] [address]   Phone [phone]   BRAIN NATRIURETIC PEPTIDE - Abnormal; Notable for the following components: Pro-BNP 4,600 (*)     All other components within normal limits    Narrative:     Performed at:  [facility]) [address], [facility]   Phone [phone], RAPID    Narrative:     Performed at:  [facility]) [address], [facility] [employer]   Phone [phone]   TROPONIN    Narrative:     Performed at:  [facility]) [address], [facility]   Phone [phone]   URINE RT REFLEX TO CULTURE   BASIC METABOLIC PANEL   CBC WITH AUTO DIFFERENTIAL   LIPID PANEL   MAGNESIUM   TROPONIN   T4, FREE   TSH WITHOUT REFLEX       All other labs were within normal range ornot returned as of this dictation. EMERGENCY DEPARTMENT COURSE and DIFFERENTIAL DIAGNOSIS/MDM:   Vitals:    Vitals:    06/17/21 1542 06/17/21 1635 06/17/21 1743 06/17/21 1949   BP: (!) 208/128 114/76 (!) 137/92 132/86   Pulse: 77 69 71 71   Resp: 20 16 20 16   Temp:    98 °F (36.7 °C)   TempSrc:    Oral   SpO2: 92% 95% 94% 93%   Weight:       Height:             MDM    ED COURSE/MDM    -Francy Gutierrezzachery Chapin is a 80 y.o. female with a history of hyperlipidemia, left bundle branch block, hypertension A. fib on Eliquis who presents to ED for shortness of breath x3 days, exertional with slight cough. Upon arrival patient was afebrile with stable vitals, pulse of 69.  -Of note patient was admitted 6-3/2021 and discharged 2 days later after arriving to the ED complaints of shortness of breath and was found to have A. fib with RVR. She was started on Cardizem infusion admitted to the hospital.  She was DC'd on p.o. metoprolol. She is on Eliquis 2.5 mg twice daily. Cardiology recommend 2-week cardiac event monitor at [place].  -On evaluation, patient's EKG was concerning for some changes that appeared to be biphasic T waves which is an acute change from patient's prior EKG on [facility].   Cardiology was consulted, spoke about reason for admission as noted above with hospitalist who agrees to accept patient for admission      REASSESSMENT      Well appearing, non toxic, alert, oriented speaking in full sentences and hemodynamically stable upon discharge      Shayla Nesb[address] 124 time was 0 minutes, excluding separately reportableprocedures. There was a high probability of clinicallysignificant/life threatening deterioration in the patient's condition which required my urgent intervention. CONSULTS:  IP CONSULT TO HOSPITALIST  IP CONSULT TO HEART FAILURE NURSE/COORDINATOR  IP CONSULT TO DIETITIAN  IP CONSULT TO CARDIOLOGY    PROCEDURES:  Unless otherwise noted below, none     Procedures    FINAL IMPRESSION      1. Acute on chronic congestive heart failure, unspecified heart failure type ([facility] [address]    2. Elevated troponin          DISPOSITION/PLAN   DISPOSITION Admitted 06/17/2021 06:46:44 PM      PATIENT REFERREDTO:  No follow-up provider specified.     DISCHARGE MEDICATIONS:  Current Discharge Medication List             (Please note that portions of this note were completed with a voice recognition program.  Efforts were made to edit the dictations but occasionally wordsare mis-transcribed.)    Drew Shaikh MD (electronically signed)  Attending Emergency Physician            Drew Shaikh MD  06/17/21 5682

## 2021-06-17 NOTE — H&P
Hospital Medicine History & Physical      PCP: Norman Buck MD    Date of Admission: 2021    Date of Service: Pt seen/examined on 2021 and Admitted to Inpatient with expected LOS greater than two midnights due to medical therapy. Chief Complaint: Shortness of breath for last few days      History Of Present Illness:  (    80 y.o. female who presented to [facility] with above complaint. She noticed more and more shortness of breath lately  Especially with activities. She experience some chest pressure yesterday. There is no chest pain, cough, fever, change in mental status or lower extremity edema. Past Medical History:          Diagnosis Date    Allergic rhinitis     CHF (congestive heart failure), NYHA class I, acute on chronic, combined ([facility] [address] 2021    Hyperlipidemia     Hypertension     Hypothyroidism     probable autoimmune thyroiditis    LBBB (left bundle branch block)     ON EKG, had normal GXT    Rheumatoid arthritis(714.0)     Rheumatologist in [place]       Past Surgical History:          Procedure Laterality Date     SECTION      x2    COLONOSCOPY  2005    JOINT REPLACEMENT Left 13    left total knee replacement       Medications Prior to Admission:      Prior to Admission medications    Medication Sig Start Date End Date Taking?  Authorizing Provider   apixaban (ELIQUIS) 2.5 MG TABS tablet Take 1 tablet by mouth 2 times daily 21   GRACIELA Menjivar CNP   furosemide (LASIX) 20 MG tablet Take 1 tablet by mouth daily 21   GRACIELA Menjivar CNP   metoprolol succinate (TOPROL XL) 25 MG extended release tablet Take 1 tablet by mouth 2 times daily 21   GRACIELA Menjivar CNP   folic acid (FOLVITE) 1 MG tablet TAKE 1 TABLET BY MOUTH EVERY DAY  Patient taking differently: TAKE 1 TABLET BY MOUTH EVERY DAY EXCEPT ON 21   Norman Buck MD   levothyroxine (SYNTHROID) 25 MCG tablet TAKE 1 TABLET BY MOUTH EVERY DAY 5/14/21   Becky Mcknight MD   tretinoin (RETIN-A) 0.05 % cream Apply topically nightly Apply topically nightly as needed for rash    Historical Provider, MD   fluticasone (FLONASE) 50 MCG/ACT nasal spray 1 spray by Each Nostril route daily as needed for Allergies    Historical Provider, MD   fexofenadine (ALLEGRA) 180 MG tablet TAKE 1 TABLET BY MOUTH EVERY DAY  Patient taking differently: TAKE 1 TABLET BY MOUTH EVERY DAY taking prn 2/24/21   Becky Mcknight MD   methotrexate (RHEUMATREX) 2.5 MG chemo tablet TAKE 8 TABLETS EVERY MONDAY 2/12/21   Becky Mcknight MD   VITAMIN D PO Take by mouth    Historical Provider, MD   Probiotic Product (PROBIOTIC PO) Take by mouth    Historical Provider, MD   Ascorbic Acid (VITAMIN C PO) Take by mouth    Historical Provider, MD   fish oil-omega-3 fatty acids 1000 MG capsule Take 4 g by mouth daily     Historical Provider, MD       Allergies:  Patient has no known allergies. Social History:      The patient currently lives at home    TOBACCO:   reports that she has quit smoking. She has never used smokeless tobacco.  ETOH:   reports current alcohol use of about 2.0 standard drinks of alcohol per week. E-Cigarettes/Vaping Use     Questions Responses    E-Cigarette/Vaping Use Never User    Start Date     Passive Exposure     Quit Date     Counseling Given     Comments             Family History:      Reviewed in detail and negative for DM, CAD, Cancer, CVA. Positive as follows:        Problem Relation Age of Onset    Heart Disease Mother         very slow heart beat    Thyroid Disease Father     Heart Disease Father        REVIEW OF SYSTEMS COMPLETED:   Pertinent positives as noted in the HPI. All other systems reviewed and negative.     PHYSICAL EXAM PERFORMED:    BP (!) 137/92   Pulse 71   Temp 97.7 °F (36.5 °C) (Oral)   Resp 20   Ht 5' 5\" (1.651 m)   Wt 126 lb (57.2 kg)   SpO2 94%   BMI 20.97 kg/m²     General appearance:  No apparent distress, appears stated age and cooperative. HEENT:  Normal cephalic, atraumatic without obvious deformity. Pupils equal, round, and reactive to light. Extra ocular muscles intact. Conjunctivae/corneas clear. Neck: Supple, with full range of motion. No jugular venous distention. Trachea midline. Respiratory:  Normal respiratory effort. , bilaterally with Rales/Wheezes/no rhonchi. Cardiovascular:  Regular rate and rhythm with normal S1/S2 without murmurs, rubs or gallops. Abdomen: Soft, non-tender, non-distended with normal bowel sounds. Musculoskeletal:  No clubbing, cyanosis or edema bilaterally. Bilateral hands have some deformity secondary to rheumatoid arthritis   skin: Skin color, texture, turgor normal.  No rashes or lesions. Neurologic:  Neurovascularly intact without any focal sensory/motor deficits. Cranial nerves: II-XII intact, grossly non-focal.  Psychiatric:  Alert and oriented, thought content appropriate, normal insight  Capillary Refill: Brisk,3 seconds, normal  Peripheral Pulses: +2 palpable, equal bilaterally       Labs:     Recent Labs     06/17/21  1345   WBC 7.7   HGB 13.8   HCT 41.1        Recent Labs     06/17/21  1345   *   K 3.8   CL 96*   CO2 26   BUN 19   CREATININE 0.6   CALCIUM 9.2     Recent Labs     06/17/21  1345   AST 40*   ALT 59*   BILITOT 1.1*   ALKPHOS 74     No results for input(s): INR in the last 72 hours. Recent Labs     06/17/21  1345   TROPONINI 0.01       Urinalysis:      Lab Results   Component Value Date    NITRU Negative 06/03/2021    WBCUA None seen 10/01/2019    RBCUA 0-2 10/01/2019    BLOODU Negative 06/03/2021    SPECGRAV 1.020 06/03/2021    GLUCOSEU Negative 06/03/2021       Radiology:     CXR: I have reviewed the CXR with the following interpretation:   EKG:  I have reviewed the EKG with the following interpretation: Normal sinus rhythm 65/min no acute ST-T changes    CT CHEST PULMONARY EMBOLISM W CONTRAST   Final Result   1.  No evidence of pulmonary embolism   2. Cardiomegaly with interstitial edema and right larger than left pleural   effusions   3. Multifocal ground-glass and interstitial opacities in the upper lobes have   an appearance compatible with atypical/viral pneumonia. Patchy asymmetric   distribution of edema also considered         XR CHEST PORTABLE   Final Result   Pulmonary edema with pleuroparenchymal disease at the lung bases. ASSESSMENT:    Active Hospital Problems    Diagnosis Date Noted    CHF (congestive heart failure), NYHA class I, acute [facility] chronic, [facility] [I50.43] 06/17/2021         PLAN:  Shortness of breath secondary to acute on chronic systolic congestive heart failure, angina equivalent cannot be ruled out, Covid negative-admit, telemetry, troponin, Lasix IV twice daily, monitor electrolytes and renal function, cardiology evaluation, intake and output, daily weight  Lisinopril 5 mg was added    Elevated troponin-? Given the history of chest pressure on previous day, Non-ST elevation MI cannot be ruled out-trend, telemetry, patient is already on Eliquis, continue with metoprolol start baby aspirin , lipid profile  Hold statin as of elevated liver function test  Cardiology on board    Chronically elevated liver function test-no l uptrending and patient is asymptomatic, outpatient follow-up    Chronic macrocytic [id number] deferred to PCP    Hypothyroidism-continue Synthroid, TSH    History of atrial fibrillation-currently has a pacemaker on Eliquis and beta-blocker-currently on sinus rhythm, telemetry, cardiology on board    Rheumatoid arthritis with deformed extremities-continue home dose of steroid    DVT Prophylaxis: On Eliquis  Diet: Cardiac  Code Status: DNR CCA    PT/OT Eval Status:     Dispo -admit to [address] Efrem Villareal MD    Thank you Willa Olszewski, MD for the opportunity to be involved in this patient's care.  If you have any questions or concerns please feel free

## 2021-06-17 NOTE — ED NOTES

## 2021-06-18 VITALS
SYSTOLIC BLOOD PRESSURE: 126 MMHG | WEIGHT: 123.4 LBS | DIASTOLIC BLOOD PRESSURE: 79 MMHG | HEIGHT: 65 IN | BODY MASS INDEX: 20.56 KG/M2 | TEMPERATURE: 98.2 F | RESPIRATION RATE: 16 BRPM | OXYGEN SATURATION: 94 % | HEART RATE: 62 BPM

## 2021-06-18 PROBLEM — I50.23 CHF (CONGESTIVE HEART FAILURE), NYHA CLASS III, ACUTE ON CHRONIC, SYSTOLIC (HCC): Status: ACTIVE | Noted: 2021-06-17

## 2021-06-18 PROBLEM — Z79.01 CHRONIC ANTICOAGULATION: Status: ACTIVE | Noted: 2021-06-18

## 2021-06-18 LAB
ANION GAP SERPL CALCULATED.3IONS-SCNC: 9 MMOL/L (ref 3–16)
BASOPHILS ABSOLUTE: 0 K/UL (ref 0–0.2)
BASOPHILS RELATIVE PERCENT: 0.7 %
BUN BLDV-MCNC: 19 MG/DL (ref 7–20)
CALCIUM SERPL-MCNC: 8.8 MG/DL (ref 8.3–10.6)
CHLORIDE BLD-SCNC: 102 MMOL/L (ref 99–110)
CHOLESTEROL, TOTAL: 173 MG/DL (ref 0–199)
CO2: 26 MMOL/L (ref 21–32)
CREAT SERPL-MCNC: 0.7 MG/DL (ref 0.6–1.2)
EKG ATRIAL RATE: 65 BPM
EKG DIAGNOSIS: NORMAL
EKG P AXIS: 80 DEGREES
EKG P-R INTERVAL: 146 MS
EKG Q-T INTERVAL: 538 MS
EKG QRS DURATION: 138 MS
EKG QTC CALCULATION (BAZETT): 559 MS
EKG R AXIS: 52 DEGREES
EKG T AXIS: 135 DEGREES
EKG VENTRICULAR RATE: 65 BPM
EOSINOPHILS ABSOLUTE: 0.2 K/UL (ref 0–0.6)
EOSINOPHILS RELATIVE PERCENT: 3.6 %
GFR AFRICAN AMERICAN: >60
GFR NON-AFRICAN AMERICAN: >60
GLUCOSE BLD-MCNC: 105 MG/DL (ref 70–99)
HCT VFR BLD CALC: 38.7 % (ref 36–48)
HDLC SERPL-MCNC: 48 MG/DL (ref 40–60)
HEMOGLOBIN: 13.1 G/DL (ref 12–16)
LDL CHOLESTEROL CALCULATED: 108 MG/DL
LYMPHOCYTES ABSOLUTE: 1.9 K/UL (ref 1–5.1)
LYMPHOCYTES RELATIVE PERCENT: 30.6 %
MAGNESIUM: 1.9 MG/DL (ref 1.8–2.4)
MCH RBC QN AUTO: 33.8 PG (ref 26–34)
MCHC RBC AUTO-ENTMCNC: 33.8 G/DL (ref 31–36)
MCV RBC AUTO: 100.2 FL (ref 80–100)
MONOCYTES ABSOLUTE: 0.4 K/UL (ref 0–1.3)
MONOCYTES RELATIVE PERCENT: 7 %
NEUTROPHILS ABSOLUTE: 3.6 K/UL (ref 1.7–7.7)
NEUTROPHILS RELATIVE PERCENT: 58.1 %
PDW BLD-RTO: 16.8 % (ref 12.4–15.4)
PLATELET # BLD: 225 K/UL (ref 135–450)
PMV BLD AUTO: 8.2 FL (ref 5–10.5)
POTASSIUM SERPL-SCNC: 3.6 MMOL/L (ref 3.5–5.1)
RBC # BLD: 3.86 M/UL (ref 4–5.2)
SODIUM BLD-SCNC: 137 MMOL/L (ref 136–145)
T4 FREE: 1.5 NG/DL (ref 0.9–1.8)
TRIGL SERPL-MCNC: 86 MG/DL (ref 0–150)
TSH SERPL DL<=0.05 MIU/L-ACNC: 4.47 UIU/ML (ref 0.27–4.2)
VLDLC SERPL CALC-MCNC: 17 MG/DL
WBC # BLD: 6.2 K/UL (ref 4–11)

## 2021-06-18 PROCEDURE — 80048 BASIC METABOLIC PNL TOTAL CA: CPT

## 2021-06-18 PROCEDURE — 85025 COMPLETE CBC W/AUTO DIFF WBC: CPT

## 2021-06-18 PROCEDURE — 93010 ELECTROCARDIOGRAM REPORT: CPT | Performed by: INTERNAL MEDICINE

## 2021-06-18 PROCEDURE — 2580000003 HC RX 258: Performed by: INTERNAL MEDICINE

## 2021-06-18 PROCEDURE — G0378 HOSPITAL OBSERVATION PER HR: HCPCS

## 2021-06-18 PROCEDURE — 6360000002 HC RX W HCPCS: Performed by: INTERNAL MEDICINE

## 2021-06-18 PROCEDURE — 97116 GAIT TRAINING THERAPY: CPT

## 2021-06-18 PROCEDURE — 36415 COLL VENOUS BLD VENIPUNCTURE: CPT

## 2021-06-18 PROCEDURE — 83735 ASSAY OF MAGNESIUM: CPT

## 2021-06-18 PROCEDURE — 80061 LIPID PANEL: CPT

## 2021-06-18 PROCEDURE — 6370000000 HC RX 637 (ALT 250 FOR IP): Performed by: NURSE PRACTITIONER

## 2021-06-18 PROCEDURE — 6370000000 HC RX 637 (ALT 250 FOR IP): Performed by: INTERNAL MEDICINE

## 2021-06-18 PROCEDURE — 97161 PT EVAL LOW COMPLEX 20 MIN: CPT

## 2021-06-18 PROCEDURE — 97535 SELF CARE MNGMENT TRAINING: CPT

## 2021-06-18 PROCEDURE — 97165 OT EVAL LOW COMPLEX 30 MIN: CPT

## 2021-06-18 PROCEDURE — 99222 1ST HOSP IP/OBS MODERATE 55: CPT | Performed by: INTERNAL MEDICINE

## 2021-06-18 RX ORDER — FUROSEMIDE 40 MG/1
40 TABLET ORAL DAILY
Qty: 30 TABLET | Refills: 1 | Status: SHIPPED | OUTPATIENT
Start: 2021-06-18 | End: 2021-08-06 | Stop reason: SDUPTHER

## 2021-06-18 RX ADMIN — Medication 10 ML: at 09:16

## 2021-06-18 RX ADMIN — LEVOTHYROXINE SODIUM 25 MCG: 0.03 TABLET ORAL at 06:43

## 2021-06-18 RX ADMIN — METOPROLOL SUCCINATE 25 MG: 50 TABLET, EXTENDED RELEASE ORAL at 09:17

## 2021-06-18 RX ADMIN — AMIODARONE HYDROCHLORIDE 200 MG: 200 TABLET ORAL at 09:17

## 2021-06-18 RX ADMIN — FUROSEMIDE 40 MG: 10 INJECTION, SOLUTION INTRAMUSCULAR; INTRAVENOUS at 09:16

## 2021-06-18 RX ADMIN — APIXABAN 2.5 MG: 5 TABLET, FILM COATED ORAL at 09:17

## 2021-06-18 ASSESSMENT — PAIN SCALES - GENERAL
PAINLEVEL_OUTOF10: 0

## 2021-06-18 NOTE — DISCHARGE INSTR - COC
Continuity of Care Form    Patient Name: Danielle Chapin   :  1940  MRN:  [id number]    Admit date:  2021  Discharge date:  21    Code Status Order: Full Code   Advance Directives:   Advance Care Flowsheet Documentation       Date/Time Healthcare Directive Type of Healthcare Directive Copy in [address] Agent's Name Healthcare Agent's Phone Number    [phone]  No, patient does not have an advance directive for healthcare treatment -- -- -- -- --            Admitting Physician:  Mignon Hubbard MD  PCP: Marian Villegas MD    Discharging Nurse: [facility] Unit/Room#: 0201/0201-01  Discharging Unit Phone Number: [phone]    Emergency Contact:   Extended Emergency Contact Information  Primary Emergency Contact: Rambo Chapin   [address] Phone: [phone]  Mobile Phone: [phone]  Relation: Child  Secondary Emergency Contact: Constantino Chapin   [address] Phone: [phone]  Relation: Child    Past Surgical History:  Past Surgical History:   Procedure Laterality Date     SECTION      x2    COLONOSCOPY  2005    JOINT REPLACEMENT Left 13    left total knee replacement       Immunization History:   Immunization History   Administered Date(s) Administered    COVID-19, Moderna, PF, 100mcg/0.5mL 2021, 2021    Influenza Vaccine, unspecified formulation 10/27/2015, 10/15/2016    Influenza Virus Vaccine 10/01/2010, 10/01/2011, 10/01/2014, 10/01/2017, 2018    Pneumococcal Conjugate 13-valent (Alberto Jointer) 2018    Pneumococcal Polysaccharide ([id number]) 10/01/2008       Active Problems:  Patient Active Problem List   Diagnosis Code    Hyperlipidemia E78.5    Hypothyroidism (acquired) E03.9    LBBB (left bundle branch block) I44.7    left TKR Z96.659    Trigger finger, acquired M65.30    Rheumatoid arthritis involving multiple joints ([facility] [address] M06.9  Essential hypertension I10    Foot callus L84    Hammertoes of both feet M20.41, M20.42    Valgus deformity of both great toes M20.11, M20.12    Arthritis of right acromioclavicular joint M19.011    Rotator cuff tear arthropathy of right shoulder M75.101, M12.811    Pelvic mass R19.00    Hyponatremia E87.1    Intra-abdominal abscess (HCC) K65.1    A-fib ([facility]) I48.91    CHF (congestive heart failure), NYHA class III, acute on chronic, systolic ([facility]) W61.46    Chronic anticoagulation Z79.01       Isolation/Infection:   Isolation            No Isolation          Patient Infection Status       Infection Onset Added Last Indicated Last Indicated By Review Planned Expiration Resolved Resolved By    None active    Resolved    COVID-19 Rule Out 06/17/21 06/17/21 06/17/21 COVID-19, Rapid (Ordered)   06/17/21 Rule-Out Test Resulted    C-diff Rule Out 03/18/21 03/18/21 03/18/21 GI Bacterial Pathogens By PCR (Ordered)   03/18/21 Rule-Out Test Resulted            Nurse Assessment:  Last Vital Signs: /79   Pulse 62   Temp 98.2 °F (36.8 °C) (Oral)   Resp 16   Ht 5' 5\" (1.651 m)   Wt 123 lb 6.4 oz (56 kg)   SpO2 94%   BMI 20.53 kg/m²     Last documented pain score (0-10 scale): Pain Level: 0  Last Weight:   Wt Readings from Last 1 Encounters:   06/18/21 123 lb 6.4 oz (56 kg)     Mental Status:  oriented and alert    IV Access:  - None    Nursing Mobility/ADLs:  Walking   Independent  Transfer  Independent  Bathing  Assisted  Dressing  Independent  Toileting  Independent  Feeding  Independent  Med Admin  Assisted  Med Delivery   whole    Wound Care Documentation and Therapy:        Elimination:  Continence:   · Bowel:  Yes  · Bladder: Yes  Urinary Catheter: None   Colostomy/Ileostomy/Ileal Conduit: No       Date of Last BM:     Intake/Output Summary (Last 24 hours) at 6/18/2021 1333  Last data filed at 6/18/2021 1303  Gross per 24 hour   Intake 1060 ml   Output 500 ml   Net 560 ml     I/O last 3 completed

## 2021-06-18 NOTE — CARE COORDINATION
Sw spent a lengthy amount of time in the room with pt, son, and daughter. Pt lives at son's home in her own room up a set of stairs. She does not use any DME. Son and daughter are expressing concerns over pt returning home, largely due to ability to assist her and concerns over her heart issues. Sw received assistance from [address] [place] [address] who relayed Cardiology information clearly to pt and family. All concerns were cleared up and a plan of action was discussed should any issues arise. PT/OT have been consulted, however pt is declining to go to SNF. Pt has a plan to move to [address] [place] [address] on 6/29/21. Of note, this date was pushed back by family. Family is expressing concerns over pt returning home. Sw attempted to assist family in problem solving but ultimately this is not something CM can resolve for family. Charmayne Coyer is familiar and active with pt, Sw will fax orders once PT/OT have assessed pt. Sw will follow for any potential DME needs. Kirstie also encouraged considering private aide services to assist with safety for the remaining days in son's home.

## 2021-06-18 NOTE — PROGRESS NOTES
Pretx: 111/69, HR65 SpO2 96% on RA; SpO2 down to 83% after stair training, HR up to 99bpm; Posttx: HR 82, SpO2 93%. Patient Diagnosis(es): The primary encounter diagnosis was Acute on chronic congestive heart failure, unspecified heart failure type ([address] A diagnosis of Elevated troponin was also pertinent to this visit. has a past medical history of Allergic rhinitis, CHF (congestive heart failure), NYHA class I, acute on chronic, combined ([address] Hyperlipidemia, Hypertension, Hypothyroidism, LBBB (left bundle branch block), and Rheumatoid arthritis(714.0). has a past surgical history that includes Colonoscopy ();  section; and joint replacement (Left, 13). Restrictions  Restrictions/Precautions  Restrictions/Precautions: Fall Risk  Implants present? : Pacemaker  Position Activity Restriction  Other position/activity restrictions: Up with assist  Vision/Hearing  Vision: Impaired  Vision Exceptions: Wears glasses for reading  Hearing: Within functional limits     Subjective  General  Chart Reviewed: Yes  Patient assessed for rehabilitation services?: Yes  Response To Previous Treatment: Not applicable  Family / Caregiver Present: Yes (Son and dtr)  Referring Practitioner: Kia Michel MD  Referral Date : 21  Diagnosis: CHF  Follows Commands: Within Functional Limits  General Comment  Comments: RN clears for therapy  Subjective  Subjective: Pt seated in bed upon arrival, agreeable to PT Evaluation.   Pain Screening  Patient Currently in Pain: Denies  Vital Signs  Patient Currently in Pain: Denies  Pre Treatment Pain Screening  Intervention List: Patient able to continue with treatment    Orientation  Orientation  Overall Orientation Status: Within Normal Limits  Social/Functional History  Social/Functional History  Lives With:  (son, 2 teenage grandchildren)  Type of Home: House  Home Layout: Multi-level, Bed/Bath upstairs  Home Access: Stairs to enter without rails  Entrance sit: Modified independent  Ambulation  Ambulation?: Yes  Ambulation 1  Surface: level tile  Device: No Device  Assistance: Supervision;Modified Independent  Quality of Gait: Pt ambulated 200ft without assistive device and mod I - supervision. Added balance challenges including vertical head turns, horizontal head turns, sudden stops, sudden stops and turns, manual perturbations in all directions. Pt with mild postural sway but no LOB. Distance: 200ft  Stairs/Curb  Stairs?: Yes  Stairs  # Steps : 12  Stairs Height: 6\"  Rails: Right ascending  Device: No Device  Assistance: Stand by assistance;Supervision  Comment: Pt self-selected reciprocal pattern to ascend and non-reciprocal pattern to descend with SBA-supervision. Pt able to clear each step, no LOB. Educated regarding safer technique of non-reciprocal pattern throughout. Balance  Posture: Good  Sitting - Static: Good  Sitting - Dynamic: Good  Standing - Static: Good  Standing - Dynamic: Good;-        Plan   Plan  Times per week: 1 visit only  Safety Devices  Type of devices: All fall risk precautions in place, Call light within reach, Gait belt, Nurse notified, Left in bed, Patient at risk for falls (Family in room)  Restraints  Initially in place: No      AM-PAC Score  AM-PAC Inpatient Mobility Raw Score : 23 (06/18/21 1530)  AM-PAC Inpatient T-Scale Score : 56.93 (06/18/21 1530)  Mobility Inpatient CMS 0-100% Score: 11.2 (06/18/21 1530)  Mobility Inpatient CMS G-Code Modifier : CI (06/18/21 1530)          Goals  Short term goals  Time Frame for Short term goals: 1 visit only  Short term goal 1: Pt will perform functional transfers with mod I - MET 6/18  Short term goal 2: Pt will ambulate >50ft without device with mod I - MET 6/18  Short term goal 3: Pt will ascend/descend 12 steps with hand rail without hands on assist - MET 6/18  Patient Goals   Patient goals : \"To go home. \"       Therapy Time   Individual Concurrent Group Co-treatment   Time In 9457 Time Out 1517         Minutes 33         Timed Code Treatment Minutes: 23 Minutes (10 minute eval)       Fannie Whitley PT     If pt is unable to be seen after this session, please let this note serve as discharge summary. Please see case management note for discharge disposition. Thank you.

## 2021-06-18 NOTE — DISCHARGE INSTR - DIET
 Good nutrition is important when healing from an illness, injury, or surgery. Follow any nutrition recommendations given to you during your hospital stay.  If you were given an oral nutrition supplement while in the hospital, continue to take this supplement at home. You can take it with meals, in-between meals, and/or before bedtime. These supplements can be purchased at most local grocery stores, pharmacies, and chain super-stores.  If you have any questions about your diet or nutrition, call the hospital and ask for the dietitian.  Watch sodium intake, do not add salt to your food and eat fresh fruits and veggies.  Processed foods (take out, frozen meals, etc) contain high amounts of sodium to preserve them   Do not drink more than 2 liters (64 ounces) of water per 24 hours.

## 2021-06-18 NOTE — PROGRESS NOTES
Patient admitted to room 201 from ED. Patient oriented to room, call light, bed rails, phone, lights and bathroom. Patient instructed about the schedule of the day including: vital sign frequency, lab draws, possible tests, frequency of MD and staff rounds, including RN/MD rounding together at bedside, daily weights, and I &O's. Patient instructed about prescribed diet, how to use 8MENU, and television. bed alarm in place, patient aware of placement and reason. Telemetry box 78 in place, patient aware of placement and reason. Bed locked, in lowest position, side rails up 2/4, call light within reach. Will continue to monitor.

## 2021-06-18 NOTE — DISCHARGE SUMMARY
Hospital Medicine Discharge Summary    Patient ID: Jason Chapin      Patient's PCP: Nelson Manuel MD    Admit Date: 6/17/2021     Discharge Date:   06/18/21     Admitting Physician: Olive Rush MD     Discharge Physician: Jazz Galeana MD     Discharge Diagnoses: Active Hospital Problems    Diagnosis     Chronic anticoagulation [Z79.01]     CHF (congestive heart failure), NYHA class III, acute on chronic, systolic (HCC) [Y81.27]     A-fib (HCC) [I48.91]     Essential hypertension [I10]     LBBB (left bundle branch block) [I44.7]        The patient was seen and examined on day of discharge and this discharge summary is in conjunction with any daily progress note from day of discharge. Hospital Course:     Admitted with shortness of breath. Known to have tachycardia-related cardiomyopathy. Improved overnight with aggressive diuresis. Seen by cardiology next morning and cleared for discharge  Will be discharged home with increased dose of Lasix, from 20 mg po qAM to 40    May benefit from ACEI/ARB, but has not tolerated in the past. Defer to patient's primary cardiologist    Being discharged in stable asymptomatic condition, on room air          Physical Exam Performed:     /82   Pulse 60   Temp 98.2 °F (36.8 °C) (Oral)   Resp 16   Ht 5' 5\" (1.651 m)   Wt 123 lb 6.4 oz (56 kg)   SpO2 94%   BMI 20.53 kg/m²       General appearance:  No apparent distress, appears stated age and cooperative. HEENT:  Normal cephalic, atraumatic without obvious deformity. Pupils equal, round, and reactive to light. Extra ocular muscles intact. Conjunctivae/corneas clear. Neck: Supple, with full range of motion. No jugular venous distention. Trachea midline. Respiratory:  Normal respiratory effort. Clear to auscultation, bilaterally without Rales/Wheezes/Rhonchi. Cardiovascular:  Regular rate and rhythm with normal S1/S2 without murmurs, rubs or gallops.   Abdomen: Soft, non-tender, non-distended with normal bowel sounds. Musculoskeletal:  No clubbing, cyanosis or edema bilaterally. Full range of motion without deformity. Skin: Skin color, texture, turgor normal.  No rashes or lesions. Neurologic:  Neurovascularly intact without any focal sensory/motor deficits. Cranial nerves: II-XII intact, grossly non-focal.  Psychiatric:  Alert and oriented, thought content appropriate, normal insight  Capillary Refill: Brisk,< 3 seconds   Peripheral Pulses: +2 palpable, equal bilaterally       Labs: For convenience and continuity at follow-up the following most recent labs are provided:      CBC:    Lab Results   Component Value Date    WBC 6.2 06/18/2021    HGB 13.1 06/18/2021    HCT 38.7 06/18/2021     06/18/2021       Renal:    Lab Results   Component Value Date     06/18/2021    K 3.6 06/18/2021    K 4.1 06/05/2021     06/18/2021    CO2 26 06/18/2021    BUN 19 06/18/2021    CREATININE 0.7 06/18/2021    CALCIUM 8.8 06/18/2021         Significant Diagnostic Studies    Radiology:   CT CHEST PULMONARY EMBOLISM W CONTRAST   Final Result   1. No evidence of pulmonary embolism   2. Cardiomegaly with interstitial edema and right larger than left pleural   effusions   3. Multifocal ground-glass and interstitial opacities in the upper lobes have   an appearance compatible with atypical/viral pneumonia. Patchy asymmetric   distribution of edema also considered         XR CHEST PORTABLE   Final Result   Pulmonary edema with pleuroparenchymal disease at the lung bases. Consults:     IP CONSULT TO HOSPITALIST  IP CONSULT TO HEART FAILURE NURSE/COORDINATOR  IP CONSULT TO DIETITIAN  IP CONSULT TO CARDIOLOGY    Disposition:  home     Condition at Discharge: Stable    Discharge Instructions/Follow-up:  cardiology    Code Status:  Full Code     Activity: activity as tolerated    Diet: ADULT DIET;  Regular; Low Fat/Low Chol/High Fiber/2 gm Na       Discharge Medications:     Current Discharge Medication List           Details   furosemide (LASIX) 40 MG tablet Take 1 tablet by mouth daily  Qty: 30 tablet, Refills: 1              Details   amiodarone (CORDARONE) 200 MG tablet Take 200 mg by mouth 2 times daily      apixaban (ELIQUIS) 2.5 MG TABS tablet Take 1 tablet by mouth 2 times daily  Qty: 60 tablet, Refills: 0      metoprolol succinate (TOPROL XL) 25 MG extended release tablet Take 1 tablet by mouth 2 times daily  Qty: 30 tablet, Refills: 3      folic acid (FOLVITE) 1 MG tablet TAKE 1 TABLET BY MOUTH EVERY DAY  Qty: 90 tablet, Refills: 1      levothyroxine (SYNTHROID) 25 MCG tablet TAKE 1 TABLET BY MOUTH EVERY DAY  Qty: 90 tablet, Refills: 1    Associated Diagnoses: Hypothyroidism (acquired)      tretinoin (RETIN-A) 0.05 % cream Apply topically nightly Apply topically nightly as needed for rash      fluticasone (FLONASE) 50 MCG/ACT nasal spray 1 spray by Each Nostril route daily as needed for Allergies      fexofenadine (ALLEGRA) 180 MG tablet TAKE 1 TABLET BY MOUTH EVERY DAY  Qty: 90 tablet, Refills: 1      methotrexate (RHEUMATREX) 2.5 MG chemo tablet TAKE 8 TABLETS EVERY MONDAY  Qty: 96 tablet, Refills: 5    Associated Diagnoses: Rheumatoid arthritis involving multiple joints (HCC)      VITAMIN D PO Take by mouth      Probiotic Product (PROBIOTIC PO) Take by mouth      Ascorbic Acid (VITAMIN C PO) Take by mouth      fish oil-omega-3 fatty acids 1000 MG capsule Take 4 g by mouth daily              Time Spent on discharge is more than 45 minutes in the examination, evaluation, counseling and review of medications and discharge plan. Signed:    Anil Schulz MD   6/18/2021      Thank you Edgardo Lynch MD for the opportunity to be involved in this patient's care. If you have any questions or concerns please feel free to contact me at [phone].

## 2021-06-18 NOTE — PROGRESS NOTES
Yes    Activity Tolerance  Activity Tolerance: Patient Tolerated treatment well  Activity Tolerance: SpO2 ranges 86-94% on RA during session. HR ranges 68bpm-105bpm during session. Safety Devices  Safety Devices in place: Yes  Type of devices: Nurse notified;Call light within reach; Left in bed;Bed alarm in place;Gait belt           Patient Diagnosis(es): The primary encounter diagnosis was Acute on chronic congestive heart failure, unspecified heart failure type ([address] A diagnosis of Elevated troponin was also pertinent to this visit. has a past medical history of Allergic rhinitis, CHF (congestive heart failure), NYHA class I, acute on chronic, combined ([address] Hyperlipidemia, Hypertension, Hypothyroidism, LBBB (left bundle branch block), and Rheumatoid arthritis(714.0). has a past surgical history that includes Colonoscopy ();  section; and joint replacement (Left, 13). Restrictions  Position Activity Restriction  Other position/activity restrictions: up with assist    Subjective   General  Chart Reviewed: Yes  Patient assessed for rehabilitation services?: Yes  Additional Pertinent Hx: HTN, RA, L TKA  Family / Caregiver Present: Yes (daughter and son)    Diagnosis: Pt admitted with SOB, acute on chronic CHF, Afib, elevated troponin, known tachycardia related cardiomyopathy. Subjective  Subjective: Pt supine in bed upon OT arrival. Pt very talkative, tangential, and distracted during session. Daughter and son report that life at home has been very stressful. They have plans for pt to move to the [place] [address] in about 10 days. Family reports they feel anxious about bringing pt home with her new cardiac issues. Family reports concerns with pt having a cardiac event at home and do not want the pt's grandchildren to witness pt in distress. Pt reports no concerns with returning home at discharge. General Comment  Comments: RN agreeable to OT session.     Patient Currently in Pain: No  Pain Assessment  Pain Assessment: 0-10  Pain Level: 0  Pre Treatment Pain Screening  Intervention List: Patient able to continue with treatment;Nurse/Physician notified  Vital Signs  Pulse: 62  Heart Rate Source: Monitor  BP: 126/79  BP Location: Right upper arm  Patient Currently in Pain: No     Social/Functional History  Social/Functional History  Lives With:  (son, 2 teenage grandchildren)  Type of Home: House  Home Layout: Multi-level, Bed/Bath upstairs  Home Access: Stairs to enter without rails  Entrance Stairs - Number of Steps: 3  Bathroom Shower/Tub: Tub/Shower unit  Bathroom Toilet: Standard  Bathroom Equipment: Grab bars in shower  Home Equipment: Cane  ADL Assistance: Needs assistance  Bath: Stand by assistance  Homemaking Assistance: Needs assistance  Homemaking Responsibilities: No  Ambulation Assistance: Independent  Transfer Assistance: Independent  Additional Comments: Pt home alone during the daytime while family works or goes to school. Home health aides on Tuesday, Thursday & Friday for 1-4 hours a day. Pt denies any falls in the last year. Plans for pt to move to [place] [address] in 10 days. Objective   Vision: Impaired  Vision Exceptions: Wears glasses for reading  Hearing: Within functional limits      Orientation  Overall Orientation Status: Within Normal Limits        Balance  Sitting Balance: Independent  Standing Balance: Independent    Functional Mobility  Functional - Mobility Device: No device  Activity: Other; To/from bathroom  Assist Level: Supervision  Functional Mobility Comments: 100 feet in hallway without AD. SpO2 ranges 86%-94% during mobility. HR ranges 68bpm-105bpm with ambulation.     Toilet Transfers  Toilet - Technique: Ambulating  Equipment Used: Standard toilet  Toilet Transfer: Independent    ADL  Grooming: Supervision (standing at sink to wash hands)  LE Dressing: Setup (doff/don socks and underwear with set up)  Toileting: Supervision     Tone RUE  RUE Tone: Normotonic  Tone LUE  LUE Tone: Normotonic  Coordination  Movements Are Fluid And Coordinated: Yes  Quality of Movement Other  Comment: Pt with severe arthritic deformitities to bilateral hands related to RA. Pt with ulnar deviation bilateral hands. Bed mobility  Supine to Sit: Independent     Transfers  Stand Step Transfers: Independent  Sit to stand: Independent  Stand to sit: Independent     Vision - Basic Assessment  Prior Vision: Wears glasses only for reading  Visual History: No significant visual history  Patient Visual Report: No visual complaint reported. Visual Field Cut: No  Oculo Motor Control: WNL    Cognition  Overall Cognitive Status: Exceptions  Arousal/Alertness: Appropriate responses to stimuli  Following Commands: Follows multistep commands with repitition  Attention Span: Attends with cues to redirect  Memory: Decreased short term memory  Safety Judgement: Decreased awareness of need for assistance;Decreased awareness of need for safety  Problem Solving: Assistance required to identify errors made;Assistance required to generate solutions  Insights: Decreased awareness of deficits  Initiation: Does not require cues  Cognition Comment: Pt forgetful and easily distracted. Pt tangential during conversation. Family reports that pt's cognition appears to be at baseline. Sensation  Overall Sensation Status: WNL          LUE AROM (degrees)  LUE AROM : WFL  Left Hand AROM (degrees)  Left Hand AROM: Exceptions  Left Hand General AROM: severe arthritic deformities to hand with ulnar deviation. RUE AROM (degrees)  RUE AROM : WFL  Right Hand AROM (degrees)  Right Hand AROM: Exceptions  Right Hand General AROM: severe arthritic deformities to hand with ulnar deviation.      LUE Strength  Gross LUE Strength: Exceptions to [facility]  L Shoulder Flex: 4/5  L Elbow Flex: 4/5  L Elbow Ext: 4/5  L Hand General: 4-/5  RUE Strength  Gross RUE Strength: Exceptions to [facility]  R Shoulder Flex: 4/5  R Elbow Flex: 4/5  R Elbow Ext: 4/5  R Hand General: 4-/5                   Plan   Plan  Times per week: 3-5x/week  Current Treatment Recommendations: Strengthening, Positioning, Gait Training, Safety Education & Training, Balance Training, Stair training, Patient/Caregiver Education & Training, Self-Care / ADL, Functional Mobility Training, Equipment Evaluation, Education, & procurement, Home Management Training, Endurance Training      AM-PAC Score        AM-PAC Inpatient Daily Activity Raw Score: 19 (06/18/21 1301)  AM-PAC Inpatient ADL T-Scale Score : 40.22 (06/18/21 1301)  ADL Inpatient CMS 0-100% Score: 42.8 (06/18/21 1301)  ADL Inpatient CMS G-Code Modifier : CK (06/18/21 1301)    Goals  Short term goals  Time Frame for Short term goals: 2 weeks (7/2/21)  Short term goal 1: Mod I total body dressing  Short term goal 2: Increase standing activity toleance to 4-5 minutes for engagement in ADLs  Short term goal 3: Mod I simple kitchen mobility  Short term goal 4: Mod I toileting. Patient Goals   Patient goals : to go home as soon as possible       Therapy Time   Individual Concurrent Group Co-treatment   Time In 1143         Time Out 1227         Minutes 44         Timed Code Treatment Minutes: 29 Minutes (15 minute evaluation)     If pt is discharged prior to next OT session, this note will serve as the discharge summary.     Adryan Gamble, OT

## 2021-06-18 NOTE — CONSULTS
Nutrition Education    Consulted for CHF nutrition education. Provided pt with verbal instruction on HF nutrition therapy. Discussed low sodium diet, daily weights, and fluid restriction. Pt denies consuming high sodium foods, unforthcoming with current diet at home. Denies consuming greater than 64 ounces of fluids daily. Declined handout. Will continue to monitor. Time spent: 5 minutes      · Verbally reviewed information with Patient  · Educated on CHF  · Refer to Patient Education activity for more details.     Electronically signed by Sarah Beth Tsai, MS, RD, LD on 6/18/21 at 1:35 PM EDT    Contact: [phone]

## 2021-06-18 NOTE — PROGRESS NOTES
Pt d/c'd home with home health care. Removed PIV and stopped bleeding. Catheter intact. Pt tolerated well. No redness noted at site. Notified [facility] and removed tele box. Reviewed d/c instructions, home meds, and  f/u information utilizing teach-back method. Scripts for furosemide sent to [facility] on [facility]. Patient verbalized understanding.  Electronically signed by Carol Ham RN on 6/18/2021 at 4:00 PM

## 2021-06-18 NOTE — CARE COORDINATION
CASE MANAGEMENT DISCHARGE SUMMARY      Discharge to: Home to son's house with [facility] [address] [place] [address] completed: [address] Exemption Notification (HENS) completed: N/A    New Durable Medical Equipment ordered/agency:  N/A Shower chair recommended. Pt to determine if she wishes to obtain or not. Transportation: Family to assist   Family/car: Y     Confirmed discharge plan with:     Patient: yes     Family:  yes   Son and daughter in the room. Facility/Agency, name:  [facility] pulled from Epic by [facility]          Note: Discharging nurse to complete [facility], reconcile AVS, and place final copy with patient's discharge packet. RN to ensure that written prescriptions for  Level II medications are sent with patient to the facility as per protocol.

## 2021-06-18 NOTE — PLAN OF CARE
Problem: OXYGENATION/RESPIRATORY FUNCTION  Goal: Patient will maintain patent airway  Outcome: Ongoing  Note:   Patient's EF (Ejection Fraction) is less than 40%    Heart Failure Medications:  Diuretics[de-identified] Furosemide    (One of the following REQUIRED for EF <40%/SYSTOLIC FAILURE but MAY be used in EF% >40%/DIASTOLIC FAILURE)        ACE[de-identified] Lisinopril        ARB[de-identified] None         ARNI[de-identified] None    (Beta Blockers)  NON- Evidenced Based Beta Blocker (for EF% >40%/DIASTOLIC FAILURE): None    Evidenced Based Beta Blocker::(REQUIRED for EF% <40%/SYSTOLIC FAILURE) Metoprolol SUCCinate- Toprol XL  . .................................................................................................................................................. Patient's weights and intake/output reviewed: Yes    Patient's Last Weight: 125 lbs obtained by standing scale. Difference of 0 lbs less than last documented weight. Intake/Output Summary (Last 24 hours) at 6/17/2021 2357  Last data filed at 6/17/2021 2326  Gross per 24 hour   Intake 240 ml   Output 0 ml   Net 240 ml       Comorbidities Reviewed Yes    Patient has a past medical history of Allergic rhinitis, CHF (congestive heart failure), NYHA class I, acute on chronic, combined ([facility] [address] Hyperlipidemia, Hypertension, Hypothyroidism, LBBB (left bundle branch block), and Rheumatoid arthritis(714.0). >>For CHF and Comorbidity documentation on Education Time and Topics, please see Education Tab    Progressive Mobility Assessment:  What is this patient's Current Level of Mobility?: Ambulatory- with Assistance  How was this patient Mobilized today?: Edge of Bed, Bedside Commode, and Up in Room                 With Whom? Nurse, PCA, and Self                 Level of Difficulty/Assistance: 1x Assist     Pt resting in bed at this time on room air. Pt denies shortness of breath. Pt without lower extremity edema.      Patient and/or Family's stated Goal of Care this Admission: increase activity

## 2021-06-18 NOTE — PROGRESS NOTES
Pt states she would like to be a full code. Perfect served [employer] CNP, received orders for pt to be full code.

## 2021-06-18 NOTE — CONSULTS
[address] [facility]   Cardiovascular Evaluation    PATIENT: Frances Chapin  DATE: 2021  MRN: [id number]  [facility]: [id number]  : 1940    Primary Care Doctor/Referring provider: Bárbara Garcia MD, Carie Nino MD     Reason for evaluation/Chief complaint:   Shortness of Breath (Recent ER visits for same symptoms. Pt started of new medication that helps symptoms, amniodarone.)      Subjective:    History of present illness on initial date of evaluation:   Frances Chapin is a 80 y.o. patient who represents to the hospital with complaints of palpitations/SOB for the past several weeks. The patient states that several weeks ago she noted that her heart rate was elevated. She was seen initially at [facility] and found to have AFIB with RVR. She was treated with treated with [facility] and CV successfully. She states that the next day she was very SOB. This prompted her to be seen again at [facility]. She was found to have recurrent AFIB with evidence of CHF. She was treated with diuretics and returned to the normal. She was discharged again but returned last night due to recurrent CHF. She was found to have an abnormal EKG with elevated troponin. When the patient was admitted to [facility], she was found to have significant cardiomyopathy. Left ventricular dysfunction was global and consistent with tachycardia induced cardiomyopathy. Plans were for patient to follow-up and undergo guideline directed medical therapy.       Patient Active Problem List   Diagnosis    Hyperlipidemia    Hypothyroidism (acquired)    LBBB (left bundle branch block)    left TKR    Trigger finger, acquired    Rheumatoid arthritis involving multiple joints ([address]    Essential hypertension    Foot callus    Hammertoes of both feet    Valgus deformity of both great toes    Arthritis of right acromioclavicular joint    Rotator cuff tear arthropathy of right shoulder    Pelvic mass    Hyponatremia    Intra-abdominal abscess (HCC)    A-fib (HCC)    CHF (congestive heart failure), NYHA class III, acute on chronic, systolic (HCC)    Chronic anticoagulation         Cardiac Testing: I have reviewed the findings below. EKG:  ECHO:   STRESS TEST:  CATH:  BYPASS:  VASCULAR:    Past Medical History:   has a past medical history of Allergic rhinitis, CHF (congestive heart failure), NYHA class I, acute on chronic, combined ([address] Hyperlipidemia, Hypertension, Hypothyroidism, LBBB (left bundle branch block), and Rheumatoid arthritis(714.0). Surgical History:   has a past surgical history that includes Colonoscopy ();  section; and joint replacement (Left, 13). Social History:   reports that she has quit smoking. She has never used smokeless tobacco. She reports current alcohol use of about 2.0 standard drinks of alcohol per week. She reports that she does not use drugs. Family History:  No evidence for sudden cardiac death or premature CAD    Medications:  Reviewed and are listed in nursing record. and/or listed below  Outpatient Medications:  Prior to Admission medications    Medication Sig Start Date End Date Taking?  Authorizing Provider   amiodarone (CORDARONE) 200 MG tablet Take 200 mg by mouth 2 times daily   Yes Historical Provider, MD   apixaban (ELIQUIS) 2.5 MG TABS tablet Take 1 tablet by mouth 2 times daily  Patient taking differently: Take 5 mg by mouth 2 times daily  21  Yes GRACIELA Damon CNP   furosemide (LASIX) 20 MG tablet Take 1 tablet by mouth daily 21  Yes GRACIELA Damon CNP   metoprolol succinate (TOPROL XL) 25 MG extended release tablet Take 1 tablet by mouth 2 times daily 21  Yes GRACIELA Damon CNP   folic acid (FOLVITE) 1 MG tablet TAKE 1 TABLET BY MOUTH EVERY DAY  Patient taking differently: TAKE 1 TABLET BY MOUTH EVERY DAY EXCEPT ON 21  Yes Giacomo Powell MD   levothyroxine (SYNTHROID) 25 MCG tablet TAKE 1 TABLET BY MOUTH EVERY DAY 5/14/21  Yes Norman Buck MD   tretinoin (RETIN-A) 0.05 % cream Apply topically nightly Apply topically nightly as needed for rash   Yes Historical Provider, MD   fluticasone (FLONASE) 50 MCG/ACT nasal spray 1 spray by Each Nostril route daily as needed for Allergies   Yes Historical Provider, MD   fexofenadine (ALLEGRA) 180 MG tablet TAKE 1 TABLET BY MOUTH EVERY DAY  Patient taking differently: TAKE 1 TABLET BY MOUTH EVERY DAY taking prn 2/24/21  Yes Norman Bukc MD   methotrexate (RHEUMATREX) 2.5 MG chemo tablet TAKE 8 TABLETS EVERY MONDAY 2/12/21  Yes Norman Buck MD   VITAMIN D PO Take by mouth   Yes Historical Provider, MD   Probiotic Product (PROBIOTIC PO) Take by mouth   Yes Historical Provider, MD   Ascorbic Acid (VITAMIN C PO) Take by mouth   Yes Historical Provider, MD   fish oil-omega-3 fatty acids 1000 MG capsule Take 4 g by mouth daily    Yes Historical Provider, MD       In-patient schedule medications:   apixaban  2.5 mg Oral BID    levothyroxine  25 mcg Oral Daily    metoprolol succinate  25 mg Oral BID    sodium chloride flush  5-40 mL Intravenous 2 times per day    lisinopril  5 mg Oral Daily    furosemide  40 mg Intravenous BID    aspirin  81 mg Oral Once    amiodarone  200 mg Oral BID         Infusion Medications:   sodium chloride           Allergies:  Patient has no known allergies. Review of Systems:   All 14 point review of symptoms completed. Pertinent positives identified in the HPI, all other review of symptoms findings as below.      Review of Systems - History obtained from the patient  General ROS: negative for - chills, fever or night sweats  Psychological ROS: negative for - disorientation or hallucinations  Ophthalmic ROS: negative for - dry eyes, eye pain or loss of vision  ENT ROS: negative for - nasal discharge or sore throat  Allergy and Immunology ROS: negative for - hives or itchy/watery eyes  Hematological and Lymphatic ROS: negative for - jaundice or night sweats  Endocrine ROS: negative for - mood swings or temperature intolerance  Breast ROS: deferred  Respiratory ROS: negative for - hemoptysis or stridor  Gastrointestinal ROS: no abdominal pain, change in bowel habits, or black or bloody stools  Genito-Urinary ROS: no dysuria, trouble voiding, or hematuria  Musculoskeletal ROS: negative for - gait disturbance, joint pain or joint stiffness  Neurological ROS: negative for - seizures or speech problems  Dermatological ROS: negative for - rash or skin lesion changes      Physical Examination:    [unfilled]  /82   Pulse 60   Temp 98.2 °F (36.8 °C) (Oral)   Resp 16   Ht 5' 5\" (1.651 m)   Wt 123 lb 6.4 oz (56 kg)   SpO2 94%   BMI 20.53 kg/m²    Weight: 123 lb 6.4 oz (56 kg)     Wt Readings from Last 3 Encounters:   06/18/21 123 lb 6.4 oz (56 kg)   06/04/21 122 lb 1.6 oz (55.4 kg)   05/14/21 125 lb 9.6 oz (57 kg)       Intake/Output Summary (Last 24 hours) at 6/18/2021 0842  Last data filed at 6/18/2021 0651  Gross per 24 hour   Intake 480 ml   Output 500 ml   Net -20 ml       General Appearance:  Alert, cooperative, no distress, appears stated age   Head:  Normocephalic, without obvious abnormality, atraumatic   Eyes:  PERRL, conjunctiva/corneas clear       Nose: Nares normal, no drainage or sinus tenderness   Throat: Lips, mucosa, and tongue normal   Neck: Supple, symmetrical, trachea midline, no adenopathy, thyroid: not enlarged, symmetric, no tenderness/mass/nodules, no carotid bruit or JVD       Lungs:   reduced to auscultation bilaterally, respirations unlabored   Chest Wall:  No tenderness or deformity   Heart:  Regular rhythm and normal rate; S1, S2 are normal; no murmur noted; no rub or gallop   Abdomen:   Soft, non-tender, bowel sounds active all four quadrants,  no masses, no organomegaly           Extremities: Extremities normal, atraumatic, no cyanosis or edema   Pulses: 2+ and symmetric   Skin: Skin color, texture, turgor normal, no rashes or lesions   Pysch: Normal mood and affect   Neurologic: Normal gross motor and sensory exam.         Labs  Recent Labs     06/17/21  1345 06/18/21  0658   WBC 7.7 6.2   HGB 13.8 13.1   HCT 41.1 38.7   .4* 100.2*    225     Recent Labs     06/17/21  1345 06/18/21  0658   CREATININE 0.6 0.7   BUN 19 19   * 137   K 3.8 3.6   CL 96* 102   CO2 26 26     No results for input(s): INR, PROTIME in the last 72 hours. Recent Labs     06/17/21  1345 06/17/21  2223   TROPONINI 0.01 <0.01     Invalid input(s): PRO-BNP  No results for input(s): CHOL, HDL in the last 72 hours. Invalid input(s): LDL, TG      Imaging:  I have reviewed the below testing personally and my interpretation is below. EKG:  CXR:      Assessment:  80 y.o. patient with:  Principal Problem:    CHF (congestive heart failure), NYHA class III, acute on chronic, systolic (HCC)  Active Problems:    LBBB (left bundle branch block)    Essential hypertension    A-fib (HCC)    Chronic anticoagulation  Resolved Problems:    * No resolved hospital problems. *      Plan:  1. Appears to be improving with diuresis  2. Education on Sodium and Fluid restrictions. 3. CMP due to tachycardia induced LV dysfunction  4. Remains in sinus   ~continue amiodarone  5. On oral AC for stroke prevention   6. No in-patient testing needed during this stay   ~had LUCY/CV at [facility] several weeks ago  7. Can be discharged and f/u with cardiology team in a few weeks for re-assessment  8. Repeat echo in 12 weeks   ~determine need for ischemic evaluation at that time    Medical Decision Making:   The following items were considered in medical decision making:  Independent review of images  Review / order clinical lab tests  Review / order radiology tests  Decision to obtain old records  Review and summation of old records as accessed through [facility] (a summary of my findings in these old records)        All questions and concerns were addressed to the patient/family. Alternatives to my treatment were discussed. The note was completed using EMR. Every effort was made to ensure accuracy; however, inadvertent computerized transcription errors may be present.     Ronnell Hodgson MD, [address], [place], [place]  [phone] [facility]  [phone] [facility]  6/18/2021  8:42 AM

## 2021-06-21 ENCOUNTER — CARE COORDINATION (OUTPATIENT)
Dept: CASE MANAGEMENT | Age: 81
End: 2021-06-21

## 2021-06-21 NOTE — CARE COORDINATION
concerns that I can assist you with?: No  Identified Barriers: None  Care Transitions Interventions  Other Interventions:            Follow Up  Future Appointments   Date Time Provider Kathy Diez   6/30/2021  9:30 AM GRACIELA Harris - BARBARA CHANEY   11/18/2021  2:00 PM MD Shruti [address], RN

## 2021-06-24 NOTE — PROGRESS NOTES
Physician Progress Note      PATIENT:               Yady Mehta  CSN #:                  [id number]  :                       1940  ADMIT DATE:       2021 1:27 PM  [address] [place] DATE:        2021 4:00 PM  RESPONDING  PROVIDER #:        Yani Sánchez MD          QUERY TEXT:    Pt admitted with SOB . Pt noted to have \"Improved overnight with aggressive   diuresis\" per DCS. If possible, please document in progress notes and   discharge summary the relationship, if any, between SOB and possible fluid   overload. The medical record reflects the following:  Risk Factors: CHF, CKD  Clinical Indicators: Per H&P-\"Shortness of breath secondary to acute on   chronic systolic congestive heart failure\". Per Cardiology c/s-\" CHF   (congestive heart failure), NYHA class III, acute on chronic, systolic\"  Treatment: Lasix 20 mg po daily then discharge dose increased to 40 mg po   daily, to discuss with primary cardiologist ACEI/ARB benefit, I&O, daily   weights, RD for CHF diet/fluid education, Cardiology consult, ongoing   supportive care/monitoring. Thank you,  Franky Garsia RN, BSN, [facility]  [phone]  Options provided:  -- SOB due to possible fluid overload  -- SOB unrelated to possible fluid overload  -- Other - I will add my own diagnosis  -- Disagree - Not applicable / Not valid  -- Disagree - Clinically unable to determine / Unknown  -- Refer to Clinical Documentation Reviewer    PROVIDER RESPONSE TEXT:    This patient has SOB due to possible fluid overload.     Query created by: Josh Robbins on 2021 11:53 AM      Electronically signed by:  Yani Sánchez MD 2021 11:54 AM

## 2021-06-24 NOTE — PROGRESS NOTES
Physician Progress Note      PATIENT:               Lise Mathew  CSN #:                  [id number]  :                       1940  ADMIT DATE:       2021 1:27 PM  [address] DATE:        2021 4:00 PM  RESPONDING  PROVIDER #:        Lenoie Calloway MD          QUERY TEXT:    Patient admitted with SOB. Documentation reflects \"Shortness of breath   secondary to acute on chronic systolic congestive heart failure\" per H&P and   cardiology consult. If possible, please document in the progress notes and   discharge summary if Acute on chronic systolic congestive heart failure was: The medical record reflects the following:  Risk Factors: CHF, CKD  Clinical Indicators: Per H&P-\"Shortness of breath secondary to acute on   chronic systolic congestive heart failure\". Per Cardiology c/s-\" CHF   (congestive heart failure), NYHA class III, acute on chronic, systolic\"  Treatment: Lasix 20 mg po daily then discharge dose increased to 40 mg po   daily, to discuss with primary cardiologist ACEI/ARB benefit, I&O, daily   weights, RD for CHF diet/fluid education, Cardiology consult, ongoing   supportive care/monitoring. Thank you,  Price Guzman RN, BSN, [employer]   [phone]  Options provided:  -- acute on chronic systolic congestive heart failure confirmed after study  -- acute on chronic systolic congestive heart failure treated and resolved  -- acute on chronic systolic congestive heart failure ruled out after study  -- Other - I will add my own diagnosis  -- Disagree - Not applicable / Not valid  -- Disagree - Clinically unable to determine / Unknown  -- Refer to Clinical Documentation Reviewer    PROVIDER RESPONSE TEXT:    acute on chronic systolic congestive heart failure ruled out after study.     Query created by: Arsh Jerez on 2021 11:11 AM      Electronically signed by:  Leonie Calloway MD 2021 11:15 AM

## 2021-06-25 ENCOUNTER — TELEPHONE (OUTPATIENT)
Dept: FAMILY MEDICINE CLINIC | Age: 81
End: 2021-06-25

## 2021-06-25 NOTE — TELEPHONE ENCOUNTER
Patient called and stated her pulse was 43. This is the only time it has been below 50. It Usually runs above 50. She is currently on amiodarone 200mg daily.

## 2021-06-28 ENCOUNTER — CARE COORDINATION (OUTPATIENT)
Dept: CASE MANAGEMENT | Age: 81
End: 2021-06-28

## 2021-07-06 ENCOUNTER — TELEPHONE (OUTPATIENT)
Dept: CARDIOLOGY CLINIC | Age: 81
End: 2021-07-06

## 2021-07-06 ENCOUNTER — TELEPHONE (OUTPATIENT)
Dept: PHARMACY | Facility: CLINIC | Age: 81
End: 2021-07-06

## 2021-07-06 DIAGNOSIS — Z79.899 ENCOUNTER FOR MEDICATION REVIEW: Primary | ICD-10-CM

## 2021-07-06 PROCEDURE — 1111F DSCHRG MED/CURRENT MED MERGE: CPT

## 2021-07-06 NOTE — TELEPHONE ENCOUNTER
Patient's daughter called stating the patient's pulse is running low. At time of call pulse was 42. Patient states extreme tiredness.  Patient denies any other symptoms

## 2021-07-06 NOTE — TELEPHONE ENCOUNTER
CLINICAL PHARMACY NOTE  Post-Discharge Transitions of Care [facility])    Patient appears to have been discharged from [facility]   on 6/18/21. Patient not found in Outcomes MTM. Please follow-up with patient to review medications.       30 days since discharge = 7/18/21     [address]  [phone] or [phone] (Option 7)

## 2021-07-06 NOTE — TELEPHONE ENCOUNTER
Spoke to daughter. Patients BP has been ranging 108//71 with a pulse of 41-51. She has extreme fatigue. She took a dose of Methotrexate eight tablets of 2.5 mg yesterday. Her weight is steady at 123. Please advise.

## 2021-07-06 NOTE — TELEPHONE ENCOUNTER
Have come in for EKG    Reduce amiodarone to 100mg and metorpolol to 12.5mg (if HR is documented to be low on EKG)

## 2021-07-07 ENCOUNTER — CARE COORDINATION (OUTPATIENT)
Dept: CASE MANAGEMENT | Age: 81
End: 2021-07-07

## 2021-07-07 NOTE — TELEPHONE ENCOUNTER
Scheduled pt for EKG on 7/8. Advised medication changes will only occur IF HR is documented as being low after EKG.

## 2021-07-07 NOTE — CARE COORDINATION
[facility] Transitions Follow Up Call    2021    Patient: Martina Chapin  Patient : 1940   MRN: [id number]  Reason for Admission: CHF/afib  Discharge Date: 21 RARS: Readmission Risk Score: 16         Spoke with: Martha Chapin    Patient answered call and verified . Patient pleasant and agreeable to final transition call. Patient has returned home and continues to unpack her boxes. Patient having episodes of low heart rate and had some medication change since last contact. Patient stated that amiodarone has decreased to 1xday per MD orders and aware of change. Patient aware of scheduled visit tomorrow for EKG and stated that her daughter will take her to that appt. Patient has pulse oximeter and checking oxygen level and pulse on device. Patient denies any acute needs at present time. Educated on the use of urgent care or physicians 24 hr access line if assistance is needed after hours. Patient stable and no further transition support needed. Episode closed  Ilir Souza RN   Care Transition Nurse  [phone]      Care Transitions Subsequent and Final Call    Subsequent and Final Calls  Do you have any ongoing symptoms?: No  Have your medications changed?: No  Do you have any questions related to your medications?: No  Do you currently have any active services?: No  Are you currently active with any services?: Home Health  Do you have any needs or concerns that I can assist you with?: No  Identified Barriers: None  Care Transitions Interventions  Other Interventions:            Follow Up  Future Appointments   Date Time Provider Kathy Diez   2021 10:45 AM SCHEDULE, Kyler Harrison [facility]   2021  2:00 PM GRACIELA Hector - BARBARA López [facility]   2021  2:00 PM MD Shruti [address], RN

## 2021-07-08 ENCOUNTER — NURSE ONLY (OUTPATIENT)
Dept: CARDIOLOGY CLINIC | Age: 81
End: 2021-07-08
Payer: MEDICARE

## 2021-07-08 DIAGNOSIS — I10 ESSENTIAL HYPERTENSION: Primary | ICD-10-CM

## 2021-07-08 PROCEDURE — 93000 ELECTROCARDIOGRAM COMPLETE: CPT | Performed by: INTERNAL MEDICINE

## 2021-07-08 RX ORDER — AMIODARONE HYDROCHLORIDE 200 MG/1
200 TABLET ORAL 2 TIMES DAILY
Qty: 30 TABLET | Refills: 5 | Status: SHIPPED | OUTPATIENT
Start: 2021-07-08 | End: 2021-07-09 | Stop reason: DRUGHIGH

## 2021-07-08 NOTE — TELEPHONE ENCOUNTER
Take metoprolol 12.5 mg (1/2 tablet) twice daily and amiodarone 100 mg (1/2 tablet) once daily per DR Galan's instructions.

## 2021-07-09 RX ORDER — FEXOFENADINE HCL 180 MG/1
180 TABLET ORAL DAILY PRN
COMMUNITY
End: 2021-09-13

## 2021-07-09 RX ORDER — METOPROLOL SUCCINATE 25 MG/1
12.5 TABLET, EXTENDED RELEASE ORAL EVERY EVENING
COMMUNITY
End: 2021-08-06 | Stop reason: SINTOL

## 2021-07-09 RX ORDER — FOLIC ACID 1 MG/1
1 TABLET ORAL
COMMUNITY

## 2021-07-09 RX ORDER — AMIODARONE HYDROCHLORIDE 100 MG/1
50 TABLET ORAL DAILY
COMMUNITY
End: 2021-12-10 | Stop reason: SDUPTHER

## 2021-07-09 NOTE — TELEPHONE ENCOUNTER
[facility] CLINICAL PHARMACY REVIEW: Post-Discharge Transitions of Care (MAGAN)  Subjective/Objective:  Seymour Chapin is a 80 y.o. female. Patient outreach to review discharge medications and provide medication review and management. Spoke with patient. She has a nurse that comes 4 times a week and does her vitals. She had EKG recently in the hospital and her HR was too low. Some meds have been adjusted and changed on med list now like metoprolol and amiodarone. No Known Allergies    Discharge Medications (as per discharging medication list found in AVS): There are NEW medications for you.  START taking them after you leave the hospital:  na  You told us you were taking these medications at home, but the amount or how often you take this medication has CHANGED:  furosemide (LASIX) 40 MG tablet Take 1 tablet by mouth daily     These are medications you told us you were taking at home, CONTINUE taking them after you leave the hospital:  Medication Sig    amiodarone (CORDARONE) 200 MG tablet Take 1 tablet by mouth 2 times daily 1/2 tablet daily  - is now 100 mg daily    apixaban (ELIQUIS) 2.5 MG TABS tablet Take 1 tablet by mouth 2 times daily (Patient taking differently: Take 5 mg by mouth 2 times daily )  - is now 2.5 mg BID    metoprolol succinate (TOPROL XL) 25 MG extended release tablet Take 1 tablet by mouth 2 times daily  - decreased to 10.9 mg HS    folic acid (FOLVITE) 1 MG tablet TAKE 1 TABLET BY MOUTH EVERY DAY (Patient taking differently: TAKE 1 TABLET BY MOUTH EVERY DAY EXCEPT ON MONDAY)    levothyroxine (SYNTHROID) 25 MCG tablet TAKE 1 TABLET BY MOUTH EVERY DAY    tretinoin (RETIN-A) 0.05 % cream Apply topically nightly Apply topically nightly as needed for rash    fluticasone (FLONASE) 50 MCG/ACT nasal spray 1 spray by Each Nostril route daily as needed for Allergies    fexofenadine (ALLEGRA) 180 MG tablet TAKE 1 TABLET BY MOUTH EVERY DAY (Patient taking differently: TAKE 1 TABLET BY MOUTH EVERY DAY taking prn)    methotrexate (RHEUMATREX) 2.5 MG chemo tablet TAKE 8 TABLETS EVERY MONDAY    VITAMIN D PO Take by mouth  - 1000 units per day    Probiotic Product (PROBIOTIC PO) Take by mouth    Ascorbic Acid (VITAMIN C PO) Take by mouth    fish oil-omega-3 fatty acids 1000 MG capsule Take 4 g by mouth daily      These are the medications you have told us you were taking at home, STOP taking them after you leave the hospital:   na    Additional Medications:   na    Estimated Creatinine Clearance: 56 mL/min (based on SCr of 0.7 mg/dL). Assessment/Plan:  - Medication reconciliation completed. Patient has filled new medications ordered after this hospital discharge and is taking medications as directed by discharging provider. - Instructions per discharge list provided except per below documentation.  Identified medication discrepancies/issues:   · Some med doses have been changed since discharge when patient had recent EKG and bradycardia  · Medication list updated as appropriate/noted    - Identified Potential Medication Interactions: No clinically significant interactions identified via [employer] Interaction Analysis as category D or higher.    - Renal Dosing: No renal adjustments necessary.    - Follow up appointment(s):  · Reminded of upcoming appointment(s)  · Encouraged to schedule follow up as advised at discharge  Future Appointments   Date Time Provider Kathy Diez   7/27/2021  2:00 PM GRACIELA Youngblood - CNP [employer] [facility]   11/18/2021  2:00 PM MD Anastacio Martin, PharmD, [facility] Pharmacist  Direct: [phone]  Department: [phone], option 7  ==================================  For Pharmacy Admin Tracking Only     Gap Closed?: Yes    Time Spent (min): 30

## 2021-07-15 ENCOUNTER — TELEPHONE (OUTPATIENT)
Dept: CARDIOLOGY CLINIC | Age: 81
End: 2021-07-15

## 2021-07-15 NOTE — TELEPHONE ENCOUNTER
I don't know this patient. I know she has an appointment with me but I have not seen her yet. I have noted the information. There does not seem any changes warranted at this time. Keep follow up appointment.    GRACIELA Bergman - CNP, 7/15/2021, 4:12 PM

## 2021-07-15 NOTE — TELEPHONE ENCOUNTER
Pts daughter Yomaira Hagan called to report that pts HR was 43 today. Pt recently had dose changes on her medications. Amiodarone went down to 100 mg and Metoprolol 12.5 mg. Yomaira Hagan stated pt was SOB yesterday for just a short amount of time. Yomaira Hagan also wanted to make [employer] aware that pt gets Methotrexate every Monday for her RA. Yomaira Hagan was not sure if this medication could be affecting anything.  Please call Yomaira Hagan @ [phone]

## 2021-07-16 DIAGNOSIS — E03.9 HYPOTHYROIDISM (ACQUIRED): ICD-10-CM

## 2021-07-16 RX ORDER — LEVOTHYROXINE SODIUM 0.03 MG/1
TABLET ORAL
Qty: 90 TABLET | Refills: 1 | Status: SHIPPED | OUTPATIENT
Start: 2021-07-16 | End: 2021-09-09

## 2021-07-16 NOTE — TELEPHONE ENCOUNTER
Daughter sts pt's pulse today was 39 and now it is 64. Pt's daughter wondering it all the different kinds of meds could be causing problems with pulse. Wants to review medications.

## 2021-07-27 ENCOUNTER — OFFICE VISIT (OUTPATIENT)
Dept: CARDIOLOGY CLINIC | Age: 81
End: 2021-07-27
Payer: MEDICARE

## 2021-07-27 ENCOUNTER — TELEPHONE (OUTPATIENT)
Dept: CARDIOLOGY CLINIC | Age: 81
End: 2021-07-27

## 2021-07-27 VITALS
DIASTOLIC BLOOD PRESSURE: 60 MMHG | HEIGHT: 65 IN | WEIGHT: 121 LBS | OXYGEN SATURATION: 97 % | BODY MASS INDEX: 20.16 KG/M2 | HEART RATE: 48 BPM | SYSTOLIC BLOOD PRESSURE: 112 MMHG

## 2021-07-27 DIAGNOSIS — I50.22 CHRONIC SYSTOLIC CONGESTIVE HEART FAILURE (HCC): ICD-10-CM

## 2021-07-27 DIAGNOSIS — I48.0 PAROXYSMAL ATRIAL FIBRILLATION (HCC): Primary | ICD-10-CM

## 2021-07-27 DIAGNOSIS — M06.9 RHEUMATOID ARTHRITIS INVOLVING MULTIPLE JOINTS (HCC): ICD-10-CM

## 2021-07-27 DIAGNOSIS — Z79.01 CHRONIC ANTICOAGULATION: ICD-10-CM

## 2021-07-27 DIAGNOSIS — I44.7 LBBB (LEFT BUNDLE BRANCH BLOCK): ICD-10-CM

## 2021-07-27 PROCEDURE — 1111F DSCHRG MED/CURRENT MED MERGE: CPT | Performed by: NURSE PRACTITIONER

## 2021-07-27 PROCEDURE — 99215 OFFICE O/P EST HI 40 MIN: CPT | Performed by: NURSE PRACTITIONER

## 2021-07-27 PROCEDURE — 93225 XTRNL ECG REC<48 HRS REC: CPT | Performed by: INTERNAL MEDICINE

## 2021-07-27 NOTE — LETTER
Catherine Chapin  1940    [facility]   Cardiac Evaluation    Primary Care Doctor:  Jose Adler MD    Chief Complaint   Patient presents with    Follow-Up from [facility] Other     heart rate going down iin the 40s when laying down     Dizziness     comes and goes         History of Present Illness:   I had the pleasure of seeing Catherine Chapin in follow up for recent hospitalization. Catherine Chapin was hospitalized in March 2021 with abdominal pain and diarrhea. CT of the abdomen pelvis did showed diverticulitis with abscess. She was treated with antibiotics. She was also treated for hyponatremia and hypokalemia at which time her hydrochlorothiazide was discontinued. She was then hospitalized in June 2021 with shortness of breath and found to have A. fib with RVR. She had previously been hospitalized at [facility] and underwent LUCY and cardioversion on 6/2/2021 for the same. She was noted to have severe LV dysfunction with global hypokinesis felt related to tachycardia. She was continued on Eliquis for anticoagulation and metoprolol for heart rate control. She also was given IV Lasix for recurrent CHF. Ischemia evaluation was recommended as outpatient. She returned 1 week later on 6/17/2021 with palpitations and shortness of breath. She was started on amiodarone as well as oral anticoagulation. Again recommended further outpatient evaluation for ischemia and repeat echocardiogram.     Her daughter has called twice in the interim with heart rates less than 50. The metoprolol and amiodarone doses were decreased. She had dizziness all day on Sunday but resolved on Monday. Her HR has been 50's. She lives at the [address] [place] [address], has visiting nursing. She has ulcer on right foot from RA. Now in a boot to right foot.  Follows with podiatrist.    Catherine Chapin describes symptoms including dyspnea, palpitations, fatigue, dizziness but denies chest pain, orthopnea, PND, early [de-identified], edema, syncope. Activity: fair, somewhat limited due to foot ulcer  Appetite: good  Bleeding: none  Sleep: fair, light sleeper, awakens due to noises    HOME WEIGHTS:   21:  124 lbs    Past Medical History:   has a past medical history of Allergic rhinitis, CHF (congestive heart failure), NYHA class I, acute on chronic, combined ([address] Hyperlipidemia, Hypertension, Hypothyroidism, LBBB (left bundle branch block), and Rheumatoid arthritis(714.0). Surgical History:   has a past surgical history that includes Colonoscopy ();  section; and joint replacement (Left, 13). Social History:   reports that she has quit smoking. She has never used smokeless tobacco. She reports current alcohol use of about 2.0 standard drinks of alcohol per week. She reports that she does not use drugs. Family History:   Family History   Problem Relation Age of Onset    Heart Disease Mother         very slow heart beat    Thyroid Disease Father     Heart Disease Father        Home Medications:  Prior to Admission medications    Medication Sig Start Date End Date Taking?  Authorizing Provider   levothyroxine (SYNTHROID) 25 MCG tablet TAKE 1 TABLET BY MOUTH EVERY DAY 21  Yes Leena Turpin MD   amiodarone (PACERONE) 100 MG tablet Take 100 mg by mouth daily   Yes Historical Provider, MD   metoprolol succinate (TOPROL XL) 25 MG extended release tablet Take 12.5 mg by mouth every evening   Yes Historical Provider, MD   folic acid (FOLVITE) 1 MG tablet Take 1 mg by mouth Six times weekly Except    Yes Historical Provider, MD   fexofenadine (ALLEGRA ALLERGY) 180 MG tablet Take 180 mg by mouth daily as needed (allergies)   Yes Historical Provider, MD   furosemide (LASIX) 40 MG tablet Take 1 tablet by mouth daily 21  Yes Katlin Leon MD   apixaban (ELIQUIS) 2.5 MG TABS tablet Take 1 tablet by mouth 2 times daily 21  Yes [address] [place] [address], APRN - [facility]   tretinoin (RETIN-A) 0.05 % cream Apply topically nightly Apply topically nightly as needed for rash   Yes Historical Provider, MD   fluticasone (FLONASE) 50 MCG/ACT nasal spray 1 spray by Each Nostril route daily as needed for Allergies   Yes Historical Provider, MD   methotrexate (RHEUMATREX) 2.5 MG chemo tablet TAKE 8 TABLETS EVERY MONDAY 2/12/21  Yes Susana Garner MD   VITAMIN D PO Take 1,000 Units by mouth daily    Yes Historical Provider, MD   Probiotic Product (PROBIOTIC PO) Take 1 capsule by mouth daily    Yes Historical Provider, MD   Ascorbic Acid (VITAMIN C PO) Take 1,000 mg by mouth daily    Yes Historical Provider, MD   fish oil-omega-3 fatty acids 1000 MG capsule Take 4 g by mouth daily    Yes Historical Provider, MD        Allergies:  Patient has no known allergies. Physical Examination:    Vitals:    07/27/21 1413   BP: 112/60   Pulse: 50   SpO2: 97%   Weight: 121 lb (54.9 kg)   Height: 5' 5\" (1.651 m)      Constitutional and General Appearance: Warm and dry, no apparent distress, normal coloration  HEENT:  Normocephalic, atraumatic  Respiratory:  · Normal excursion and expansion without use of accessory muscles  · Resp Auscultation: Normal breath sounds without dullness  Cardiovascular:  · The apical impulses not displaced  · Heart tones are crisp and normal  · JVP 8 cm H2O  · Regular rate and rhythm, elliot, no m/g/r  · Peripheral pulses are symmetrical and full  · There is no clubbing, cyanosis of the extremities.   · No BLE edema  · Pedal Pulses: 2+ and equal     Abdomen:  · No masses or tenderness  · Liver/Spleen: No Abnormalities Noted  Neurological/Psychiatric:  · Alert and oriented in all spheres  · Moves all extremities well  · Exhibits normal gait balance and coordination  · No abnormalities of mood, affect, memory, mentation, or behavior are noted    Lab Data:  CBC:   Lab Results   Component Value Date    WBC 6.2 06/18/2021    WBC 7.7 06/17/2021    WBC 7.7 06/04/2021    RBC 3.86 06/18/2021 39 mmHg (Right atrial pressure of 8 mmHg). Echo: LUCY (06/02/2021)  Summary:   LUCY performed by [facility]   No left atrial clot detected. The left atrial size is severely dilated. The right atrium is severely dilated. The right ventricle is borderline dilated. Trace pericardial effusion. The left ventricular function is severely reduced. There is severe global hypokinesis of the left ventricle. There is mild mitral regurgitation. Assessment:    1. Paroxysmal atrial fibrillation (HCC)    2. LBBB (left bundle branch block)    3. Chronic anticoagulation    4. CHF (congestive heart failure), NYHA class III, acute on chronic, systolic (HCC)    5. Rheumatoid arthritis involving multiple joints ([facility] [address]      Upon completion of visit, patient reports has been taking the metoprolol half tablet twice daily. Recommended to take half tablet once daily in evening. Discussion of daily weights, keeping fluid intake around 64 oz per day and low salt diet. She is currently taking about 82 oz of fluids per day. More than 45 minutes of time was spent during this visit, more than 50% of which was spent in direct patient contact, counseling and/or coordinating care. Plan:   1. 72 hours Holter monitor (CAM)  2. Lexiscan nuclear stress test  3. Will call you with results and any changes  4. No change in current heart medicines - okay to take amiodarone 200 mg every other but need to take the metoprolol half tablet every day  5. Follow up with me in 1 month   6.  Will arrange follow up with Dr. Dariela Palmer after that      I appreciate the opportunity of cooperating in the care of this individual.    GRACIELA Rodriguez - CNP, CNP, 7/27/2021, 3:02 PM

## 2021-07-27 NOTE — TELEPHONE ENCOUNTER
Monitor placed by AL  Monitor company [employer]  Length of monitor 72hr  Monitor ordered by JORI  Serial number [id number]  Kit ID [id number]  Activation successful prior to pt leaving office?  YES

## 2021-07-27 NOTE — PROGRESS NOTES
[facility]   Cardiac Evaluation    Primary Care Doctor:  Joy Boo MD    Chief Complaint   Patient presents with    Follow-Up from [facility] Other     heart rate going down iin the 40s when laying down     Dizziness     comes and goes         History of Present Illness:   I had the pleasure of seeing Rashida Chapin in follow up for recent hospitalization. Rashida Chapin was hospitalized in March 2021 with abdominal pain and diarrhea. CT of the abdomen pelvis did showed diverticulitis with abscess. She was treated with antibiotics. She was also treated for hyponatremia and hypokalemia at which time her hydrochlorothiazide was discontinued. She was then hospitalized in June 2021 with shortness of breath and found to have A. fib with RVR. She had previously been hospitalized at [facility] and underwent LUCY and cardioversion on 6/2/2021 for the same. She was noted to have severe LV dysfunction with global hypokinesis felt related to tachycardia. She was continued on Eliquis for anticoagulation and metoprolol for heart rate control. She also was given IV Lasix for recurrent CHF. Ischemia evaluation was recommended as outpatient. She returned 1 week later on 6/17/2021 with palpitations and shortness of breath. She was started on amiodarone as well as oral anticoagulation. Again recommended further outpatient evaluation for ischemia and repeat echocardiogram.     Her daughter has called twice in the interim with heart rates less than 50. The metoprolol and amiodarone doses were decreased. She had dizziness all day on Sunday but resolved on Monday. Her HR has been 50's. She lives at the [facility], has visiting nursing. She has ulcer on right foot from RA. Now in a boot to right foot. Follows with podiatrist.    Rashida Chapin describes symptoms including dyspnea, palpitations, fatigue, dizziness but denies chest pain, orthopnea, PND, early saiety, edema, syncope. Activity: fair, somewhat limited due to foot ulcer  Appetite: good  Bleeding: none  Sleep: fair, light sleeper, awakens due to noises    HOME WEIGHTS:   21:  124 lbs    Past Medical History:   has a past medical history of Allergic rhinitis, CHF (congestive heart failure), NYHA class I, acute on chronic, combined ([facility] [address] Hyperlipidemia, Hypertension, Hypothyroidism, LBBB (left bundle branch block), and Rheumatoid arthritis(714.0). Surgical History:   has a past surgical history that includes Colonoscopy ();  section; and joint replacement (Left, 13). Social History:   reports that she has quit smoking. She has never used smokeless tobacco. She reports current alcohol use of about 2.0 standard drinks of alcohol per week. She reports that she does not use drugs. Family History:   Family History   Problem Relation Age of Onset    Heart Disease Mother         very slow heart beat    Thyroid Disease Father     Heart Disease Father        Home Medications:  Prior to Admission medications    Medication Sig Start Date End Date Taking?  Authorizing Provider   levothyroxine (SYNTHROID) 25 MCG tablet TAKE 1 TABLET BY MOUTH EVERY DAY 21  Yes Sergei Osborne MD   amiodarone (PACERONE) 100 MG tablet Take 100 mg by mouth daily   Yes Historical Provider, MD   metoprolol succinate (TOPROL XL) 25 MG extended release tablet Take 12.5 mg by mouth every evening   Yes Historical Provider, MD   folic acid (FOLVITE) 1 MG tablet Take 1 mg by mouth Six times weekly Except    Yes Historical Provider, MD   fexofenadine (ALLEGRA ALLERGY) 180 MG tablet Take 180 mg by mouth daily as needed (allergies)   Yes Historical Provider, MD   furosemide (LASIX) 40 MG tablet Take 1 tablet by mouth daily 21  Yes Perla Byrne MD   apixaban (ELIQUIS) 2.5 MG TABS tablet Take 1 tablet by mouth 2 times daily 21  Yes Wenceslao Martin APRN - [facility]   tretinoin (RETIN-A) 0.05 % cream Apply topically nightly Apply topically nightly as needed for rash   Yes Historical Provider, MD   fluticasone (FLONASE) 50 MCG/ACT nasal spray 1 spray by Each Nostril route daily as needed for Allergies   Yes Historical Provider, MD   methotrexate (RHEUMATREX) 2.5 MG chemo tablet TAKE 8 TABLETS EVERY MONDAY 2/12/21  Yes Summer Hogan MD   VITAMIN D PO Take 1,000 Units by mouth daily    Yes Historical Provider, MD   Probiotic Product (PROBIOTIC PO) Take 1 capsule by mouth daily    Yes Historical Provider, MD   Ascorbic Acid (VITAMIN C PO) Take 1,000 mg by mouth daily    Yes Historical Provider, MD   fish oil-omega-3 fatty acids 1000 MG capsule Take 4 g by mouth daily    Yes Historical Provider, MD        Allergies:  Patient has no known allergies. Physical Examination:    Vitals:    07/27/21 1413   BP: 112/60   Pulse: 50   SpO2: 97%   Weight: 121 lb (54.9 kg)   Height: 5' 5\" (1.651 m)      Constitutional and General Appearance: Warm and dry, no apparent distress, normal coloration  HEENT:  Normocephalic, atraumatic  Respiratory:  · Normal excursion and expansion without use of accessory muscles  · Resp Auscultation: Normal breath sounds without dullness  Cardiovascular:  · The apical impulses not displaced  · Heart tones are crisp and normal  · JVP 8 cm H2O  · Regular rate and rhythm, elliot, no m/g/r  · Peripheral pulses are symmetrical and full  · There is no clubbing, cyanosis of the extremities.   · No BLE edema  · Pedal Pulses: 2+ and equal     Abdomen:  · No masses or tenderness  · Liver/Spleen: No Abnormalities Noted  Neurological/Psychiatric:  · Alert and oriented in all spheres  · Moves all extremities well  · Exhibits normal gait balance and coordination  · No abnormalities of mood, affect, memory, mentation, or behavior are noted    Lab Data:  CBC:   Lab Results   Component Value Date    WBC 6.2 06/18/2021    WBC 7.7 06/17/2021    WBC 7.7 06/04/2021    RBC 3.86 06/18/2021    RBC 4.06 06/17/2021    RBC 4.08

## 2021-07-27 NOTE — PATIENT INSTRUCTIONS
1. 72 hours Holter monitor  2. Lexiscan nuclear stress test  3. Will call you with results and any changes   4. No change in current heart medicines - okay to take amiodarone 200 mg every other but need to take the metoprolol half tablet every day  5. Keep fluid intake to 64 oz per day  6. Follow up with me in 1 month   7.  Will arrange follow up with Dr. Tl Hurt after that

## 2021-07-30 NOTE — TELEPHONE ENCOUNTER
Keyanna Hazel presented herself in the office today to return the pt's monitor. Monitor has been placed in return basket.

## 2021-08-06 ENCOUNTER — TELEPHONE (OUTPATIENT)
Dept: CARDIOLOGY CLINIC | Age: 81
End: 2021-08-06

## 2021-08-06 RX ORDER — FUROSEMIDE 40 MG/1
40 TABLET ORAL DAILY
Qty: 30 TABLET | Refills: 1 | Status: SHIPPED | OUTPATIENT
Start: 2021-08-06 | End: 2021-08-26

## 2021-08-06 NOTE — TELEPHONE ENCOUNTER
[employer] called to report a holter monitor critical event. Pts HR went as low as 29 BMP. This occurred on 7/30 at 8:28 am. Average HR is 57 BPM. Report will be uploaded on web site in about 15 minutes for [employer] to review.

## 2021-08-06 NOTE — TELEPHONE ENCOUNTER
Spoke to [employer]. Relayed message. Per Priya, stop metoprolol for bradycardia. EP follow up asap. Spoke to pt and daughter. Relayed message.  Soonest available appt with Dr Anamaria Romero is 08/19 at 11:15. VU

## 2021-08-10 RX ORDER — SPIRONOLACTONE 25 MG/1
25 TABLET ORAL DAILY
COMMUNITY
End: 2021-08-10 | Stop reason: SDUPTHER

## 2021-08-10 NOTE — TELEPHONE ENCOUNTER
Script Spironolactone 25 mg daily has been sent to the  [facility] [place]. Pt has been notified per [employer].  Pt V/U.      TY

## 2021-08-10 NOTE — TELEPHONE ENCOUNTER
Let's start her on spironolactone 25 mg daily for BP, cardiomyopathy and potassium replacement.    Thank you, Briana Hay

## 2021-08-11 ENCOUNTER — TELEPHONE (OUTPATIENT)
Dept: CARDIOLOGY CLINIC | Age: 81
End: 2021-08-11

## 2021-08-11 PROCEDURE — 93227 XTRNL ECG REC<48 HR R&I: CPT | Performed by: INTERNAL MEDICINE

## 2021-08-11 RX ORDER — SPIRONOLACTONE 25 MG/1
25 TABLET ORAL DAILY
Qty: 30 TABLET | Refills: 3 | Status: SHIPPED | OUTPATIENT
Start: 2021-08-11 | End: 2021-09-02

## 2021-08-11 NOTE — TELEPHONE ENCOUNTER
Patient would like to speak with [employer] nurse or MA about her swelling. She states this may be from her RA. She came across like the edema has already been discussed, and she just wanted to mention the RA possibly being a factor?

## 2021-08-11 NOTE — TELEPHONE ENCOUNTER
Made attempt to contact the patient and after automated message played , a busy signal started     Will try again later

## 2021-08-11 NOTE — TELEPHONE ENCOUNTER
LOV : 07/27/2021 w/ [employer]     Pt states that she is having an RA flare up. She states that her BP has been higher. Pt states that she has some swelling but it is because of the flare up she states .      Pt would like to know if her RA flare up is contributing to her elevated BP.   08/10 : @9:33 pm 152/70  08/10 : @ 12 pm 140/81  08/09: @5:56 pm 161/90    Denies chest pain/pressure, headache dizziness, or weakess at this time

## 2021-08-11 NOTE — TELEPHONE ENCOUNTER
The RA can contribute to this rise in BP if she is having significant pain with this. I also feel it may be due to stopping the metoprolol and maybe the low HR. I still recommend starting the spironolactone which will help with a few issues (potassium, BP, mild diuretic).    Thank you, Que Parisi

## 2021-08-12 ENCOUNTER — TELEPHONE (OUTPATIENT)
Dept: CARDIOLOGY CLINIC | Age: 81
End: 2021-08-12

## 2021-08-12 DIAGNOSIS — M06.9 RHEUMATOID ARTHRITIS INVOLVING MULTIPLE JOINTS (HCC): ICD-10-CM

## 2021-08-12 DIAGNOSIS — Z79.01 CHRONIC ANTICOAGULATION: ICD-10-CM

## 2021-08-12 DIAGNOSIS — I44.7 LBBB (LEFT BUNDLE BRANCH BLOCK): ICD-10-CM

## 2021-08-12 DIAGNOSIS — I48.0 PAROXYSMAL ATRIAL FIBRILLATION (HCC): ICD-10-CM

## 2021-08-12 DIAGNOSIS — I50.22 CHRONIC SYSTOLIC CONGESTIVE HEART FAILURE (HCC): ICD-10-CM

## 2021-08-19 ENCOUNTER — OFFICE VISIT (OUTPATIENT)
Dept: CARDIOLOGY CLINIC | Age: 81
End: 2021-08-19
Payer: MEDICARE

## 2021-08-19 VITALS
HEIGHT: 65 IN | SYSTOLIC BLOOD PRESSURE: 114 MMHG | BODY MASS INDEX: 19.74 KG/M2 | DIASTOLIC BLOOD PRESSURE: 70 MMHG | WEIGHT: 118.5 LBS

## 2021-08-19 DIAGNOSIS — I50.23 CHF (CONGESTIVE HEART FAILURE), NYHA CLASS III, ACUTE ON CHRONIC, SYSTOLIC (HCC): ICD-10-CM

## 2021-08-19 DIAGNOSIS — I44.7 LBBB (LEFT BUNDLE BRANCH BLOCK): ICD-10-CM

## 2021-08-19 DIAGNOSIS — I48.0 PAROXYSMAL ATRIAL FIBRILLATION (HCC): Primary | ICD-10-CM

## 2021-08-19 PROCEDURE — 99214 OFFICE O/P EST MOD 30 MIN: CPT | Performed by: INTERNAL MEDICINE

## 2021-08-19 PROCEDURE — 93000 ELECTROCARDIOGRAM COMPLETE: CPT | Performed by: INTERNAL MEDICINE

## 2021-08-19 NOTE — PATIENT INSTRUCTIONS
RECOMMENDATIONS:  1. Discussed alternative to amiodarone due to worsening of RA symptoms. -Multaq. Patient would like to talk to her rheumatologist first.   2. Discussed possible need for cardiac device due to low LVEF, bradycardia, and LBBB. -CRT-D, dual ICD, CRT-P or dual PPM.    -Recommendation will depend on LVEF of repeat echo 9/2021.   3. Complete stress test and echo 9/2021.   4. Follow up with me in October 2021. Your provider has ordered testing for further evaluation. An order/prescription has been included in your paper work.  To schedule outpatient testing, contact Central Scheduling by calling [address] [place] [address] ([phone]).

## 2021-08-19 NOTE — PROGRESS NOTES
[facility]   Electrophysiology Consult Note     Date: 8/19/21  Patient Name: Catherine Chapin  YOB: 1940    Primary Care Physician: Jami Del Toro MD    CHIEF COMPLAINT:   Chief Complaint   Patient presents with    Follow-up     Pauses noted on cardiac event monitor    Atrial Fibrillation     Paroxysmal    Congestive Heart Failure     HISTORY OF PRESENT ILLNESS: Catherine Chapin is a 80 y.o. female with a medical history significant for symptomatic paroxysmal atrial fibrillation, known left bundle branch block (negative stress test from [facility]), hypertension, rheumoatoid arthritis, hypothyroidism, and complicated diverticulitis who presents from home with symptomatic atrial fibrillation with RVR having recently undergone LUCY cardioversion. According to patient and her daughter she recently began to suffer from dyspnea on exertion and shortness of breath. She is unable to move from her bed to her recliner without becoming short of breath. She was seen by her PCP who directed her to a cardiologist.  Patient was found to be in atrial fibrillation with RVR. She underwent a LUCY cardioversion on 06/02/2021 ([facility]). She was discharged home on rate control and anticoagulation. Unfortunately patient began to suffer from worsening shortness of breath and dyspnea on exertion shortly thereafter and was brought to [facility] for further evaluation and care last night. Echo 6/3/2021 demonstrated an LVEF of 25%. Patient was evaluated emergency room 6/4/2021. Her CMP was reassuring outside of hypoalbuminemia and hypoproteinemia along with elevated AST and ALT. Her CBC was reassuring. Her BNP was elevated at 1216. Her chest radiograph showed pulmonary vascular congestion with chronic changes, cardiomegaly, small left pleural effusion. Patient was started on amiodarone 6/2021 ([facility]). Patient returned to ED 6/17/2201 due to acute on chronic CHF. Troponin elevated. 1 tablet by mouth daily 8/6/21  Yes GRACIELA Graves CNP   levothyroxine (SYNTHROID) 25 MCG tablet TAKE 1 TABLET BY MOUTH EVERY DAY 7/16/21  Yes Summer Hogan MD   amiodarone (PACERONE) 100 MG tablet Take 100 mg by mouth daily   Yes Historical Provider, MD   folic acid (FOLVITE) 1 MG tablet Take 1 mg by mouth Six times weekly Except mondays   Yes Historical Provider, MD   apixaban (ELIQUIS) 2.5 MG TABS tablet Take 1 tablet by mouth 2 times daily 6/5/21  Yes GRACIELA Trujillo CNP   fluticasone (FLONASE) 50 MCG/ACT nasal spray 1 spray by Each Nostril route daily as needed for Allergies   Yes Historical Provider, MD   methotrexate (RHEUMATREX) 2.5 MG chemo tablet TAKE 8 TABLETS EVERY MONDAY 2/12/21  Yes Summer Hogan MD   VITAMIN D PO Take 1,000 Units by mouth daily    Yes Historical Provider, MD   Probiotic Product (PROBIOTIC PO) Take 1 capsule by mouth daily    Yes Historical Provider, MD   Ascorbic Acid (VITAMIN C PO) Take 1,000 mg by mouth daily    Yes Historical Provider, MD   fish oil-omega-3 fatty acids 1000 MG capsule Take 4 g by mouth daily    Yes Historical Provider, MD   fexofenadine (ALLEGRA ALLERGY) 180 MG tablet Take 180 mg by mouth daily as needed (allergies)  Patient not taking: Reported on 8/19/2021    Historical Provider, MD   tretinoin (RETIN-A) 0.05 % cream Apply topically nightly Apply topically nightly as needed for rash  Patient not taking: Reported on 8/19/2021    Historical Provider, MD       REVIEW OF SYSTEMS:    All 14-point review of systems are completed and  pertinent positives are mentioned in the history of present illness. Other  systems are reviewed and are negative. Physical Examination:    Ht 5' 5\" (1.651 m)   Wt 118 lb 8 oz (53.8 kg)   BMI 19.72 kg/m²      Constitutional and General Appearance:    alert, cooperative, no distress and appears stated age  [de-identified]:    PERRLA, no cervical lymphadenopathy. No masses palpable.  Normal oral mucosa  Respiratory:  · Normal excursion and expansion without use of accessory muscles  · Resp Auscultation: Normal breath sounds without dullness or wheezing  Cardiovascular:  · The apical impulse is not displaced  · RRR S1S2 w/o M/G/R  Abdomen:  · No masses or tenderness  · Bowel sounds present  Extremities:  ·  No Cyanosis or Clubbing  ·  Lower extremity edema: No  · Skin: Warm and dry  Neurological:  · Alert and oriented. · Moves all extremities well  · No abnormalities of mood, affect, memory, mentation, or behavior are noted    DATA:      ECG 8/19/21: Personally reviewed. ECHO 6/3/2021:  Summary   Unable to access left ventricular systolic function due to arrhythmia but   appears moderately reduced with an ejection fraction of 25%. There is global hypokinesis with abnormal septal motion. Normal left ventricular size with mild concentric left ventricular   hypertrophy. Left ventricular diastolic filling pressure is elevated. Moderate mitral regurgitation. The left atrium is mildly dilated. Aortic valve appears sclerotic but opens adequately. Mild aortic and tricuspid regurgitation. Right ventricular systolic function is mildly reduced . Systolic pulmonic artery pressure (SPAP) is normal estimated at 39 mmHg   (Right atrial pressure of 8 mmHg). IMPRESSION:    1. Paroxysmal atrial fibrillation. 06/04/2021  1. Atrial fibrillation (new diagnosis). Patient is a pleasant 31-year-old female with a medical history significant for atrial fibrillation, hypertension, newly diagnosed cardiomyopathy, rheumatoid arthritis, and diverticulitis who presents from home with symptomatic atrial fibrillation with RVR. Patient follows at [facility]. Work-up at [facility] unclear as far as etiology of her cardiomyopathy. Based on symptoms suspect most likely related to tachycardia cardiomyopathy however indices to be proved with ischemic evaluation.   She was started on GDMT however this was eventually discontinued by providers at [facility]. 08/19/2021  Patient presents for follow-up. She is now again to follow with [facility]. She has been on amiodarone since 06/17/2021. She is not been on any juana agents given bradycardia. She wore a cardiac monitor which showed no atrial fibrillation however she did have some PSVT, likely AT. She states that while she is on amiodarone her symptoms significantly improved. We discussed options yet again including antiarrhythmics, juana agents, ablation, pace and ablate strategy. At this point patient is not able to tolerate goal-directed medical therapy due to blood pressure and bradycardia as she had some pauses on her cardiac monitor for up to 4 seconds. We will see if her left ventricular ejection fraction has significantly improved since being on in normal sinus rhythm. We will also follow-up her cardiac stress test to see if ischemia may be the etiology of her symptoms/heart failure. Pending the results of these findings we will discuss the need for ICD and/or ablation. If patient requires an ICD she will need a biventricular ICD. Of note, patient concerned her RA may be getting worse and is concerned it may be her amiodarone which is reasonable. We discussed alternatives however patient would like to discuss with her rheumatologist before changing as it has made a great impact in her life. - Continue amiodarone 100 mg daily (discuss with rheumatologist). - Continue apixaban 2.5 mg BID.  - Follow up with me in 10/2021 after repeat echocardiogram and stress test to review options. 2. Cardiomyopathy with wide LBBB. - Plan as per above. - Continue to follow with IC.    RECOMMENDATIONS:  1. Discussed alternative to amiodarone due to worsening of RA symptoms. -Multaq. Patient would like to talk to her rheumatologist first.   2. Discussed possible need for cardiac device due to low LVEF, bradycardia, and LBBB.     -CRT-D, dual ICD, CRT-P or dual PPM.    -Recommendation will depend on LVEF of repeat echo 9/2021.   3. Complete stress test and echo 9/2021.   4. Follow up with me in October 2021. QUALITY MEASURES  1. Tobacco Cessation Counseling: NA  2. Retake of BP if >140/90:   NA  3. Documentation to PCP/referring for new patient:  Sent to PCP at close of office visit  4. CAD patient on anti-platelet: NA  5. CAD patient on STATIN therapy:  NA  6. Patient with CHF and aFib on anticoagulation:  Yes     All questions and concerns were addressed to the patient/family. Alternatives to my treatment were discussed. This note has been scribed in the presence of Gregorio Mueller MD,  by Nikolay Thorne RN. The scribe's documentation has been prepared under my direction and personally reviewed by me in its entirety. I confirm that the note above accurately reflects all work, physical examination, the discussion of treatments and procedures, and medical decision making performed by me. Osvaldo Reza MD personally performed the services described in this documentation as scribed by nurse in my presence, and is both accurate and complete. Electronically signed by Francis Morrison MD on 8/19/2021 at 5:21 PM     Dr. Harley Chappell MD  [place] [address]. [address] [place] [address]. [address]. [place], [address]  Phone: [phone]  Fax: [phone]     NOTE: This report was transcribed using voice recognition software. Every effort was made to ensure accuracy, however, inadvertent computerized transcription errors may be present.

## 2021-08-19 NOTE — LETTER
[address] Cardiology - [address] [facility]  Phone: [phone]  Fax: [phone]    Florida Zaragoza MD    August 23, 2021     Sergei Osborne, [address] [address]    Patient: Tiffanie Chapin   MR Number: [id number]   YOB: 1940   Date of Visit: 8/19/2021       Dear Sergei Osborne:    Thank you for referring Karie Chapin to me for evaluation/treatment. Below are the relevant portions of my assessment and plan of care. If you have questions, please do not hesitate to call me. I look forward to following Karie Dillard along with you.     Sincerely,    Florida Zaragoza MD

## 2021-08-23 DIAGNOSIS — I50.22 CHRONIC SYSTOLIC HF (HEART FAILURE) (HCC): Primary | ICD-10-CM

## 2021-08-26 ENCOUNTER — OFFICE VISIT (OUTPATIENT)
Dept: CARDIOLOGY CLINIC | Age: 81
End: 2021-08-26
Payer: MEDICARE

## 2021-08-26 VITALS
HEIGHT: 65 IN | DIASTOLIC BLOOD PRESSURE: 66 MMHG | WEIGHT: 118 LBS | BODY MASS INDEX: 19.66 KG/M2 | SYSTOLIC BLOOD PRESSURE: 134 MMHG | OXYGEN SATURATION: 96 % | HEART RATE: 51 BPM

## 2021-08-26 DIAGNOSIS — I50.22 CHRONIC SYSTOLIC HF (HEART FAILURE) (HCC): Primary | ICD-10-CM

## 2021-08-26 DIAGNOSIS — I42.8 NON-ISCHEMIC CARDIOMYOPATHY (HCC): ICD-10-CM

## 2021-08-26 DIAGNOSIS — I10 ESSENTIAL HYPERTENSION: ICD-10-CM

## 2021-08-26 DIAGNOSIS — M06.9 RHEUMATOID ARTHRITIS INVOLVING MULTIPLE JOINTS (HCC): ICD-10-CM

## 2021-08-26 DIAGNOSIS — I48.0 PAROXYSMAL ATRIAL FIBRILLATION (HCC): ICD-10-CM

## 2021-08-26 DIAGNOSIS — E87.1 HYPONATREMIA: ICD-10-CM

## 2021-08-26 DIAGNOSIS — E78.00 PURE HYPERCHOLESTEROLEMIA: ICD-10-CM

## 2021-08-26 DIAGNOSIS — I44.7 LBBB (LEFT BUNDLE BRANCH BLOCK): ICD-10-CM

## 2021-08-26 DIAGNOSIS — Z79.01 CHRONIC ANTICOAGULATION: ICD-10-CM

## 2021-08-26 PROCEDURE — 99214 OFFICE O/P EST MOD 30 MIN: CPT | Performed by: NURSE PRACTITIONER

## 2021-08-26 RX ORDER — FUROSEMIDE 40 MG/1
40 TABLET ORAL DAILY PRN
Qty: 30 TABLET | Refills: 1
Start: 2021-08-26 | End: 2021-08-31

## 2021-08-26 NOTE — PATIENT INSTRUCTIONS
1. Change the furosemide (Lasix) 40 mg to use only if needed for weight gain of 3 lbs in 1 day or if you have swelling or shortness of breath   2. Continue the spironolactone 25 mg once daily  3. No change in other medicines  4. Follow up with Dr. Ada Lanza as scheduled  5. Follow up with me in 2 months   6.  Keep fluid intake at least 48 oz but no more than 64 oz per day

## 2021-08-26 NOTE — PROGRESS NOTES
[facility]   Cardiac Evaluation    Primary Care Doctor:  Susana Garner MD    Chief Complaint   Patient presents with    1 Month Follow-Up    Fatigue          Assessment:    1. Chronic systolic HF (heart failure) ([address]    2. Non-ischemic cardiomyopathy ([address]    3. Essential hypertension    4. Paroxysmal atrial fibrillation (HCC)    5. LBBB (left bundle branch block)    6. Chronic anticoagulation    7. Pure hypercholesterolemia    8. Hyponatremia    9. Rheumatoid arthritis involving multiple joints (HCC)          Plan:   1. Change the furosemide (Lasix) 40 mg to use only if needed for weight gain of 3 lbs in 1 day or if you have swelling or shortness of breath   2. Continue the spironolactone 25 mg once daily  3. No change in other medicines  4. Echo and stress test as scheduled  5. Follow up with Dr. Grace Ferrari as scheduled  6. Follow up with me in 2 months       Vitals:    08/26/21 1117   BP: 134/66   Pulse: 51   SpO2: 96%   Weight: 118 lb (53.5 kg)   Height: 5' 5\" (1.651 m)       History of Present Illness:   I had the pleasure of seeing Attila Chapin in follow up for nonischemic cardiomyopathy, systolic CHF, hypertension, A. fib on anticoagulation with Eliquis, chronic LBBB, as well as RA. She wore a monitor which showed sinus rhythm, no episodes of atrial fibrillation but heart rate dropped to 20s at night with 4-second pauses. Metoprolol was discontinued completely and she was seen by Dr. Grace Ferrari. He recommended continuing the amiodarone but to discuss with her rheumatologist further, question if contributing to flare of her RA. He also recommended undergoing echocardiogram and stress test for further evaluation of ischemia and heart function. Her blood pressure was running elevated so she was started on spironolactone for both BP and cardiomyopathy. She spoke to her rheumatologist about the Amiodarone. Does not think Amio is related to RA flare.   She continues to have flare currently. She feels dehydrated. Her weight is down 3 lbs over the past month. Also loosing muscle mass. BP has been better. No edema. Appetite is good. Sleep is fair but interrupted with nocturia twice nightly. 15 Denia Chapin describes symptoms including fatigue, weakness, \"dry\" but denies chest pain, palpitations, orthopnea, PND, early saiety, edema, syncope. HOME WEIGHTS:   21:  118.8 lb  21:           124 lbs         NYHA:   II  ACC/ AHA Stage:    C    Past Medical History:   has a past medical history of Allergic rhinitis, CHF (congestive heart failure), NYHA class I, acute on chronic, combined ([address] Hyperlipidemia, Hypertension, Hypothyroidism, LBBB (left bundle branch block), and Rheumatoid arthritis(714.0). Surgical History:   has a past surgical history that includes Colonoscopy ();  section; and joint replacement (Left, 13). Social History:   reports that she has quit smoking. She has never used smokeless tobacco. She reports current alcohol use of about 2.0 standard drinks of alcohol per week. She reports that she does not use drugs. Family History:   Family History   Problem Relation Age of Onset    Heart Disease Mother         very slow heart beat    Thyroid Disease Father     Heart Disease Father        Home Medications:  Prior to Admission medications    Medication Sig Start Date End Date Taking?  Authorizing Provider   spironolactone (ALDACTONE) 25 MG tablet Take 1 tablet by mouth daily 21  Yes GRACIELA Almanza CNP   furosemide (LASIX) 40 MG tablet Take 1 tablet by mouth daily 21  Yes GRACIELA Almanza CNP   levothyroxine (SYNTHROID) 25 MCG tablet TAKE 1 TABLET BY MOUTH EVERY DAY 21  Yes Gill Jeong MD   amiodarone (PACERONE) 100 MG tablet Take 100 mg by mouth daily   Yes Historical Provider, MD   folic acid (FOLVITE) 1 MG tablet Take 1 mg by mouth Six times weekly Except    Yes Historical spheres  · Moves all extremities well  · Exhibits normal gait balance and coordination  · No abnormalities of mood, affect, memory, mentation, or behavior are noted    Lab Data:  Most recent lab results below reviewed in office    CBC:   Lab Results   Component Value Date    WBC 6.2 06/18/2021    WBC 7.7 06/17/2021    WBC 7.7 06/04/2021    RBC 3.86 06/18/2021    RBC 4.06 06/17/2021    RBC 4.08 06/04/2021    HGB 13.1 06/18/2021    HGB 13.8 06/17/2021    HGB 13.8 06/04/2021    HCT 38.7 06/18/2021    HCT 41.1 06/17/2021    HCT 41.0 06/04/2021    .2 06/18/2021    .4 06/17/2021    .5 06/04/2021    RDW 16.8 06/18/2021    RDW 16.6 06/17/2021    RDW 16.8 06/04/2021     06/18/2021     06/17/2021     06/04/2021     BMP:  Lab Results   Component Value Date     06/18/2021     06/17/2021     06/05/2021    K 3.6 06/18/2021    K 3.8 06/17/2021    K 4.1 06/05/2021    K 3.5 06/04/2021    K 4.0 06/03/2021    K 3.3 03/20/2021     06/18/2021    CL 96 06/17/2021     06/05/2021    CO2 26 06/18/2021    CO2 26 06/17/2021    CO2 24 06/05/2021    BUN 19 06/18/2021    BUN 19 06/17/2021    BUN 16 06/05/2021    CREATININE 0.7 06/18/2021    CREATININE 0.6 06/17/2021    CREATININE 0.6 06/05/2021     BNP:   Lab Results   Component Value Date    PROBNP 4,600 06/17/2021    PROBNP 1,216 06/03/2021     LIPID:   Lab Results   Component Value Date    TRIG 86 06/18/2021    TRIG 78 05/14/2021    HDL 48 06/18/2021    HDL 82 05/14/2021    HDL 72.8 11/14/2011    HDL 64 09/26/2011    LDLCALC 108 06/18/2021    LDLCALC 103 05/14/2021       Cardiac Imaging:  Echo: LUCY (06/02/2021)  Summary:   [facility]   No left atrial clot detected. The left atrial size is severely dilated. The right atrium is severely dilated. The right ventricle is borderline dilated. Trace pericardial effusion. The left ventricular function is severely reduced.    There is severe global hypokinesis of the left ventricle. There is mild mitral regurgitation.           I appreciate the opportunity of cooperating in the care of this individual.    GRACIELA Nuñez CNP, 8/26/2021, 11:41 AM

## 2021-08-31 RX ORDER — FUROSEMIDE 40 MG/1
TABLET ORAL
Qty: 90 TABLET | Refills: 3 | Status: SHIPPED | OUTPATIENT
Start: 2021-08-31 | End: 2021-10-21

## 2021-08-31 NOTE — TELEPHONE ENCOUNTER
NPLR please advise for NPDD  Last OV 08/26/21 NPDD  BMP 06/18/21  Upcoming OV 10/12/21 [address] [employer],[address]

## 2021-09-02 RX ORDER — SPIRONOLACTONE 25 MG/1
TABLET ORAL
Qty: 90 TABLET | Refills: 1 | Status: SHIPPED | OUTPATIENT
Start: 2021-09-02 | End: 2021-12-08

## 2021-09-08 DIAGNOSIS — E03.9 HYPOTHYROIDISM (ACQUIRED): ICD-10-CM

## 2021-09-08 NOTE — TELEPHONE ENCOUNTER
Pts daughter is requesting a refill on Eliquis 2.5 mg BID. Preferred pharmacy is [facility] @ [phone]. Last OV 8/26/2021 with NPDD.

## 2021-09-09 RX ORDER — LEVOTHYROXINE SODIUM 0.03 MG/1
TABLET ORAL
Qty: 90 TABLET | Refills: 1 | Status: SHIPPED | OUTPATIENT
Start: 2021-09-09 | End: 2021-11-18

## 2021-09-09 NOTE — TELEPHONE ENCOUNTER
Refilled medication per verbal order from provider.       Future appt scheduled 11/18/2021         Last appt 05/24/2021      Last Written 07/16/2021    levothyroxine (SYNTHROID) 25 MCG tablet  #90  1 Rf

## 2021-09-09 NOTE — TELEPHONE ENCOUNTER
Received refill request for ELIQUIS    Last OV: 08/26/2021 w/ NPDD    Last Labs: 06/18/2021 CBC & BMP    Last Refill:  06/05/2021 #60 w/  refills    Next Appointment: 10/12/2021 w/ [address]

## 2021-09-13 ENCOUNTER — VIRTUAL VISIT (OUTPATIENT)
Dept: FAMILY MEDICINE CLINIC | Age: 81
End: 2021-09-13
Payer: MEDICARE

## 2021-09-13 ENCOUNTER — TELEPHONE (OUTPATIENT)
Dept: FAMILY MEDICINE CLINIC | Age: 81
End: 2021-09-13

## 2021-09-13 DIAGNOSIS — J06.9 VIRAL URI: Primary | ICD-10-CM

## 2021-09-13 PROCEDURE — 99213 OFFICE O/P EST LOW 20 MIN: CPT | Performed by: NURSE PRACTITIONER

## 2021-09-13 RX ORDER — AZITHROMYCIN 250 MG/1
250 TABLET, FILM COATED ORAL SEE ADMIN INSTRUCTIONS
Qty: 6 TABLET | Refills: 0 | Status: SHIPPED | OUTPATIENT
Start: 2021-09-13 | End: 2021-09-18

## 2021-09-13 ASSESSMENT — ENCOUNTER SYMPTOMS
EYES NEGATIVE: 1
GASTROINTESTINAL NEGATIVE: 1
COUGH: 1
CHEST TIGHTNESS: 1

## 2021-09-13 NOTE — PATIENT INSTRUCTIONS
Please read the healthy family handout that you were given and share it with your family. Please compare this printed medication list with your medications at home to be sure they are the same. If you have any medications that are different please contact us immediately at [phone]. Also review your allergies that we have listed, these may also include medications that you have not been able to tolerate, make sure everything listed is correct. If you have any allergies that are different please contact us immediately at [phone].

## 2021-09-13 NOTE — PROGRESS NOTES
2021    TELEHEALTH EVALUATION -- Audio/Visual (During JZHYP-70 public health emergency)    HPI:    Rashida Chapin (:  1940) has requested an audio/video evaluation for the following concern(s):    Upper Respiratory Infection  Patient complains of symptoms of a URI. Symptoms include cough and congestion. Onset of symptoms was 5 days ago, gradually worsening since that time. She also c/o myalgias for the past 5 days . She is drinking plenty of fluids. Evaluation to date: none. Treatment to date: allegra, delsom and tylenol. Saturday night patient was negative for COVID test.     Review of Systems   Constitutional: Negative for appetite change, chills and fever. HENT: Positive for congestion. Eyes: Negative. Respiratory: Positive for cough and chest tightness. Cardiovascular: Negative. Gastrointestinal: Negative. Endocrine: Negative. Genitourinary: Negative. Musculoskeletal: Positive for myalgias. Allergic/Immunologic: Positive for environmental allergies. Prior to Visit Medications    Medication Sig Taking?  Authorizing Provider   levothyroxine (SYNTHROID) 25 MCG tablet TAKE 1 TABLET DAILY Yes Joy Boo MD   apixaban (ELIQUIS) 2.5 MG TABS tablet Take 1 tablet by mouth 2 times daily Yes GRACIELA Moncada CNP   spironolactone (ALDACTONE) 25 MG tablet TAKE 1 TABLET BY MOUTH EVERY DAY Yes GRACIELA Moncada CNP   furosemide (LASIX) 40 MG tablet TAKE 1 TABLET BY MOUTH EVERY DAY Yes GRACIELA Britton CNP   amiodarone (PACERONE) 100 MG tablet Take 50 mg by mouth daily  Yes Historical Provider, MD   folic acid (FOLVITE) 1 MG tablet Take 1 mg by mouth Six times weekly Except  Yes Historical Provider, MD   methotrexate (RHEUMATREX) 2.5 MG chemo tablet TAKE 8 TABLETS EVERY MONDAY Yes Joy Boo MD   VITAMIN D PO Take 1,000 Units by mouth daily  Yes Historical Provider, MD   Probiotic Product (PROBIOTIC PO) Take 1 capsule by mouth daily  Yes EXAMINED]  Vital Signs: (As obtained by patient/caregiver or practitioner observation)       Temperature- 97.6 Pulse oximetry- 98%    Constitutional: [x] Appears well-developed and well-nourished [x] No apparent distress      [] Abnormal-   Mental status  [x] Alert and awake  [x] Oriented to person/place/time [x]Able to follow commands      Eyes:  EOM    [x]  Normal  [] Abnormal-  Sclera  [x]  Normal  [] Abnormal -         Discharge [x]  None visible  [] Abnormal -    HENT:   [x] Normocephalic, atraumatic. [] Abnormal   [x] Mouth/Throat: Mucous membranes are moist.     External Ears [x] Normal  [] Abnormal-     Neck: [x] No visualized mass     Pulmonary/Chest: [x] Respiratory effort normal.  [x] No visualized signs of difficulty breathing or respiratory distress        [] Abnormal-      Musculoskeletal:   [] Normal gait with no signs of ataxia         [x] Normal range of motion of neck        [] Abnormal-       Neurological:        [x] No Facial Asymmetry (Cranial nerve 7 motor function) (limited exam to video visit)          [] No gaze palsy        [] Abnormal-         Skin:        [x] No significant exanthematous lesions or discoloration noted on facial skin         [] Abnormal-            Psychiatric:       [x] Normal Affect [] No Hallucinations        [] Abnormal-     Other pertinent observable physical exam findings-     ASSESSMENT/PLAN:  1. Viral URI  Patient presents today with complaints of clear nasal congestion, intermittent bursts of cough with mild chest tightness. Patient denies difficulty breathing, loss of taste/smell, fever, GI symptoms. Patient reports pulse ox is 98% on room air and she has been monitoring her temperature as noted above. Discussed the possibility of COVID-19 impaction.   Daughter reports they did a home Covid test on Saturday and is going to repeat the second test today however they are willing to go to the urgent care for testing should patient symptoms persist.  - azithromycin (ZITHROMAX) 250 MG tablet; Take 1 tablet by mouth See Admin Instructions for 5 days 500mg on day 1 followed by 250mg on days 2 - 5  Dispense: 6 tablet; Refill: 0      Return if symptoms worsen or fail to improve. 15 Denia Chapin, was evaluated through a synchronous (real-time) audio-video encounter. The patient (or guardian if applicable) is aware that this is a billable service. Verbal consent to proceed has been obtained within the past 12 months. The visit was conducted pursuant to the emergency declaration under the [address] [place] [address], [address] [place] [address] authority and the [employer] Act. Patient identification was verified, and a caregiver was present when appropriate. The patient was located in a state where the provider was credentialed to provide care. Total time spent on this encounter: Not billed by time    --GRACIELA Resendiz CNP on 9/13/2021 at 11:38 AM    An electronic signature was used to authenticate this note.

## 2021-09-21 ENCOUNTER — TELEPHONE (OUTPATIENT)
Dept: FAMILY MEDICINE CLINIC | Age: 81
End: 2021-09-21

## 2021-09-22 ENCOUNTER — TELEPHONE (OUTPATIENT)
Dept: CARDIOLOGY CLINIC | Age: 81
End: 2021-09-22

## 2021-09-22 NOTE — TELEPHONE ENCOUNTER
Jaylyn Eaton pt's dtr is requesting either VSP, NPDD, or an RN that contact pt today to inform why the stress test is neccessary as pt is hesitant to have it performed tomorrow 9/23/21. Jaylyn Eaton also stated they need to know if pt needs to hold any medications for the test. Also please add Prednisone to pt's chart, pt can inform of the milligram as Jaylyn Eaton did not have info at time of call. Pt can be reached at [phone].

## 2021-09-22 NOTE — TELEPHONE ENCOUNTER
Echocardiogram and stress test were recommended to further evaluate her heart function since being back in normal sinus rhythm as well as causes of her abnormal heart rhythms, abnormal EKG and heart failure symptoms.   No she does not need to hold any medications prior to the stress test.  Thank you, Mike Stark

## 2021-09-23 ENCOUNTER — APPOINTMENT (OUTPATIENT)
Dept: CARDIOLOGY | Age: 81
End: 2021-09-23
Payer: MEDICARE

## 2021-09-23 ENCOUNTER — HOSPITAL ENCOUNTER (OUTPATIENT)
Dept: CARDIOLOGY | Age: 81
Discharge: HOME | End: 2021-09-23
Payer: MEDICARE

## 2021-09-23 ENCOUNTER — HOSPITAL ENCOUNTER (OUTPATIENT)
Dept: NON INVASIVE DIAGNOSTICS | Age: 81
Discharge: HOME | End: 2021-09-23
Payer: MEDICARE

## 2021-09-23 ENCOUNTER — HOSPITAL ENCOUNTER (OUTPATIENT)
Dept: NUCLEAR MEDICINE | Age: 81
Discharge: HOME | End: 2021-09-23
Payer: MEDICARE

## 2021-09-23 DIAGNOSIS — I50.22 CHRONIC SYSTOLIC HF (HEART FAILURE) (HCC): ICD-10-CM

## 2021-09-23 DIAGNOSIS — I50.23 CHF (CONGESTIVE HEART FAILURE), NYHA CLASS III, ACUTE ON CHRONIC, SYSTOLIC (HCC): ICD-10-CM

## 2021-09-23 LAB
LV EF: 44 %
LVEF MODALITY: NORMAL

## 2021-09-23 PROCEDURE — A9502 TC99M TETROFOSMIN: HCPCS | Performed by: NURSE PRACTITIONER

## 2021-09-23 PROCEDURE — 93306 TTE W/DOPPLER COMPLETE: CPT

## 2021-09-23 PROCEDURE — 78452 HT MUSCLE IMAGE SPECT MULT: CPT

## 2021-09-23 PROCEDURE — 93017 CV STRESS TEST TRACING ONLY: CPT

## 2021-09-23 PROCEDURE — 3430000000 HC RX DIAGNOSTIC RADIOPHARMACEUTICAL: Performed by: NURSE PRACTITIONER

## 2021-09-23 PROCEDURE — 6360000002 HC RX W HCPCS: Performed by: NURSE PRACTITIONER

## 2021-09-23 RX ADMIN — TETROFOSMIN 32.4 MILLICURIE: 1.38 INJECTION, POWDER, LYOPHILIZED, FOR SOLUTION INTRAVENOUS at 12:45

## 2021-09-23 RX ADMIN — TETROFOSMIN 10.7 MILLICURIE: 1.38 INJECTION, POWDER, LYOPHILIZED, FOR SOLUTION INTRAVENOUS at 11:45

## 2021-09-23 RX ADMIN — REGADENOSON 0.4 MG: 0.08 INJECTION, SOLUTION INTRAVENOUS at 12:45

## 2021-09-23 NOTE — PROGRESS NOTES
Patient arrived to stress lab for Grace Myoview stress test.  Patient was educated on procedure, all questions answered, and consent verified/obtained.

## 2021-09-24 ENCOUNTER — TELEPHONE (OUTPATIENT)
Dept: CARDIOLOGY CLINIC | Age: 81
End: 2021-09-24

## 2021-09-24 NOTE — TELEPHONE ENCOUNTER
----- Message from GRACIELA Uribe CNP sent at 9/23/2021  4:45 PM EDT -----  Stress test is abnormal suggesting a blockage in her coronary arteries. I recommend a coronary angiogram to look at arteries and determine if there is a blockage that can be treated. She has seen Dr. Reginald Hardy in the past and can ask him to perform angiogram if she and family are willing. Also of note, her heart function is improved on the echocardiogram and by this stress test when compared to echo done in June.    Thank you, Curtis Cardona

## 2021-09-24 NOTE — TELEPHONE ENCOUNTER
Spoke with pt, she has NPDD 10/21 appt and would like to discuss this at this appt. She is just too tired right now to deal with it. She thinks she is getting over covid but was never tested. She is now on a zpack and it is helping.

## 2021-09-29 ENCOUNTER — TELEPHONE (OUTPATIENT)
Dept: CARDIOLOGY CLINIC | Age: 81
End: 2021-09-29

## 2021-09-29 NOTE — TELEPHONE ENCOUNTER
Attempted to contact pt, the wireless customer is not available. Will try to contact at another time.

## 2021-10-12 ENCOUNTER — OFFICE VISIT (OUTPATIENT)
Dept: CARDIOLOGY CLINIC | Age: 81
End: 2021-10-12
Payer: MEDICARE

## 2021-10-12 ENCOUNTER — TELEPHONE (OUTPATIENT)
Dept: CARDIOLOGY CLINIC | Age: 81
End: 2021-10-12

## 2021-10-12 VITALS
BODY MASS INDEX: 20.33 KG/M2 | SYSTOLIC BLOOD PRESSURE: 130 MMHG | HEART RATE: 50 BPM | WEIGHT: 122 LBS | OXYGEN SATURATION: 98 % | DIASTOLIC BLOOD PRESSURE: 70 MMHG | HEIGHT: 65 IN

## 2021-10-12 DIAGNOSIS — I48.0 PAROXYSMAL ATRIAL FIBRILLATION (HCC): ICD-10-CM

## 2021-10-12 DIAGNOSIS — Z79.899 MEDICATION MANAGEMENT: Primary | ICD-10-CM

## 2021-10-12 DIAGNOSIS — I50.23 CHF (CONGESTIVE HEART FAILURE), NYHA CLASS III, ACUTE ON CHRONIC, SYSTOLIC (HCC): ICD-10-CM

## 2021-10-12 DIAGNOSIS — E78.00 PURE HYPERCHOLESTEROLEMIA: ICD-10-CM

## 2021-10-12 PROCEDURE — 93000 ELECTROCARDIOGRAM COMPLETE: CPT | Performed by: INTERNAL MEDICINE

## 2021-10-12 PROCEDURE — 99214 OFFICE O/P EST MOD 30 MIN: CPT | Performed by: INTERNAL MEDICINE

## 2021-10-12 NOTE — LETTER
[address] [place] [address] Cardiology - [address]  Phone: [phone]  Fax: [phone]           Za Carrillo MD      October 15, 2021     Patient: Raiza Chapin   MR Number: [id number]   YOB: 1940   Date of Visit: 10/12/2021       Dear Dr. Jenny Shepard MD:    Thank you for referring Dayana Chapin to me for evaluation/treatment. Below are the relevant portions of my assessment and plan of care. If you have questions, please do not hesitate to call me. I look forward to following Dayana Arndt along with you.     Sincerely,        Za Carrillo MD    CC providers:  Jenny Shepard, [address] [facility] [address]  [address] [place]

## 2021-10-12 NOTE — PATIENT INSTRUCTIONS
RECOMMENDATIONS:  1. Discussed risks and benefits of cardiac cath due to abnormal stress test.    - patient would like to proceed with this. We will call you to set this up. 2. Discussed risks and benefits of rhythm control medications: dofetilide or dronedarone. She would like to continue with amiodarone for now. 3. Follow up with me in 6 months. Patient Education        dronedarone  Pronunciation:  carlyn CASTILLO parth rodriguez  Brand:  Jessica  What is the most important information I should know about dronedarone? Dronedarone can double your risk of death if you have certain heart conditions. Call your doctor at once if you have an irregular pulse, chest tightness, wheezing, a cough with foamy mucus, swelling or rapid weight gain, or trouble breathing (especially at night). Tell your doctor about all other medicines you start or stop using. Many drugs can interact, and some drugs should not be used together. Dronedarone can also cause liver problems. Call your doctor if you have symptoms such as vomiting, loss of appetite, right-sided upper stomach pain, dark urine, and yellowing of your skin or eyes. What is dronedarone? Dronedarone is a heart rhythm medicine that helps lower your risk of needing to be hospitalized for a heart rhythm disorder called atrial fibrillation. Dronedarone is for adults who have had this disorder in the past, but now have normal heart rhythm. Dronedarone may also be used for purposes not listed in this medication guide. What should I discuss with my healthcare provider before taking dronedarone?   You should not use dronedarone if you are allergic to it, or if you have:  · severe liver disease;  · a serious heart condition such as \"sick sinus syndrome,\" \"AV block\" (unless you have a pacemaker), or very slow heartbeats that have caused you to faint;  · if you are pregnant or breastfeeding; or  · if you used a medicine called amiodarone and then had lung problems or liver problems. Dronedarone can double your risk of death if you have certain heart conditions. You should not use this medicine if:  · you have severe heart failure;  · you were recently hospitalized for worsening heart failure symptoms (shortness of breath, chest tightness, night-time breathing problems, swelling, rapid weight gain); or  · you have a \"permanent\" atrial fibrillation that cannot be changed back to a normal rhythm (this will be determined by your doctor). Many drugs can interact and cause dangerous effects. Some drugs should not be used together with dronedarone. Your doctor may change your treatment plan if you also use:  · other heart rhythm medicines;  · cyclosporine;  · ritonavir or other antiviral medicine;  · an antibiotic such as clarithromycin or telithromycin;  · an antidepressant such as amitriptyline, clomipramine, doxepin, imipramine, nefazodone, and others;  · antifungal medicine such as itraconazole, ketoconazole, or voriconazole; or  · antipsychotic medicine such as chlorpromazine, fluphenazine, promethazine, thioridazine, and others. Tell your doctor if you have other heart problems. Dronedarone may harm an unborn baby. Use effective birth control to prevent pregnancy, and tell your doctor if you become pregnant. You should not breastfeed while using dronedarone. How should I take dronedarone? Follow all directions on your prescription label and read all medication guides or instruction sheets. Use the medicine exactly as directed. Dronedarone is usually taken with morning and evening meals. Your heart function may need to be checked every 3 months to help determine if you still need dronedarone. Do not stop taking dronedarone without your doctor's advice. Check your pulse often, and call your doctor if you notice an irregular rhythm. Store at room temperature away from heat and moisture. What happens if I miss a dose?   Skip the missed dose and use your next dose at the regular time. Do not use two doses at one time. What happens if I overdose? Seek emergency medical attention or call the Poison Help line at 1-893.987.7546. What should I avoid while taking dronedarone? Grapefruit may interact with dronedarone and cause side effects. Avoid consuming grapefruit while taking dronedarone. What are the possible side effects of dronedarone? Get emergency medical help if you have signs of an allergic reaction: hives; difficult breathing; swelling of your face, lips, tongue, or throat. Call your doctor at once if you have:  · dry cough, shortness of breath;  · little or no urination;  · fast or pounding heartbeats, fluttering in your chest, shortness of breath, and sudden dizziness (like you might pass out);  · heart problems --swelling, rapid weight gain, feeling short of breath;  · liver problems --loss of appetite, stomach pain (upper right side), tiredness, itching, dark urine, jaundice (yellowing of the skin or eyes); or  · low magnesium or potassium levels --dizziness, numbness or tingling, irregular heartbeats, fluttering in your chest, increased thirst or urination, muscle cramps or limp feeling. Common side effects may include:  · stomach pain, indigestion, nausea, vomiting, diarrhea;  · feeling weak or tired; or  · skin rash, itching, or redness. This is not a complete list of side effects and others may occur. Call your doctor for medical advice about side effects. You may report side effects to FDA at 4-245-RXQ-2378. What other drugs will affect dronedarone? Dronedarone can cause a serious heart problem. Your risk may be higher if you also use certain other medicines for infections, asthma, heart problems, high blood pressure, depression, mental illness, cancer, malaria, or HIV. Tell your doctor about all your current medicines.  Many drugs can affect dronedarone, especially:  · digoxin;  · Galt's wort;  · a blood thinner (warfarin, Coumadin, Jantoven);  · a diuretic or \"water pill\";  · medicines to treat tuberculosis;  · medicine to prevent organ transplant rejection;  · seizure medicine; or  · \"statin\" cholesterol medication (Lipitor, Zocor, Vytorin, and others). This list is not complete and many other drugs may affect dronedarone. This includes prescription and over-the-counter medicines, vitamins, and herbal products. Not all possible drug interactions are listed here. Where can I get more information? Your pharmacist can provide more information about dronedarone. Remember, keep this and all other medicines out of the reach of children, never share your medicines with others, and use this medication only for the indication prescribed. Every effort has been made to ensure that the information provided by [address] [place] [address]  is accurate, up-to-date, and complete, but no guarantee is made to that effect. Drug information contained herein may be time sensitive. [facility] information has been compiled for use by healthcare practitioners and consumers in the [place] and therefore [employer] does not warrant that uses outside of the [place] are appropriate, unless specifically indicated otherwise. [facility] drug information does not endorse drugs, diagnose patients or recommend therapy. [facility] drug information is an informational resource designed to assist licensed healthcare practitioners in caring for their patients and/or to serve consumers viewing this service as a supplement to, and not a substitute for, the expertise, skill, knowledge and judgment of healthcare practitioners. The absence of a warning for a given drug or drug combination in no way should be construed to indicate that the drug or drug combination is safe, effective or appropriate for any given patient. [facility] does not assume any responsibility for any aspect of healthcare administered with the aid of information [facility] provides.  The information contained herein is not intended to cover all possible uses, directions, precautions, warnings, drug interactions, allergic reactions, or adverse effects. If you have questions about the drugs you are taking, check with your doctor, nurse or pharmacist.  Copyright 7221-6269 [address]: 13.01. Revision date: 4/26/2021. Care instructions adapted under license by [facility] ([facility]). If you have questions about a medical condition or this instruction, always ask your healthcare professional. [place] [address] any warranty or liability for your use of this information. Patient Education        dofetilide  Pronunciation:  callejas FET i lide  Brand:  Tikosyn  What is the most important information I should know about dofetilide? You should not take dofetilide if you have severe kidney disease or a history of Long QT syndrome. Serious drug interactions can occur when certain medicines are used together with dofetilide. Tell each of your healthcare providers about all medicines you use now, and any medicine you start or stop using. You will need to spend at least 3 days in a hospital setting when you first start taking dofetilide. This is so your heart rhythm and kidney function can be monitored in case the medicine causes serious side effects. What is dofetilide? Dofetilide is a heart rhythm medicine, also called an antiarrhythmic. Dofetilide is used to help keep the heart beating normally in people with certain heart rhythm disorders of the atrium (the upper chambers of the heart that allow blood to flow into the heart). Dofetilide is used in people with atrial fibrillation or atrial flutter. Dofetilide may also be used for purposes not listed in this medication guide. What should I discuss with my health care provider before taking dofetilide? You should not take dofetilide if you are allergic to it, or if you have:  · severe kidney disease (or if you are on dialysis); or  · a history of Long QT syndrome.   Some medicines can cause unwanted or dangerous effects when used with dofetilide, and should not be used at the same time. Your doctor may need to change your treatment plan if you use any of the following drugs:  · cimetidine;  · dolutegravir;  · ketoconazole;  · megestrol;  · prochlorperazine;  · trimethoprim (Proloprim, Trimpex, Bactrim, Septra);  · verapamil; or  · a diuretic (water pill) that contains hydrochlorothiazide (HCTZ), such as Accuretic, Aldactazide, Atacand HCT, Benicar HCT, Diovan HCT, Dyazide, Exforge HCT, Hyzaar, Lopressor HCT, Maxzide, Micardis HCT, Monopril HCT, Prinzide, Tekturna HCT, Vaseretic, and others. To make sure dofetilide is safe for you, tell your doctor if you have:  · heart disease, high blood pressure;  · liver or kidney disease;  · depression, mental illness;  · asthma or allergies;  · any active infection;  · skin problems; or  · an electrolyte imbalance (such as low levels of potassium or magnesium in your blood). It is not known whether this medicine will harm an unborn baby. Tell your doctor if you are pregnant or plan to become pregnant while using this medicine. It is not known whether dofetilide passes into breast milk or if it could harm a nursing baby. You should not breast-feed while you are using dofetilide. How should I take dofetilide? Dofetilide is available only from a hospital or specialty pharmacy. You will need to spend at least 3 days in a hospital setting when you first start taking dofetilide. This is so your heart rhythm and kidney function can be monitored in case the medicine causes serious side effects. Follow all directions on your prescription label. Do not take this medicine in larger or smaller amounts or for longer than recommended. You may take dofetilide with or without food. You should not skip doses or stop using dofetilide suddenly. Stopping suddenly may make your condition worse. Follow your doctor's instructions about tapering your dose.   Tell your doctor if you have a prolonged illness that causes severe diarrhea, vomiting, or heavy sweating. These conditions can cause an electrolyte imbalance, making it dangerous for you to use dofetilide. Your blood pressure will need to be checked often. Your kidney function may also need to be checked with frequent blood tests. Store at room temperature away from moisture and heat. What happens if I miss a dose? Skip the missed dose and take your next dose at the usual time to stay on schedule. Do not take extra medicine to make up the missed dose. What happens if I overdose? Seek emergency medical attention or call the Poison Help line at 1-489.643.3843. What should I avoid while taking dofetilide? Grapefruit and grapefruit juice may interact with dofetilide and lead to unwanted side effects. Discuss the use of grapefruit products with your doctor. What are the possible side effects of dofetilide? Get emergency medical help if you have any of these signs of an allergic reaction: hives; difficult breathing; swelling of your face, lips, tongue, or throat. Call your doctor at once if you have:  · headache with chest pain and severe dizziness, fainting, fast or pounding heartbeats;  · loss of appetite, vomiting or severe diarrhea; or  · low magnesium or potassium --confusion, uneven heart rate, increased thirst or urination, sweating, jerking muscle movements, leg discomfort, muscle weakness or limp feeling. Common side effects may include:  · mild headache;  · mild dizziness; or  · cold symptoms such as stuffy nose, sneezing, sore throat. This is not a complete list of side effects and others may occur. Call your doctor for medical advice about side effects. You may report side effects to FDA at 2-215-FDA-1381. What other drugs will affect dofetilide? Other drugs may interact with dofetilide, including prescription and over-the-counter medicines, vitamins, and herbal products.  Tell each of your health care providers about all medicines you use now and any medicine you start or stop using. Where can I get more information? Your doctor or pharmacist can provide more information about dofetilide. Remember, keep this and all other medicines out of the reach of children, never share your medicines with others, and use this medication only for the indication prescribed. Every effort has been made to ensure that the information provided by [address] [place] [address] is accurate, up-to-date, and complete, but no guarantee is made to that effect. Drug information contained herein may be time sensitive. [facility] information has been compiled for use by healthcare practitioners and consumers in the [place] and therefore [facility] does not warrant that uses outside of the [place] are appropriate, unless specifically indicated otherwise. [facility]'s drug information does not endorse drugs, diagnose patients or recommend therapy. [facility] drug information is an informational resource designed to assist licensed healthcare practitioners in caring for their patients and/or to serve consumers viewing this service as a supplement to, and not a substitute for, the expertise, skill, knowledge and judgment of healthcare practitioners. The absence of a warning for a given drug or drug combination in no way should be construed to indicate that the drug or drug combination is safe, effective or appropriate for any given patient. [facility] does not assume any responsibility for any aspect of healthcare administered with the aid of information [facility] provides. The information contained herein is not intended to cover all possible uses, directions, precautions, warnings, drug interactions, allergic reactions, or adverse effects. If you have questions about the drugs you are taking, check with your doctor, nurse or pharmacist.  Copyright 2128-1885 [place] [address] [place] [address] Avenue: 4.01. Revision date: 4/13/2016.   Care instructions adapted under license by [facility] ([facility]). If you have questions about a medical condition or this instruction, always ask your healthcare professional. [address] any warranty or liability for your use of this information.

## 2021-10-12 NOTE — PROGRESS NOTES
[facility]   Electrophysiology Consult Note     Date: 10/12/21  Patient Name: Oli Chapin  YOB: 1940    Primary Care Physician: Radha Felix MD    CHIEF COMPLAINT:   Chief Complaint   Patient presents with    Follow-up     No new cardiac symptoms to report at this time. Pt reports feeling well since last OV    Other     Pt reports that she has been doing vigorous walking since last OV as well.  Atrial Fibrillation     HISTORY OF PRESENT ILLNESS: Oli Chapin is a 80 y.o. female with a medical history significant for symptomatic paroxysmal atrial fibrillation, known left bundle branch block (negative stress test from [place]), hypertension, rheumoatoid arthritis, hypothyroidism, and complicated diverticulitis who presents from home with symptomatic atrial fibrillation with RVR having recently undergone LUCY cardioversion. According to patient and her daughter she recently began to suffer from dyspnea on exertion and shortness of breath. She is unable to move from her bed to her recliner without becoming short of breath. She was seen by her PCP who directed her to a cardiologist.  Patient was found to be in atrial fibrillation with RVR. She underwent a LUCY cardioversion on 06/02/2021 ([facility]). She was discharged home on rate control and anticoagulation. Unfortunately patient began to suffer from worsening shortness of breath and dyspnea on exertion shortly thereafter and was brought to [facility] for further evaluation and care last night. Echo 6/3/2021 demonstrated an LVEF of 25%. Patient was evaluated emergency room 6/4/2021. Her CMP was reassuring outside of hypoalbuminemia and hypoproteinemia along with elevated AST and ALT. Her CBC was reassuring. Her BNP was elevated at 1216. Her chest radiograph showed pulmonary vascular congestion with chronic changes, cardiomegaly, small left pleural effusion.     Patient was started on amiodarone 6/2021 ([facility]). Patient returned to ED 2201 due to acute on chronic CHF. Troponin elevated. Cardiac event monitor worn 2021-2021 demonstrated predominately SB with an average HR of 57 (29-75) BPM, 4.1 second nocturnal pauses, 0.7% PACs, 0.13% PVCs. On 2021, patient's ECG demonstrated Sinus  Rhythm, left bundle branch block, 63 BPM. She reported that she would prefer to follow with [facility] for cardiology. She reported that her biggest complaint is the pain from her RA. She follows with rheumatology. She reported that she experiences dizziness with position change. She reported she is losign weight and attributes this to taking her diuretic as prescribed. She reported she does not wish to complete stress test. She reported she is able to ascend a flight or stairs without any problems. Interval History since last office visit: Today, 10/12/2021, ECG demonstrates SB (51). She states that she has been having fatigue recently. She states that she suffers from shoulder and neck pain. She states that she is taking her medications as prescribed and tolerates them well. Patient denies current edema, chest pain, sob, palpitations, dizziness or syncope. Past Medical History:   has a past medical history of Allergic rhinitis, CHF (congestive heart failure), NYHA class I, acute on chronic, combined ([address] Hyperlipidemia, Hypertension, Hypothyroidism, LBBB (left bundle branch block), and Rheumatoid arthritis(714.0). Past Surgical History:   has a past surgical history that includes Colonoscopy ();  section; and joint replacement (Left, 13). Allergies:  Patient has no known allergies. Social History:   reports that she has quit smoking. She has never used smokeless tobacco. She reports current alcohol use of about 2.0 standard drinks of alcohol per week. She reports that she does not use drugs.      Family History: family history includes Heart Disease in her father and mother; Thyroid Disease in her father. Home Medications:    Prior to Admission medications    Medication Sig Start Date End Date Taking? Authorizing Provider   levothyroxine (SYNTHROID) 25 MCG tablet TAKE 1 TABLET DAILY 9/9/21  Yes Khanh Holly MD   [facility]) 2.5 MG TABS tablet Take 1 tablet by mouth 2 times daily 9/9/21  Yes Tawana Robles, APRN - CNP   spironolactone (ALDACTONE) 25 MG tablet TAKE 1 TABLET BY MOUTH EVERY DAY 9/2/21  Yes Tawana Pulling, APRN - CNP   furosemide (LASIX) 40 MG tablet TAKE 1 TABLET BY MOUTH EVERY DAY 8/31/21  Yes Lisa Aguilar APRN - CNP   amiodarone (PACERONE) 100 MG tablet Take 50 mg by mouth daily    Yes Historical Provider, MD   folic acid (FOLVITE) 1 MG tablet Take 1 mg by mouth Six times weekly Except mondays   Yes Historical Provider, MD   methotrexate (RHEUMATREX) 2.5 MG chemo tablet TAKE 8 TABLETS EVERY MONDAY 2/12/21  Yes Khanh Holly MD   VITAMIN D PO Take 1,000 Units by mouth daily    Yes Historical Provider, MD   Probiotic Product (PROBIOTIC PO) Take 1 capsule by mouth daily    Yes Historical Provider, MD   Ascorbic Acid (VITAMIN C PO) Take 1,000 mg by mouth daily    Yes Historical Provider, MD   fish oil-omega-3 fatty acids 1000 MG capsule Take 4 g by mouth daily    Yes Historical Provider, MD   fluticasone (FLONASE) 50 MCG/ACT nasal spray 1 spray by Each Nostril route daily as needed for Allergies  Patient not taking: Reported on 9/13/2021    Historical Provider, MD       REVIEW OF SYSTEMS:    All 14-point review of systems are completed and  pertinent positives are mentioned in the history of present illness. Other  systems are reviewed and are negative. Physical Examination:    /70   Pulse 50   Ht 5' 5\" (1.651 m)   Wt 122 lb (55.3 kg)   SpO2 98%   BMI 20.30 kg/m²      Constitutional and General Appearance:    alert, cooperative, no distress and appears stated age  [de-identified]:    PERRLA, no cervical lymphadenopathy. No masses palpable. Normal oral  mucosa  Respiratory:  · Normal excursion and expansion without use of accessory muscles  · Resp Auscultation: Normal breath sounds without dullness or wheezing  Cardiovascular:  · The apical impulse is not displaced  · RRR S1S2 w/o M/G/R  Abdomen:  · No masses or tenderness  · Bowel sounds present  Extremities:  ·  No Cyanosis or Clubbing  ·  Lower extremity edema: No  · Skin: Warm and dry  Neurological:  · Alert and oriented. · Moves all extremities well  · No abnormalities of mood, affect, memory, mentation, or behavior are noted    DATA:      ECG 10/12/21: Personally reviewed. Stress test 9/23/2021   Summary  Abnormal myocardial perfusion study. There is a medium-sized, moderate-intensity anteroseptal and apical  reversible defect consistent with ischemia in the territory of the LAD. Calculated LVEF of 44% with mild global hypokinesis. Overall findings represent an intermediate risk scan. Echo limited 9/23/2021   Summary   Limited echo for LV function. The left ventricular systolic function is mildly reduced with an ejection   fraction of 40%. Mild global hypokinesis. Grade II diastolic dysfunction with elevated filing pressure. The left atrium is severely dilated. Compared to last echo on 6/5/2021 (EF 25%), left ventricle systolic function   has improved. IMPRESSION:    1. Paroxysmal atrial fibrillation. 06/04/2021  1. Atrial fibrillation (new diagnosis). Patient is a pleasant 31-year-old female with a medical history significant for atrial fibrillation, hypertension, newly diagnosed cardiomyopathy, rheumatoid arthritis, and diverticulitis who presents from home with symptomatic atrial fibrillation with RVR. Patient follows at [facility]. Work-up at [facility] unclear as far as etiology of her cardiomyopathy. Based on symptoms suspect most likely related to tachycardia cardiomyopathy however indices to be proved with ischemic evaluation.   She was started on GDMT however this was eventually discontinued by providers at [facility]. 08/19/2021  Patient presents for follow-up. She is now again to follow with Kat gallo exclusively. She has been on amiodarone since 06/17/2021. She is not been on any juana agents given bradycardia. She wore a cardiac monitor which showed no atrial fibrillation however she did have some PSVT, likely AT. She states that while she is on amiodarone her symptoms significantly improved. We discussed options yet again including antiarrhythmics, juana agents, ablation, pace and ablate strategy. At this point patient is not able to tolerate goal-directed medical therapy due to blood pressure and bradycardia as she had some pauses on her cardiac monitor for up to 4 seconds. We will see if her left ventricular ejection fraction has significantly improved since being on in normal sinus rhythm. We will also follow-up her cardiac stress test to see if ischemia may be the etiology of her symptoms/heart failure. Pending the results of these findings we will discuss the need for ICD and/or ablation. If patient requires an ICD she will need a biventricular ICD. Of note, patient concerned her RA may be getting worse and is concerned it may be her amiodarone which is reasonable. We discussed alternatives however patient would like to discuss with her rheumatologist before changing as it has made a great impact in her life. - Continue amiodarone 100 mg daily (discuss with rheumatologist). - Continue apixaban 2.5 mg BID.  - Follow up with me in 10/2021 after repeat echocardiogram and stress test to review options. 10/12/2021  Patient presents for follow-up. She has been doing well on her low-dose amiodarone. Upon her rheumatologist there is no firm contraindications from a rheumatologic standpoint to her amiodarone.   We discussed options including alternative antiarrhythmics however at this point patient would like to continue on amiodarone as it has significantly improved her atrial fibrillation is help with recovery of her left ventricular ejection fraction. She will need labs every 6 months, chest radiograph every year and an eye exam every year  - Labs Q6 months (TSH, CBC, and CMP). - Chest radiograph yearly. - Eye exam every year. - Continue amiodarone 100 mg daily (ok per rheumatology). - Continue apixaban 2.5 mg BID.  - Follow up with me in 6 momths. 2. Cardiomyopathy with wide LBBB. - Plan as per above. - Continue to follow with IC.    10/12/2021  Patient had a stress test was concerning for moderate ischemic disease. She is unclear as to whether or not she would like to move forward with left heart catheterization however after counseling she has decided to move forward. I will asked Dr. Aly Velazquez to perform her left heart catheterization. We will continue medical therapy. She will follow up with cardiology as well as electrophysiology. At this point no ICD indication. Her NYHA is class is 1-2.  - Continue GDMT. - Schedule LHC with Dr. Aly Velazquez (I discussed briefly with him). - Follow up with IC.    RECOMMENDATIONS:  1. Discussed risks and benefits of cardiac cath due to abnormal stress test.    - patient would like to proceed with this. We will call you to set this up. 2. Discussed risks and benefits of rhythm control medications: dofetilide or dronedarone. She would like to continue with amiodarone for now. 3. Follow up with me in 6 months. QUALITY MEASURES  1. Tobacco Cessation Counseling: NA  2. Retake of BP if >140/90:   NA  3. Documentation to PCP/referring for new patient:  Sent to PCP at close of office visit  4. CAD patient on anti-platelet: NA  5. CAD patient on STATIN therapy:  NA  6. Patient with CHF and aFib on anticoagulation:  Yes     I, Janiya Garcia RN, am scribing for and in the presence of Dr. Pedro Luis Riddle.  10/12/21 11:43 AM   Janiya Garcia RN    The scribe's documentation has been prepared under my direction and personally reviewed by me in its entirety. I confirm that the note above accurately reflects all work, physical examination, the discussion of treatments and procedures, and medical decision making performed by me. Wai Jaimes MD personally performed the services described in this documentation as scribed by nurse in my presence, and is both accurate and complete. Electronically signed by Reinaldo Cobb MD on 10/12/2021 at 2:16 PM     Dr. Anthony Gold MD  Electrophysiology  [facility]. [address] [place] [address]. [address]. [place], [address]  Phone: [phone]  Fax: [phone]     NOTE: This report was transcribed using voice recognition software. Every effort was made to ensure accuracy, however, inadvertent computerized transcription errors may be present.

## 2021-10-12 NOTE — TELEPHONE ENCOUNTER
Patient seen in office 10/12/2021. Cardiac cath discussed and agreed upon by [address] and patient. Medications not discussed. Patient will need covid testing prior. Thank you! [place]- patient has been seen by Aly Velazquez in the hospital in the past.     RN's hold blood thinner 2 days prior to procedure.

## 2021-10-12 NOTE — TELEPHONE ENCOUNTER
Spoke with patient. She said it was ok to call daughter Simone Santizo and get the procedure scheduled. LM for [place] letting her know I'll be available Thursday to get pt scheduled.

## 2021-10-14 NOTE — TELEPHONE ENCOUNTER
Spoke with patient's daughter. Patient is scheduled with Dr. Boone Carver for Left Heart Cath on 11/16/21 at 12pm [facility], arrival time of 10:30am to the Cath Lab. Please have patient arrive to the main entrance of [facility] and check in with the registration desk. Please call regarding medication instructions. Remind patient to be NPO after midnight (8 hours prior). Do not apply lotions/creams on skin the day of procedure. COVID testing -  Pre-Procedure Process   We are asking the physicians' offices to encourage all patients to obtain a COVID test prior to their procedure (within 6 days of their scheduled date)   The PAT RN will encourage the patient to obtain a COVID test prior to their procedure (within 6 days of their scheduled date)   This can be done at their Primary Care Doctor, [address] [place] [address], Urgent Care, [facility], [facility], etc.   A PCR or Rapid test result will be accepted        [facility]/Walk in ([facility] main entrance of hospitals)  Mondays - 0800 to 1700  Wednesdays - 0800 to 1700  Fridays - 0800 to 1700     JEFF, VIC - SHIRLEYI, this is best date as of now for requesting a later time of day and to work with [facility]'s schedule. If they decide to do sooner they will let me know.

## 2021-10-21 ENCOUNTER — OFFICE VISIT (OUTPATIENT)
Dept: CARDIOLOGY CLINIC | Age: 81
End: 2021-10-21
Payer: MEDICARE

## 2021-10-21 VITALS
HEIGHT: 65 IN | BODY MASS INDEX: 20.66 KG/M2 | SYSTOLIC BLOOD PRESSURE: 130 MMHG | DIASTOLIC BLOOD PRESSURE: 70 MMHG | HEART RATE: 51 BPM | OXYGEN SATURATION: 98 % | WEIGHT: 124 LBS

## 2021-10-21 DIAGNOSIS — I10 ESSENTIAL HYPERTENSION: ICD-10-CM

## 2021-10-21 DIAGNOSIS — I50.22 CHRONIC SYSTOLIC CONGESTIVE HEART FAILURE (HCC): ICD-10-CM

## 2021-10-21 DIAGNOSIS — Z79.01 CHRONIC ANTICOAGULATION: ICD-10-CM

## 2021-10-21 DIAGNOSIS — M06.9 RHEUMATOID ARTHRITIS INVOLVING MULTIPLE JOINTS (HCC): ICD-10-CM

## 2021-10-21 DIAGNOSIS — I48.0 PAROXYSMAL ATRIAL FIBRILLATION (HCC): ICD-10-CM

## 2021-10-21 DIAGNOSIS — E78.00 PURE HYPERCHOLESTEROLEMIA: ICD-10-CM

## 2021-10-21 DIAGNOSIS — I42.8 NON-ISCHEMIC CARDIOMYOPATHY (HCC): Primary | ICD-10-CM

## 2021-10-21 PROCEDURE — 99214 OFFICE O/P EST MOD 30 MIN: CPT | Performed by: NURSE PRACTITIONER

## 2021-10-21 RX ORDER — FUROSEMIDE 40 MG/1
40 TABLET ORAL DAILY PRN
Qty: 90 TABLET | Refills: 3
Start: 2021-10-21

## 2021-10-21 NOTE — PROGRESS NOTES
[address]   Cardiac Evaluation    Primary Care Doctor:  Radha Felix MD    Chief Complaint   Patient presents with    Follow-up     2 months. Pt reports that she has been doing good since last OV. Assessment:    1. Non-ischemic cardiomyopathy ([address]    2. Chronic systolic congestive heart failure (HCC)    3. Paroxysmal atrial fibrillation ([address]    4. Chronic anticoagulation    5. Essential hypertension    6. Pure hypercholesterolemia    7. Rheumatoid arthritis involving multiple joints (HCC)        Plan:   1. No change in current heart medicines  2. Take the furosemide (Lasix) only if needed for swelling or weight > 125 lbs  3. Cardiac catheterization with Dr. Jigar Oliveira on 11/6/2021 as scheduled  4. Follow up with me week of 12/6/21      Vitals:    10/21/21 1315   BP: 130/70   Pulse: 51   SpO2: 98%   Weight: 124 lb (56.2 kg)   Height: 5' 5\" (1.651 m)       History of Present Illness:   I had the pleasure of seeing Tim Chapin in follow up for systolic CHF, and ICM, PAF, LBBB, hypertension, hyperlipidemia as well as RA. Since last visit we repeated an echocardiogram which showed improvement in her EF from 25% to 40%. We also performed a stress test which was abnormal.  Cardiac catheterization was recommended for further evaluation for CAD. She was seen by Dr. Beronica Aguirre on 10/12/2021 who also reviewed the stress test and echocardiogram.  The heart catheterization was recommended and the patient and daughter agreed. This is scheduled for mid November with Dr. Jigar Oliveira. She has not taken a dose of Lasix since August.  Her weight is up 5 lbs since August. She feels it is due to eating more. She does not add salt to food. Appetite is good. Eats well at [facility]. Accompanied by daughter today. Clarified with patient recent testing and reasoning for left heart catheterization. She continues to minimize symptoms and wonder if remembers/ understands correctly/ fully.     She is not very active, mostly limited by RA and disfigurement of hands. She also complains of right shoulder pain which is newer for her. She tested + twice for COVID at end of August but she feels was not truly COVID as her symptoms improved with course of ATBX quickly. She did quarantine for about 3 weeks. Esteban Chapin describes symptoms including fatigue but denies chest pain, dyspnea, palpitations, orthopnea, PND, early saiety, edema, syncope. HOME WEIGHTS:   10/21/21:  123 lbs  21:           118.8 lb  21:           657 lbs     NYHA:   II  ACC/ AHA Stage:    C    Past Medical History:   has a past medical history of Allergic rhinitis, CHF (congestive heart failure), NYHA class I, acute on chronic, combined ([address] Hyperlipidemia, Hypertension, Hypothyroidism, LBBB (left bundle branch block), and Rheumatoid arthritis(714.0). Surgical History:   has a past surgical history that includes Colonoscopy ();  section; and joint replacement (Left, 13). Social History:   reports that she has quit smoking. She has never used smokeless tobacco. She reports current alcohol use of about 2.0 standard drinks of alcohol per week. She reports that she does not use drugs. Family History:   Family History   Problem Relation Age of Onset    Heart Disease Mother         very slow heart beat    Thyroid Disease Father     Heart Disease Father        Home Medications:  Prior to Admission medications    Medication Sig Start Date End Date Taking?  Authorizing Provider   levothyroxine (SYNTHROID) 25 MCG tablet TAKE 1 TABLET DAILY 21  Yes Kofi Peraza MD   apixaban Rasheeda Storm) 2.5 MG TABS tablet Take 1 tablet by mouth 2 times daily 21  Yes Rheta Standard, APRN - CNP   spironolactone (ALDACTONE) 25 MG tablet TAKE 1 TABLET BY MOUTH EVERY DAY 21  Yes Rheta Standard, APRN - CNP   amiodarone (PACERONE) 100 MG tablet Take 50 mg by mouth daily    Yes Historical Provider, MD   folic acid (FOLVITE) 1 MG tablet Take 1 mg by mouth Six times weekly Except mondays   Yes Historical Provider, MD   fluticasone (FLONASE) 50 MCG/ACT nasal spray 1 spray by Each Nostril route daily as needed for Allergies    Yes Historical Provider, MD   methotrexate (RHEUMATREX) 2.5 MG chemo tablet TAKE 8 TABLETS EVERY MONDAY 2/12/21  Yes Garret Patterson MD   VITAMIN D PO Take 1,000 Units by mouth daily    Yes Historical Provider, MD   Probiotic Product (PROBIOTIC PO) Take 1 capsule by mouth daily    Yes Historical Provider, MD   Ascorbic Acid (VITAMIN C PO) Take 1,000 mg by mouth daily    Yes Historical Provider, MD   fish oil-omega-3 fatty acids 1000 MG capsule Take 4 g by mouth daily    Yes Historical Provider, MD   furosemide (LASIX) 40 MG tablet TAKE 1 TABLET BY MOUTH EVERY DAY  Patient not taking: Reported on 10/21/2021 8/31/21   GRACIELA Barrios CNP        Allergies:  Patient has no known allergies. Physical Examination:    Vitals:    10/21/21 1315   BP: 130/70   Pulse: 51   SpO2: 98%   Weight: 124 lb (56.2 kg)   Height: 5' 5\" (1.651 m)     Constitutional and General Appearance: Warm and dry, no apparent distress, normal coloration  HEENT:  Normocephalic, atraumatic  Respiratory:  · Normal excursion and expansion without use of accessory muscles  · Resp Auscultation: Clear to auscultation   Cardiovascular:  · The apical impulses not displaced  · Heart tones are crisp and normal  · JVP 8 cm H2O  · Irregular rate and rhythm, normal W7I4, soft systolic murmur, no g/r  · Peripheral pulses are symmetrical and full  · There is no clubbing, cyanosis of the extremities.   · No BLE edema  · Pedal Pulses: 2+ and equal   Abdomen:  · No masses or tenderness  · Liver/Spleen: No Abnormalities Noted  Neurological/Psychiatric:  · Alert and oriented in all spheres  · Moves all extremities well  · Exhibits normal gait balance and coordination  · No abnormalities of mood, affect, memory, mentation, or behavior are noted    Lab Data:  Most recent lab results below reviewed in office    CBC:   Lab Results   Component Value Date    WBC 6.2 06/18/2021    WBC 7.7 06/17/2021    WBC 7.7 06/04/2021    RBC 3.86 06/18/2021    RBC 4.06 06/17/2021    RBC 4.08 06/04/2021    HGB 13.1 06/18/2021    HGB 13.8 06/17/2021    HGB 13.8 06/04/2021    HCT 38.7 06/18/2021    HCT 41.1 06/17/2021    HCT 41.0 06/04/2021    .2 06/18/2021    .4 06/17/2021    .5 06/04/2021    RDW 16.8 06/18/2021    RDW 16.6 06/17/2021    RDW 16.8 06/04/2021     06/18/2021     06/17/2021     06/04/2021     BMP:  Lab Results   Component Value Date     06/18/2021     06/17/2021     06/05/2021    K 3.6 06/18/2021    K 3.8 06/17/2021    K 4.1 06/05/2021    K 3.5 06/04/2021    K 4.0 06/03/2021    K 3.3 03/20/2021     06/18/2021    CL 96 06/17/2021     06/05/2021    CO2 26 06/18/2021    CO2 26 06/17/2021    CO2 24 06/05/2021    BUN 19 06/18/2021    BUN 19 06/17/2021    BUN 16 06/05/2021    CREATININE 0.7 06/18/2021    CREATININE 0.6 06/17/2021    CREATININE 0.6 06/05/2021     BNP:   Lab Results   Component Value Date    PROBNP 4,600 06/17/2021    PROBNP 1,216 06/03/2021     LIPID:   Lab Results   Component Value Date    TRIG 86 06/18/2021    TRIG 78 05/14/2021    HDL 48 06/18/2021    HDL 82 05/14/2021    HDL 72.8 11/14/2011    HDL 64 09/26/2011    LDLCALC 108 06/18/2021    LDLCALC 103 05/14/2021       Cardiac Imaging:  Stress test 9/23/2021   Summary  Abnormal myocardial perfusion study. There is a medium-sized, moderate-intensity anteroseptal and apical  reversible defect consistent with ischemia in the territory of the LAD. Calculated LVEF of 44% with mild global hypokinesis. Overall findings represent an intermediate risk scan. Echo limited 9/23/2021   Summary   Limited echo for LV function. The left ventricular systolic function is mildly reduced with an ejection fraction of 40%.    Mild global hypokinesis. Grade II diastolic dysfunction with elevated filing pressure. The left atrium is severely dilated. Compared to last echo on 6/5/2021 (EF 25%), left ventricle systolic function has improved. ECHO 6/5/2021:    Unable to access left ventricular systolic function due to arrhythmia but appears moderately reduced with an ejection fraction of 25%. There is global hypokinesis with abnormal septal motion. Normal left ventricular size with mild concentric left ventricular hypertrophy. Left ventricular diastolic filling pressure is elevated. Moderate mitral regurgitation. The left atrium is mildly dilated. Aortic valve appears sclerotic but opens adequately. Mild aortic and tricuspid regurgitation. Right ventricular systolic function is mildly reduced . Systolic pulmonic artery pressure (SPAP) is normal estimated at 39 mmHg (Right atrial pressure of 8 mmHg). Echo: LUCY (06/02/2021)  Summary:   [facility]   No left atrial clot detected. The left atrial size is severely dilated. The right atrium is severely dilated. The right ventricle is borderline dilated. Trace pericardial effusion. The left ventricular function is severely reduced. There is severe global hypokinesis of the left ventricle. There is mild mitral regurgitation.           I appreciate the opportunity of cooperating in the care of this individual.    Bandar Corona, GRACIELA - CNP, 10/21/2021, 1:20 PM

## 2021-10-21 NOTE — LETTER
[address] Cardiology - [address] [facility] [address] [facility]  Phone: [phone]  Fax: 305 [facility], APRN - CNP    October 26, 2021     Christopher Morales, [address] [address]    Patient: Ngozi Chapin   MR Number: [id number]   YOB: 1940   Date of Visit: 10/21/2021       Dear Christopher Morales:    Thank you for referring William Lozoya Tavares to me for evaluation/treatment. Below are the relevant portions of my assessment and plan of care. If you have questions, please do not hesitate to call me. I look forward to following William Lozoya along with you.     Sincerely,      GRACIELA Alexander - CNP

## 2021-10-21 NOTE — PATIENT INSTRUCTIONS
1. No change in current heart medicines  2. Take the furosemide (Lasix) only if needed for swelling or weight > 125 lbs  3. Cardiac catheterization with Dr. Ronald Boss on 11/16/2021 as scheduled  4. Follow up with me week of 12/6/21    Hold the Eliquis for 2 days before the cardiac cath (none on 11/14 or 11/15)      [address]    Your procedure has been scheduled for:    Date:  __11/16/2021____   Report to:  [facility] / REGISTRATION________  Arrival Time: ____10:30____ AM    Performing cardiologist:  __________Dr. Galan__________________________  In case of questions, or to reschedule, please call [phone] and ask for: _nursing staff_____    WHAT IS A CARDIAC CATHETERIZATION? This is a procedure that providers your cardiologist with detailed information regarding how your heart functions. A small catheter (long, fine tube) is inserted into an artery (a vessel that carries blood and oxygen) that leads to your heart. While watching with x-ray equipment, small amounts of dye are injected which enables visualization of the heart arteries and the heart chambers. The pictures that your cardiologist receives from the cardiac catheterization enables him or her to decide on the best method of treatment for you. HOW TO PREPARE:  · Please notify us before the procedure if you are allergic to anything, especially x-ray contrast dye, iodine, nickel or any type of jewelry. THIS IS VERY IMPORTANT. · DO NOT eat or drink anything after midnight (or 8 hours) prior to the procedure. Please take your morning medication with a small amount of water at your normally scheduled time, including any blood pressure pills, Plavix, and aspirin. · DO NOT eat or drink anything containing CAFFEINE for at least 24 hours prior to your procedure.   · DO NOT TAKE ELIQUIS FOR 2 DAYS PRIOR TO PROCEDURE  · DO NOT take any diuretics (water pills) such as Lasix, Bumex, Demadex, Furosemide, and Zaroxolyn on the day of your procedure. If your diuretic is combined with your blood pressure medication, you will take it as usual.  · You MUST have someone to drive you home - you are not allowed to drive for 24 hours after your procedure. If an intervention is performed, you will stay overnight at the hospital.  · You ARE NOT allowed to do any heavy lifting (over 15 pounds), no squatting or crouching for at least one week after your procedure. Additional discharge instructions will be given to you at the time of your procedure. Examples of products that contain CAFFEINE are:  Brewed coffee   Instant Tea  Mt.  Dew Cocoa  Instant coffee   Coca-Cola  Pepsi  Decaffeinated Pepsi  Decaffeinated coffee  Diet Coke  Mr. Jeni Marr Caffeine-free sodas  Iced tea   Tab   Mellow Yellow    Foods containing CAFFEINE PRODUCTS are:  Chocolate candy  Baking chocolate Chocolate pudding  Brownies   Chocolate cake Chocolate milk  Chocolate-coated candy Chocolate Shakes    Prescription drugs containing CAFFEINE are:  Cafergot (all forms)  Esgic   Fiorcet  Fiornial (all forms)  Norgesic Forte  Norgesic  [employer] (all forms)    Over-the-Counter drugs containing CAFFEINE are:  Anacin    No-Doz  Excedrin (all forms)

## 2021-11-03 ENCOUNTER — TELEPHONE (OUTPATIENT)
Dept: FAMILY MEDICINE CLINIC | Age: 81
End: 2021-11-03

## 2021-11-03 NOTE — TELEPHONE ENCOUNTER
Pts daughter Valentin Banks called to reschedule pt's LHC on 11/16. Valentin Banks stated pt wants to wait until after the holidays. Please call Valentin Banks to reschedule after the first of the year.  Valentin Banks can be reached @ [phone]

## 2021-11-03 NOTE — TELEPHONE ENCOUNTER
Nurse Practioner Chan Maldonado from [facility] is currently  Seeing patient under palliative Care

## 2021-11-18 DIAGNOSIS — E03.9 HYPOTHYROIDISM (ACQUIRED): ICD-10-CM

## 2021-11-18 RX ORDER — LEVOTHYROXINE SODIUM 0.03 MG/1
TABLET ORAL
Qty: 90 TABLET | Refills: 1 | Status: SHIPPED | OUTPATIENT
Start: 2021-11-18 | End: 2022-09-09 | Stop reason: SDUPTHER

## 2021-11-18 NOTE — TELEPHONE ENCOUNTER
Future appt scheduled 12/09/2021               Last appt 09/13/2021      Last Written  09/09/2021    levothyroxine (SYNTHROID) 25 MCG tablet  #90  1 RF

## 2021-12-03 ENCOUNTER — TELEPHONE (OUTPATIENT)
Dept: FAMILY MEDICINE CLINIC | Age: 81
End: 2021-12-03

## 2021-12-03 NOTE — TELEPHONE ENCOUNTER
Patient c/o rash that is mostly on her lower body. She has places on her legs and some on her belly. She said they are very itchy. She said it kind of looks like bites but she has not seen anything in the home, she does not have any pets in the house. She said she has tried allegra but it does not help. Her daughter told her to try Benadryl but she said she has not tried it because in the past it made her very dizzy. I tried to get her to come in for appt today but she said she did not have a way to get here today.   She wanted to see if there was anything you could prescribe or recommend for her to try   She said daughter maybe able to bring her in next week

## 2021-12-08 ENCOUNTER — TELEPHONE (OUTPATIENT)
Dept: CARDIOLOGY CLINIC | Age: 81
End: 2021-12-08

## 2021-12-08 RX ORDER — SPIRONOLACTONE 25 MG/1
TABLET ORAL
Qty: 90 TABLET | Refills: 1 | Status: SHIPPED | OUTPATIENT
Start: 2021-12-08 | End: 2021-12-10 | Stop reason: SDUPTHER

## 2021-12-08 NOTE — TELEPHONE ENCOUNTER
Pt's daughter Adia Dias was calling to reschedule cath procedure that was originally on 11/16 with [facility]. Please call to reschedule.  Adia Dias can be reached @ [phone]

## 2021-12-08 NOTE — TELEPHONE ENCOUNTER
Pt's daughter Duarte Arreola is requesting a refill on Spironolactone 25 mg and Amiodarone 100 mg. Preferred pharmacy is [facility] on [address] [place] [address]  @ [phone]. Last OV 10/21/2021 with NPD[facility].

## 2021-12-10 RX ORDER — AMIODARONE HYDROCHLORIDE 100 MG/1
50 TABLET ORAL DAILY
Qty: 30 TABLET | Refills: 0 | Status: SHIPPED | OUTPATIENT
Start: 2021-12-10 | End: 2022-02-23 | Stop reason: SDUPTHER

## 2021-12-10 RX ORDER — SPIRONOLACTONE 25 MG/1
25 TABLET ORAL DAILY
Qty: 90 TABLET | Refills: 1 | Status: SHIPPED | OUTPATIENT
Start: 2021-12-10

## 2021-12-16 ENCOUNTER — HOSPITAL ENCOUNTER (OUTPATIENT)
Age: 81
Discharge: HOME | End: 2021-12-16
Payer: MEDICARE

## 2021-12-16 DIAGNOSIS — Z79.899 MEDICATION MANAGEMENT: ICD-10-CM

## 2021-12-16 LAB — TSH REFLEX FT4: 1.33 UIU/ML (ref 0.27–4.2)

## 2021-12-16 PROCEDURE — 36415 COLL VENOUS BLD VENIPUNCTURE: CPT

## 2021-12-16 PROCEDURE — 84443 ASSAY THYROID STIM HORMONE: CPT

## 2021-12-23 NOTE — TELEPHONE ENCOUNTER
Spoke to Eyad Frost, states she will call if patient decides to proceed with re-scheduling left heart cath. She request TSH level for patient lab informed of normal result. No further questions.

## 2021-12-30 NOTE — TELEPHONE ENCOUNTER
See previous telephone encounter, spoke with daughter Emy Sandhu states she will talk with patient, at that time patient did not want to proceed with cardiac angiogram. She states will call office if decides to schedule LHC.

## 2022-01-19 RX ORDER — APIXABAN 2.5 MG/1
TABLET, FILM COATED ORAL
Qty: 180 TABLET | Refills: 3 | Status: SHIPPED | OUTPATIENT
Start: 2022-01-19 | End: 2022-02-17 | Stop reason: SDUPTHER

## 2022-01-20 ENCOUNTER — TELEPHONE (OUTPATIENT)
Dept: FAMILY MEDICINE CLINIC | Age: 82
End: 2022-01-20

## 2022-01-20 NOTE — TELEPHONE ENCOUNTER
Patient called stating she started with runny nose and cough today, no fever, no sob. Advised her that she could treat symptoms with Mucinex and Tylenol. And if symptoms persist in the next couple days she needs to be tested for covid.

## 2022-01-21 ENCOUNTER — TELEPHONE (OUTPATIENT)
Dept: FAMILY MEDICINE CLINIC | Age: 82
End: 2022-01-21

## 2022-01-21 DIAGNOSIS — R09.81 CONGESTED NOSE: Primary | ICD-10-CM

## 2022-01-21 RX ORDER — AZITHROMYCIN 250 MG/1
250 TABLET, FILM COATED ORAL SEE ADMIN INSTRUCTIONS
Qty: 6 TABLET | Refills: 0 | Status: SHIPPED | OUTPATIENT
Start: 2022-01-21 | End: 2022-01-26

## 2022-01-21 NOTE — TELEPHONE ENCOUNTER
Pt states she is having cough and runny nose. Clear drainage. She called and talked to [facility] yesterday. She has used a bottle of Robitussin but it is making her nauseated. She really doesn't want to use anymore of that. She was wondering if you could send her in a zpak? She said the last time she had this that helped her. She had the same thing this time last year.     Thank you

## 2022-01-24 ENCOUNTER — TELEPHONE (OUTPATIENT)
Dept: FAMILY MEDICINE CLINIC | Age: 82
End: 2022-01-24

## 2022-01-24 NOTE — TELEPHONE ENCOUNTER
Patient called in Friday with cough and runny nose and was given devi, she said her symptoms have improved some but she will take last pill today and was concerned that she may need additional abx since her symptoms have not cleared up completely

## 2022-01-28 ENCOUNTER — TELEPHONE (OUTPATIENT)
Dept: FAMILY MEDICINE CLINIC | Age: 82
End: 2022-01-28

## 2022-01-28 RX ORDER — AMOXICILLIN 500 MG/1
1000 CAPSULE ORAL 2 TIMES DAILY
Qty: 40 CAPSULE | Refills: 0 | Status: SHIPPED | OUTPATIENT
Start: 2022-01-28 | End: 2022-05-17 | Stop reason: SDUPTHER

## 2022-02-17 NOTE — TELEPHONE ENCOUNTER
Year supply was sent to [facility] 1/19/22  10/21/2021 NPDIANA    No upcoming appt

## 2022-02-17 NOTE — TELEPHONE ENCOUNTER
[employer] requesting a new prescription for pt's eliquis. Please send current eliquis script to [facility].

## 2022-02-23 RX ORDER — AMIODARONE HYDROCHLORIDE 100 MG/1
50 TABLET ORAL DAILY
Qty: 30 TABLET | Refills: 2 | Status: SHIPPED | OUTPATIENT
Start: 2022-02-23 | End: 2022-05-03

## 2022-02-24 ENCOUNTER — OFFICE VISIT (OUTPATIENT)
Dept: FAMILY MEDICINE CLINIC | Age: 82
End: 2022-02-24
Payer: MEDICARE

## 2022-02-24 VITALS
HEART RATE: 68 BPM | BODY MASS INDEX: 20.47 KG/M2 | TEMPERATURE: 97.9 F | OXYGEN SATURATION: 94 % | SYSTOLIC BLOOD PRESSURE: 128 MMHG | WEIGHT: 123 LBS | DIASTOLIC BLOOD PRESSURE: 77 MMHG

## 2022-02-24 DIAGNOSIS — I50.23 CHF (CONGESTIVE HEART FAILURE), NYHA CLASS III, ACUTE ON CHRONIC, SYSTOLIC (HCC): ICD-10-CM

## 2022-02-24 DIAGNOSIS — E78.00 PURE HYPERCHOLESTEROLEMIA: ICD-10-CM

## 2022-02-24 DIAGNOSIS — I48.0 PAROXYSMAL ATRIAL FIBRILLATION (HCC): ICD-10-CM

## 2022-02-24 DIAGNOSIS — E03.9 HYPOTHYROIDISM (ACQUIRED): ICD-10-CM

## 2022-02-24 DIAGNOSIS — Z23 NEED FOR PNEUMOCOCCAL VACCINATION: ICD-10-CM

## 2022-02-24 DIAGNOSIS — M06.9 RHEUMATOID ARTHRITIS INVOLVING MULTIPLE JOINTS (HCC): Primary | ICD-10-CM

## 2022-02-24 DIAGNOSIS — I10 ESSENTIAL HYPERTENSION: ICD-10-CM

## 2022-02-24 PROCEDURE — 36415 COLL VENOUS BLD VENIPUNCTURE: CPT | Performed by: FAMILY MEDICINE

## 2022-02-24 PROCEDURE — 90732 PPSV23 VACC 2 YRS+ SUBQ/IM: CPT | Performed by: FAMILY MEDICINE

## 2022-02-24 PROCEDURE — G0009 ADMIN PNEUMOCOCCAL VACCINE: HCPCS | Performed by: FAMILY MEDICINE

## 2022-02-24 PROCEDURE — 99214 OFFICE O/P EST MOD 30 MIN: CPT | Performed by: FAMILY MEDICINE

## 2022-02-24 NOTE — PROGRESS NOTES
She presents for follow up of chronic health problems she saw her rheumatologist this week and they wanted her to get more lab work done. She wants to get a booster for her pneumonia she thinks she already had the flu this year she had Covid back in August.  Her rheumatologist dropped her methotrexate from 8 down to 5 a week back in November but she had increased inflammation of her joints so he increased her to 6 a week now. Her repeat TSH level in December was okay range she goes back to cardiology in the spring. She opted not to have angiogram done they are treating her paroxysmal atrial fibrillation but she states she has not had any symptoms of A. fib lately after amiodarone reduction he is still on anticoagulation  Tests and documents reviewed: Recent labs and notes     Objective:   /77   Pulse 68   Temp 97.9 °F (36.6 °C) (Oral)   Wt 123 lb (55.8 kg)   SpO2 94%   BMI 20.47 kg/m²   BP Readings from Last 3 Encounters:   02/24/22 128/77   10/21/21 130/70   10/12/21 130/70     Physical Exam  Vitals reviewed. Constitutional:       Appearance: She is well-developed. She is not toxic-appearing. HENT:      Head: Normocephalic. Neck:      Thyroid: No thyroid mass or thyromegaly. Cardiovascular:      Rate and Rhythm: Normal rate and regular rhythm. Heart sounds: Normal heart sounds. No murmur heard. Pulmonary:      Effort: No respiratory distress. Breath sounds: Normal breath sounds. No wheezing or rales. Chest:   Breasts:      Right: No supraclavicular adenopathy. Abdominal:      General: There is no distension. Palpations: Abdomen is soft. There is no mass. Tenderness: There is no abdominal tenderness. There is no guarding or rebound. Musculoskeletal:      Cervical back: Neck supple. Lymphadenopathy:      Cervical: No cervical adenopathy. Upper Body:      Right upper body: No supraclavicular adenopathy. Skin:     General: Skin is warm.    Neurological: Mental Status: She is alert and oriented to person, place, and time. Psychiatric:         Behavior: Behavior normal.         Thought Content: Thought content normal.         Judgment: Judgment normal.     Deformity of finger joints  Assessment and Plan:   Diagnosis Orders   1. Rheumatoid arthritis involving multiple joints (HCC)  CBC with Auto Differential    Sedimentation Rate    C-Reactive Protein   2. Essential hypertension  Comprehensive Metabolic Panel   3. Pure hypercholesterolemia     4. Hypothyroidism (acquired)  TSH with Reflex   5. Paroxysmal atrial fibrillation (HCC)     6. CHF (congestive heart failure), NYHA class III, acute on chronic, systolic (HCC)     7. Need for pneumococcal vaccination  Pneumococcal polysaccharide vaccine 23-valent greater than or equal to 1yo subcutaneous/IM   We discussed getting flu shot but she decided not to get it since it is already in the February. I recommended shingles vaccine and COVID booster. Patient also requested Retin-A prescription that she is used on her face in the past  The problems listed in the assessment are stable unless otherwise indicated. She  was instructed to continue their current medications and treatment for the above problems unless otherwise indicated above. Age-specific preventative medicine recommendations were reviewed with patient today and the Healthy Family Handout was given to patient. Avoid tobacco products exposure. I have recommended that the patient follow CDC guidelines for prevention of COVID-19 infection. Follow up in 6mo. Call or return to office for any problems that develop before the next scheduled follow-up appointment. Todd Wheatley M.D. Parts of this note were completed using voice recognition transcription. Every effort was made to ensure accuracy; however, inadvertent transcription errors may be present.

## 2022-02-24 NOTE — PATIENT INSTRUCTIONS
Please read the healthy family handout that you were given and share it with your family. Please compare this printed medication list with your medications at home to be sure they are the same. If you have any medications that are different please contact us immediately at [phone]. Also review your allergies that we have listed, these may also include medications that you have not been able to tolerate, make sure everything listed is correct. If you have any allergies that are different please contact us immediately at [phone].

## 2022-02-25 DIAGNOSIS — R73.09 ELEVATED GLUCOSE: ICD-10-CM

## 2022-02-25 DIAGNOSIS — R73.09 ELEVATED GLUCOSE: Primary | ICD-10-CM

## 2022-02-25 LAB
A/G RATIO: 1.7 (ref 1.1–2.2)
ALBUMIN SERPL-MCNC: 4.4 G/DL (ref 3.4–5)
ALP BLD-CCNC: 67 U/L (ref 40–129)
ALT SERPL-CCNC: 14 U/L (ref 10–40)
ANION GAP SERPL CALCULATED.3IONS-SCNC: 14 MMOL/L (ref 3–16)
AST SERPL-CCNC: 24 U/L (ref 15–37)
BASOPHILS ABSOLUTE: 0 K/UL (ref 0–0.2)
BASOPHILS RELATIVE PERCENT: 0.6 %
BILIRUB SERPL-MCNC: 0.6 MG/DL (ref 0–1)
BUN BLDV-MCNC: 12 MG/DL (ref 7–20)
C-REACTIVE PROTEIN: 11.3 MG/L (ref 0–5.1)
CALCIUM SERPL-MCNC: 9.8 MG/DL (ref 8.3–10.6)
CHLORIDE BLD-SCNC: 96 MMOL/L (ref 99–110)
CO2: 24 MMOL/L (ref 21–32)
CREAT SERPL-MCNC: 0.6 MG/DL (ref 0.6–1.2)
EOSINOPHILS ABSOLUTE: 0.1 K/UL (ref 0–0.6)
EOSINOPHILS RELATIVE PERCENT: 1.4 %
ESTIMATED AVERAGE GLUCOSE: 114 MG/DL
GFR AFRICAN AMERICAN: >60
GFR NON-AFRICAN AMERICAN: >60
GLUCOSE BLD-MCNC: 184 MG/DL (ref 70–99)
HBA1C MFR BLD: 5.6 %
HCT VFR BLD CALC: 40.8 % (ref 36–48)
HEMOGLOBIN: 13.8 G/DL (ref 12–16)
LYMPHOCYTES ABSOLUTE: 1.5 K/UL (ref 1–5.1)
LYMPHOCYTES RELATIVE PERCENT: 27.7 %
MCH RBC QN AUTO: 34.3 PG (ref 26–34)
MCHC RBC AUTO-ENTMCNC: 33.7 G/DL (ref 31–36)
MCV RBC AUTO: 101.7 FL (ref 80–100)
MONOCYTES ABSOLUTE: 0.4 K/UL (ref 0–1.3)
MONOCYTES RELATIVE PERCENT: 7.9 %
NEUTROPHILS ABSOLUTE: 3.4 K/UL (ref 1.7–7.7)
NEUTROPHILS RELATIVE PERCENT: 62.4 %
PDW BLD-RTO: 14.3 % (ref 12.4–15.4)
PLATELET # BLD: 283 K/UL (ref 135–450)
PMV BLD AUTO: 7.9 FL (ref 5–10.5)
POTASSIUM SERPL-SCNC: 4.2 MMOL/L (ref 3.5–5.1)
RBC # BLD: 4.01 M/UL (ref 4–5.2)
SEDIMENTATION RATE, ERYTHROCYTE: 59 MM/HR (ref 0–30)
SODIUM BLD-SCNC: 134 MMOL/L (ref 136–145)
TOTAL PROTEIN: 7 G/DL (ref 6.4–8.2)
TSH REFLEX: 2.68 UIU/ML (ref 0.27–4.2)
WBC # BLD: 5.5 K/UL (ref 4–11)

## 2022-05-03 ENCOUNTER — OFFICE VISIT (OUTPATIENT)
Dept: CARDIOLOGY CLINIC | Age: 82
End: 2022-05-03
Payer: MEDICARE

## 2022-05-03 VITALS
HEIGHT: 65 IN | DIASTOLIC BLOOD PRESSURE: 64 MMHG | HEART RATE: 70 BPM | SYSTOLIC BLOOD PRESSURE: 124 MMHG | WEIGHT: 122 LBS | BODY MASS INDEX: 20.33 KG/M2 | OXYGEN SATURATION: 98 %

## 2022-05-03 DIAGNOSIS — I42.8 NON-ISCHEMIC CARDIOMYOPATHY (HCC): ICD-10-CM

## 2022-05-03 DIAGNOSIS — I48.0 PAROXYSMAL ATRIAL FIBRILLATION (HCC): ICD-10-CM

## 2022-05-03 DIAGNOSIS — I44.7 LBBB (LEFT BUNDLE BRANCH BLOCK): Primary | ICD-10-CM

## 2022-05-03 DIAGNOSIS — I50.23 CHF (CONGESTIVE HEART FAILURE), NYHA CLASS III, ACUTE ON CHRONIC, SYSTOLIC (HCC): ICD-10-CM

## 2022-05-03 PROCEDURE — 93000 ELECTROCARDIOGRAM COMPLETE: CPT | Performed by: INTERNAL MEDICINE

## 2022-05-03 PROCEDURE — 99214 OFFICE O/P EST MOD 30 MIN: CPT | Performed by: INTERNAL MEDICINE

## 2022-05-03 RX ORDER — PANTOPRAZOLE SODIUM 40 MG/1
40 TABLET, DELAYED RELEASE ORAL
Qty: 180 TABLET | Refills: 3 | Status: SHIPPED | OUTPATIENT
Start: 2022-05-03 | End: 2022-09-21 | Stop reason: SDUPTHER

## 2022-05-03 RX ORDER — AMIODARONE HYDROCHLORIDE 100 MG/1
50 TABLET ORAL EVERY OTHER DAY
Qty: 45 TABLET | Refills: 3
Start: 2022-05-03 | End: 2022-08-29

## 2022-05-03 NOTE — PATIENT INSTRUCTIONS
RECOMMENDATIONS:  1. Decrease amiodarone to 50mg every other day. 2. Discussed alternatives to Eliquis: Stay on Eliquis at this time. -Meds: Xarelto or warfarin.    -Watchman procedure: not a good option for you at this time. 3. OK to take ibuprofen with protonix. Start protonix 40mg daily. 4. Revisited C and explained risks and benefits- patient does not want to proceed. 5. Follow up with me in 6 months.

## 2022-05-03 NOTE — PROGRESS NOTES
[address]   Electrophysiology Consult Note     Date: 5/3/22  Patient Name: Rashdia Chapin  YOB: 1940    Primary Care Physician: Joy Boo MD    CHIEF COMPLAINT:   Chief Complaint   Patient presents with    6 Month Follow-Up    Other     left bundle branch block     Congestive Heart Failure    Atrial Fibrillation    Cardiomyopathy     HISTORY OF PRESENT ILLNESS: Rashida Chapin is a 80 y.o. female with a medical history significant for symptomatic paroxysmal atrial fibrillation, known left bundle branch block (negative stress test from [facility]), hypertension, rheumoatoid arthritis, hypothyroidism, and complicated diverticulitis who presented to the ED from home 6/2021 with symptomatic atrial fibrillation with RVR having recently undergone LUCY cardioversion. According to patient and her daughter she recently began to suffer from dyspnea on exertion and shortness of breath. She is unable to move from her bed to her recliner without becoming short of breath. She was seen by her PCP who directed her to a cardiologist.  Patient was found to be in atrial fibrillation with RVR. She underwent a LUCY cardioversion on 06/02/2021 ([facility]). She was discharged home on rate control and anticoagulation. Unfortunately patient began to suffer from worsening shortness of breath and dyspnea on exertion shortly thereafter and was brought to [facility] for further evaluation and care last night. Echo 6/3/2021 demonstrated an LVEF of 25%. Patient was evaluated emergency room 6/4/2021. Her CMP was reassuring outside of hypoalbuminemia and hypoproteinemia along with elevated AST and ALT. Her CBC was reassuring. Her BNP was elevated at 1216. Her chest radiograph showed pulmonary vascular congestion with chronic changes, cardiomegaly, small left pleural effusion. Patient was started on amiodarone 6/2021 ([facility]).     Patient returned to ED 6/17/2201 due to acute on chronic CHF. Troponin elevated. Cardiac event monitor worn 2021-2021 demonstrated predominately SB with an average HR of 57 (29-75) BPM, 4.1 second nocturnal pauses, 0.7% PACs, 0.13% PVCs. On 2021, patient's ECG demonstrated Sinus  Rhythm, left bundle branch block, 63 BPM. She reported that she would prefer to follow with [facility] for cardiology. She reported that her biggest complaint is the pain from her RA. She follows with rheumatology. She reported that she experiences dizziness with position change. She reported she is losign weight and attributes this to taking her diuretic as prescribed. She reported she does not wish to complete stress test. She reported she is able to ascend a flight or stairs without any problems. On 10/12/2021, ECG demonstrated SB (51). She stated that she has been having fatigue recently. She stated that she suffers from shoulder and neck pain. Interval History since last office visit: A [facility] was planned at last visit, but patient decided against this. Today, 5/3/2022. ECG demonstrates sinus rhythm, occasional PACs, Left bundle branch block, 70 BPM. She reports she is feeling very well. Patient is taking all cardiac medications as prescribed and tolerates them well. Denies recent issues with bleeding. She does complain of increased bruising on her lower legs. She reports she stays active doing pilates several days a week and tolerates activity well. Patient denies current edema, chest pain, sob, palpitations, dizziness or syncope. Past Medical History:   has a past medical history of Allergic rhinitis, CHF (congestive heart failure), NYHA class I, acute on chronic, combined ([address] Hyperlipidemia, Hypertension, Hypothyroidism, LBBB (left bundle branch block), and Rheumatoid arthritis(714.0). Past Surgical History:   has a past surgical history that includes Colonoscopy ();  section; and joint replacement (Left, 13).      Allergies:  Patient has no known allergies. Social History:   reports that she has quit smoking. She has never used smokeless tobacco. She reports current alcohol use of about 2.0 standard drinks of alcohol per week. She reports that she does not use drugs. Family History: family history includes Heart Disease in her father and mother; Thyroid Disease in her father. Home Medications:    Prior to Admission medications    Medication Sig Start Date End Date Taking? Authorizing Provider   amiodarone (PACERONE) 100 MG tablet Take 0.5 tablets by mouth daily 2/23/22  Yes Francis De La Cruz MD   apixaban (ELIQUIS) 2.5 MG TABS tablet TAKE 1 TABLET BY MOUTH TWICE A DAY 2/17/22  Yes GRACIELA Traore CNP   spironolactone (ALDACTONE) 25 MG tablet Take 1 tablet by mouth daily 12/10/21  Yes GRACIELA Traore CNP   levothyroxine (SYNTHROID) 25 MCG tablet TAKE 1 TABLET DAILY 11/18/21  Yes Georgia Sabillon MD   folic acid (FOLVITE) 1 MG tablet Take 1 mg by mouth Six times weekly Except mondays   Yes Historical Provider, MD   fluticasone (FLONASE) 50 MCG/ACT nasal spray 1 spray by Each Nostril route daily as needed for Allergies    Yes Historical Provider, MD   methotrexate (RHEUMATREX) 2.5 MG chemo tablet TAKE [facility]  Patient taking differently: TAKE 6 TABLETS EVERY MONDAY 2/12/21  Yes Georgia Sabillon MD   VITAMIN D PO Take 1,000 Units by mouth daily    Yes Historical Provider, MD   Probiotic Product (PROBIOTIC PO) Take 1 capsule by mouth daily    Yes Historical Provider, MD   Ascorbic Acid (VITAMIN C PO) Take 1,000 mg by mouth daily    Yes Historical Provider, MD   fish oil-omega-3 fatty acids 1000 MG capsule Take 4 g by mouth daily    Yes Historical Provider, MD   tretinoin (RETIN-A) 0.025 % cream Apply a thin layer to face nightly, 15 minutes after washing.   Patient not taking: Reported on 5/3/2022 2/24/22   Georgia Sabillon MD   furosemide (LASIX) 40 MG tablet Take 1 tablet by mouth daily as needed (SWELLING OR WEIGHT > 125 LBS)  Patient not taking: Reported on 2/24/2022 10/21/21   Mahamed Easton, APRN - CNP       REVIEW OF SYSTEMS:    All 14-point review of systems are completed and  pertinent positives are mentioned in the history of present illness. Other  systems are reviewed and are negative. Physical Examination:    /64   Pulse 70   Ht 5' 5\" (1.651 m)   Wt 122 lb (55.3 kg)   SpO2 98%   BMI 20.30 kg/m²      Constitutional and General Appearance:    alert, cooperative, no distress and appears stated age  [de-identified]:    PERRLA, no cervical lymphadenopathy. No masses palpable. Normal oral  mucosa  Respiratory:  · Normal excursion and expansion without use of accessory muscles  · Resp Auscultation: Normal breath sounds without dullness or wheezing  Cardiovascular:  · The apical impulse is not displaced  · RRR S1S2 w/o M/G/R  Abdomen:  · No masses or tenderness  · Bowel sounds present  Extremities:  ·  No Cyanosis or Clubbing  ·  Lower extremity edema: No  · Skin: Warm and dry  Neurological:  · Alert and oriented. · Moves all extremities well  · No abnormalities of mood, affect, memory, mentation, or behavior are noted    DATA:      ECG 5/3/22: Personally reviewed. Stress test 9/23/2021   Summary  Abnormal myocardial perfusion study. There is a medium-sized, moderate-intensity anteroseptal and apical  reversible defect consistent with ischemia in the territory of the LAD. Calculated LVEF of 44% with mild global hypokinesis. Overall findings represent an intermediate risk scan. Echo limited 9/23/2021   Summary   Limited echo for LV function. The left ventricular systolic function is mildly reduced with an ejection   fraction of 40%. Mild global hypokinesis. Grade II diastolic dysfunction with elevated filing pressure. The left atrium is severely dilated. Compared to last echo on 6/5/2021 (EF 25%), left ventricle systolic function   has improved. IMPRESSION:    1. Paroxysmal atrial fibrillation. 06/04/2021  1. Atrial fibrillation (new diagnosis). Patient is a pleasant 78-year-old female with a medical history significant for atrial fibrillation, hypertension, newly diagnosed cardiomyopathy, rheumatoid arthritis, and diverticulitis who presents from home with symptomatic atrial fibrillation with RVR. Patient follows at [facility]. Work-up at [facility] unclear as far as etiology of her cardiomyopathy. Based on symptoms suspect most likely related to tachycardia cardiomyopathy however indices to be proved with ischemic evaluation. She was started on GDMT however this was eventually discontinued by providers at [facility]. 08/19/2021  Patient presents for follow-up. She is now again to follow with [facility]. She has been on amiodarone since 06/17/2021. She is not been on any juana agents given bradycardia. She wore a cardiac monitor which showed no atrial fibrillation however she did have some PSVT, likely AT. She states that while she is on amiodarone her symptoms significantly improved. We discussed options yet again including antiarrhythmics, juana agents, ablation, pace and ablate strategy. At this point patient is not able to tolerate goal-directed medical therapy due to blood pressure and bradycardia as she had some pauses on her cardiac monitor for up to 4 seconds. We will see if her left ventricular ejection fraction has significantly improved since being on in normal sinus rhythm. We will also follow-up her cardiac stress test to see if ischemia may be the etiology of her symptoms/heart failure. Pending the results of these findings we will discuss the need for ICD and/or ablation. If patient requires an ICD she will need a biventricular ICD. Of note, patient concerned her RA may be getting worse and is concerned it may be her amiodarone which is reasonable.   We discussed alternatives however patient would like to discuss with her rheumatologist before changing as it has made a great impact in her life. - Continue amiodarone 100 mg daily (discuss with rheumatologist). - Continue apixaban 2.5 mg BID.  - Follow up with me in 10/2021 after repeat echocardiogram and stress test to review options. 10/12/2021  Patient presents for follow-up. She has been doing well on her low-dose amiodarone. Upon her rheumatologist there is no firm contraindications from a rheumatologic standpoint to her amiodarone. We discussed options including alternative antiarrhythmics however at this point patient would like to continue on amiodarone as it has significantly improved her atrial fibrillation is help with recovery of her left ventricular ejection fraction. She will need labs every 6 months, chest radiograph every year and an eye exam every year  - Labs Q6 months (TSH, CBC, and CMP). - Chest radiograph yearly. - Eye exam every year. - Continue amiodarone 100 mg daily (ok per rheumatology). - Continue apixaban 2.5 mg BID.  - Follow up with me in 6 momths. 5/3/2022  Patient presents for follow up. She has been doing well. No symptomatic atrial fibrillation. No heart failure. Patient feeling well so declined LHC for ischemic disease. She will let me know if any chest pain or ischemic symptoms. We discussed decreasing her amiodarone to 50 mg Q48 hours and I agreed. - Labs Q6 months (TSH, CBC, and CMP). - Chest radiograph yearly. - Eye exam every year. - Continue amiodarone 100 mg QOD (ok per rheumatology). - Continue apixaban 2.5 mg BID. - Start protonix for GI prophylaxis given rare NSAID use. - Follow up with me in 6 momths. 2. Cardiomyopathy with wide LBBB. - Plan as per above. - Continue to follow with IC.    10/12/2021  Patient had a stress test was concerning for moderate ischemic disease.   She is unclear as to whether or not she would like to move forward with left heart catheterization however after counseling she has decided to move forward. I will asked Dr. Obdulio Shine to perform her left heart catheterization. We will continue medical therapy. She will follow up with cardiology as well as electrophysiology. At this point no ICD indication. Her NYHA is class is 1-2.  - Continue GDMT. - Schedule LHC with Dr. Obdulio Shine (I discussed briefly with him). - Follow up with IC.    5/3/2022  Patient declined ischemic evaluation with [facility]. - Monitor for ischemic symptoms. RECOMMENDATIONS:  1. Decrease amiodarone to 50mg every other day. 2. Discussed alternatives to Eliquis: Stay on Eliquis at this time. -Meds: Xarelto or warfarin.    -Watchman procedure: not a good option for you at this time. 3. OK to take ibuprofen with protonix. Start protonix 40mg daily. 4. Revisited [facility] and explained risks and benefits- patient does not want to proceed. 5. Follow up with me in 6 months. QUALITY MEASURES  1. Tobacco Cessation Counseling: NA  2. Retake of BP if >140/90:   NA  3. Documentation to PCP/referring for new patient:  Sent to PCP at close of office visit  4. CAD patient on anti-platelet: NA  5. CAD patient on STATIN therapy:  NA  6. Patient with CHF and aFib on anticoagulation:  Yes     This note has been scribed in the presence of Caroline Peters MD, by Yari Galeano RN. I reviewed with the resident the medical history and the resident's findings on the physical examination. I discussed with the resident the patient's diagnosis and concur with the plan. Dr. Jose Dailey MD  Electrophysiology  [facility]. [facility]. [address]. [place], [address]  Phone: [phone]  Fax: [phone]     NOTE: This report was transcribed using voice recognition software. Every effort was made to ensure accuracy, however, inadvertent computerized transcription errors may be present.

## 2022-05-17 ENCOUNTER — TELEPHONE (OUTPATIENT)
Dept: FAMILY MEDICINE CLINIC | Age: 82
End: 2022-05-17

## 2022-05-17 RX ORDER — AMOXICILLIN 500 MG/1
1000 CAPSULE ORAL 2 TIMES DAILY
Qty: 28 CAPSULE | Refills: 0 | Status: SHIPPED | OUTPATIENT
Start: 2022-05-17 | End: 2022-05-24

## 2022-05-17 NOTE — TELEPHONE ENCOUNTER
She feels like it never really went away from January 2022? I believe that was the last time she was treated with zpak and then high dose amoxicillin X 10 days.

## 2022-05-17 NOTE — TELEPHONE ENCOUNTER
----- Message from [place] sent at 5/17/2022 11:16 AM EDT -----  Subject: Refill Request    QUESTIONS  Name of Medication? azithromycin (ZITHROMAX) 250 MG tablet  Patient-reported dosage and instructions? as needed  How many days do you have left? 0  Preferred Pharmacy? [id number]  Pharmacy phone number (if available)? [phone]  Additional Information for Provider? Patient having mucus and coughing  ---------------------------------------------------------------------------  --------------  CALL BACK INFO  What is the best way for the office to contact you? OK to leave message on   voicemail  Preferred Call Back Phone Number? [phone]  ---------------------------------------------------------------------------  --------------  SCRIPT ANSWERS  Relationship to Patient?  Self

## 2022-05-17 NOTE — TELEPHONE ENCOUNTER
Patient is still cough and head congestion x 4 to 5 days and is requesting a zpak but is getting worse. She is taking allegra not working, and using Mucinex. She feels like it never really went away. Patient lives in [place] and can't come in. Dr. Carlito Wallace usually calls medication in for her. She is not sob, no fever.

## 2022-05-17 NOTE — TELEPHONE ENCOUNTER
Yes that is what she stated, but sometime she talks to Dr. Avis Hines directly and maybe the last time did not get put in the chart?

## 2022-05-31 ENCOUNTER — TELEMEDICINE (OUTPATIENT)
Dept: FAMILY MEDICINE CLINIC | Age: 82
End: 2022-05-31
Payer: MEDICARE

## 2022-05-31 DIAGNOSIS — Z00.00 MEDICARE ANNUAL WELLNESS VISIT, SUBSEQUENT: Primary | ICD-10-CM

## 2022-05-31 PROCEDURE — G0439 PPPS, SUBSEQ VISIT: HCPCS | Performed by: FAMILY MEDICINE

## 2022-05-31 PROCEDURE — 1123F ACP DISCUSS/DSCN MKR DOCD: CPT | Performed by: FAMILY MEDICINE

## 2022-05-31 SDOH — ECONOMIC STABILITY: FOOD INSECURITY: WITHIN THE PAST 12 MONTHS, THE FOOD YOU BOUGHT JUST DIDN'T LAST AND YOU DIDN'T HAVE MONEY TO GET MORE.: NEVER TRUE

## 2022-05-31 SDOH — ECONOMIC STABILITY: FOOD INSECURITY: WITHIN THE PAST 12 MONTHS, YOU WORRIED THAT YOUR FOOD WOULD RUN OUT BEFORE YOU GOT MONEY TO BUY MORE.: NEVER TRUE

## 2022-05-31 ASSESSMENT — LIFESTYLE VARIABLES
HAVE YOU OR SOMEONE ELSE BEEN INJURED AS A RESULT OF YOUR DRINKING: 0
HOW MANY STANDARD DRINKS CONTAINING ALCOHOL DO YOU HAVE ON A TYPICAL DAY: 1 OR 2
HOW OFTEN DURING THE LAST YEAR HAVE YOU HAD A FEELING OF GUILT OR REMORSE AFTER DRINKING: 0
HOW OFTEN DURING THE LAST YEAR HAVE YOU FAILED TO DO WHAT WAS NORMALLY EXPECTED FROM YOU BECAUSE OF DRINKING: 0
HOW OFTEN DO YOU HAVE A DRINK CONTAINING ALCOHOL: 4 OR MORE TIMES A WEEK
HAS A RELATIVE, FRIEND, DOCTOR, OR ANOTHER HEALTH PROFESSIONAL EXPRESSED CONCERN ABOUT YOUR DRINKING OR SUGGESTED YOU CUT DOWN: 0
HOW OFTEN DURING THE LAST YEAR HAVE YOU BEEN UNABLE TO REMEMBER WHAT HAPPENED THE NIGHT BEFORE BECAUSE YOU HAD BEEN DRINKING: 0
HOW OFTEN DURING THE LAST YEAR HAVE YOU NEEDED AN ALCOHOLIC DRINK FIRST THING IN THE MORNING TO GET YOURSELF GOING AFTER A NIGHT OF HEAVY DRINKING: 0
HOW OFTEN DURING THE LAST YEAR HAVE YOU FOUND THAT YOU WERE NOT ABLE TO STOP DRINKING ONCE YOU HAD STARTED: 0

## 2022-05-31 ASSESSMENT — PATIENT HEALTH QUESTIONNAIRE - PHQ9
SUM OF ALL RESPONSES TO PHQ QUESTIONS 1-9: 0
2. FEELING DOWN, DEPRESSED OR HOPELESS: 0
SUM OF ALL RESPONSES TO PHQ QUESTIONS 1-9: 0
SUM OF ALL RESPONSES TO PHQ9 QUESTIONS 1 & 2: 0
1. LITTLE INTEREST OR PLEASURE IN DOING THINGS: 0

## 2022-05-31 ASSESSMENT — SOCIAL DETERMINANTS OF HEALTH (SDOH): HOW HARD IS IT FOR YOU TO PAY FOR THE VERY BASICS LIKE FOOD, HOUSING, MEDICAL CARE, AND HEATING?: NOT HARD AT ALL

## 2022-05-31 NOTE — PATIENT INSTRUCTIONS
Personalized Preventive Plan for Alis Chapin - 5/31/2022  Medicare offers a range of preventive health benefits. Some of the tests and screenings are paid in full while other may be subject to a deductible, co-insurance, and/or copay. Some of these benefits include a comprehensive review of your medical history including lifestyle, illnesses that may run in your family, and various assessments and screenings as appropriate. After reviewing your medical record and screening and assessments performed today your provider may have ordered immunizations, labs, imaging, and/or referrals for you. A list of these orders (if applicable) as well as your Preventive Care list are included within your After Visit Summary for your review. Other Preventive Recommendations:    · A preventive eye exam performed by an eye specialist is recommended every 1-2 years to screen for glaucoma; cataracts, macular degeneration, and other eye disorders. · A preventive dental visit is recommended every 6 months. · Try to get at least 150 minutes of exercise per week or 10,000 steps per day on a pedometer . · Order or download the FREE \"Exercise & Physical Activity: Your Everyday Guide\" from [employer]. Call 4-429.171.5807 or search [employer] online. · You need 7877-5769 mg of calcium and 6225-1159 IU of vitamin D per day. It is possible to meet your calcium requirement with diet alone, but a vitamin D supplement is usually necessary to meet this goal.  · When exposed to the sun, use a sunscreen that protects against both UVA and UVB radiation with an SPF of 30 or greater. Reapply every 2 to 3 hours or after sweating, drying off with a towel, or swimming. · Always wear a seat belt when traveling in a car. Always wear a helmet when riding a bicycle or motorcycle.

## 2022-05-31 NOTE — PROGRESS NOTES
Medicare Annual Wellness Visit    15 Denia Chapin is here for Medicare AWV    Assessment & Plan   Medicare annual wellness visit, subsequent      Recommendations for Preventive Services Due: see orders and patient instructions/AVS.  Recommended screening schedule for the next 5-10 years is provided to the patient in written form: see Patient Instructions/AVS.     No follow-ups on file. Subjective       Patient's complete Health Risk Assessment and screening values have been reviewed and are found in Flowsheets. The following problems were reviewed today and where indicated follow up appointments were made and/or referrals ordered. Positive Risk Factor Screenings with Interventions:               General Health and ACP:  General  In general, how would you say your health is?: Very Good  In the past 7 days, have you experienced any of the following: New or Increased Pain, New or Increased Fatigue, Loneliness, Social Isolation, Stress or Anger?: No  Do you get the social and emotional support that you need?: Yes  Do you have a Living Will?: Yes    Advance Directives     Power of  Living Will ACP-Advance Directive ACP-Power of     Not on File Not on File Not on File Filed      General Health Risk Interventions:  · No Living Will: ACP documents already completed- patient asked to provide copy to the office    Health Habits/Nutrition:     Physical Activity: Sufficiently Active    Days of Exercise per Week: 7 days    Minutes of Exercise per Session: 30 min     Have you lost any weight without trying in the past 3 months?: No     Have you seen the dentist within the past year?: (!) No             Objective      Patient-Reported Vitals  Patient-Reported Systolic (Top): 209 mmHg  Patient-Reported Diastolic (Bottom): 77 mmHg  Patient-Reported Pulse: 70  Patient-Reported Weight: 123 lbs              No Known Allergies  Prior to Visit Medications    Medication Sig Taking?  Authorizing Provider amiodarone (PACERONE) 100 MG tablet Take 0.5 tablets by mouth every other day  Floirda Nieto MD   pantoprazole (PROTONIX) 40 MG tablet Take 1 tablet by mouth 2 times daily (before meals)  Folrida Nieto MD   tretinoin (RETIN-A) 0.025 % cream Apply a thin layer to face nightly, 15 minutes after washing.   Patient not taking: Reported on 5/3/2022  Sergei Osborne MD   apixaban (ELIQUIS) 2.5 MG TABS tablet TAKE 1 TABLET BY MOUTH TWICE A DAY  GRACIELA Yap CNP   spironolactone (ALDACTONE) 25 MG tablet Take 1 tablet by mouth daily  GRACIELA Day CNP   levothyroxine (SYNTHROID) 25 MCG tablet TAKE 1 TABLET DAILY  Sergei Osborne MD   furosemide (LASIX) 40 MG tablet Take 1 tablet by mouth daily as needed (SWELLING OR WEIGHT > 125 LBS)  Patient not taking: Reported on 2/24/2022  GRACIELA Day CNP   folic acid (FOLVITE) 1 MG tablet Take 1 mg by mouth Six times weekly Except mondays  Historical Provider, MD   fluticasone (FLONASE) 50 MCG/ACT nasal spray 1 spray by Each Nostril route daily as needed for Allergies   Historical Provider, MD   methotrexate ([address]) 2.5 MG chemo tablet TAKE 8 TABLETS EVERY MONDAY  Patient taking differently: TAKE 6 TABLETS EVERY [place] MD Jessenia   VITAMIN D PO Take 1,000 Units by mouth daily   Historical Provider, MD   Probiotic Product (PROBIOTIC PO) Take 1 capsule by mouth daily   Historical Provider, MD   Ascorbic Acid (VITAMIN C PO) Take 1,000 mg by mouth daily   Historical Provider, MD   fish oil-omega-3 fatty acids 1000 MG capsule Take 4 g by mouth daily   Historical Provider, MD Zaidi (Including outside providers/suppliers regularly involved in providing care):   Patient Care Team:  Sergei Osborne MD as PCP - General (Family Medicine)  Sergei Osborne MD as PCP - [facility] Empaneled Provider     Reviewed and updated this visit:  Allergies  Meds            15 Denia Chapin, was evaluated through a synchronous (real-time) telephone encounter. The patient (or guardian if applicable) is aware that this is a billable service. Verbal consent to proceed has been obtained within the past 12 months. The visit was conducted pursuant to the emergency declaration under the [address] [place] [address], [address] [place] [address] authority and the [employer] and [employer] General Act. Patient identification was verified, and a caregiver was present when appropriate. The patient was located in a state where the provider was credentialed to provide care. --Bernie Ash LPN on 4/36/4096 at 0:10 AM    An electronic signature was used to authenticate this note. Rashida Larsen LPN, 1/13/3605, performed the documented evaluation under the direct supervision of the attending physician.

## 2022-06-20 ENCOUNTER — TELEPHONE (OUTPATIENT)
Dept: FAMILY MEDICINE CLINIC | Age: 82
End: 2022-06-20

## 2022-06-20 NOTE — TELEPHONE ENCOUNTER
Patient wants to come here for her bw that is usually ordered by Dr. Aubrie Kahn every 3 months. It was done on 2-24-22 but only because she had an appt with you. Do you want to order what was previous ordered or have her go to a hospital lab.

## 2022-06-21 DIAGNOSIS — R53.82 CHRONIC FATIGUE: ICD-10-CM

## 2022-06-21 DIAGNOSIS — M06.9 RHEUMATOID ARTHRITIS INVOLVING MULTIPLE JOINTS (HCC): Primary | ICD-10-CM

## 2022-08-28 DIAGNOSIS — I48.0 PAROXYSMAL ATRIAL FIBRILLATION (HCC): ICD-10-CM

## 2022-08-29 RX ORDER — AMIODARONE HYDROCHLORIDE 100 MG/1
TABLET ORAL
Qty: 45 TABLET | Refills: 2 | Status: SHIPPED | OUTPATIENT
Start: 2022-08-29 | End: 2022-09-21 | Stop reason: SDUPTHER

## 2022-09-09 DIAGNOSIS — E03.9 HYPOTHYROIDISM (ACQUIRED): ICD-10-CM

## 2022-09-09 RX ORDER — LEVOTHYROXINE SODIUM 0.03 MG/1
TABLET ORAL
Qty: 10 TABLET | Refills: 0 | Status: SHIPPED | OUTPATIENT
Start: 2022-09-09 | End: 2022-09-12 | Stop reason: SDUPTHER

## 2022-09-12 DIAGNOSIS — E03.9 HYPOTHYROIDISM (ACQUIRED): ICD-10-CM

## 2022-09-12 RX ORDER — LEVOTHYROXINE SODIUM 0.03 MG/1
TABLET ORAL
Qty: 90 TABLET | Refills: 0 | Status: SHIPPED | OUTPATIENT
Start: 2022-09-12 | End: 2022-09-26

## 2022-09-21 DIAGNOSIS — I48.0 PAROXYSMAL ATRIAL FIBRILLATION (HCC): ICD-10-CM

## 2022-09-21 RX ORDER — AMIODARONE HYDROCHLORIDE 100 MG/1
TABLET ORAL
Qty: 45 TABLET | Refills: 0 | Status: SHIPPED | OUTPATIENT
Start: 2022-09-21

## 2022-09-21 RX ORDER — PANTOPRAZOLE SODIUM 40 MG/1
40 TABLET, DELAYED RELEASE ORAL
Qty: 180 TABLET | Refills: 1 | Status: SHIPPED | OUTPATIENT
Start: 2022-09-21

## 2022-09-23 NOTE — TELEPHONE ENCOUNTER
11/8/2022 upcoming [address] [facility] appt  11/14/2022 NPDD upcoming appt      10/21/2021 NPDD

## 2022-09-23 NOTE — TELEPHONE ENCOUNTER
Medication Refill    Medication needing refilled:apixaban (ELIQUIS) 2.5 MG TABS tablet    Dosage of the medication:2.5mg    How are you taking this medication (QD, BID, TID, QID, PRN):TAKE 1 TABLET BY MOUTH TWICE A DAY    30 or 90 day supply called in:180    When will you run out of your medication:2 weeks    Which Pharmacy are we sending the medication to?:  [address] [place] [address] [phone] - F [phone] [address] [address]   Phone:  [phone]  Fax:  [phone]

## 2022-09-25 DIAGNOSIS — E03.9 HYPOTHYROIDISM (ACQUIRED): ICD-10-CM

## 2022-09-26 DIAGNOSIS — E03.9 HYPOTHYROIDISM (ACQUIRED): ICD-10-CM

## 2022-09-26 RX ORDER — LEVOTHYROXINE SODIUM 0.03 MG/1
TABLET ORAL
Qty: 10 TABLET | Refills: 0 | Status: SHIPPED | OUTPATIENT
Start: 2022-09-26 | End: 2022-10-04 | Stop reason: SDUPTHER

## 2022-09-28 RX ORDER — LEVOTHYROXINE SODIUM 0.03 MG/1
TABLET ORAL
Qty: 90 TABLET | Refills: 1 | OUTPATIENT
Start: 2022-09-28

## 2022-10-04 ENCOUNTER — OFFICE VISIT (OUTPATIENT)
Dept: FAMILY MEDICINE CLINIC | Age: 82
End: 2022-10-04
Payer: MEDICARE

## 2022-10-04 VITALS
DIASTOLIC BLOOD PRESSURE: 71 MMHG | OXYGEN SATURATION: 95 % | HEIGHT: 65 IN | BODY MASS INDEX: 20.49 KG/M2 | WEIGHT: 123 LBS | HEART RATE: 63 BPM | SYSTOLIC BLOOD PRESSURE: 148 MMHG | RESPIRATION RATE: 16 BRPM

## 2022-10-04 DIAGNOSIS — I50.22 CHRONIC SYSTOLIC CONGESTIVE HEART FAILURE (HCC): ICD-10-CM

## 2022-10-04 DIAGNOSIS — I48.0 PAROXYSMAL ATRIAL FIBRILLATION (HCC): ICD-10-CM

## 2022-10-04 DIAGNOSIS — M06.9 RHEUMATOID ARTHRITIS INVOLVING MULTIPLE JOINTS (HCC): ICD-10-CM

## 2022-10-04 DIAGNOSIS — I10 ESSENTIAL HYPERTENSION: ICD-10-CM

## 2022-10-04 DIAGNOSIS — E78.00 PURE HYPERCHOLESTEROLEMIA: ICD-10-CM

## 2022-10-04 DIAGNOSIS — E03.9 HYPOTHYROIDISM (ACQUIRED): Primary | ICD-10-CM

## 2022-10-04 PROCEDURE — 99214 OFFICE O/P EST MOD 30 MIN: CPT | Performed by: FAMILY MEDICINE

## 2022-10-04 PROCEDURE — 36415 COLL VENOUS BLD VENIPUNCTURE: CPT | Performed by: FAMILY MEDICINE

## 2022-10-04 PROCEDURE — 1123F ACP DISCUSS/DSCN MKR DOCD: CPT | Performed by: FAMILY MEDICINE

## 2022-10-04 RX ORDER — LEVOTHYROXINE SODIUM 0.03 MG/1
TABLET ORAL
Qty: 90 TABLET | Refills: 3 | Status: SHIPPED | OUTPATIENT
Start: 2022-10-04 | End: 2022-10-04 | Stop reason: SDUPTHER

## 2022-10-04 RX ORDER — LEVOTHYROXINE SODIUM 0.03 MG/1
TABLET ORAL
Qty: 30 TABLET | Refills: 1 | Status: SHIPPED | OUTPATIENT
Start: 2022-10-04

## 2022-10-04 RX ORDER — LEVOTHYROXINE SODIUM 0.03 MG/1
TABLET ORAL
Qty: 30 TABLET | Refills: 1 | Status: SHIPPED | OUTPATIENT
Start: 2022-10-04 | End: 2022-10-04 | Stop reason: SDUPTHER

## 2022-10-04 NOTE — PROGRESS NOTES
She presents today for chronic health problems. She goes to Dr. Alex Ng for rheumatology every 6 months and she had her last blood work in July, which I reviewed today but they did not check her thyroid level. She has been on the same dose of Synthroid for a while now and her daughter requested to get her thyroid lab work just once a year she gets other labs by rheumatology. She also has cardiologist that she goes to. She also has a nurse practitioner in that visits her at home once a month. Tests and documents reviewed today: Reviewed last blood work done by her rheumatologist and last note by outside specialist cardiologist     Objective:   BP (!) 148/71 (Site: Right Upper Arm, Position: Sitting)   Pulse 63   Resp 16   Ht 5' 5\" (1.651 m)   Wt 123 lb (55.8 kg)   SpO2 95%   BMI 20.47 kg/m²   BP Readings from Last 3 Encounters:   10/04/22 (!) 148/71   05/03/22 124/64   02/24/22 128/77     Physical Exam  Vitals reviewed. Constitutional:       Appearance: She is well-developed. She is not toxic-appearing. HENT:      Head: Normocephalic. Neck:      Thyroid: No thyroid mass or thyromegaly. Cardiovascular:      Rate and Rhythm: Normal rate and regular rhythm. Heart sounds: Normal heart sounds. No murmur heard. Pulmonary:      Effort: No respiratory distress. Breath sounds: Normal breath sounds. No wheezing or rales. Abdominal:      General: There is no distension. Palpations: Abdomen is soft. There is no mass. Tenderness: There is no abdominal tenderness. There is no guarding or rebound. Musculoskeletal:      Cervical back: Neck supple. Lymphadenopathy:      Cervical: No cervical adenopathy. Upper Body:      Right upper body: No supraclavicular adenopathy. Skin:     General: Skin is warm. Neurological:      Mental Status: She is alert and oriented to person, place, and time. Psychiatric:         Behavior: Behavior normal.         Thought Content:  Thought content normal. Judgment: Judgment normal.   Deformity of fingers noted  Assessment and Plan:   Diagnosis Orders   1. Hypothyroidism (acquired)  levothyroxine (SYNTHROID) 25 MCG tablet    TSH with Reflex    DISCONTINUED: levothyroxine (SYNTHROID) 25 MCG tablet    DISCONTINUED: levothyroxine (SYNTHROID) 25 MCG tablet      2. Rheumatoid arthritis involving multiple joints (HCC)        3. Paroxysmal atrial fibrillation ([facility]        4. Essential hypertension        5. Pure hypercholesterolemia        6. Chronic systolic congestive heart failure ([facility]        She needs a short supply of Synthroid sent to local [facility] and then 90 day sent to mail order  The problems listed in the assessment are stable unless otherwise indicated. Prescription drug management completed today. She  was instructed to continue their current medications and treatment for the above problems unless otherwise indicated above. Age-specific preventative medicine recommendations were reviewed with patient today. Follow up in 12 months. Call or return to office for any problems that develop before the next scheduled follow-up appointment. Ariana Smith M.D. Parts of this note were completed using voice recognition transcription. Every effort was made to ensure accuracy; however, inadvertent transcription errors may be present.

## 2022-10-05 LAB — TSH REFLEX: 3.29 UIU/ML (ref 0.27–4.2)

## 2022-10-12 ENCOUNTER — TELEPHONE (OUTPATIENT)
Dept: CARDIOLOGY CLINIC | Age: 82
End: 2022-10-12

## 2022-10-12 NOTE — TELEPHONE ENCOUNTER
Pts daughter called mhi and stated today pts BP has been high, @1200- 128/87   , stated pt stated her heart feels heavy.  Please call pt

## 2022-10-12 NOTE — TELEPHONE ENCOUNTER
I attempted to call the patient. The blood pressure appears normal and the heart rate is not that elevated. Does her heart rate sustain above 100 BPM? What is she doing while her heart rate is elevated? Is she experiencing any other symptoms? No voicemail set up. Will reattempt at a later time.

## 2022-10-13 NOTE — TELEPHONE ENCOUNTER
Can we see if we can get her on for cardioversion tomorrow. If no missed doses of apixaban then no LUCY. If missed doses then will need LUCY. Please help me arrange. Thank you.

## 2022-10-13 NOTE — TELEPHONE ENCOUNTER
I attempted to call the patient to see how she is monitoring her heart rate. No answer. Will reattempt. If her heart rate is truly this low, what are your recommendations?

## 2022-10-13 NOTE — TELEPHONE ENCOUNTER
Pt returned call. Pt stated that she went for a walk and is very sob. Pt stated that her oxygen is 97 and heart is 37.

## 2022-10-13 NOTE — TELEPHONE ENCOUNTER
Pts daughter called to stated that she thinks pt is in afib. Pts hr ranges from 67-120s. Pt is sob and fatigued. Mumtaz Joseinna stated that pt takes Amiodarone every other day and has not taken it today. Mumtaz Varghese stated that pts phone is not working correctly and would like us to give her a call at [phone].

## 2022-10-13 NOTE — TELEPHONE ENCOUNTER
10/13 pt called i and stated her pulse is 30-34 and wants to know if she should take another amiodarone?  Stated she is having no sob/ cp Please advise

## 2022-10-14 NOTE — TELEPHONE ENCOUNTER
Pt calling in to I. Pt stated that her phone was out of order yesterday. Pt stated that she would like to know how much Amiodarone she is supposed to be taking? Pt states that she has been taking 50 mg (from 100mg tab) every other day. Yesterday was the first day that she had any issues. Today pt states that she is not having any symptoms as of today. BP: 135/96 HR: 64 @11:10am.   Please reach back out to pt at [phone].

## 2022-10-14 NOTE — TELEPHONE ENCOUNTER
Spoke with the patient. She states she is asymptomatic today and her heart rate has been above 60 BPM. She states that she is taking amiodarone 50 mg every other day and tolerates this well, but will increase it to every day if you feel it is necessary. I told the patient she could come into the clinic for an EKG but she stated she did not feel it was necessary now as she feels well.

## 2022-10-14 NOTE — TELEPHONE ENCOUNTER
Spoke with her daughter and she stated that her mother has not missed any doses of Eliquis in the last four weeks. Her mother states that she does not want a cardioversion as she remembers that it did not work the last time. I did explain that she is on Amiodarone now which can increase the success of the cardioversion. She is requesting a call from [address] [place] [address],[address] to discuss the procedure before she schedules. Please contact the patient at [phone].

## 2022-10-14 NOTE — TELEPHONE ENCOUNTER
10/14 Pts daughter called I returning a call from [facility] and would like a call back at [phone]

## 2022-10-14 NOTE — TELEPHONE ENCOUNTER
I understand that she feels better today and would prefer not to come in for EKG. As long as she feels better I'm ok with continuing course. Please let patient know.

## 2022-10-21 NOTE — TELEPHONE ENCOUNTER
Medication Refill    Medication needing refilled: apixaban (ELIQUIS) 2.5 MG TABS tablet       Dosage of the medication: 2.5 mg     How are you taking this medication (QD, BID, TID, QID, PRN): TAKE 1 TABLET BY MOUTH TWICE A DAY    30 or 90 day supply called in: 7 days     When will you run out of your medication: now     Which Pharmacy are we sending the medication to?:    [facility]   [address] [place], [address]   [phone]            Pts daughter said she only needs 7 day supply from [facility] due to mail order shipping out at end of week.  Please advise

## 2022-10-21 NOTE — TELEPHONE ENCOUNTER
LOV 05/03/2022 with [address] [place],[address]  Upcoming appt 11/08/2022  CBC & BMP 06/21/2022 Normal rate, regular rhythm.  Heart sounds S1, S2.  No murmurs, rubs or gallops. 2+ pitting edema bilaterally. Cap refill < 2sec. DP/PT pulses 2+ bilaterally

## 2022-11-08 ENCOUNTER — OFFICE VISIT (OUTPATIENT)
Dept: CARDIOLOGY CLINIC | Age: 82
End: 2022-11-08
Payer: MEDICARE

## 2022-11-08 VITALS
OXYGEN SATURATION: 97 % | WEIGHT: 120.6 LBS | HEART RATE: 63 BPM | HEIGHT: 65 IN | DIASTOLIC BLOOD PRESSURE: 68 MMHG | SYSTOLIC BLOOD PRESSURE: 122 MMHG | BODY MASS INDEX: 20.09 KG/M2

## 2022-11-08 DIAGNOSIS — I44.7 LBBB (LEFT BUNDLE BRANCH BLOCK): Primary | ICD-10-CM

## 2022-11-08 DIAGNOSIS — I42.8 NON-ISCHEMIC CARDIOMYOPATHY (HCC): ICD-10-CM

## 2022-11-08 DIAGNOSIS — I48.0 PAROXYSMAL ATRIAL FIBRILLATION (HCC): ICD-10-CM

## 2022-11-08 PROCEDURE — 1123F ACP DISCUSS/DSCN MKR DOCD: CPT | Performed by: INTERNAL MEDICINE

## 2022-11-08 PROCEDURE — 93000 ELECTROCARDIOGRAM COMPLETE: CPT | Performed by: INTERNAL MEDICINE

## 2022-11-08 PROCEDURE — 3074F SYST BP LT 130 MM HG: CPT | Performed by: INTERNAL MEDICINE

## 2022-11-08 PROCEDURE — 99214 OFFICE O/P EST MOD 30 MIN: CPT | Performed by: INTERNAL MEDICINE

## 2022-11-08 PROCEDURE — 3078F DIAST BP <80 MM HG: CPT | Performed by: INTERNAL MEDICINE

## 2022-11-08 RX ORDER — AMIODARONE HYDROCHLORIDE 100 MG/1
TABLET ORAL
Qty: 90 TABLET | Refills: 0 | Status: SHIPPED | OUTPATIENT
Start: 2022-11-08

## 2022-11-08 RX ORDER — AMIODARONE HYDROCHLORIDE 100 MG/1
TABLET ORAL
Qty: 90 TABLET | Refills: 0 | Status: SHIPPED
Start: 2022-11-08 | End: 2022-11-08 | Stop reason: SDUPTHER

## 2022-11-08 NOTE — PROGRESS NOTES
[address]   Electrophysiology Consult Note     Date: 11/8/22  Patient Name: Nemo Chapin  YOB: 1940    Primary Care Physician: Kenya Celaya MD    CHIEF COMPLAINT:   Chief Complaint   Patient presents with    6 Month Follow-Up    Atrial Fibrillation    Other     LBBB     HISTORY OF PRESENT ILLNESS: Nemo Chapin is a 80 y.o. female with a medical history significant for symptomatic paroxysmal atrial fibrillation, known left bundle branch block (negative stress test from [facility]), hypertension, rheumoatoid arthritis, hypothyroidism, and complicated diverticulitis who presented to the ED from home 6/2021 with symptomatic atrial fibrillation with RVR having recently undergone LUCY cardioversion. According to patient and her daughter she recently began to suffer from dyspnea on exertion and shortness of breath. She is unable to move from her bed to her recliner without becoming short of breath. She was seen by her PCP who directed her to a cardiologist.  Patient was found to be in atrial fibrillation with RVR. She underwent a LUCY cardioversion on 06/02/2021 ([facility]). She was discharged home on rate control and anticoagulation. Unfortunately patient began to suffer from worsening shortness of breath and dyspnea on exertion shortly thereafter and was brought to [address] [place] [address] for further evaluation and care last night. Echo 6/3/2021 demonstrated an LVEF of 25%. Patient was evaluated emergency room 6/4/2021. Her CMP was reassuring outside of hypoalbuminemia and hypoproteinemia along with elevated AST and ALT. Her CBC was reassuring. Her BNP was elevated at 1216. Her chest radiograph showed pulmonary vascular congestion with chronic changes, cardiomegaly, small left pleural effusion. Patient was started on amiodarone 6/2021 ([facility]). Patient returned to ED 6/17/2201 due to acute on chronic CHF. Troponin elevated.     Cardiac event monitor worn 7/27/2021-7/30/2021 demonstrated predominately SB with an average HR of 57 (29-75) BPM, 4.1 second nocturnal pauses, 0.7% PACs, 0.13% PVCs. On 8/19/2021, patient's ECG demonstrated Sinus  Rhythm, left bundle branch block, 63 BPM. She reported that she would prefer to follow with [facility] for cardiology. She reported that her biggest complaint is the pain from her RA. She follows with rheumatology. She reported that she experiences dizziness with position change. She reported she is losign weight and attributes this to taking her diuretic as prescribed. She reported she does not wish to complete stress test. She reported she is able to ascend a flight or stairs without any problems. On 10/12/2021, ECG demonstrated SB (51). She stated that she has been having fatigue recently. She stated that she suffers from shoulder and neck pain. A LHC was planned at last visit, but patient decided against this. On 5/3/2022 she reported she is feeling very well. She does complain of increased bruising on her lower legs. She reported she stays active doing pilates several days a week and tolerates activity well. Interval History since last office visit: On 10/12/2022 the patients daughter called into the office to report that her mothers blood pressure and heart rate were elevated. The patient reported that she felt like her \"heart was heavy. \" A cardioversion was discussed but the patient declined and stated she felt better. Today, 11/08/2022, ECG demonstrates SR (63 BPM). She states that when she had the episode a few weeks ago she had eaten oatmeal porridge for breakfast. She states that she felt like it did not digest. She reports she was unable to sleep as well. She states that her heart rate was sustaining around 100 BPM. She felt some chest pressure along with this. She states that this lasted two days and she has not had any episodes since. She states she takes 50 mg every other morning of Amiodarone.  She states that arthritis is causing her pain. Her daughter reports that she is following with rheumatology for this. Patient denies current edema, sob, dizziness or syncope. She denies any recent issues with bleeding or bruising. Past Medical History:   has a past medical history of Allergic rhinitis, CHF (congestive heart failure), NYHA class I, acute on chronic, combined ([address] Hyperlipidemia, Hypertension, Hypothyroidism, LBBB (left bundle branch block), and Rheumatoid arthritis(714.0). Past Surgical History:   has a past surgical history that includes Colonoscopy ();  section; and joint replacement (Left, 13). Allergies:  Patient has no known allergies. Social History:   reports that she has quit smoking. She has never used smokeless tobacco. She reports current alcohol use of about 2.0 standard drinks per week. She reports that she does not use drugs. Family History: family history includes Heart Disease in her father and mother; Thyroid Disease in her father. Home Medications:    Prior to Admission medications    Medication Sig Start Date End Date Taking?  Authorizing Provider   apixaban (ELIQUIS) 2.5 MG TABS tablet TAKE 1 TABLET BY MOUTH TWICE A DAY 10/21/22  Yes To Mathews MD   levothyroxine (SYNTHROID) 25 MCG tablet TAKE 1 TABLET BY MOUTH EVERY DAY 10/4/22  Yes Lorraine Estrada MD   amiodarone (PACERONE) 100 MG tablet TAKE 1/2 TABLET BY MOUTH EVERY DAY  Patient taking differently: TAKE 1/2 TABLET BY MOUTH EVERY other DAY 22  Yes To Mathews MD   pantoprazole (PROTONIX) 40 MG tablet Take 1 tablet by mouth 2 times daily (before meals)  Patient taking differently: Take 40 mg by mouth as needed 22  Yes To Mathews MD   tretinoin (RETIN-A) 0.025 % cream Apply a thin layer to face nightly, 15 minutes after washing. 22  Yes Lorraine Estrada MD   spironolactone (ALDACTONE) 25 MG tablet Take 1 tablet by mouth daily 12/10/21  Yes GRACIELA Avendano - CNP furosemide (LASIX) 40 MG tablet Take 1 tablet by mouth daily as needed (SWELLING OR WEIGHT > 125 LBS)  Patient taking differently: Take 40 mg by mouth daily 10/21/21  Yes GRACIELA Willoughby - CNP   folic acid (FOLVITE) 1 MG tablet Take 1 mg by mouth Six times weekly Except mondays   Yes Historical Provider, MD   fluticasone (FLONASE) 50 MCG/ACT nasal spray 1 spray by Each Nostril route daily as needed for Allergies    Yes Historical Provider, MD   methotrexate (RHEUMATREX) 2.5 MG chemo tablet TAKE [facility]  Patient taking differently: TAKE 6 TABLETS EVERY MONDAY 2/12/21  Yes Ashley Bedoya MD   VITAMIN D PO Take 1,000 Units by mouth daily    Yes Historical Provider, MD   Probiotic Product (PROBIOTIC PO) Take 1 capsule by mouth daily    Yes Historical Provider, MD   Ascorbic Acid (VITAMIN C PO) Take 1,000 mg by mouth daily    Yes Historical Provider, MD   fish oil-omega-3 fatty acids 1000 MG capsule Take 4 g by mouth daily    Yes Historical Provider, MD       REVIEW OF SYSTEMS:    All 14-point review of systems are completed and  pertinent positives are mentioned in the history of present illness. Other  systems are reviewed and are negative. Physical Examination:    /68   Pulse 63   Ht 5' 5\" (1.651 m)   Wt 120 lb 9.6 oz (54.7 kg)   SpO2 97%   BMI 20.07 kg/m²      Constitutional and General Appearance:    alert, cooperative, no distress and appears stated age  [de-identified]:    PERRLA, no cervical lymphadenopathy. No masses palpable. Normal oral  mucosa  Respiratory:  Normal excursion and expansion without use of accessory muscles  Resp Auscultation: Normal breath sounds without dullness or wheezing  Cardiovascular: The apical impulse is not displaced  RRR S1S2 w/o M/G/R  Abdomen:  No masses or tenderness  Bowel sounds present  Extremities:   No Cyanosis or Clubbing   Lower extremity edema: No  Skin: Warm and dry  Neurological:  Alert and oriented.   Moves all extremities well  No abnormalities of mood, affect, memory, mentation, or behavior are noted    DATA:      ECG 11/8/22: Personally reviewed. Stress test 9/23/2021   Summary  Abnormal myocardial perfusion study. There is a medium-sized, moderate-intensity anteroseptal and apical  reversible defect consistent with ischemia in the territory of the LAD. Calculated LVEF of 44% with mild global hypokinesis. Overall findings represent an intermediate risk scan. Echo limited 9/23/2021   Summary   Limited echo for LV function. The left ventricular systolic function is mildly reduced with an ejection   fraction of 40%. Mild global hypokinesis. Grade II diastolic dysfunction with elevated filing pressure. The left atrium is severely dilated. Compared to last echo on 6/5/2021 (EF 25%), left ventricle systolic function   has improved. IMPRESSION:    1. Paroxysmal atrial fibrillation. 06/04/2021  1. Atrial fibrillation (new diagnosis). Patient is a pleasant 26-year-old female with a medical history significant for atrial fibrillation, hypertension, newly diagnosed cardiomyopathy, rheumatoid arthritis, and diverticulitis who presents from home with symptomatic atrial fibrillation with RVR. Patient follows at [address] [place] [address]. Work-up at [facility] unclear as far as etiology of her cardiomyopathy. Based on symptoms suspect most likely related to tachycardia cardiomyopathy however indices to be proved with ischemic evaluation. She was started on GDMT however this was eventually discontinued by providers at [facility]. 08/19/2021  Patient presents for follow-up. She is now again to follow with [facility]. She has been on amiodarone since 06/17/2021. She is not been on any juana agents given bradycardia. She wore a cardiac monitor which showed no atrial fibrillation however she did have some PSVT, likely AT. She states that while she is on amiodarone her symptoms significantly improved.   We discussed options yet again including antiarrhythmics, juana agents, ablation, pace and ablate strategy. At this point patient is not able to tolerate goal-directed medical therapy due to blood pressure and bradycardia as she had some pauses on her cardiac monitor for up to 4 seconds. We will see if her left ventricular ejection fraction has significantly improved since being on in normal sinus rhythm. We will also follow-up her cardiac stress test to see if ischemia may be the etiology of her symptoms/heart failure. Pending the results of these findings we will discuss the need for ICD and/or ablation. If patient requires an ICD she will need a biventricular ICD. Of note, patient concerned her RA may be getting worse and is concerned it may be her amiodarone which is reasonable. We discussed alternatives however patient would like to discuss with her rheumatologist before changing as it has made a great impact in her life. - Continue amiodarone 100 mg daily (discuss with rheumatologist). - Continue apixaban 2.5 mg BID.  - Follow up with me in 10/2021 after repeat echocardiogram and stress test to review options. 10/12/2021  Patient presents for follow-up. She has been doing well on her low-dose amiodarone. Upon her rheumatologist there is no firm contraindications from a rheumatologic standpoint to her amiodarone. We discussed options including alternative antiarrhythmics however at this point patient would like to continue on amiodarone as it has significantly improved her atrial fibrillation is help with recovery of her left ventricular ejection fraction. She will need labs every 6 months, chest radiograph every year and an eye exam every year  - Labs Q6 months (TSH, CBC, and CMP). - Chest radiograph yearly. - Eye exam every year. - Continue amiodarone 100 mg daily (ok per rheumatology). - Continue apixaban 2.5 mg BID.  - Follow up with me in 6 momths. 5/3/2022  Patient presents for follow up.   She has been doing well. No symptomatic atrial fibrillation. No heart failure. Patient feeling well so declined LHC for ischemic disease. She will let me know if any chest pain or ischemic symptoms. We discussed decreasing her amiodarone to 50 mg Q48 hours and I agreed. - Labs Q6 months (TSH, CBC, and CMP). - Chest radiograph yearly. - Eye exam every year. - Continue amiodarone 100 mg QOD (ok per rheumatology). - Continue apixaban 2.5 mg BID. - Start protonix for GI prophylaxis given rare NSAID use. - Follow up with me in 6 momths. 11/08/2022  Patient presents for follow up. She recently had an episode of tachycardia that last 48 hours and resolved. I would like to increase he amiodarone to 100 mg QOD (only taking 50 mg over other day). - Labs Q6 months (TSH, CBC, and CMP, with labs today). - Chest radiograph yearly (at next visit). - Eye exam every year. - Increase amiodarone to 100 mg QOD. - Continue apixaban 2.5 mg BID. - Start protonix for GI prophylaxis given rare NSAID use. - Follow up with me in 6 momths. 2. Cardiomyopathy with wide LBBB. - Plan as per above. - Continue to follow with IC.    10/12/2021  Patient had a stress test was concerning for moderate ischemic disease. She is unclear as to whether or not she would like to move forward with left heart catheterization however after counseling she has decided to move forward. I will asked Dr. Jagdeep Quintana to perform her left heart catheterization. We will continue medical therapy. She will follow up with cardiology as well as electrophysiology. At this point no ICD indication. Her NYHA is class is 1-2.  - Continue GDMT. - Schedule LHC with Dr. Jagdeep Quintana (I discussed briefly with him). - Follow up with IC.    5/3/2022  Patient declined ischemic evaluation with C. - Monitor for ischemic symptoms. 11/08/2022  - Per IC. RECOMMENDATIONS:  1. Start taking Amiodarone 100 mg every other day  2.  Continue taking Eliquis 2.5 mg twice daily to prevent stroke in the presence of AF  3. Follow up with me in 6 months     QUALITY MEASURES  1. Tobacco Cessation Counseling: NA  2. Retake of BP if >140/90:   NA  3. Documentation to PCP/referring for new patient:  Sent to PCP at close of office visit  4. CAD patient on anti-platelet: NA  5. CAD patient on STATIN therapy:  NA  6. Patient with CHF and aFib on anticoagulation:  Yes     I, Angel Osei RN, am scribing for and in the presence of Dr. Lyubov Moore. 11/08/22 11:20 AM   Angel Osei RN    I reviewed with the resident the medical history and the resident's findings on the physical examination. I discussed with the resident the patient's diagnosis and concur with the plan. Dr. Bryanna Aguirre MD  Electrophysiology  [facility]. [address] [place] [address]. [address]. [place] [id number]  Phone: [phone]  Fax: [phone]     NOTE: This report was transcribed using voice recognition software. Every effort was made to ensure accuracy, however, inadvertent computerized transcription errors may be present.

## 2022-11-08 NOTE — PATIENT INSTRUCTIONS
RECOMMENDATIONS:  1. Start taking Amiodarone 100 mg every other day  2. Continue taking Eliquis 2.5 mg twice daily to prevent stroke in the presence of AF  3.  Follow up with me in 6 months

## 2022-11-10 ENCOUNTER — TELEPHONE (OUTPATIENT)
Dept: CARDIOLOGY CLINIC | Age: 82
End: 2022-11-10

## 2022-11-10 DIAGNOSIS — Z79.899 MEDICATION MANAGEMENT: Primary | ICD-10-CM

## 2022-11-10 NOTE — TELEPHONE ENCOUNTER
I spoke with the patient. She would like to know if the lab work she had done in July at [facility] will suffice. She states she also had a TSH drawn on 10/4/2022. She would like to verify before she has more blood drawn. Please advise.

## 2022-11-10 NOTE — TELEPHONE ENCOUNTER
----- Message from Ana Purvis MD sent at 11/8/2022 11:54 AM EST -----  Please apologize to patient and daughter but I need CBC, CMP and TSH. Will need cxr next year. Can be done at her convenience.

## 2022-11-11 ENCOUNTER — TELEPHONE (OUTPATIENT)
Dept: CARDIOLOGY CLINIC | Age: 82
End: 2022-11-11

## 2022-11-11 NOTE — TELEPHONE ENCOUNTER
----- Message from Catrachito Noyola MD sent at 11/8/2022 11:54 AM EST -----  Please apologize to patient and daughter but I need CBC, CMP and TSH. Will need cxr next year. Can be done at her convenience.

## 2022-11-14 ENCOUNTER — OFFICE VISIT (OUTPATIENT)
Dept: CARDIOLOGY CLINIC | Age: 82
End: 2022-11-14
Payer: MEDICARE

## 2022-11-14 VITALS
SYSTOLIC BLOOD PRESSURE: 120 MMHG | HEART RATE: 62 BPM | OXYGEN SATURATION: 97 % | DIASTOLIC BLOOD PRESSURE: 60 MMHG | WEIGHT: 123 LBS | BODY MASS INDEX: 20.49 KG/M2 | HEIGHT: 65 IN

## 2022-11-14 DIAGNOSIS — E78.00 PURE HYPERCHOLESTEROLEMIA: ICD-10-CM

## 2022-11-14 DIAGNOSIS — I42.8 NON-ISCHEMIC CARDIOMYOPATHY (HCC): Primary | ICD-10-CM

## 2022-11-14 DIAGNOSIS — Z79.01 CHRONIC ANTICOAGULATION: ICD-10-CM

## 2022-11-14 DIAGNOSIS — I10 ESSENTIAL HYPERTENSION: ICD-10-CM

## 2022-11-14 DIAGNOSIS — I50.22 CHRONIC SYSTOLIC CONGESTIVE HEART FAILURE (HCC): ICD-10-CM

## 2022-11-14 DIAGNOSIS — M06.9 RHEUMATOID ARTHRITIS INVOLVING MULTIPLE JOINTS (HCC): ICD-10-CM

## 2022-11-14 DIAGNOSIS — I44.7 LBBB (LEFT BUNDLE BRANCH BLOCK): ICD-10-CM

## 2022-11-14 DIAGNOSIS — I48.0 PAROXYSMAL ATRIAL FIBRILLATION (HCC): ICD-10-CM

## 2022-11-14 PROCEDURE — 3074F SYST BP LT 130 MM HG: CPT | Performed by: NURSE PRACTITIONER

## 2022-11-14 PROCEDURE — 1123F ACP DISCUSS/DSCN MKR DOCD: CPT | Performed by: NURSE PRACTITIONER

## 2022-11-14 PROCEDURE — 99214 OFFICE O/P EST MOD 30 MIN: CPT | Performed by: NURSE PRACTITIONER

## 2022-11-14 PROCEDURE — 3078F DIAST BP <80 MM HG: CPT | Performed by: NURSE PRACTITIONER

## 2022-11-14 RX ORDER — SPIRONOLACTONE 25 MG/1
25 TABLET ORAL DAILY
Qty: 90 TABLET | Refills: 3 | Status: SHIPPED | OUTPATIENT
Start: 2022-11-14

## 2022-11-14 NOTE — PROGRESS NOTES
[address]   Cardiac Evaluation    Primary Care Doctor:  Surinder Reza MD    Chief Complaint   Patient presents with    Medication Refill    Follow-up    Congestive Heart Failure    Hyperlipidemia    Hypertension    Atrial Fibrillation    Cardiomyopathy        Assessment:    1. Non-ischemic cardiomyopathy ([address]    2. Chronic systolic congestive heart failure (HCC)    3. Paroxysmal atrial fibrillation ([facility] [place] [address]    4. LBBB (left bundle branch block)    5. Chronic anticoagulation    6. Essential hypertension    7. Pure hypercholesterolemia    8. Rheumatoid arthritis involving multiple joints (HCC)        Plan:   No change in current heart medicines  Have fasting blood work in early/ mid December  Follow up with me in 1 year    Vitals:    11/14/22 1408   BP: 120/60   Site: Right Upper Arm   Position: Sitting   Cuff Size: Medium Adult   Pulse: 62   SpO2: 97%   Weight: 123 lb (55.8 kg)   Height: 5' 5\" (1.651 m)       Primary Cardiologist: Dr. Kavita Boo. Angelia     History of Present Illness:   I had the pleasure of seeing Shimon Chapin (80 y.o.) in follow up for systolic CHF, NICM, PAF, LBBB, hypertension, hyperlipidemia as well as RA. She was scheduled to undergo left heart catheterization but did decided against this. She saw Dr. Emi Sams on 11/8/22 who increased her amiodarone to 100 mg QOD for palpitations and potential breakthrough Afib. She states when she had the palpitations noted some chest tightness. She denies any further palpitations. No further chest tightness. She is doing fair. She walks every day without chest pain or shortness of breath. Her arthritis is most limiting for her, especially with weather changes. Her BP stays about the same - 120/60-65 at home. She has a little more fatigue with the RA flare up. She has not taken any Lasix. No change in weight or swelling. She takes the spironolactone.      15 Denia Chapin describes symptoms including fatigue, arthritic pain but denies chest pain, dyspnea, palpitations, orthopnea, PND, early saiety, edema, syncope. HOME WEIGHTS:   10/21/21:         123 lbs  21:           118.8 lb  21:           124 lbs    NYHA:   III  ACC/ AHA Stage:    C    Past Medical History:   has a past medical history of Allergic rhinitis, CHF (congestive heart failure), NYHA class I, acute on chronic, combined ([address] Hyperlipidemia, Hypertension, Hypothyroidism, LBBB (left bundle branch block), and Rheumatoid arthritis(714.0). Surgical History:   has a past surgical history that includes Colonoscopy ();  section; and joint replacement (Left, 13). Social History:   reports that she has quit smoking. She has never used smokeless tobacco. She reports current alcohol use of about 2.0 standard drinks per week. She reports that she does not use drugs. Family History:   Family History   Problem Relation Age of Onset    Heart Disease Mother         very slow heart beat    Thyroid Disease Father     Heart Disease Father        Home Medications:  Prior to Admission medications    Medication Sig Start Date End Date Taking?  Authorizing Provider   amiodarone (PACERONE) 100 MG tablet TAKE 1 tablet every OTHER day 22  Yes Asia Teresa MD   apixaban (ELIQUIS) 2.5 MG TABS tablet TAKE 1 TABLET BY MOUTH TWICE A DAY 10/21/22  Yes Asia Teresa MD   levothyroxine (SYNTHROID) 25 MCG tablet TAKE 1 TABLET BY MOUTH EVERY DAY 10/4/22  Yes Cristy Ramirez MD   pantoprazole (PROTONIX) 40 MG tablet Take 1 tablet by mouth 2 times daily (before meals)  Patient taking differently: Take 40 mg by mouth as needed 22  Yes Asia Teresa MD   spironolactone (ALDACTONE) 25 MG tablet Take 1 tablet by mouth daily 12/10/21  Yes GRACIELA Tsai CNP   furosemide (LASIX) 40 MG tablet Take 1 tablet by mouth daily as needed (SWELLING OR WEIGHT > 125 LBS)  Patient taking differently: Take 40 mg by mouth daily 10/21/21  Yes Britton De La Vega Shannan Crawford, APRN - CNP   folic acid (FOLVITE) 1 MG tablet Take 1 mg by mouth Six times weekly Except mondays   Yes Historical Provider, MD   fluticasone (FLONASE) 50 MCG/ACT nasal spray 1 spray by Each Nostril route daily as needed for Allergies    Yes Historical Provider, MD   methotrexate (RHEUMATREX) 2.5 MG chemo tablet TAKE [facility]  Patient taking differently: TAKE 6 TABLETS EVERY MONDAY 2/12/21  Yes Kenya Celaya MD   VITAMIN D PO Take 1,000 Units by mouth daily    Yes Historical Provider, MD   Probiotic Product (PROBIOTIC PO) Take 1 capsule by mouth daily    Yes Historical Provider, MD   Ascorbic Acid (VITAMIN C PO) Take 1,000 mg by mouth daily    Yes Historical Provider, MD   fish oil-omega-3 fatty acids 1000 MG capsule Take 4 g by mouth daily    Yes Historical Provider, MD   tretinoin (RETIN-A) 0.025 % cream Apply a thin layer to face nightly, 15 minutes after washing. Patient not taking: Reported on 11/14/2022 2/24/22   Kenya Celaya MD        Allergies:  Patient has no known allergies. Physical Examination:    Vitals:    11/14/22 1408   BP: 120/60   Site: Right Upper Arm   Position: Sitting   Cuff Size: Medium Adult   Pulse: 62   SpO2: 97%   Weight: 123 lb (55.8 kg)   Height: 5' 5\" (1.651 m)     Constitutional and General Appearance: Warm and dry, no apparent distress, normal coloration  HEENT:  Normocephalic, atraumatic  Respiratory:  Normal excursion and expansion without use of accessory muscles  Resp Auscultation: Clear to auscultation   Cardiovascular: The apical impulses not displaced  Heart tones are crisp and normal  JVP 8 cm H2O  Regular rate and rhythm, normal S1S2, + murmur, no g/r  Peripheral pulses are symmetrical and full  There is no clubbing, cyanosis of the extremities.   No BLE edema  Pedal Pulses: 2+ and equal   Abdomen:  No masses or tenderness  Liver/Spleen: No Abnormalities Noted  Neurological/Psychiatric:  Alert and oriented in all spheres  Moves all extremities well  Exhibits normal gait balance and coordination  No abnormalities of mood, affect, memory, mentation, or behavior are noted    Lab Data:  Most recent lab results below reviewed in office    CBC:   Lab Results   Component Value Date/Time    WBC 5.5 02/24/2022 02:30 PM    WBC 6.2 06/18/2021 06:58 AM    WBC 7.7 06/17/2021 01:45 PM    RBC 4.01 02/24/2022 02:30 PM    RBC 3.86 06/18/2021 06:58 AM    RBC 4.06 06/17/2021 01:45 PM    HGB 13.8 02/24/2022 02:30 PM    HGB 13.1 06/18/2021 06:58 AM    HGB 13.8 06/17/2021 01:45 PM    HCT 40.8 02/24/2022 02:30 PM    HCT 38.7 06/18/2021 06:58 AM    HCT 41.1 06/17/2021 01:45 PM    .7 02/24/2022 02:30 PM    .2 06/18/2021 06:58 AM    .4 06/17/2021 01:45 PM    RDW 14.3 02/24/2022 02:30 PM    RDW 16.8 06/18/2021 06:58 AM    RDW 16.6 06/17/2021 01:45 PM     02/24/2022 02:30 PM     06/18/2021 06:58 AM     06/17/2021 01:45 PM     BMP:  Lab Results   Component Value Date/Time     02/24/2022 02:30 PM     06/18/2021 06:58 AM     06/17/2021 01:45 PM    K 4.2 02/24/2022 02:30 PM    K 3.6 06/18/2021 06:58 AM    K 3.8 06/17/2021 01:45 PM    K 4.1 06/05/2021 11:42 AM    K 3.5 06/04/2021 06:30 AM    K 3.3 03/20/2021 06:06 AM    CL 96 02/24/2022 02:30 PM     06/18/2021 06:58 AM    CL 96 06/17/2021 01:45 PM    CO2 24 02/24/2022 02:30 PM    CO2 26 06/18/2021 06:58 AM    CO2 26 06/17/2021 01:45 PM    BUN 12 02/24/2022 02:30 PM    BUN 19 06/18/2021 06:58 AM    BUN 19 06/17/2021 01:45 PM    CREATININE 0.6 02/24/2022 02:30 PM    CREATININE 0.7 06/18/2021 06:58 AM    CREATININE 0.6 06/17/2021 01:45 PM     BNP:   Lab Results   Component Value Date/Time    PROBNP 4,600 06/17/2021 01:45 PM    PROBNP 1,216 06/03/2021 08:20 AM     LIPID:   Lab Results   Component Value Date/Time    TRIG 86 06/18/2021 06:58 AM    TRIG 78 05/14/2021 04:25 PM    HDL 48 06/18/2021 06:58 AM    HDL 82 05/14/2021 04:25 PM    HDL 72.8 11/14/2011 12:00 AM HDL 64 09/26/2011 09:49 AM    LDLCALC 108 06/18/2021 06:58 AM    LDLCALC 103 05/14/2021 04:25 PM       Cardiac Imaging:  Stress test 9/23/2021   Summary  Abnormal myocardial perfusion study. There is a medium-sized, moderate-intensity anteroseptal and apical  reversible defect consistent with ischemia in the territory of the LAD. Calculated LVEF of 44% with mild global hypokinesis. Overall findings represent an intermediate risk scan. Echo limited 9/23/2021   Summary   Limited echo for LV function. The left ventricular systolic function is mildly reduced with an ejection fraction of 40%. Mild global hypokinesis. Grade II diastolic dysfunction with elevated filing pressure. The left atrium is severely dilated. Compared to last echo on 6/5/2021 (EF 25%), left ventricle systolic function has improved. ECHO 6/5/2021:    Unable to access left ventricular systolic function due to arrhythmia but appears moderately reduced with an ejection fraction of 25%. There is global hypokinesis with abnormal septal motion. Normal left ventricular size with mild concentric left ventricular hypertrophy. Left ventricular diastolic filling pressure is elevated. Moderate mitral regurgitation. The left atrium is mildly dilated. Aortic valve appears sclerotic but opens adequately. Mild aortic and tricuspid regurgitation. Right ventricular systolic function is mildly reduced . Systolic pulmonic artery pressure (SPAP) is normal estimated at 39 mmHg (Right atrial pressure of 8 mmHg). Echo: LUCY (06/02/2021)  Summary:   LUCY performed by [facility]   No left atrial clot detected. The left atrial size is severely dilated. The right atrium is severely dilated. The right ventricle is borderline dilated. Trace pericardial effusion. The left ventricular function is severely reduced. There is severe global hypokinesis of the left ventricle. There is mild mitral regurgitation.         I appreciate the opportunity of cooperating in the care of this individual.    Odalis Perry, GRACIELA - CNP, 11/14/2022, 2:39 PM

## 2022-11-14 NOTE — PATIENT INSTRUCTIONS
No change in current heart medicines  Have fasting blood work in early/ mid December  Follow up with me in 1 year

## 2022-12-02 RX ORDER — SPIRONOLACTONE 25 MG/1
TABLET ORAL
Qty: 90 TABLET | Refills: 3 | Status: SHIPPED | OUTPATIENT
Start: 2022-12-02

## 2022-12-17 ENCOUNTER — APPOINTMENT (OUTPATIENT)
Dept: GENERAL RADIOLOGY | Age: 82
End: 2022-12-17
Payer: MEDICARE

## 2022-12-17 ENCOUNTER — HOSPITAL ENCOUNTER (EMERGENCY)
Age: 82
Discharge: HOME | End: 2022-12-17
Payer: MEDICARE

## 2022-12-17 VITALS
TEMPERATURE: 98.1 F | OXYGEN SATURATION: 98 % | SYSTOLIC BLOOD PRESSURE: 152 MMHG | HEIGHT: 65 IN | DIASTOLIC BLOOD PRESSURE: 77 MMHG | RESPIRATION RATE: 16 BRPM | HEART RATE: 61 BPM | BODY MASS INDEX: 19.99 KG/M2 | WEIGHT: 120 LBS

## 2022-12-17 DIAGNOSIS — L29.8 WINTER ITCH: ICD-10-CM

## 2022-12-17 DIAGNOSIS — R21 RASH AND OTHER NONSPECIFIC SKIN ERUPTION: ICD-10-CM

## 2022-12-17 DIAGNOSIS — L85.3 DRY SKIN: ICD-10-CM

## 2022-12-17 DIAGNOSIS — S22.31XA CLOSED FRACTURE OF ONE RIB OF RIGHT SIDE, INITIAL ENCOUNTER: Primary | ICD-10-CM

## 2022-12-17 PROCEDURE — 71101 X-RAY EXAM UNILAT RIBS/CHEST: CPT

## 2022-12-17 PROCEDURE — 99283 EMERGENCY DEPT VISIT LOW MDM: CPT

## 2022-12-17 ASSESSMENT — PAIN - FUNCTIONAL ASSESSMENT: PAIN_FUNCTIONAL_ASSESSMENT: 0-10

## 2022-12-17 ASSESSMENT — PAIN SCALES - GENERAL: PAINLEVEL_OUTOF10: 8

## 2022-12-17 NOTE — DISCHARGE INSTRUCTIONS
For the rash which could be related to the weather I do recommend lotion, Benadryl cream and cortisone 10. X-ray showed evidence of a nondisplaced right rib fracture. Use the incentive spirometer as instructed. I do recommend Tylenol 650 mg 3 times daily and ibuprofen 200 mg 3 times daily for pain control.

## 2022-12-17 NOTE — ED PROVIDER NOTES
[address] [facility]  ED  EMERGENCY DEPARTMENT ENCOUNTER        Pt Name: Winter Nicole  MRN: [id number]  [address] 1940  Date of evaluation: 12/17/2022  Provider: Rober Meyer PA-C  PCP: Carol Ryan MD  Note Started: 12:34 PM EST 12/17/22      ELVER. I have evaluated this patient. My supervising physician was available for consultation. CHIEF COMPLAINT       Chief Complaint   Patient presents with    Rib Pain (injury)     Was seated, leaning over to put slippers on and slid out of chair and fell onto right side. R rib pain. 200mg ibuporfen @0930. HISTORY OF PRESENT ILLNESS   (Location, Timing/Onset, Context/Setting, Quality, Duration, Modifying Factors, Severity, Associated Signs and Symptoms)  Note limiting factors. Chief Complaint: Right chest wall injury    Bailey Chapin is a 80 y.o. female who presents to the emergency department with her caregiver. Patient is rheumatoid arthritis and has caregiver. This patient is on methotrexate for the RA. She reports on Thursday evening sitting on a roller chair as she typically does to help with dressing due to the RA pain. She apparently fell forward from the chair striking the lateral right chest.  She has pain in this area. Pain is worse with deep breath or cough. She indicates no swelling. Small abrasion area. No hemoptysis or related cough. She is not short of breath. Patient takes methotrexate for the RA. She did take ibuprofen 200 mg yesterday evening and 1 this morning. States that this works better than Tylenol for her RA. Nursing Notes were all reviewed and agreed with or any disagreements were addressed in the HPI. REVIEW OF SYSTEMS    (2-9 systems for level 4, 10 or more for level 5)     Review of Systems    Positives and Pertinent negatives as per HPI. Except as noted above in the ROS, all other systems were reviewed and negative.        PAST MEDICAL HISTORY     Past Medical History:   Diagnosis Date    Allergic rhinitis     CHF (congestive heart failure), NYHA class I, acute on chronic, combined ([facility] [address] 2021    Hyperlipidemia     Hypertension     Hypothyroidism     probable autoimmune thyroiditis    LBBB (left bundle branch block)     ON EKG, had normal GXT    Rheumatoid arthritis(714.0)     Rheumatologist in [address] [place] [address]     Past Surgical History:   Procedure Laterality Date     SECTION      x2    COLONOSCOPY  2005    JOINT REPLACEMENT Left 13    left total knee replacement         CURRENTMEDICATIONS       Previous Medications    AMIODARONE (PACERONE) 100 MG TABLET    TAKE 1 tablet every OTHER day    APIXABAN (ELIQUIS) 2.5 MG TABS TABLET    TAKE 1 TABLET BY MOUTH TWICE A DAY    ASCORBIC ACID (VITAMIN C PO)    Take 1,000 mg by mouth daily     FISH OIL-OMEGA-3 FATTY ACIDS 1000 MG CAPSULE    Take 4 g by mouth daily     FLUTICASONE (FLONASE) 50 MCG/ACT NASAL SPRAY    1 spray by Each Nostril route daily as needed for Allergies     FOLIC ACID (FOLVITE) 1 MG TABLET    Take 1 mg by mouth Six times weekly Except     FUROSEMIDE (LASIX) 40 MG TABLET    Take 1 tablet by mouth daily as needed (SWELLING OR WEIGHT > 125 LBS)    LEVOTHYROXINE (SYNTHROID) 25 MCG TABLET    TAKE 1 TABLET BY MOUTH EVERY DAY    METHOTREXATE (RHEUMATREX) 2.5 MG CHEMO TABLET    TAKE 8 TABLETS EVERY MONDAY    PANTOPRAZOLE (PROTONIX) 40 MG TABLET    Take 1 tablet by mouth 2 times daily (before meals)    PROBIOTIC PRODUCT (PROBIOTIC PO)    Take 1 capsule by mouth daily     SPIRONOLACTONE (ALDACTONE) 25 MG TABLET    TAKE 1 TABLET BY MOUTH EVERY DAY    TRETINOIN (RETIN-A) 0.025 % CREAM    Apply a thin layer to face nightly, 15 minutes after washing. VITAMIN D PO    Take 1,000 Units by mouth daily          ALLERGIES     Patient has no known allergies.     FAMILYHISTORY       Family History   Problem Relation Age of Onset    Heart Disease Mother         very slow heart beat    Thyroid Disease Father Heart Disease Father           SOCIAL HISTORY       Social History     Tobacco Use    Smoking status: Former    Smokeless tobacco: Never    Tobacco comments:     quit age 40   Vaping Use    Vaping Use: Never used   Substance Use Topics    Alcohol use: Yes     Alcohol/week: 2.0 standard drinks     Types: 2 Glasses of wine per week     Comment: occasionally     Drug use: No       SCREENINGS        Kinney Coma Scale  Eye Opening: Spontaneous  Best Verbal Response: Oriented  Best Motor Response: Obeys commands  Kinney Coma Scale Score: 15                CIWA Assessment  BP: (!) 154/67  Heart Rate: 62             PHYSICAL EXAM    (up to 7 for level 4, 8 or more for level 5)     ED Triage Vitals [12/17/22 1208]   BP Temp Temp Source Heart Rate Resp SpO2 Height Weight   (!) 154/67 97.8 °F (36.6 °C) Oral 62 18 98 % 5' 5\" (1.651 m) 120 lb (54.4 kg)       Physical Exam  Vitals and nursing note reviewed. Constitutional:       Appearance: Normal appearance. She is well-developed and normal weight. HENT:      Head: Normocephalic and atraumatic. Right Ear: External ear normal.      Left Ear: External ear normal.   Eyes:      General: No scleral icterus. Right eye: No discharge. Left eye: No discharge. Conjunctiva/sclera: Conjunctivae normal.   Cardiovascular:      Rate and Rhythm: Normal rate and regular rhythm. Heart sounds: Normal heart sounds. Pulmonary:      Effort: Pulmonary effort is normal.      Breath sounds: Normal breath sounds. Comments: The patient has a very superficial skin injury not truly yet an abrasion. There is no crepitation. No instability or obvious flail chest.  Chest:      Chest wall: Tenderness present. Abdominal:      General: Abdomen is flat. Bowel sounds are normal.      Palpations: Abdomen is soft. Tenderness: There is no abdominal tenderness. Musculoskeletal:         General: Normal range of motion.       Cervical back: Normal range of motion and neck supple. Right lower leg: No edema. Left lower leg: No edema. Skin:     General: Skin is warm and dry. Neurological:      General: No focal deficit present. Mental Status: She is alert and oriented to person, place, and time. Mental status is at baseline. Psychiatric:         Mood and Affect: Mood normal.         Behavior: Behavior normal.         Thought Content: Thought content normal.         Judgment: Judgment normal.       DIAGNOSTIC RESULTS   LABS:    Labs Reviewed - No data to display    When ordered only abnormal lab results are displayed. All other labs were within normal range or not returned as of this dictation. EKG: When ordered, EKG's are interpreted by the Emergency Department Physician in the absence of a cardiologist.  Please see their note for interpretation of EKG. RADIOLOGY:   Non-plain film images such as CT, Ultrasound and MRI are read by the radiologist. Plain radiographic images are visualized and preliminarily interpreted by the ED Provider with the below findings:        Interpretation per the Radiologist below, if available at the time of this note:    XR RIBS RIGHT INCLUDE CHEST (MIN 3 VIEWS)   Final Result   Nondisplaced fracture of the right posterolateral 9th rib. No acute process in the lungs. No results found. No results found. PROCEDURES   Unless otherwise noted below, none     Procedures    CRITICAL CARE TIME       CONSULTS:  None      EMERGENCY DEPARTMENT COURSE and DIFFERENTIAL DIAGNOSIS/MDM:   Vitals:    Vitals:    12/17/22 1208   BP: (!) 154/67   Pulse: 62   Resp: 18   Temp: 97.8 °F (36.6 °C)   TempSrc: Oral   SpO2: 98%   Weight: 120 lb (54.4 kg)   Height: 5' 5\" (1.651 m)       Patient was given the following medications:  Medications - No data to display      Is this patient to be included in the SEP-1 Core Measure due to severe sepsis or septic shock?    No   Exclusion criteria - the patient is NOT to be included for SEP-1

## 2022-12-17 NOTE — ED NOTES
Incentive Spirometer and education on use provided.  Patient verbalized the importance of use and demonstrated proper use with this RN       Kim Fofana RN  12/17/22 [id number]

## 2022-12-19 ENCOUNTER — CARE COORDINATION (OUTPATIENT)
Dept: CARE COORDINATION | Age: 82
End: 2022-12-19

## 2022-12-20 DIAGNOSIS — E03.9 HYPOTHYROIDISM (ACQUIRED): ICD-10-CM

## 2022-12-20 RX ORDER — LEVOTHYROXINE SODIUM 0.03 MG/1
TABLET ORAL
Qty: 90 TABLET | Refills: 0 | Status: SHIPPED | OUTPATIENT
Start: 2022-12-20

## 2023-01-02 RX ORDER — PREDNISONE 20 MG/1
20 TABLET ORAL DAILY
Qty: 10 TABLET | Refills: 0 | Status: SHIPPED | OUTPATIENT
Start: 2023-01-02 | End: 2023-01-12

## 2023-01-02 RX ORDER — TRIAMCINOLONE ACETONIDE 1 MG/G
CREAM TOPICAL
Qty: 45 G | Refills: 3 | Status: SHIPPED | OUTPATIENT
Start: 2023-01-02

## 2023-01-02 RX ORDER — HYDROXYZINE HYDROCHLORIDE 10 MG/1
10-20 TABLET, FILM COATED ORAL 3 TIMES DAILY PRN
Qty: 50 TABLET | Refills: 1 | Status: SHIPPED | OUTPATIENT
Start: 2023-01-02

## 2023-01-10 ENCOUNTER — TELEPHONE (OUTPATIENT)
Dept: FAMILY MEDICINE CLINIC | Age: 83
End: 2023-01-10

## 2023-01-10 DIAGNOSIS — E78.00 PURE HYPERCHOLESTEROLEMIA: ICD-10-CM

## 2023-01-10 DIAGNOSIS — I10 ESSENTIAL HYPERTENSION: ICD-10-CM

## 2023-01-10 DIAGNOSIS — M06.9 RHEUMATOID ARTHRITIS INVOLVING MULTIPLE JOINTS (HCC): ICD-10-CM

## 2023-01-10 DIAGNOSIS — E03.9 HYPOTHYROIDISM (ACQUIRED): Primary | ICD-10-CM

## 2023-01-10 NOTE — TELEPHONE ENCOUNTER
Pt called. States that she needs blood work done. Not the blood work orders from Dr Augusto Meza that are pending. She said that it's her regular things that you always order for her. I do not see any orders. What would you like drawn on her. Pls advise. Will need to get her scheduled.

## 2023-01-11 ENCOUNTER — NURSE ONLY (OUTPATIENT)
Dept: FAMILY MEDICINE CLINIC | Age: 83
End: 2023-01-11
Payer: MEDICARE

## 2023-01-11 DIAGNOSIS — E03.9 HYPOTHYROIDISM (ACQUIRED): ICD-10-CM

## 2023-01-11 DIAGNOSIS — E78.00 PURE HYPERCHOLESTEROLEMIA: ICD-10-CM

## 2023-01-11 DIAGNOSIS — I10 ESSENTIAL HYPERTENSION: ICD-10-CM

## 2023-01-11 DIAGNOSIS — M06.9 RHEUMATOID ARTHRITIS INVOLVING MULTIPLE JOINTS (HCC): ICD-10-CM

## 2023-01-11 PROCEDURE — 36415 COLL VENOUS BLD VENIPUNCTURE: CPT | Performed by: FAMILY MEDICINE

## 2023-01-12 ENCOUNTER — TELEPHONE (OUTPATIENT)
Dept: FAMILY MEDICINE CLINIC | Age: 83
End: 2023-01-12

## 2023-01-12 DIAGNOSIS — L29.9 CHRONIC PRURITUS: Primary | ICD-10-CM

## 2023-01-12 LAB
A/G RATIO: 1.6 (ref 1.1–2.2)
ALBUMIN SERPL-MCNC: 4.1 G/DL (ref 3.4–5)
ALP BLD-CCNC: 82 U/L (ref 40–129)
ALT SERPL-CCNC: 15 U/L (ref 10–40)
ANION GAP SERPL CALCULATED.3IONS-SCNC: 16 MMOL/L (ref 3–16)
AST SERPL-CCNC: 23 U/L (ref 15–37)
BASOPHILS ABSOLUTE: 0.1 K/UL (ref 0–0.2)
BASOPHILS RELATIVE PERCENT: 1.3 %
BILIRUB SERPL-MCNC: 0.5 MG/DL (ref 0–1)
BUN BLDV-MCNC: 16 MG/DL (ref 7–20)
C-REACTIVE PROTEIN: <3 MG/L (ref 0–5.1)
CALCIUM SERPL-MCNC: 9.6 MG/DL (ref 8.3–10.6)
CHLORIDE BLD-SCNC: 95 MMOL/L (ref 99–110)
CHOLESTEROL, TOTAL: 266 MG/DL (ref 0–199)
CO2: 22 MMOL/L (ref 21–32)
CREAT SERPL-MCNC: 0.7 MG/DL (ref 0.6–1.2)
EOSINOPHILS ABSOLUTE: 0.2 K/UL (ref 0–0.6)
EOSINOPHILS RELATIVE PERCENT: 2.1 %
GFR SERPL CREATININE-BSD FRML MDRD: >60 ML/MIN/{1.73_M2}
GLUCOSE BLD-MCNC: 96 MG/DL (ref 70–99)
HCT VFR BLD CALC: 45.5 % (ref 36–48)
HDLC SERPL-MCNC: 108 MG/DL (ref 40–60)
HEMOGLOBIN: 14.8 G/DL (ref 12–16)
LDL CHOLESTEROL CALCULATED: 139 MG/DL
LYMPHOCYTES ABSOLUTE: 1.9 K/UL (ref 1–5.1)
LYMPHOCYTES RELATIVE PERCENT: 24.1 %
MCH RBC QN AUTO: 33 PG (ref 26–34)
MCHC RBC AUTO-ENTMCNC: 32.5 G/DL (ref 31–36)
MCV RBC AUTO: 101.5 FL (ref 80–100)
MONOCYTES ABSOLUTE: 0.8 K/UL (ref 0–1.3)
MONOCYTES RELATIVE PERCENT: 10.6 %
NEUTROPHILS ABSOLUTE: 4.8 K/UL (ref 1.7–7.7)
NEUTROPHILS RELATIVE PERCENT: 61.9 %
PDW BLD-RTO: 16.1 % (ref 12.4–15.4)
PLATELET # BLD: 314 K/UL (ref 135–450)
PMV BLD AUTO: 8.2 FL (ref 5–10.5)
POTASSIUM SERPL-SCNC: 4.4 MMOL/L (ref 3.5–5.1)
RBC # BLD: 4.48 M/UL (ref 4–5.2)
SEDIMENTATION RATE, ERYTHROCYTE: 45 MM/HR (ref 0–30)
SODIUM BLD-SCNC: 133 MMOL/L (ref 136–145)
TOTAL PROTEIN: 6.7 G/DL (ref 6.4–8.2)
TRIGL SERPL-MCNC: 97 MG/DL (ref 0–150)
VLDLC SERPL CALC-MCNC: 19 MG/DL
WBC # BLD: 7.7 K/UL (ref 4–11)

## 2023-01-12 RX ORDER — DOXEPIN HYDROCHLORIDE 10 MG/1
10 CAPSULE ORAL NIGHTLY
Qty: 30 CAPSULE | Refills: 0 | Status: SHIPPED | OUTPATIENT
Start: 2023-01-12

## 2023-01-12 NOTE — TELEPHONE ENCOUNTER
Patient spoke to  on call on 1/2 stating she had a rash, itchy skin he gave her prednisone 20 mg daily x 10 days, atarax 10-20 mg tid and kenalog cream, still itching she has also been using cortisone cream and benadryl otc, please advise.

## 2023-01-13 NOTE — TELEPHONE ENCOUNTER
Patient notified, she wanted to know if you thought  this would help her more than the atarax?      Also patient said she has tried stopping her methotrexate for the past 3 wks and seems to be doing ok but she will be following up with Dr Erick Maria soon

## 2023-01-24 ENCOUNTER — APPOINTMENT (OUTPATIENT)
Dept: CT IMAGING | Age: 83
End: 2023-01-24
Payer: MEDICARE

## 2023-01-24 ENCOUNTER — HOSPITAL ENCOUNTER (OUTPATIENT)
Age: 83
Setting detail: OBSERVATION
Discharge: HOME | End: 2023-01-26
Attending: EMERGENCY MEDICINE | Admitting: INTERNAL MEDICINE
Payer: MEDICARE

## 2023-01-24 ENCOUNTER — APPOINTMENT (OUTPATIENT)
Dept: GENERAL RADIOLOGY | Age: 83
End: 2023-01-24
Payer: MEDICARE

## 2023-01-24 DIAGNOSIS — M54.6 PAIN IN THORACIC SPINE: ICD-10-CM

## 2023-01-24 DIAGNOSIS — R10.30 LOWER ABDOMINAL PAIN: Primary | ICD-10-CM

## 2023-01-24 DIAGNOSIS — S22.41XA CLOSED FRACTURE OF MULTIPLE RIBS OF RIGHT SIDE, INITIAL ENCOUNTER: ICD-10-CM

## 2023-01-24 PROBLEM — R07.9 CHEST PAIN: Status: ACTIVE | Noted: 2023-01-24

## 2023-01-24 PROBLEM — I50.22 CHRONIC SYSTOLIC CHF (CONGESTIVE HEART FAILURE) (HCC): Status: ACTIVE | Noted: 2021-06-17

## 2023-01-24 PROBLEM — M54.9 BACK PAIN: Status: ACTIVE | Noted: 2023-01-24

## 2023-01-24 LAB
A/G RATIO: 1.5 (ref 1.1–2.2)
ALBUMIN SERPL-MCNC: 4.2 G/DL (ref 3.4–5)
ALP BLD-CCNC: 78 U/L (ref 40–129)
ALT SERPL-CCNC: 13 U/L (ref 10–40)
ANION GAP SERPL CALCULATED.3IONS-SCNC: 10 MMOL/L (ref 3–16)
AST SERPL-CCNC: 19 U/L (ref 15–37)
BASOPHILS ABSOLUTE: 0 K/UL (ref 0–0.2)
BASOPHILS RELATIVE PERCENT: 0.3 %
BILIRUB SERPL-MCNC: 0.5 MG/DL (ref 0–1)
BUN BLDV-MCNC: 12 MG/DL (ref 7–20)
CALCIUM SERPL-MCNC: 9.1 MG/DL (ref 8.3–10.6)
CHLORIDE BLD-SCNC: 98 MMOL/L (ref 99–110)
CO2: 24 MMOL/L (ref 21–32)
CREAT SERPL-MCNC: <0.5 MG/DL (ref 0.6–1.2)
EKG ATRIAL RATE: 64 BPM
EKG DIAGNOSIS: NORMAL
EKG P AXIS: 75 DEGREES
EKG P-R INTERVAL: 148 MS
EKG Q-T INTERVAL: 462 MS
EKG QRS DURATION: 142 MS
EKG QTC CALCULATION (BAZETT): 476 MS
EKG R AXIS: -24 DEGREES
EKG T AXIS: 146 DEGREES
EKG VENTRICULAR RATE: 64 BPM
EOSINOPHILS ABSOLUTE: 0 K/UL (ref 0–0.6)
EOSINOPHILS RELATIVE PERCENT: 0.3 %
GFR SERPL CREATININE-BSD FRML MDRD: >60 ML/MIN/{1.73_M2}
GLUCOSE BLD-MCNC: 201 MG/DL (ref 70–99)
HCT VFR BLD CALC: 42.1 % (ref 36–48)
HEMOGLOBIN: 13.9 G/DL (ref 12–16)
LIPASE: 25 U/L (ref 13–60)
LYMPHOCYTES ABSOLUTE: 0.8 K/UL (ref 1–5.1)
LYMPHOCYTES RELATIVE PERCENT: 9.8 %
MCH RBC QN AUTO: 33 PG (ref 26–34)
MCHC RBC AUTO-ENTMCNC: 33 G/DL (ref 31–36)
MCV RBC AUTO: 100.2 FL (ref 80–100)
MONOCYTES ABSOLUTE: 0.5 K/UL (ref 0–1.3)
MONOCYTES RELATIVE PERCENT: 6.8 %
NEUTROPHILS ABSOLUTE: 6.3 K/UL (ref 1.7–7.7)
NEUTROPHILS RELATIVE PERCENT: 82.8 %
PDW BLD-RTO: 15.5 % (ref 12.4–15.4)
PLATELET # BLD: 222 K/UL (ref 135–450)
PMV BLD AUTO: 7.4 FL (ref 5–10.5)
POTASSIUM REFLEX MAGNESIUM: 4 MMOL/L (ref 3.5–5.1)
PRO-BNP: 630 PG/ML (ref 0–449)
RBC # BLD: 4.2 M/UL (ref 4–5.2)
SARS-COV-2, NAAT: NOT DETECTED
SODIUM BLD-SCNC: 132 MMOL/L (ref 136–145)
TOTAL PROTEIN: 7 G/DL (ref 6.4–8.2)
TROPONIN: <0.01 NG/ML
TROPONIN: <0.01 NG/ML
WBC # BLD: 7.7 K/UL (ref 4–11)

## 2023-01-24 PROCEDURE — 36415 COLL VENOUS BLD VENIPUNCTURE: CPT

## 2023-01-24 PROCEDURE — 93010 ELECTROCARDIOGRAM REPORT: CPT | Performed by: INTERNAL MEDICINE

## 2023-01-24 PROCEDURE — 6360000002 HC RX W HCPCS: Performed by: PHYSICIAN ASSISTANT

## 2023-01-24 PROCEDURE — 96375 TX/PRO/DX INJ NEW DRUG ADDON: CPT

## 2023-01-24 PROCEDURE — 6360000002 HC RX W HCPCS: Performed by: EMERGENCY MEDICINE

## 2023-01-24 PROCEDURE — 87635 SARS-COV-2 COVID-19 AMP PRB: CPT

## 2023-01-24 PROCEDURE — G0378 HOSPITAL OBSERVATION PER HR: HCPCS

## 2023-01-24 PROCEDURE — 2580000003 HC RX 258: Performed by: INTERNAL MEDICINE

## 2023-01-24 PROCEDURE — 6370000000 HC RX 637 (ALT 250 FOR IP): Performed by: INTERNAL MEDICINE

## 2023-01-24 PROCEDURE — 6360000002 HC RX W HCPCS: Performed by: INTERNAL MEDICINE

## 2023-01-24 PROCEDURE — 6360000004 HC RX CONTRAST MEDICATION: Performed by: EMERGENCY MEDICINE

## 2023-01-24 PROCEDURE — 71260 CT THORAX DX C+: CPT | Performed by: EMERGENCY MEDICINE

## 2023-01-24 PROCEDURE — 83690 ASSAY OF LIPASE: CPT

## 2023-01-24 PROCEDURE — 85025 COMPLETE CBC W/AUTO DIFF WBC: CPT

## 2023-01-24 PROCEDURE — 97116 GAIT TRAINING THERAPY: CPT

## 2023-01-24 PROCEDURE — 83880 ASSAY OF NATRIURETIC PEPTIDE: CPT

## 2023-01-24 PROCEDURE — 96376 TX/PRO/DX INJ SAME DRUG ADON: CPT

## 2023-01-24 PROCEDURE — 84484 ASSAY OF TROPONIN QUANT: CPT

## 2023-01-24 PROCEDURE — 80053 COMPREHEN METABOLIC PANEL: CPT

## 2023-01-24 PROCEDURE — 97162 PT EVAL MOD COMPLEX 30 MIN: CPT

## 2023-01-24 PROCEDURE — 96374 THER/PROPH/DIAG INJ IV PUSH: CPT

## 2023-01-24 PROCEDURE — 99285 EMERGENCY DEPT VISIT HI MDM: CPT

## 2023-01-24 PROCEDURE — 72070 X-RAY EXAM THORAC SPINE 2VWS: CPT

## 2023-01-24 PROCEDURE — 93005 ELECTROCARDIOGRAM TRACING: CPT | Performed by: PHYSICIAN ASSISTANT

## 2023-01-24 PROCEDURE — 71101 X-RAY EXAM UNILAT RIBS/CHEST: CPT

## 2023-01-24 PROCEDURE — 97530 THERAPEUTIC ACTIVITIES: CPT

## 2023-01-24 PROCEDURE — 74177 CT ABD & PELVIS W/CONTRAST: CPT

## 2023-01-24 RX ORDER — SODIUM CHLORIDE 0.9 % (FLUSH) 0.9 %
5-40 SYRINGE (ML) INJECTION PRN
Status: DISCONTINUED | OUTPATIENT
Start: 2023-01-24 | End: 2023-01-26 | Stop reason: HOSPADM

## 2023-01-24 RX ORDER — POLYETHYLENE GLYCOL 3350 17 G/17G
17 POWDER, FOR SOLUTION ORAL DAILY PRN
Status: DISCONTINUED | OUTPATIENT
Start: 2023-01-24 | End: 2023-01-26 | Stop reason: HOSPADM

## 2023-01-24 RX ORDER — ONDANSETRON 4 MG/1
4 TABLET, ORALLY DISINTEGRATING ORAL EVERY 8 HOURS PRN
Status: DISCONTINUED | OUTPATIENT
Start: 2023-01-24 | End: 2023-01-26 | Stop reason: HOSPADM

## 2023-01-24 RX ORDER — HYDROCODONE BITARTRATE AND ACETAMINOPHEN 5; 325 MG/1; MG/1
1 TABLET ORAL EVERY 6 HOURS PRN
Status: DISCONTINUED | OUTPATIENT
Start: 2023-01-24 | End: 2023-01-26 | Stop reason: HOSPADM

## 2023-01-24 RX ORDER — HYDROCODONE BITARTRATE AND ACETAMINOPHEN 5; 325 MG/1; MG/1
1 TABLET ORAL ONCE
Status: DISCONTINUED | OUTPATIENT
Start: 2023-01-24 | End: 2023-01-26 | Stop reason: HOSPADM

## 2023-01-24 RX ORDER — LIDOCAINE 4 G/G
1 PATCH TOPICAL DAILY
Status: DISCONTINUED | OUTPATIENT
Start: 2023-01-24 | End: 2023-01-26 | Stop reason: HOSPADM

## 2023-01-24 RX ORDER — ASPIRIN 81 MG/1
81 TABLET, CHEWABLE ORAL DAILY
Status: DISCONTINUED | OUTPATIENT
Start: 2023-01-25 | End: 2023-01-26 | Stop reason: HOSPADM

## 2023-01-24 RX ORDER — ACETAMINOPHEN 325 MG/1
650 TABLET ORAL EVERY 6 HOURS PRN
Status: DISCONTINUED | OUTPATIENT
Start: 2023-01-24 | End: 2023-01-26 | Stop reason: HOSPADM

## 2023-01-24 RX ORDER — ONDANSETRON 2 MG/ML
4 INJECTION INTRAMUSCULAR; INTRAVENOUS EVERY 30 MIN PRN
Status: DISCONTINUED | OUTPATIENT
Start: 2023-01-24 | End: 2023-01-26 | Stop reason: HOSPADM

## 2023-01-24 RX ORDER — MORPHINE SULFATE 2 MG/ML
2 INJECTION, SOLUTION INTRAMUSCULAR; INTRAVENOUS ONCE
Status: COMPLETED | OUTPATIENT
Start: 2023-01-24 | End: 2023-01-24

## 2023-01-24 RX ORDER — ATORVASTATIN CALCIUM 40 MG/1
40 TABLET, FILM COATED ORAL NIGHTLY
Status: DISCONTINUED | OUTPATIENT
Start: 2023-01-24 | End: 2023-01-26 | Stop reason: HOSPADM

## 2023-01-24 RX ORDER — DOXEPIN HYDROCHLORIDE 10 MG/1
10 CAPSULE ORAL NIGHTLY
Status: DISCONTINUED | OUTPATIENT
Start: 2023-01-24 | End: 2023-01-26 | Stop reason: HOSPADM

## 2023-01-24 RX ORDER — MORPHINE SULFATE 2 MG/ML
2 INJECTION, SOLUTION INTRAMUSCULAR; INTRAVENOUS EVERY 30 MIN PRN
Status: DISCONTINUED | OUTPATIENT
Start: 2023-01-24 | End: 2023-01-26 | Stop reason: HOSPADM

## 2023-01-24 RX ORDER — KETOROLAC TROMETHAMINE 30 MG/ML
15 INJECTION, SOLUTION INTRAMUSCULAR; INTRAVENOUS EVERY 6 HOURS PRN
Status: DISCONTINUED | OUTPATIENT
Start: 2023-01-24 | End: 2023-01-25

## 2023-01-24 RX ORDER — PANTOPRAZOLE SODIUM 40 MG/1
40 TABLET, DELAYED RELEASE ORAL
Status: DISCONTINUED | OUTPATIENT
Start: 2023-01-25 | End: 2023-01-26 | Stop reason: HOSPADM

## 2023-01-24 RX ORDER — LEVOTHYROXINE SODIUM 0.03 MG/1
25 TABLET ORAL DAILY
Status: DISCONTINUED | OUTPATIENT
Start: 2023-01-25 | End: 2023-01-26 | Stop reason: HOSPADM

## 2023-01-24 RX ORDER — AMIODARONE HYDROCHLORIDE 200 MG/1
100 TABLET ORAL EVERY OTHER DAY
Status: DISCONTINUED | OUTPATIENT
Start: 2023-01-25 | End: 2023-01-26 | Stop reason: HOSPADM

## 2023-01-24 RX ORDER — ACETAMINOPHEN 650 MG/1
650 SUPPOSITORY RECTAL EVERY 6 HOURS PRN
Status: DISCONTINUED | OUTPATIENT
Start: 2023-01-24 | End: 2023-01-26 | Stop reason: HOSPADM

## 2023-01-24 RX ORDER — SODIUM CHLORIDE 9 MG/ML
INJECTION, SOLUTION INTRAVENOUS PRN
Status: DISCONTINUED | OUTPATIENT
Start: 2023-01-24 | End: 2023-01-26 | Stop reason: HOSPADM

## 2023-01-24 RX ORDER — ONDANSETRON 2 MG/ML
4 INJECTION INTRAMUSCULAR; INTRAVENOUS EVERY 6 HOURS PRN
Status: DISCONTINUED | OUTPATIENT
Start: 2023-01-24 | End: 2023-01-26 | Stop reason: HOSPADM

## 2023-01-24 RX ORDER — SPIRONOLACTONE 25 MG/1
25 TABLET ORAL DAILY
Status: DISCONTINUED | OUTPATIENT
Start: 2023-01-25 | End: 2023-01-26 | Stop reason: HOSPADM

## 2023-01-24 RX ORDER — SODIUM CHLORIDE 0.9 % (FLUSH) 0.9 %
5-40 SYRINGE (ML) INJECTION EVERY 12 HOURS SCHEDULED
Status: DISCONTINUED | OUTPATIENT
Start: 2023-01-24 | End: 2023-01-26 | Stop reason: HOSPADM

## 2023-01-24 RX ADMIN — IOPAMIDOL 75 ML: 755 INJECTION, SOLUTION INTRAVENOUS at 14:46

## 2023-01-24 RX ADMIN — MORPHINE SULFATE 2 MG: 2 INJECTION, SOLUTION INTRAMUSCULAR; INTRAVENOUS at 12:40

## 2023-01-24 RX ADMIN — MORPHINE SULFATE 2 MG: 2 INJECTION, SOLUTION INTRAMUSCULAR; INTRAVENOUS at 14:23

## 2023-01-24 RX ADMIN — KETOROLAC TROMETHAMINE 15 MG: 30 INJECTION, SOLUTION INTRAMUSCULAR at 18:27

## 2023-01-24 RX ADMIN — MORPHINE SULFATE 2 MG: 2 INJECTION, SOLUTION INTRAMUSCULAR; INTRAVENOUS at 16:40

## 2023-01-24 RX ADMIN — Medication 10 ML: at 22:15

## 2023-01-24 ASSESSMENT — PAIN SCALES - GENERAL
PAINLEVEL_OUTOF10: 8
PAINLEVEL_OUTOF10: 3
PAINLEVEL_OUTOF10: 7
PAINLEVEL_OUTOF10: 7
PAINLEVEL_OUTOF10: 8
PAINLEVEL_OUTOF10: 9
PAINLEVEL_OUTOF10: 7
PAINLEVEL_OUTOF10: 8
PAINLEVEL_OUTOF10: 5
PAINLEVEL_OUTOF10: 7

## 2023-01-24 ASSESSMENT — PAIN DESCRIPTION - LOCATION
LOCATION: BACK
LOCATION: BACK
LOCATION: BACK;RIB CAGE

## 2023-01-24 ASSESSMENT — PAIN DESCRIPTION - ORIENTATION
ORIENTATION: LOWER
ORIENTATION: RIGHT

## 2023-01-24 ASSESSMENT — PAIN DESCRIPTION - FREQUENCY: FREQUENCY: CONTINUOUS

## 2023-01-24 ASSESSMENT — PAIN - FUNCTIONAL ASSESSMENT
PAIN_FUNCTIONAL_ASSESSMENT: 0-10
PAIN_FUNCTIONAL_ASSESSMENT: PREVENTS OR INTERFERES SOME ACTIVE ACTIVITIES AND ADLS

## 2023-01-24 ASSESSMENT — PAIN DESCRIPTION - DESCRIPTORS: DESCRIPTORS: ACHING

## 2023-01-24 ASSESSMENT — PAIN DESCRIPTION - PAIN TYPE: TYPE: ACUTE PAIN

## 2023-01-24 NOTE — ED NOTES
Patient provided with oatmeal, apple sauce and coffee.   Patient states she will call out to RN when she is finished eating so RN can medicate with Gaby Myers RN  01/24/23 [id number]

## 2023-01-24 NOTE — ED PROVIDER NOTES
I independently evaluated and obtained a history and physical on [facility] All diagnostic, treatment, and disposition assistants were made to myself in conjunction the advanced practice provider. For further details of this patient's emergency department encounter, please see the advanced practice provider's documentation. History: This patient presents emergency department complaining of pain across her ribs in the spine between her shoulder blades. The patient states approximately 5 weeks ago she fell and she sustained a fracture to her right rib. She states she been doing pretty well until this morning when she got worsening pain between her shoulder blades and in her pain. She has pleuritic chest pain. Physician Exam: Pleasant female no acute distress. Her blood pressure mildly elevated. She is afebrile. Cardiac exam is regular without murmur. Breath sounds are present bilaterally. No wheezing rales or rhonchi. The Ekg interpreted by me shows  normal sinus rhythm with a rate of 64  Axis is   Left axis deviation  QTc is   476 msec  Intervals and Durations are unremarkable. ST Segments: nonspecific changes  No significant change from prior EKG dated 5/3/22        I personally saw the patient and performed a substantive portion of the visit including all aspects of the medical decision making. MDM:     DDX: Pneumothorax, pleural effusion, pneumonia, PE, dissection, other    Chest CT reveals no PE. Patient has right eighth through 10th ribs demonstrating subacute fractures. CT abdomen pelvis with IV contrast without acute findings.      Monse West MD  01/24/23 2051

## 2023-01-24 NOTE — ED PROVIDER NOTES
[address] [facility]  ED  EMERGENCY DEPARTMENT ENCOUNTER        Pt Name: José Jim  MRN: [id number]  Anjugflilliana 1940  Date of evaluation: 2023  Provider: Mario Alberto Eastman PA-C  PCP: Markel Gunn MD  Note Started: 9:24 AM EST 23       I have seen and evaluated this patient with my supervising physician Merary Shields*. CHIEF COMPLAINT       Chief Complaint   Patient presents with    Rib Pain (injury)     Patient comes from [facility] for fall x 1 month ago. C/o continued rib/upper back pain. Worse with inspiration. HISTORY OF PRESENT ILLNESS: 1 or more Elements     History From: The patient  Limitations to history : None    Sherry Chapin is a 80 y.o. female who presents to the emergency department stating that she would like to be checked out. She comes from [facility], she fell approximately 6 to 8 weeks ago, and sustained 1/9 rib fracture. She states that she is continuing to have pain across her ribs and spine in between her shoulder blades. Pain is moderate to severe, worse when she takes a deep breath in. She denies any nausea or vomiting, she denies any anterior chest pain or radiation of pain into her neck. Nursing Notes were all reviewed and agreed with or any disagreements were addressed in the HPI. REVIEW OF SYSTEMS :      Review of Systems    Positives and Pertinent negatives as per HPI.      SURGICAL HISTORY     Past Surgical History:   Procedure Laterality Date     SECTION      x2    COLONOSCOPY  2005    JOINT REPLACEMENT Left 13    left total knee replacement       CURRENTMEDICATIONS       Previous Medications    AMIODARONE (PACERONE) 100 MG TABLET    TAKE 1 tablet every OTHER day    APIXABAN (ELIQUIS) 2.5 MG TABS TABLET    TAKE 1 TABLET TWICE A DAY    ASCORBIC ACID (VITAMIN C PO)    Take 1,000 mg by mouth daily     DOXEPIN (SINEQUAN) 10 MG CAPSULE    Take 1 capsule by mouth nightly    FISH OIL-OMEGA-3 FATTY ACIDS 1000 MG CAPSULE    Take 4 g by mouth daily     FLUTICASONE (FLONASE) 50 MCG/ACT NASAL SPRAY    1 spray by Each Nostril route daily as needed for Allergies     FOLIC ACID (FOLVITE) 1 MG TABLET    Take 1 mg by mouth Six times weekly Except mondays    FUROSEMIDE (LASIX) 40 MG TABLET    Take 1 tablet by mouth daily as needed (SWELLING OR WEIGHT > 125 LBS)    HYDROXYZINE HCL (ATARAX) 10 MG TABLET    Take 1-2 tablets by mouth 3 times daily as needed for Itching    LEVOTHYROXINE (SYNTHROID) 25 MCG TABLET    TAKE 1 TABLET DAILY    METHOTREXATE (RHEUMATREX) 2.5 MG CHEMO TABLET    TAKE 8 TABLETS EVERY MONDAY    PANTOPRAZOLE (PROTONIX) 40 MG TABLET    Take 1 tablet by mouth 2 times daily (before meals)    PROBIOTIC PRODUCT (PROBIOTIC PO)    Take 1 capsule by mouth daily     SPIRONOLACTONE (ALDACTONE) 25 MG TABLET    TAKE 1 TABLET BY MOUTH EVERY DAY    TRETINOIN (RETIN-A) 0.025 % CREAM    Apply a thin layer to face nightly, 15 minutes after washing. TRIAMCINOLONE (KENALOG) 0.1 % CREAM    Apply topically 2 times daily as needed. VITAMIN D PO    Take 1,000 Units by mouth daily        ALLERGIES     Patient has no known allergies. FAMILYHISTORY       Family History   Problem Relation Age of Onset    Heart Disease Mother         very slow heart beat    Thyroid Disease Father     Heart Disease Father         SOCIAL HISTORY       Social History     Tobacco Use    Smoking status: Former    Smokeless tobacco: Never    Tobacco comments:     quit age 40   Vaping Use    Vaping Use: Never used   Substance Use Topics    Alcohol use:  Yes     Alcohol/week: 2.0 standard drinks     Types: 2 Glasses of wine per week     Comment: occasionally     Drug use: No       SCREENINGS        Manuel Coma Scale  Eye Opening: Spontaneous  Best Verbal Response: Oriented  Best Motor Response: Obeys commands  Manuel Coma Scale Score: 15                CIWA Assessment  BP: 133/65  Heart Rate: 52           PHYSICAL EXAM  1 or more Elements     ED Triage Vitals [01/24/23 0849]   BP Temp Temp Source Heart Rate Resp SpO2 Height Weight   (!) 173/88 97.7 °F (36.5 °C) Oral 70 17 100 % -- --       Physical Exam  Vitals and nursing note reviewed. Constitutional:       Appearance: Normal appearance. She is well-developed. She is not ill-appearing or diaphoretic. HENT:      Head: Normocephalic and atraumatic. Right Ear: External ear normal.      Left Ear: External ear normal.      Nose: Nose normal.   Eyes:      General:         Right eye: No discharge. Left eye: No discharge. Cardiovascular:      Rate and Rhythm: Normal rate and regular rhythm. Heart sounds: Normal heart sounds. No murmur heard. No friction rub. No gallop. Pulmonary:      Effort: Pulmonary effort is normal. No respiratory distress. Breath sounds: Normal breath sounds. No stridor. No wheezing or rales. Chest:      Chest wall: No tenderness. Musculoskeletal:         General: Normal range of motion. Cervical back: Normal range of motion and neck supple. Thoracic back: Tenderness and bony tenderness present. Back:    Skin:     General: Skin is warm and dry. Coloration: Skin is not pale. Neurological:      General: No focal deficit present. Mental Status: She is alert and oriented to person, place, and time.    Psychiatric:         Mood and Affect: Mood normal.         Behavior: Behavior normal.       DIAGNOSTIC RESULTS   LABS:    Labs Reviewed   CBC WITH AUTO DIFFERENTIAL - Abnormal; Notable for the following components:       Result Value    .2 (*)     RDW 15.5 (*)     Lymphocytes Absolute 0.8 (*)     All other components within normal limits   COMPREHENSIVE METABOLIC PANEL W/ REFLEX TO MG FOR LOW K - Abnormal; Notable for the following components:    Sodium 132 (*)     Chloride 98 (*)     Glucose 201 (*)     Creatinine <0.5 (*)     All other components within normal limits   BRAIN NATRIURETIC PEPTIDE - Abnormal; Notable for the following components:    Pro- (*)     All other components within normal limits   COVID-19, RAPID   LIPASE   TROPONIN       When ordered only abnormal lab results are displayed. All other labs were within normal range or not returned as of this dictation. EKG: When ordered, EKG's are interpreted by the Emergency Department Physician in the absence of a cardiologist.  Please see their note for interpretation of EKG. RADIOLOGY:   Non-plain film images such as CT, Ultrasound and MRI are read by the radiologist. Plain radiographic images are visualized and preliminarily interpreted by the ED Provider with the below findings:    Chest x-ray and thoracic films reviewed by myself    Interpretation per the Radiologist below, if available at the time of this note:    CT CHEST PULMONARY EMBOLISM W CONTRAST   Final Result   1. No pulmonary embolism. 2. Right 8th through 10th ribs demonstrate probable subacute fractures. 3. Cardiomegaly, emphysema and dependent atelectasis in the lung bases. 4. No acute finding of the abdomen or pelvis. 5. Bilateral simple adnexal cysts in a postmenopausal patient likely   postmenopausal cysts. Given stability for greater than 1 year, these are   likely benign. Consider ultrasound follow-up on a nonemergent outpatient   basis. RECOMMENDATIONS:   Managing Incidental Adnexal Cystic Mass detected on CT or MR      Simple-Appearing Cyst      Late postmenopausal: If 1 cm or less, no follow up. If >1 cm - 7cm, US   promptly. If greater than 7cm, prompt but nonemergent MRI with contrast.      Reference:  Prosper Sifuentes al. Managing Incidental Findings on Abdominal CT: White Paper of the ACR Incidental Findings Committee. J Am Tono Radiol   2010;7:754-773         CT ABDOMEN PELVIS W IV CONTRAST Additional Contrast? None   Final Result   1. No pulmonary embolism. 2. Right 8th through 10th ribs demonstrate probable subacute fractures.    3. Cardiomegaly, emphysema and dependent atelectasis in the lung bases. 4. No acute finding of the abdomen or pelvis. 5. Bilateral simple adnexal cysts in a postmenopausal patient likely   postmenopausal cysts. Given stability for greater than 1 year, these are   likely benign. Consider ultrasound follow-up on a nonemergent outpatient   basis. RECOMMENDATIONS:   Managing Incidental Adnexal Cystic Mass detected on CT or MR      Simple-Appearing Cyst      Late postmenopausal: If 1 cm or less, no follow up. If >1 cm - 7cm, US   promptly. If greater than 7cm, prompt but nonemergent MRI with contrast.      Reference:  Yung Nicolas al. Managing Incidental Findings on Abdominal CT: White Paper of the ACR Incidental Findings Committee. J Am Tono Radiol   2010;7:754-773         XR THORACIC SPINE (2 VIEWS)   Final Result   No acute findings. XR RIBS RIGHT INCLUDE CHEST (MIN 3 VIEWS)   Final Result   No acute radiographic abnormality right ribs. Subjective skeletal osteopenia, findings consistent with chronic right   rotator cuff tear incidentally noted. XR RIBS RIGHT INCLUDE CHEST (MIN 3 VIEWS)    Result Date: 1/24/2023  EXAMINATION: XRAY VIEWS OF THE RIGHT RIBS WITH FRONTAL XRAY VIEW OF THE CHEST 1/24/2023 9:09 am COMPARISON: 12/17/2022 HISTORY: ORDERING SYSTEM PROVIDED HISTORY: Injury TECHNOLOGIST PROVIDED HISTORY: Reason for exam:->Injury Reason for Exam: rib pain FINDINGS: Subjective skeletal osteopenia. No radiographically detectable right rib fracture. No bone destruction or malignant-appearing periosteal reaction in the visualized thorax. Visualized right lung field demonstrates no consolidations, detectable pleural fluid or pneumothorax. Diminished acromial humeral distance noted consistent with chronic rotator cuff tear on the right. No acute radiographic abnormality right ribs.  Subjective skeletal osteopenia, findings consistent with chronic right rotator cuff tear incidentally noted. No results found. PROCEDURES   Unless otherwise noted below, none     Procedures    CRITICAL CARE TIME (.cctime)       PAST MEDICAL HISTORY      has a past medical history of Allergic rhinitis, CHF (congestive heart failure), NYHA class I, acute on chronic, combined ([facility] [address] (6/17/2021), Hyperlipidemia, Hypertension, Hypothyroidism, LBBB (left bundle branch block), and Rheumatoid arthritis(714.0). EMERGENCY DEPARTMENT COURSE and DIFFERENTIAL DIAGNOSIS/MDM:   Vitals:    Vitals:    01/24/23 0945 01/24/23 1238 01/24/23 1422 01/24/23 1551   BP: (!) 173/88 (!) 153/68 (!) 161/73 133/65   Pulse: 63 64 65 52   Resp: 19 15 21 17   Temp:       TempSrc:       SpO2: 97% 100% 100% 97%       Patient was given the following medications:  Medications   HYDROcodone-acetaminophen (NORCO) 5-325 MG per tablet 1 tablet (1 tablet Oral Not Given 1/24/23 1237)   ondansetron (ZOFRAN) injection 4 mg (has no administration in time range)   morphine (PF) injection 2 mg (2 mg IntraVENous Given 1/24/23 1240)   morphine (PF) injection 2 mg (2 mg IntraVENous Given 1/24/23 1423)   iopamidol (ISOVUE-370) 76 % injection 75 mL (75 mLs IntraVENous Given 1/24/23 1446)             Is this patient to be included in the SEP-1 Core Measure due to severe sepsis or septic shock? No   Exclusion criteria - the patient is NOT to be included for SEP-1 Core Measure due to: Infection is not suspected    Chronic Conditions affecting care: Rheumatoid arthritis and CHF   has a past medical history of Allergic rhinitis, CHF (congestive heart failure), NYHA class I, acute on chronic, combined ([facility] [address] (6/17/2021), Hyperlipidemia, Hypertension, Hypothyroidism, LBBB (left bundle branch block), and Rheumatoid arthritis(714.0).     CONSULTS: (Who and What was discussed)  IP CONSULT TO CASE MANAGEMENT  Consultation with case management who initially arranged for direct admission to rehab facility however patient decompensated and I consulted with the hospitalist via perfect serve who agreed to admit the patient. Social Determinants : Patient lacks transportation    Records Reviewed (Source): None    CC/HPI Summary, DDx, ED Course, and Reassessment: Patient presented with mid back pain, she developed abdominal pain with episodes of nausea and vomiting during her department. Differential diagnosis includes pulmonary embolus, thoracic aortic dissection, abdominal aortic aneurysm, gastroenteritis, viral etiology, anxiety, pleurisy, pneumonia. There were no infections found at this time however given that the patient is extremely uncomfortable, requiring multiple doses of pain medication, we have asked the hospitalist to admit the patient for further physical therapy and evaluation of this mid back pain along with the 3 subacute rib fractures      I am the Primary Clinician of Record. FINAL IMPRESSION      1. Lower abdominal pain    2. Closed fracture of multiple ribs of right side, initial encounter    3.  Pain in thoracic spine          DISPOSITION/PLAN     DISPOSITION Decision To Admit 01/24/2023 03:52:59 PM      PATIENT REFERRED TO:  [facility] [address]  [phone]        DISCHARGE MEDICATIONS:  New Prescriptions    No medications on file       DISCONTINUED MEDICATIONS:  Discontinued Medications    No medications on file              (Please note that portions of this note were completed with a voice recognition program.  Efforts were made to edit the dictations but occasionally words are mis-transcribed.)    Daniel Powell PA-C (electronically signed)     Daniel Powell PA-C  01/24/23 3744

## 2023-01-24 NOTE — ED NOTES
RN to bedside to medicate with Norco, patient requesting something to eat prior so medication does not upset her stomach.      Georgena Moritz, RN  01/24/23 6276

## 2023-01-24 NOTE — CARE COORDINATION
Referred to patient for SNF. Patient usually lives in [place] [address] at [facility]. Patient has assistance through [facility] one time a week. Patient reports she is independent in ADLs. Per daughter they are interested in [facility]. [facility] not in network with insurance. List of providers given and reviewed. Daughter interested in [facility], referral made. Patient accepted. Precert obtained through [address] #[id number]. PASARR completed. Document ID : [id number]. [facility]  Patient, family, RN and MD updated. Electronically signed by KAN Canales on 1/24/2023 at 12:53 PM     UPDATE:  patient now being admitted. [facility] updated. Case Management Assessment  Initial Evaluation    Date/Time of Evaluation: 1/24/2023 2:36 PM  Assessment Completed by: KAN Canales    If patient is discharged prior to next notation, then this note serves as note for discharge by case management. Patient Name: Ngozi Chapin                   YOB: 1940  Diagnosis: No admission diagnoses are documented for this encounter. Date / Time: 1/24/2023  8:48 AM    Patient Admission Status: Emergency   Readmission Risk (Low < 19, Mod (19-27), High > 27): No data recorded  Current PCP: Christopher Morales MD  PCP verified by ? Chart Reviewed: Yes      History Provided by:    Patient Orientation:      Patient Cognition:      Hospitalization in the last 30 days (Readmission):  No    If yes, Readmission Assessment in  Navigator will be completed.     Advance Directives:      Code Status: Prior   Patient's Primary Decision Maker is:      Primary Decision Maker: Rambo Chapin Child - [phone]    Secondary Decision Maker: Constantino Chapin - [phone]    Discharge Planning:    Patient lives with:   Type of Home:    Primary Care Giver:    Patient Support Systems include:     Current Financial resources:    Current community resources:    Current services prior to admission:              Current DME:              Type of Home Care services:       ADLS  Prior functional level:    Current functional level:      PT AM-PAC: 17 /24  OT AM-PAC:   /24    Family can provide assistance at DC: Would you like Case Management to discuss the discharge plan with any other family members/significant others, and if so, who? Plans to Return to Present Housing:    Other Identified Issues/Barriers to RETURNING to current housing: weakness, pain  Potential Assistance needed at discharge:              Potential DME:    Patient expects to discharge to:    Plan for transportation at discharge:      Financial    Payor: Sivakumar Ordaz / Plan: Yun Smith ESSENTIAL/PLUS / Product Type: *No Product type* /     Does insurance require precert for SNF: Yes    Potential assistance Purchasing Medications:    Meds-to-Beds request:        [id number] [place] [address] [facility]  [address] [address]  Phone: [phone] Fax: [phone]    [facility] [address], [phone] - F [phone]  [address] [facility] [address]  Phone: [phone] Fax: [phone]      Notes:    Factors facilitating achievement of predicted outcomes: Family support, Motivated, Cooperative, and Pleasant    Barriers to discharge: Medical complications    Additional Case Management Notes: see above    The Plan for Transition of Care is related to the following treatment goals of No admission diagnoses are documented for this encounter. IF APPLICABLE: The Patient and/or patient representative Catherine Ornelas and her family were provided with a choice of provider and agrees with the discharge plan.  Freedom of choice list with basic dialogue that supports the patient's individualized plan of care/goals and shares the quality data associated with the providers was provided to:     Patient Representative Name:       The Patient and/or Patient Representative Agree with the Discharge Plan?       KAN Malloy, PANDA-S  Case Management Department  Ph: [phone] Fax: [phone]

## 2023-01-24 NOTE — ED NOTES
Patient repositioned in bed with pillow support on back. Patient states she is currently \"doing okay\". Patient states she is still in pain in her back and is requesting additional pain medication. VOLODYMYR Montalvo aware.            Delicia Salazar RN  01/24/23 1978

## 2023-01-24 NOTE — PROGRESS NOTES
Physical Therapy  Facility/Department: [address] [place] [address]  ED  Physical Therapy Initial Assessment/ Treatment    Name: Jarred Chapin  : 1940  MRN: [id number]  Date of Service: 2023    Discharge Recommendations:  Subacute/Skilled Nursing Facility   PT Equipment Recommendations  Equipment Needed: No   If pt discharges prior to next PT session this note will serve as discharge summary. Patient Diagnosis(es): There were no encounter diagnoses. Past Medical History:  has a past medical history of Allergic rhinitis, CHF (congestive heart failure), NYHA class I, acute on chronic, combined ([facility] [address] Hyperlipidemia, Hypertension, Hypothyroidism, LBBB (left bundle branch block), and Rheumatoid arthritis(714.0). Past Surgical History:  has a past surgical history that includes Colonoscopy ();  section; and joint replacement (Left, 13). Assessment   Body Structures, Functions, Activity Limitations Requiring Skilled Therapeutic Intervention: Decreased functional mobility ; Decreased strength;Decreased endurance; Increased pain;Decreased ADL status  Assessment: 79 yo female,resident of [facility], presented to ED with R rib cage and upper back pain. PMH includes fall over a month ago with R Rib fx. Pt resides in independent living at [facility] with indep ambulation without device. She states she is indep in bed mob and transfers, toilets and showers indep, but gets assist from aide for hair styling and putting lotion on back. Today pt reports 10/10 pain R ribcage and upper back, making it hard to breathe. She required min assist supine><sit and Contact guard sit><stand from bed and toilet. Pt needed total assist to lower and raise undergarments/pants to sit on toilet. Pt will benefit from skilled PT to address deficits.  Recommend SNF at discharge  Treatment Diagnosis: weakness, decreased gait, R rib cage and back pain  Therapy Prognosis: Good  Decision Making: Medium Complexity  Barriers to Learning: none  Requires PT Follow-Up: Yes  Activity Tolerance  Activity Tolerance: Patient tolerated evaluation without incident;Patient tolerated treatment well     Plan   Physcial Therapy Plan  General Plan: 3-5 times per week  Current Treatment Recommendations: Strengthening, Endurance training, Functional mobility training, Transfer training, Gait training, Pain management, Home exercise program, Safety education & training, Therapeutic activities  Safety Devices  Type of Devices: All fall risk precautions in place, Call light within reach, Left in bed, Patient at risk for falls, Gait belt, Nurse notified (Daughter present with pt)     Restrictions  Restrictions/Precautions  Restrictions/Precautions: Fall Risk, General Precautions  Position Activity Restriction  Other position/activity restrictions: Up with assist     Subjective   General  Chart Reviewed: Yes  Patient assessed for rehabilitation services?: Yes  Additional Pertinent Hx: Per imaging 1/24/2023:No acute radiographic abnormality right ribs. Subjective skeletal osteopenia, findings consistent with chronic right rotator cuff tear incidentally noted. Referring Practitioner: Mahogany HELM  Referral Date : 01/24/23  Diagnosis: R rib cage and upper back pain, Fall> 1month ago  Follows Commands: Within Functional Limits  Subjective  Subjective: Pt agrees to therapy   PAIN: 10/10 R rib cage and upper back. Pt provided emotional support, gentle activity and reposisitoning. No changes reported.          Social/Functional History  Social/Functional History  Type of Home:  (Independent living at [facility])  Home Layout: One level  Bathroom Shower/Tub: Walk-in shower  Bathroom Toilet: Standard  Bathroom Equipment: Grab bars in shower  ADL Assistance: Needs assistance (gets assist from aide to style hair, put lotion on back)  Ambulation Assistance: Independent (pt walks flat footed without device due to severe Rh Arthritis in hands and feet.)  Transfer Assistance: Independent  Active : No  Vision/Hearing  Vision: Within Functional Limits  Hearing: Within functional limits    Cognition   Overall Orientation Status: Within Normal Limits  Overall Cognitive Status: WFL     Objective   Heart Rate: 63  Heart Rate Source: Monitor  BP: (!) 173/88  Patient Position: Semi fowlers  MAP (Calculated): 116  Resp: 19  SpO2: 97 %  O2 Device: None (Room air)     General AROM: BLEs limited in feet due to severe Rh Arthritis, Knees and hips WFL  General AROM: BUEs limited at hands and shoulders due to Rh. Arthritis  Strength  BLEs grossly decreased but WFL      Bed mobility  Supine to Sit: Minimal assistance  Sit to Supine: Minimal assistance  Scooting: Minimal assistance    Transfers  Sit to Stand: Minimal Assistance  Stand to Sit: Minimal Assistance  Comment: Practiced transfers from EOB and from toilet, extra time allotted, use of grab bar in restroom    Ambulation  Surface: Level tile  Device: No Device  Assistance: Contact guard assistance  Quality of Gait: wide base of support, pt amb flat footed due to arthritic deformities in toes, Med/Lat trunk sway, recipricol pattern. Mild unsteadiness but no overt loss of balance  Distance: 100 ft    Pt assisted to restroom. She required total assist to manage garments, CG for transfers with use of grab bar.      Balance  Sitting - Static: Good  Standing - Static: Good  Standing - Dynamic: Good;-         AM-PAC Score  AM-PAC Inpatient Mobility Raw Score : 17 (01/24/23 1209)  AM-PAC Inpatient T-Scale Score : 42.13 (01/24/23 1209)  Mobility Inpatient CMS 0-100% Score: 50.57 (01/24/23 1209)  Mobility Inpatient CMS G-Code Modifier : CK (01/24/23 1209)      Goals  Short Term Goals  Time Frame for Short Term Goals: 1 week (1/31) unless otherwise specified  Short Term Goal 1: pt to perform bed mobility modified indep  Short Term Goal 2: pt to transfer modified indep sit ><stand  Short Term Goal 3: pt to ambulate without device 200 ft supervised  Short Term Goal 4: pt to participate in therapeutic ex 8-10 reps by 1/27  Patient Goals   Patient Goals : \"to have less pain so I can catch my breath and get around easier\"     Education  Patient Education  Education Given To: Patient  Education Provided: Role of Therapy;Plan of Care;Transfer Training  Education Provided Comments: pt educated in general safety, joint protection with mobility  Education Method: Verbal  Barriers to Learning: None  Education Outcome: Verbalized understanding;Demonstrated understanding    Therapy Time   Individual Concurrent Group Co-treatment   Time In 1120         Time Out 1155         Minutes 35         Timed Code Treatment Minutes: [address] [employer] Frio Allen, FCO

## 2023-01-24 NOTE — ED NOTES
Patient repositioned in bed at this time. Pillow support adjusted. Patient states she is still uncomfortable and having more back pain. Patients daughter back at bedside. VOLODYMYR Hogan aware that patient and family would like CT result update.       Nilsa Webber RN  01/24/23 9031

## 2023-01-24 NOTE — ED NOTES
Patient requesting to take home medications and requesting pain medication. VOLODYMYR Gunderson aware. [place] states patient is able to take home medications, daughter states she will go to patients home and get medications and bring them to the ED.       Guillermina Apley, RN  01/24/23 1993

## 2023-01-24 NOTE — ED NOTES
Patient states feeling \"very labored\" when it comes to breathing. Patient requesting to be placed on O2. Patient placed on 2L O2 via NC. Patient states she is \"feeling a little bit better\".      Laverne Perez RN  01/24/23 5143

## 2023-01-25 LAB
ANION GAP SERPL CALCULATED.3IONS-SCNC: 8 MMOL/L (ref 3–16)
BUN BLDV-MCNC: 14 MG/DL (ref 7–20)
CALCIUM SERPL-MCNC: 9.3 MG/DL (ref 8.3–10.6)
CHLORIDE BLD-SCNC: 101 MMOL/L (ref 99–110)
CHOLESTEROL, TOTAL: 223 MG/DL (ref 0–199)
CO2: 25 MMOL/L (ref 21–32)
CREAT SERPL-MCNC: <0.5 MG/DL (ref 0.6–1.2)
GFR SERPL CREATININE-BSD FRML MDRD: >60 ML/MIN/{1.73_M2}
GLUCOSE BLD-MCNC: 101 MG/DL (ref 70–99)
HCT VFR BLD CALC: 41 % (ref 36–48)
HDLC SERPL-MCNC: 89 MG/DL (ref 40–60)
HEMOGLOBIN: 13.8 G/DL (ref 12–16)
LDL CHOLESTEROL CALCULATED: 120 MG/DL
LV EF: 33 %
LVEF MODALITY: NORMAL
MCH RBC QN AUTO: 33.7 PG (ref 26–34)
MCHC RBC AUTO-ENTMCNC: 33.6 G/DL (ref 31–36)
MCV RBC AUTO: 100.3 FL (ref 80–100)
PDW BLD-RTO: 15.2 % (ref 12.4–15.4)
PLATELET # BLD: 196 K/UL (ref 135–450)
PMV BLD AUTO: 7.3 FL (ref 5–10.5)
POTASSIUM REFLEX MAGNESIUM: 4.3 MMOL/L (ref 3.5–5.1)
RBC # BLD: 4.09 M/UL (ref 4–5.2)
SODIUM BLD-SCNC: 134 MMOL/L (ref 136–145)
TRIGL SERPL-MCNC: 72 MG/DL (ref 0–150)
TROPONIN: <0.01 NG/ML
VLDLC SERPL CALC-MCNC: 14 MG/DL
WBC # BLD: 7.1 K/UL (ref 4–11)

## 2023-01-25 PROCEDURE — 2580000003 HC RX 258: Performed by: INTERNAL MEDICINE

## 2023-01-25 PROCEDURE — 93356 MYOCRD STRAIN IMG SPCKL TRCK: CPT

## 2023-01-25 PROCEDURE — 6360000002 HC RX W HCPCS: Performed by: INTERNAL MEDICINE

## 2023-01-25 PROCEDURE — 97116 GAIT TRAINING THERAPY: CPT

## 2023-01-25 PROCEDURE — 97110 THERAPEUTIC EXERCISES: CPT

## 2023-01-25 PROCEDURE — 96376 TX/PRO/DX INJ SAME DRUG ADON: CPT

## 2023-01-25 PROCEDURE — G0378 HOSPITAL OBSERVATION PER HR: HCPCS

## 2023-01-25 PROCEDURE — 80048 BASIC METABOLIC PNL TOTAL CA: CPT

## 2023-01-25 PROCEDURE — 97535 SELF CARE MNGMENT TRAINING: CPT

## 2023-01-25 PROCEDURE — 97166 OT EVAL MOD COMPLEX 45 MIN: CPT

## 2023-01-25 PROCEDURE — 93306 TTE W/DOPPLER COMPLETE: CPT

## 2023-01-25 PROCEDURE — 80061 LIPID PANEL: CPT

## 2023-01-25 PROCEDURE — 6370000000 HC RX 637 (ALT 250 FOR IP): Performed by: NURSE PRACTITIONER

## 2023-01-25 PROCEDURE — 85027 COMPLETE CBC AUTOMATED: CPT

## 2023-01-25 PROCEDURE — 84484 ASSAY OF TROPONIN QUANT: CPT

## 2023-01-25 PROCEDURE — 6370000000 HC RX 637 (ALT 250 FOR IP): Performed by: INTERNAL MEDICINE

## 2023-01-25 PROCEDURE — 36415 COLL VENOUS BLD VENIPUNCTURE: CPT

## 2023-01-25 RX ORDER — HYDROXYZINE HYDROCHLORIDE 10 MG/1
25 TABLET, FILM COATED ORAL 3 TIMES DAILY PRN
Status: DISCONTINUED | OUTPATIENT
Start: 2023-01-25 | End: 2023-01-26 | Stop reason: HOSPADM

## 2023-01-25 RX ORDER — CYCLOBENZAPRINE HCL 10 MG
10 TABLET ORAL NIGHTLY
Status: DISCONTINUED | OUTPATIENT
Start: 2023-01-25 | End: 2023-01-26 | Stop reason: HOSPADM

## 2023-01-25 RX ORDER — NAPROXEN 250 MG/1
500 TABLET ORAL EVERY 12 HOURS
Status: DISCONTINUED | OUTPATIENT
Start: 2023-01-25 | End: 2023-01-26 | Stop reason: HOSPADM

## 2023-01-25 RX ADMIN — PANTOPRAZOLE SODIUM 40 MG: 40 TABLET, DELAYED RELEASE ORAL at 06:43

## 2023-01-25 RX ADMIN — APIXABAN 2.5 MG: 2.5 TABLET, FILM COATED ORAL at 08:36

## 2023-01-25 RX ADMIN — CYCLOBENZAPRINE 10 MG: 10 TABLET, FILM COATED ORAL at 20:17

## 2023-01-25 RX ADMIN — NAPROXEN 500 MG: 250 TABLET ORAL at 20:17

## 2023-01-25 RX ADMIN — LEVOTHYROXINE SODIUM 25 MCG: 0.03 TABLET ORAL at 06:43

## 2023-01-25 RX ADMIN — KETOROLAC TROMETHAMINE 15 MG: 30 INJECTION, SOLUTION INTRAMUSCULAR at 14:12

## 2023-01-25 RX ADMIN — Medication 10 ML: at 20:18

## 2023-01-25 RX ADMIN — HYDROCODONE BITARTRATE AND ACETAMINOPHEN 1 TABLET: 5; 325 TABLET ORAL at 21:47

## 2023-01-25 RX ADMIN — SPIRONOLACTONE 25 MG: 25 TABLET ORAL at 08:36

## 2023-01-25 RX ADMIN — APIXABAN 2.5 MG: 2.5 TABLET, FILM COATED ORAL at 20:17

## 2023-01-25 RX ADMIN — HYDROXYZINE HYDROCHLORIDE 25 MG: 10 TABLET, FILM COATED ORAL at 22:11

## 2023-01-25 RX ADMIN — Medication 10 ML: at 08:38

## 2023-01-25 RX ADMIN — HYDROXYZINE HYDROCHLORIDE 25 MG: 10 TABLET, FILM COATED ORAL at 03:29

## 2023-01-25 RX ADMIN — AMIODARONE HYDROCHLORIDE 100 MG: 200 TABLET ORAL at 08:36

## 2023-01-25 RX ADMIN — KETOROLAC TROMETHAMINE 15 MG: 30 INJECTION, SOLUTION INTRAMUSCULAR at 01:31

## 2023-01-25 ASSESSMENT — PAIN SCALES - GENERAL
PAINLEVEL_OUTOF10: 7
PAINLEVEL_OUTOF10: 5
PAINLEVEL_OUTOF10: 8
PAINLEVEL_OUTOF10: 7
PAINLEVEL_OUTOF10: 9
PAINLEVEL_OUTOF10: 2
PAINLEVEL_OUTOF10: 9
PAINLEVEL_OUTOF10: 5

## 2023-01-25 ASSESSMENT — PAIN DESCRIPTION - DESCRIPTORS
DESCRIPTORS: ACHING
DESCRIPTORS: ACHING
DESCRIPTORS: ITCHING

## 2023-01-25 ASSESSMENT — PAIN DESCRIPTION - LOCATION
LOCATION: BACK
LOCATION: BACK
LOCATION: GENERALIZED

## 2023-01-25 ASSESSMENT — PAIN DESCRIPTION - ORIENTATION
ORIENTATION: UPPER;LOWER
ORIENTATION: OTHER (COMMENT)
ORIENTATION: MID

## 2023-01-25 ASSESSMENT — PAIN - FUNCTIONAL ASSESSMENT
PAIN_FUNCTIONAL_ASSESSMENT: PREVENTS OR INTERFERES SOME ACTIVE ACTIVITIES AND ADLS
PAIN_FUNCTIONAL_ASSESSMENT: PREVENTS OR INTERFERES SOME ACTIVE ACTIVITIES AND ADLS

## 2023-01-25 ASSESSMENT — PAIN DESCRIPTION - PAIN TYPE: TYPE: CHRONIC PAIN

## 2023-01-25 ASSESSMENT — PAIN DESCRIPTION - FREQUENCY: FREQUENCY: CONTINUOUS

## 2023-01-25 NOTE — PROGRESS NOTES
Patient admitted to room 361 from ED. Patient oriented to room, call light, bed rails, phone, lights and bathroom. Patient instructed about the schedule of the day including: vital sign frequency, lab draws, possible tests, frequency of MD and staff rounds, including RN/MD rounding together at bedside, daily weights, and I &O's. Patient instructed about prescribed diet, how to use 8MENU, and television. bed alarm in place, patient aware of placement and reason. Bed locked, in lowest position, side rails up 2/4, call light within reach. Will continue to monitor.

## 2023-01-25 NOTE — PLAN OF CARE
Problem: Discharge Planning  Goal: Discharge to home or other facility with appropriate resources  Outcome: Progressing     Problem: Pain  Goal: Verbalizes/displays adequate comfort level or baseline comfort level  Outcome: Progressing     Problem: Safety - Adult  Goal: Free from fall injury  Outcome: Progressing     Problem: Cardiovascular - Adult  Goal: Maintains optimal cardiac output and hemodynamic stability  Outcome: Progressing     Problem: Cardiovascular - Adult  Goal: Absence of cardiac dysrhythmias or at baseline  Outcome: Progressing     Problem: Skin/Tissue Integrity - Adult  Goal: Skin integrity remains intact  Outcome: Progressing     Problem: Infection - Adult  Goal: Absence of infection at discharge  Outcome: Progressing     Problem: Musculoskeletal - Adult  Goal: Return ADL status to a safe level of function  Outcome: Progressing     Problem: Musculoskeletal - Adult  Goal: Maintain proper alignment of affected body part  Outcome: Progressing     Problem: Musculoskeletal - Adult  Goal: Return mobility to safest level of function  Outcome: Progressing

## 2023-01-25 NOTE — PROGRESS NOTES
4 Eyes Admission Assessment     I agree as the admission nurse that one RN' has performed a thorough Head to Toe Skin Assessment on the patient. ALL assessment sites listed below have been assessed on admission. Areas assessed by both nurses:   [x]   Head, Face, and Ears   [x]   Shoulders, Back, and Chest  [x]   Arms, Elbows, and Hands   [x]   Coccyx, Sacrum, and Ischum  [x]   Legs, Feet, and Heels        Does the Patient have Skin Breakdown?   No         Alok Prevention initiated:  NA   Wound Care Orders initiated:  NA      [facility] nurse consulted for Pressure Injury (Stage 3,4, Unstageable, DTI, NWPT, and Complex wounds):  NA      Nurse 1 eSignature: Electronically signed by Donna Knight RN on 1/25/23 at 3:38 AM EST    **SHARE this note so that the co-signing nurse is able to place an eSignature**    Nurse 2 eSignature: Electronically signed by Demond Woods RN on 1/25/23 at 11:00 AM EST

## 2023-01-25 NOTE — PROGRESS NOTES
Patient has chronic pruritus, she is experiencing it now with hives which she states  it has not been this bad for some time now. Hospitalist notified with new order.  Will continue to monitor

## 2023-01-25 NOTE — PROGRESS NOTES
Physical Therapy  Facility/Department: [facility] - MED SURG  Daily Treatment Note  NAME: Dejah Chapin  : 1940  MRN: [id number]    Date of Service: 2023    Discharge Recommendations:  [address], 24 hour supervision or assist, Home with Home health PT   PT Equipment Recommendations  Equipment Needed: No    Patient Diagnosis(es): The primary encounter diagnosis was Lower abdominal pain. Diagnoses of Closed fracture of multiple ribs of right side, initial encounter and Pain in thoracic spine were also pertinent to this visit. Assessment   Assessment: Pt tolerated therapy well this date. ABle to ambulate ~200ft in rodriguez with no AD and CGA progressing to SBA. Participated fair with seated exericses, require mod cues for form and sequencing. Pt slightly below baseline function, primarily limied by pain. Pt does reports decreased pain with ambulation and movement. Pt will continue to benefit from skilled PT services in acute setting to address the above fucntional deficits. SNF vs home with 24/7 and HHPT upon DC. Activity Tolerance: Patient tolerated evaluation without incident;Patient tolerated treatment well  Equipment Needed: No     Plan    Physcial Therapy Plan  General Plan: 2-3 times per week  Current Treatment Recommendations: Strengthening; Endurance training;Functional mobility training;Transfer training;Gait training;Pain management;Home exercise program;Safety education & training; Therapeutic activities     Restrictions  Restrictions/Precautions  Restrictions/Precautions: Fall Risk, General Precautions  Position Activity Restriction  Other position/activity restrictions: Up with assist, tele     Subjective    Subjective  Subjective: Pt sitting EOB with no bed alarm upon arrival. pt pleasant and agreeable to thrapy. Pain: Reports 9/10 pain initially, RN in room giving pain meds. After ambulating in rodriguez pt reports that she hardly has any pain at all.   Orientation  Overall Orientation Status: Within Normal Limits  Orientation Level: Oriented X4  Cognition  Overall Cognitive Status: WFL     Objective   Vitals  Heart Rate: 74  BP: (!) 168/85  BP Location: Left upper arm  MAP (Calculated): 113  SpO2: 98 %  O2 Device: None (Room air)  Bed Mobility Training  Bed Mobility Training: No (Sitting EOB at start and end of session)  Supine to Sit: Stand-by assistance (HOB flat)  Sit to Supine:  (pt sitting EOB at EOS)  Scooting: Stand-by assistance  Balance  Sitting: Intact  Standing: Impaired  Standing - Static: Fair;Unsupported  Standing - Dynamic: Fair;Unsupported  Transfer Training  Transfer Training: Yes  Overall Level of Assistance: Stand-by assistance;Contact-guard assistance (CGA progressing to SBA)  Sit to Stand: Stand-by assistance;Contact-guard assistance (CGA progressing to SBA)  Stand to Sit: Stand-by assistance;Contact-guard assistance (CGA progressing to SBA)  Toilet Transfer: Contact-guard assistance  Gait Training  Gait Training: Yes  Gait  Overall Level of Assistance: Contact-guard assistance  Interventions: Verbal cues; Tactile cues; Safety awareness training  Base of Support: Narrowed  Speed/Bettina: Pace decreased (< 100 feet/min); Slow  Step Length: Left shortened;Right shortened  Gait Abnormalities: Trunk sway increased;Decreased step clearance (Decreased trunk rotation and arm swing)  Distance (ft): 200 Feet  Assistive Device: Gait belt (No AD)     PT Exercises  Exercise Treatment: Seated BLE exericses 15x each: AP, LAQ, marches. Requires mod cues throughout for correct form and sequencing. Safety Devices  Type of Devices: All fall risk precautions in place;Call light within reach; Left in bed;Patient at risk for falls;Gait belt;Nurse notified (Left sitting EOB, No alarm upon arrival)  Restraints  Restraints Initially in Place: No     [facility] 6 Clicks Inpatient Mobility:  AM-PAC Mobility Inpatient   How much difficulty turning over in bed?: A Little  How much difficulty sitting down on / standing up from a chair with arms?: A Little  How much difficulty moving from lying on back to sitting on side of bed?: A Little  How much help from another person moving to and from a bed to a chair?: A Little  How much help from another person needed to walk in hospital room?: A Little  How much help from another person for climbing 3-5 steps with a railing?: A Lot  AM-PAC Inpatient Mobility Raw Score : 17  AM-PAC Inpatient T-Scale Score : 42.13  Mobility Inpatient CMS 0-100% Score: 50.57  Mobility Inpatient CMS G-Code Modifier : CK    Goals  Short Term Goals  Time Frame for Short Term Goals: 1 week (1/31) unless otherwise specified  Short Term Goal 1: pt to perform bed mobility modified indep  Short Term Goal 2: pt to transfer modified indep sit ><stand  Short Term Goal 3: pt to ambulate without device 200 ft supervised  Short Term Goal 4: pt to participate in therapeutic ex 8-10 reps by 1/27-- MET 1/25/23  Patient Goals   Patient Goals : \"to have less pain so I can catch my breath and get around easier\"    Education  Patient Education  Education Given To: Patient  Education Provided: Role of Therapy;Plan of Care;Transfer Training;Home Exercise Program  Education Method: Verbal  Barriers to Learning: None  Education Outcome: Verbalized understanding;Demonstrated understanding    Therapy Time   Individual Concurrent Group Co-treatment   Time In 1411         Time Out 1434         Minutes 23         Timed Code Treatment Minutes: 700 Lolita, PT, DPT    If pt is unable to be seen after this session, please let this note serve as discharge summary. Please see case management note for discharge disposition. Thank you.

## 2023-01-25 NOTE — PLAN OF CARE
Problem: Discharge Planning  Goal: Discharge to home or other facility with appropriate resources  1/25/2023 1137 by Tony Solitario RN  Outcome: Progressing  Flowsheets (Taken 1/25/2023 0845)  Discharge to home or other facility with appropriate resources: Identify barriers to discharge with patient and caregiver  1/25/2023 0549 by Leslie Fung RN  Outcome: Progressing     Problem: Pain  Goal: Verbalizes/displays adequate comfort level or baseline comfort level  1/25/2023 1137 by Tony Solitario RN  Outcome: Progressing  Flowsheets (Taken 1/25/2023 0730)  Verbalizes/displays adequate comfort level or baseline comfort level: Encourage patient to monitor pain and request assistance  1/25/2023 0549 by Leslie Fung RN  Outcome: Progressing     Problem: Safety - Adult  Goal: Free from fall injury  1/25/2023 1137 by Tony Solitario RN  Outcome: Progressing  1/25/2023 0549 by Leslie Fung RN  Outcome: Progressing     Problem: Cardiovascular - Adult  Goal: Maintains optimal cardiac output and hemodynamic stability  1/25/2023 1137 by Tony Solitario RN  Outcome: Progressing  Flowsheets (Taken 1/25/2023 0845)  Maintains optimal cardiac output and hemodynamic stability: Monitor blood pressure and heart rate  1/25/2023 0549 by Leslie Fung RN  Outcome: Progressing  Goal: Absence of cardiac dysrhythmias or at baseline  1/25/2023 1137 by Tony Solitario RN  Outcome: Progressing  Flowsheets (Taken 1/25/2023 0845)  Absence of cardiac dysrhythmias or at baseline: Monitor cardiac rate and rhythm  1/25/2023 0549 by Leslie Fung RN  Outcome: Progressing     Problem: Skin/Tissue Integrity - Adult  Goal: Skin integrity remains intact  1/25/2023 1137 by Tony Solitario RN  Outcome: Progressing  Flowsheets (Taken 1/25/2023 0845)  Skin Integrity Remains Intact: Monitor for areas of redness and/or skin breakdown  1/25/2023 0549 by Leslie Fung RN  Outcome: Progressing  Goal: Incisions, wounds, or drain sites healing without S/S of infection  1/25/2023 1137 by Harley Majano RN  Outcome: Progressing  Flowsheets (Taken 1/25/2023 0845)  Incisions, Wounds, or Drain Sites Healing Without Sign and Symptoms of Infection: ADMISSION and DAILY: Assess and document risk factors for pressure ulcer development  1/25/2023 0549 by Carole Stroud RN  Outcome: Progressing  Goal: Oral mucous membranes remain intact  1/25/2023 1137 by Harley Majano RN  Outcome: Progressing  Flowsheets (Taken 1/25/2023 0845)  Oral Mucous Membranes Remain Intact: Assess oral mucosa and hygiene practices  1/25/2023 0549 by Carole Stroud RN  Outcome: Progressing     Problem: Musculoskeletal - Adult  Goal: Return mobility to safest level of function  1/25/2023 1137 by Harley Majano RN  Outcome: Progressing  Flowsheets (Taken 1/25/2023 0845)  Return Mobility to Safest Level of Function: Assess patient stability and activity tolerance for standing, transferring and ambulating with or without assistive devices  1/25/2023 0549 by Carole Stroud RN  Outcome: Progressing  Goal: Maintain proper alignment of affected body part  1/25/2023 1137 by Harley Majano RN  Outcome: Progressing  Flowsheets (Taken 1/25/2023 0845)  Maintain proper alignment of affected body part: Support and protect limb and body alignment per provider's orders  1/25/2023 0549 by Carole Stroud RN  Outcome: Progressing  Goal: Return ADL status to a safe level of function  1/25/2023 1137 by Harley Majano RN  Outcome: Progressing  Flowsheets (Taken 1/25/2023 0845)  Return ADL Status to a Safe Level of Function: Administer medication as ordered  1/25/2023 0549 by Carole Stroud RN  Outcome: Progressing     Problem: Infection - Adult  Goal: Absence of infection at discharge  1/25/2023 1137 by Harley Majano RN  Outcome: Progressing  Flowsheets (Taken 1/25/2023 0845)  Absence of infection at discharge: Assess and monitor for signs and symptoms of infection  1/25/2023 0549 by Carole Stroud RN  Outcome: Progressing Problem: Chronic Conditions and Co-morbidities  Goal: Patient's chronic conditions and co-morbidity symptoms are monitored and maintained or improved  Outcome: Progressing  Flowsheets (Taken 1/25/2023 9877)  Care Plan - Patient's Chronic Conditions and Co-Morbidity Symptoms are Monitored and Maintained or Improved: Monitor and assess patient's chronic conditions and comorbid symptoms for stability, deterioration, or improvement

## 2023-01-25 NOTE — PROGRESS NOTES
Occupational Therapy  Facility/Department: [facility] B3 - MED SURG  Occupational Therapy Initial Assessment and Treatment    Name: Triny Chapin  : 1940  MRN: [id number]  Date of Service: 2023    Discharge Recommendations:  Subacute/Skilled Nursing Facility  OT Equipment Recommendations  Equipment Needed: No       Patient Diagnosis(es): The primary encounter diagnosis was Lower abdominal pain. Diagnoses of Closed fracture of multiple ribs of right side, initial encounter and Pain in thoracic spine were also pertinent to this visit. Past Medical History:  has a past medical history of Allergic rhinitis, CHF (congestive heart failure), NYHA class I, acute on chronic, combined ([facility] [address] Hyperlipidemia, Hypertension, Hypothyroidism, LBBB (left bundle branch block), and Rheumatoid arthritis(714.0). Past Surgical History:  has a past surgical history that includes Colonoscopy ();  section; and joint replacement (Left, 13). Assessment   Performance deficits / Impairments: Decreased functional mobility ; Decreased endurance;Decreased coordination;Decreased ADL status; Decreased balance;Decreased strength;Decreased fine motor control;Decreased high-level IADLs  Assessment: Pt is 81 yo female presenting to ED  for back pain, pt with fall ~6 weeks ago Fx rib but X ray reveal typical healing process. Imaging negative for acute processes. Pt lives at [facility] in [address] [place] [address],[facility] Floor living with ~15 hr/week of help with IADLs and higher level/heavier ADLs. This date pt managed bed mobility with SBA-SPV, STS from EOB with CGA, to/from bathroom and toilet transfer with CGA with no AD. Pt did use door frame for balance intermittently. Sinkside ADLs in stance with inc A for TB bathing and applying lotion. Pt left sitting EOB with alarm set, dtr present. Pt will benefit from cont OT while in acute care to advance pt's functioning closer to baseline.  Rec SNF at d/c d/t pt living alone and benefiting from balance/ADL re training to regain PLOF safely. Prognosis: Good  Decision Making: Medium Complexity  REQUIRES OT FOLLOW-UP: Yes  Activity Tolerance  Activity Tolerance: Patient Tolerated treatment well        Plan   Occupational Therapy Plan  Times Per Week: 2-3x/week  Current Treatment Recommendations: Strengthening, Balance training, Functional mobility training, Endurance training, Equipment evaluation, education, & procurement, Patient/Caregiver education & training, Safety education & training, Self-Care / ADL, Coordination training     Restrictions  Restrictions/Precautions  Restrictions/Precautions: Fall Risk, General Precautions  Position Activity Restriction  Other position/activity restrictions:  Up with assist, tele    Subjective   General  Chart Reviewed: Yes  Patient assessed for rehabilitation services?: Yes  Family / Caregiver Present: Yes (dtr)  Referring Practitioner: Akbar Ash MD  Diagnosis: back pain  Subjective  Subjective: pt in bed at arrival, pleasant and agreeable, denied pain at rest     Social/Functional History  Social/Functional History  Lives With: Alone  Type of Home:  (Independent living at [facility])  Home Layout: One level  Home Access: Level entry  Bathroom Shower/Tub: Walk-in shower  Bathroom Toilet: Standard  Bathroom Equipment: Grab bars in shower  Has the patient had two or more falls in the past year or any fall with injury in the past year?: Yes (rib Fx from fall ~6 weeks ago, pt reports it is healed)  Receives Help From: Personal care attendant  ADL Assistance: Needs assistance (gets assist from aide to style hair, put lotion on back)  Toileting: Independent  Homemaking Assistance: Needs assistance (goes to dining [employer], has help cleaning)  Homemaking Responsibilities: No  Ambulation Assistance: Independent (pt walks flat footed without device due to severe Rh Arthritis in hands and feet.)  Transfer Assistance: Independent  Active : No  Patient's  Info: dtr drives  Additional Comments: pt reports having two caretaker with [facility] (15 hours/week)       Objective   Heart Rate: 58  Heart Rate Source: Monitor  BP: (!) 153/83  BP Location: Left upper arm  BP Method: Automatic  Patient Position: Supine  MAP (Calculated): 106  Resp: 18  SpO2: 97 %  O2 Device: None (Room air)          Observation/Palpation  Posture: Fair  Observation: severe RA in B hands and feet  Safety Devices  Type of Devices: All fall risk precautions in place;Call light within reach; Left in bed;Patient at risk for falls;Gait belt;Nurse notified  Bed Mobility Training  Bed Mobility Training: Yes  Supine to Sit: Stand-by assistance (HOB flat)  Sit to Supine:  (pt sitting EOB at EOS)  Scooting: Stand-by assistance  Balance  Sitting: Intact  Standing: Impaired  Standing - Static: Fair;Occasional  Standing - Dynamic: Fair;Occasional  Transfer Training  Transfer Training: Yes  Overall Level of Assistance: Contact-guard assistance  Sit to Stand: Contact-guard assistance  Stand to Sit: Contact-guard assistance  Toilet Transfer: Contact-guard assistance     AROM: Generally decreased, functional  Strength: Generally decreased, functional  Coordination: Generally decreased, functional  Tone: Normal  Sensation: Impaired (hypersensitivity on toes)  ADL  Grooming: Contact guard assistance  Grooming Skilled Clinical Factors: in stance for deodorant application  UE Bathing: Minimal assistance  UE Bathing Skilled Clinical Factors: A for back washing (sponge bathing)  LE Bathing: Moderate assistance  LE Bathing Skilled Clinical Factors: A for spong bathing posterior BLE and lower BLE  UE Dressing: Minimal assistance  UE Dressing Skilled Clinical Factors: gown exhcange  LE Dressing:  Moderate assistance  LE Dressing Skilled Clinical Factors: pt managed pants up/down, donned slippers; req A for socks              Vision  Vision: Within Functional Limits  Hearing  Hearing: Within functional limits  Cognition  Overall Cognitive Status: WFL  Orientation  Overall Orientation Status: Within Normal Limits  Perception  Overall Perceptual Status: WFL            Dynamic Standing Balance Exercises: in stance at sink during ADLs with no AD, CGA- SBA for ~3 minutes at a time before seated rest  Education Given To: Patient  Education Provided: Role of Therapy;Plan of Care;Precautions; ADL Adaptive Strategies;Transfer Training;Energy Conservation;Equipment; Family Education  Education Provided Comments: disease specific: d/c planning, OT's role  Education Method: Verbal  Barriers to Learning: None  Education Outcome: Verbalized understanding       AM-PAC Score        AM-PAC Inpatient Daily Activity Raw Score: 17 (01/25/23 1225)  AM-PAC Inpatient ADL T-Scale Score : 37.26 (01/25/23 1225)  ADL Inpatient CMS 0-100% Score: 50.11 (01/25/23 1225)  ADL Inpatient CMS G-Code Modifier : CK (01/25/23 1225)    Goals  Short Term Goals  Time Frame for Short Term Goals: within 7 days by 2/01 unless noted  Short Term Goal 1: pt will perform toilet transfer with SPV  Short Term Goal 2: pt will perform LB dressing with SPV  Short Term Goal 3: pt will perform grooming sinkside in stance with SPV  Short Term Goal 4: pt will perform BUE exercises in stance to focus on dynamic balance with CGA by 1/29  Patient Goals   Patient goals : \"to go home\"       Therapy Time   Individual Concurrent Group Co-treatment   Time In 1127         Time Out 1207         Minutes 40         Timed Code Treatment Minutes: 30 Minutes (10 minute eval)     If pt is unable to be seen after this session, please let this note serve as discharge summary. Please see case management note for discharge disposition. Thank you.    Faviola Fontana OT

## 2023-01-26 VITALS
WEIGHT: 118.8 LBS | TEMPERATURE: 98.7 F | HEIGHT: 65 IN | RESPIRATION RATE: 18 BRPM | BODY MASS INDEX: 19.79 KG/M2 | SYSTOLIC BLOOD PRESSURE: 143 MMHG | OXYGEN SATURATION: 93 % | HEART RATE: 67 BPM | DIASTOLIC BLOOD PRESSURE: 91 MMHG

## 2023-01-26 LAB
ANION GAP SERPL CALCULATED.3IONS-SCNC: 7 MMOL/L (ref 3–16)
BUN BLDV-MCNC: 12 MG/DL (ref 7–20)
CALCIUM SERPL-MCNC: 9.4 MG/DL (ref 8.3–10.6)
CHLORIDE BLD-SCNC: 101 MMOL/L (ref 99–110)
CO2: 27 MMOL/L (ref 21–32)
CREAT SERPL-MCNC: <0.5 MG/DL (ref 0.6–1.2)
GFR SERPL CREATININE-BSD FRML MDRD: >60 ML/MIN/{1.73_M2}
GLUCOSE BLD-MCNC: 92 MG/DL (ref 70–99)
POTASSIUM REFLEX MAGNESIUM: 5.1 MMOL/L (ref 3.5–5.1)
SODIUM BLD-SCNC: 135 MMOL/L (ref 136–145)

## 2023-01-26 PROCEDURE — G0378 HOSPITAL OBSERVATION PER HR: HCPCS

## 2023-01-26 PROCEDURE — 36415 COLL VENOUS BLD VENIPUNCTURE: CPT

## 2023-01-26 PROCEDURE — 80048 BASIC METABOLIC PNL TOTAL CA: CPT

## 2023-01-26 PROCEDURE — 97530 THERAPEUTIC ACTIVITIES: CPT

## 2023-01-26 PROCEDURE — 6370000000 HC RX 637 (ALT 250 FOR IP): Performed by: INTERNAL MEDICINE

## 2023-01-26 PROCEDURE — 97116 GAIT TRAINING THERAPY: CPT

## 2023-01-26 PROCEDURE — 2580000003 HC RX 258: Performed by: INTERNAL MEDICINE

## 2023-01-26 PROCEDURE — 6370000000 HC RX 637 (ALT 250 FOR IP): Performed by: NURSE PRACTITIONER

## 2023-01-26 RX ORDER — CYCLOBENZAPRINE HCL 10 MG
10 TABLET ORAL 3 TIMES DAILY PRN
Qty: 20 TABLET | Refills: 0 | Status: SHIPPED | OUTPATIENT
Start: 2023-01-26 | End: 2023-02-02 | Stop reason: SDUPTHER

## 2023-01-26 RX ORDER — FUROSEMIDE 40 MG/1
40 TABLET ORAL DAILY
COMMUNITY
Start: 2023-01-26

## 2023-01-26 RX ORDER — NAPROXEN 500 MG/1
500 TABLET ORAL EVERY 12 HOURS
Qty: 10 TABLET | Refills: 0 | Status: SHIPPED | OUTPATIENT
Start: 2023-01-26 | End: 2023-02-02 | Stop reason: SINTOL

## 2023-01-26 RX ADMIN — LEVOTHYROXINE SODIUM 25 MCG: 0.03 TABLET ORAL at 05:12

## 2023-01-26 RX ADMIN — SPIRONOLACTONE 25 MG: 25 TABLET ORAL at 08:42

## 2023-01-26 RX ADMIN — APIXABAN 2.5 MG: 2.5 TABLET, FILM COATED ORAL at 08:43

## 2023-01-26 RX ADMIN — PANTOPRAZOLE SODIUM 40 MG: 40 TABLET, DELAYED RELEASE ORAL at 05:12

## 2023-01-26 RX ADMIN — HYDROXYZINE HYDROCHLORIDE 25 MG: 10 TABLET, FILM COATED ORAL at 09:33

## 2023-01-26 RX ADMIN — Medication 10 ML: at 08:44

## 2023-01-26 RX ADMIN — NAPROXEN 500 MG: 250 TABLET ORAL at 09:46

## 2023-01-26 NOTE — PROGRESS NOTES
Patient transferred to room 105 from . Patient oriented to room, call light, bed rails, phone, lights and bathroom. Patient instructed about the schedule of the day including: vital sign frequency, lab draws, possible tests, frequency of MD and staff rounds, including RN/MD rounding together at bedside, daily weights, and I &O's. Patient instructed about prescribed diet, how to use 8MENU, and television. Telemetry box  in place, patient aware of placement and reason. Bed locked, in lowest position, side rails up 2/4, call light within reach. Will continue to monitor.       Aaliyah Marlow RN

## 2023-01-26 NOTE — PROGRESS NOTES
Pt verbalized no pain after atarax and norco at 2211. Pt appeared to rest well overnight but did have HR dip to 39 at 5:30am. Pt is bradycardic at baseline and has an improved EF of 28% on echo. Pt states had had a LBBB for \"a long time now\".

## 2023-01-26 NOTE — PROGRESS NOTES
Pt transported to A1 room 105, pt without complaints at time of transport report given to A1 Rn all questions answered. Daughter updated about transport.

## 2023-01-26 NOTE — CARE COORDINATION
VM left for daughter, Zuhair Bravo, to discuss discharge plan and home care choice. Per conversation with Suleman Gonzalez, plan is for patient to go home, not to skilled nursing facility. CM pending call back from family for home care preference.

## 2023-01-26 NOTE — PROGRESS NOTES
Pt d/c'd to [facility] via daughter. .  Removed peripheral IV and stopped bleeding. Catheter intact. Pt tolerated well. No redness noted at site. Notified CMU and removed tele box. Reviewed d/c instructions, home meds, and  f/u information utilizing teach-back method. Scripts for Flexeril and Naproxen given to patient to be picked up at outpatient pharmacy. Patient and daughter both verbalized understanding. No current questions or concerns. Patient discharged to [address] [place] [address] in stable condition via daughter's private vehicle.     Kelvin Brand RN

## 2023-01-26 NOTE — PROGRESS NOTES
Physical Therapy  Facility/Department: [place] [address] REMOTE TELEMETRY  Daily Treatment Note  NAME: Home Chapin  : 1940  MRN: [id number]    Date of Service: 2023    Discharge Recommendations:  Home with assist PRN, Home with Home health PT   PT Equipment Recommendations  Equipment Needed: No    Patient Diagnosis(es): The primary encounter diagnosis was Lower abdominal pain. Diagnoses of Closed fracture of multiple ribs of right side, initial encounter and Pain in thoracic spine were also pertinent to this visit. Assessment   Assessment: Pt tolerated therapy well this date. Pt has progressed to mod I with transfers and supervision with gait training without device. Pt declines concerns about mobility at home. Will d/c acute PT d/t goals met. Activity Tolerance: Patient tolerated treatment well  Equipment Needed: No     Plan    Physcial Therapy Plan  General Plan: Discharge     Restrictions  Restrictions/Precautions  Restrictions/Precautions: Fall Risk, General Precautions  Position Activity Restriction  Other position/activity restrictions: Up with assist, tele     Subjective    Subjective  Subjective: Pt agreeable to therapy. Pain: Reports a little mid back pain. Describes as spasms     Objective   Vitals  /91, HR 67; SpO2 93% on RA       Bed Mobility Training  Bed Mobility Training: Yes  Supine to Sit: Modified independent  Balance  Sitting: Intact  Standing: Intact  Transfer Training  Transfer Training: Yes  Sit to Stand: Independent (sit<>Stand x 10 reps from EOB with limited use of UEs)  Stand to Sit: Independent  Gait Training  Gait Training: Yes  Gait  Overall Level of Assistance: Supervision  Interventions: Verbal cues; Safety awareness training  Base of Support: Narrowed  Gait Abnormalities: Trunk sway increased;Decreased step clearance  Distance (ft): 250 Feet  Assistive Device: Gait belt           Safety Devices  Type of Devices:  All fall risk precautions in place;Call light within reach;Gait belt;Nurse notified (no alarm per RN)       Goals  Short Term Goals  Time Frame for Short Term Goals: 1 week (1/31) unless otherwise specified  Short Term Goal 1: pt to perform bed mobility modified indep -- GOAL MET 1/26  Short Term Goal 2: pt to transfer modified indep sit ><stand -- GOAL MET 1/26  Short Term Goal 3: pt to ambulate without device 200 ft supervised -- GOAL MET 1/26  Short Term Goal 4: pt to participate in therapeutic ex 8-10 reps by 1/27-- MET 1/25/23  Patient Goals   Patient Goals : \"to have less pain so I can catch my breath and get around easier\"    Education  Patient Education  Education Provided: Role of Therapy;Plan of Care;Transfer Training;Home Exercise Program  Education Provided Comments: pt educated in general safety  Education Method: Verbal  Barriers to Learning: None  Education Outcome: Verbalized understanding;Demonstrated understanding    Therapy Time   Individual Concurrent Group Co-treatment   Time In 0813         Time Out 0836         Minutes 23         Timed Code Treatment Minutes: FCO Burrell, DPT

## 2023-01-26 NOTE — PROGRESS NOTES
Hospitalist Progress Note    Date of Admission: 1/24/2023    Chief Complaint: Back pain    Hospital Course:   80 y.o. female who presented to [facility] with back pain. PMHx significant for RA, HTN, Non-ischemic Cardiomyopathy, Chronic Systolic CHF, Afib HLD, Hypothyroidism. She is originally from [facility] [place]. She has longstanding RA. She is followed by Rheumatology Dr. Deacon Ochoa. She reportedly stopped taking her MTX about 4 weeks ago as she didn't think it was helping. She had a fall about 5 weeks ago and during ED evaluation was noted to have right posterior 9th rib fracture. Symptoms of this gradually resolved. She has been in good health lately. However, on the morning of 1/24/23 she awoke with severe left lateral back pain and difficulty breathing from the pain. No recent injury or muscle strain noted. She denies having any actual chest or abdominal pain. There was a brief episode of vomiting but she thinks related to some food she ate today. Subjective: Pain overall improved, especially after Toradol. No CP/SOB. Assessment/Plan:  Back pain: EKG/Troponin, CT chest/Abd/Pelvis was re-assuring. No PE, no aortic pathology. Given her description, pleuritic nature, worse with movement, suspect this is most likely a musculoskeletal etiology. She does have long standing RA. Recent rib fractures but these appear to be healing. Muscle spasm/strain, pleurisy, etc are all possibilities. Improving with trial of Toradol and Lidocaine patches. Continue PT/OT as tolerated. Change to Naproxen 500 BID along with Flexeril at night. Rheumatoid arthritis involving multiple joints: Reportedly stopped taking MTX about 1 month ago. She plans to follow-up with Dr. Deacon Ochoa next week. A-fib: Stable. Continue Amiodarone, Eliquis. Monitor. Chronic systolic CHF (congestive heart failure): Euvolemic. Continue home regimen. Chronic Pruritis: Etiology unclear. Continue home steroid cream. Atarax PRN.  Follow-up with Rheumatology. DVT Prophylaxis: Eliquis  Diet: ADULT DIET; Regular; No Caffeine  Code Status: Full Code  PT/OT Eval Status: NA    Dispo - Home with Yuri Ennis tomorrow    Appropriate for A1 Discharge Unit: Yes    Physical Exam Performed:    /62   Pulse 55   Temp 98 °F (36.7 °C) (Oral)   Resp 16   Ht 5' 5\" (1.651 m)   Wt 119 lb 4.3 oz (54.1 kg)   SpO2 97%   BMI 19.85 kg/m²   General appearance:  No apparent distress, appears stated age and cooperative. HEENT:  Pupils equal, round, and reactive to light. Conjunctivae/corneas clear. Neck:  Supple, no jugular venous distention. Trachea midline with full range of motion. Respiratory:  Normal respiratory effort. Clear to auscultation, bilaterally without Rales/Wheezes/Rhonchi. Cardiovascular:  Regular rate and rhythm with normal S1/S2 without murmurs, rubs or gallops. Abdomen:  Soft, non-tender, non-distended with normal bowel sounds. Musculoskelatal:  No clubbing, cyanosis or edema bilaterally. Full range of motion without deformity. Neurologic:  Neurovascularly intact without any focal sensory/motor deficits. Cranial nerves: II-XII intact, grossly non-focal.  Psychiatric:  Alert and oriented, thought content appropriate, normal insight  Skin:  Skin color, texture, turgor normal.  No rashes or lesions. Capillary Refill:  Brisk,< 3 seconds   Peripheral Pulses:  +2 palpable, equal bilaterally     Labs:   Recent Labs     01/24/23  1243 01/25/23  0703   WBC 7.7 7.1   HGB 13.9 13.8   HCT 42.1 41.0    196     Recent Labs     01/24/23  1243 01/25/23  0703   * 134*   K 4.0 4.3   CL 98* 101   CO2 24 25   BUN 12 14   CREATININE <0.5* <0.5*   CALCIUM 9.1 9.3     Recent Labs     01/24/23  1243   AST 19   ALT 13   BILITOT 0.5   ALKPHOS 78     No results for input(s): INR in the last 72 hours.     Consults:    Karen Garland MD

## 2023-01-26 NOTE — CARE COORDINATION
CASE MANAGEMENT DISCHARGE SUMMARY      Discharge to: [facility] with Supportive home care     IMM given: (date) na.  observation    New Durable Medical Equipment ordered/agency:     Transportation:    Family/car: daughter     Confirmed discharge plan with:      Patient: yes     Family:  yes - daughter at bedside     Facility/Agency, name:  [facility] obtained via epic access by Leonardo Jorge at agency     RN, name: Sunny Doll RN

## 2023-01-26 NOTE — PROGRESS NOTES
Pt assisted to bedside commode with no void, pt refusing bed alarm and insistant that bedside commode be placed next to bed, pt refusing assistance and requesting not to be awoken overnight. Rn educated pt on safty and offered many alternatives, pt continues to refuse alarm but states will call out when getting up. Pt completely alert and oriented. But did have recent fall.  Pt refused lipitor and states does not wish to take any cholesterol meds until she speaks with her regular md.

## 2023-01-26 NOTE — HOME CARE
Parisa Chapin will require the following home care treatments or therapies: Skilled Nursing, vital signs, medication compliance and education, PT/OT, wound care, teaching and management of medical conditions, etc.  Home care will be necessary because of Back Pain, CHF and deconditioning. The patient is in agreement to receiving home care.

## 2023-01-26 NOTE — DISCHARGE SUMMARY
Hospitalist Progress Note    Date of Admission: 1/24/2023    Chief Complaint: Back pain    Hospital Course:   80 y.o. female who presented to [facility] with back pain. PMHx significant for RA, HTN, Non-ischemic Cardiomyopathy, Chronic Systolic CHF, Afib HLD, Hypothyroidism. She is originally from [facility]. She has longstanding RA. She is followed by Rheumatology Dr. Anabell Yost. She reportedly stopped taking her MTX about 4 weeks ago as she didn't think it was helping. She had a fall about 5 weeks ago and during ED evaluation was noted to have right posterior 9th rib fracture. Symptoms of this gradually resolved. She has been in good health lately. However, on the morning of 1/24/23 she awoke with severe left lateral back pain and difficulty breathing from the pain. No recent injury or muscle strain noted. She denies having any actual chest or abdominal pain. There was a brief episode of vomiting but she thinks related to some food she ate today. Subjective: Pain overall improved, especially after Toradol. No CP/SOB. Assessment/Plan:  Back pain: EKG/Troponin, CT chest/Abd/Pelvis was re-assuring. No PE, no aortic pathology. Given her description, pleuritic nature, worse with movement, suspect this is most likely a musculoskeletal etiology. She does have long standing RA. Recent rib fractures but these appear to be healing. Muscle spasm/strain, pleurisy, etc are all possibilities. Improving with trial of Toradol and Lidocaine patches. Continue PT/OT as tolerated. Change to Naproxen 500 BID along with Flexeril at night. Rheumatoid arthritis involving multiple joints: Reportedly stopped taking MTX about 1 month ago. She plans to follow-up with Dr. Anabell Yost next week. A-fib: Stable. Continue Amiodarone, Eliquis. Monitor. Chronic systolic CHF (congestive heart failure): Euvolemic. TTE showed stable EF around 30-35%. Not on BB/ACE/ARB d/t prior intolerance. Continue home regimen.      Chronic Pruritis: Etiology unclear. Continue home steroid cream. Atarax PRN. May want to consider trial off various medications that can be associated with itching, including Doxepin and Spironolactone. Follow-up with Rheumatology. DVT Prophylaxis: Eliquis  Diet: ADULT DIET; Regular; No Caffeine  Code Status: Full Code  PT/OT Eval Status: NA    Dispo - Home with Yuri Ennis tomorrow    Appropriate for A1 Discharge Unit: Yes    Physical Exam Performed:    BP (!) 143/91   Pulse 67   Temp 98.7 °F (37.1 °C) (Oral)   Resp 18   Ht 5' 5\" (1.651 m)   Wt 118 lb 12.8 oz (53.9 kg)   SpO2 93%   BMI 19.77 kg/m²   General appearance:  No apparent distress, appears stated age and cooperative. HEENT:  Pupils equal, round, and reactive to light. Conjunctivae/corneas clear. Neck:  Supple, no jugular venous distention. Trachea midline with full range of motion. Respiratory:  Normal respiratory effort. Clear to auscultation, bilaterally without Rales/Wheezes/Rhonchi. Cardiovascular:  Regular rate and rhythm with normal S1/S2 without murmurs, rubs or gallops. Abdomen:  Soft, non-tender, non-distended with normal bowel sounds. Musculoskelatal:  No clubbing, cyanosis or edema bilaterally. Full range of motion without deformity. Neurologic:  Neurovascularly intact without any focal sensory/motor deficits. Cranial nerves: II-XII intact, grossly non-focal.  Psychiatric:  Alert and oriented, thought content appropriate, normal insight  Skin:  Skin color, texture, turgor normal.  No rashes or lesions.   Capillary Refill:  Brisk,< 3 seconds   Peripheral Pulses:  +2 palpable, equal bilaterally     Labs:   Recent Labs     01/24/23  1243 01/25/23  0703   WBC 7.7 7.1   HGB 13.9 13.8   HCT 42.1 41.0    196       Recent Labs     01/24/23  1243 01/25/23  0703 01/26/23  0659   * 134* 135*   K 4.0 4.3 5.1   CL 98* 101 101   CO2 24 25 27   BUN 12 14 12   CREATININE <0.5* <0.5* <0.5*   CALCIUM 9.1 9.3 9.4       Recent Labs 01/24/23  1243   AST 19   ALT 13   BILITOT 0.5   ALKPHOS 78       No results for input(s): INR in the last 72 hours.     Consults:    Tatum Pink MD

## 2023-01-27 ENCOUNTER — TELEPHONE (OUTPATIENT)
Dept: FAMILY MEDICINE CLINIC | Age: 83
End: 2023-01-27

## 2023-01-27 ENCOUNTER — CARE COORDINATION (OUTPATIENT)
Dept: CASE MANAGEMENT | Age: 83
End: 2023-01-27

## 2023-01-27 DIAGNOSIS — M54.9 BACK PAIN, UNSPECIFIED BACK LOCATION, UNSPECIFIED BACK PAIN LATERALITY, UNSPECIFIED CHRONICITY: Primary | ICD-10-CM

## 2023-01-27 PROCEDURE — 1111F DSCHRG MED/CURRENT MED MERGE: CPT | Performed by: FAMILY MEDICINE

## 2023-01-27 NOTE — CARE COORDINATION
[facility] Care Transitions Initial Follow Up Call    Call within 2 business days of discharge: Yes    Care Transition Nurse contacted the patient by telephone to perform post hospital discharge assessment. Verified name and  with patient as identifiers. Provided introduction to self, and explanation of the Care Transition Nurse role. Patient: Marcelino Chapin Patient : 1940   MRN: [id number]  Reason for Admission: Back Pain  Discharge Date: 23 RARS: No data recorded    Last Discharge  [address]       Date Complaint Diagnosis Description Type Department Provider    23 Rib Pain (injury) Lower abdominal pain . .. ED to Hosp-Admission (Discharged) (ADMITTED) Alexander Hurtado MD; Frank Pope. .. Was this an external facility discharge? No Discharge Facility: [address] [place] [address] to be reviewed by the provider   Additional needs identified to be addressed with provider: Yes  medications-pt states she does not take Lasix, just spirolactone, please advise pt if this is incorrect. Pt needs hospital f/u appt, please contact. Method of communication with provider: chart routing. Pt lives at [facility], independent. Has heard from [facility] they are seeing pt today. Daughter sets up her medications. Will route note to PCP regarding pt states she does not take Lasix and for hospital f/u appt. States her back is better today, awaiting to see if the flexeril will help. CTN explained she does not have to take 3 times per day, its an as needed medication. Also explained the potential sedative affect. Verbalized understanding. No other needs at this time. Care Transition Nurse reviewed discharge instructions and red flags with patient who verbalized understanding. The patient was given an opportunity to ask questions and does not have any further questions or concerns at this time. Were discharge instructions available to patient? Yes.  Reviewed appropriate site of care based on symptoms and resources available to patient including: PCP  Specialist  Home health. The patient agrees to contact the PCP office for questions related to their healthcare. Advance Care Planning:   Does patient have an Advance Directive: reviewed and current. Medication reconciliation was performed with patient, who verbalizes understanding of administration of home medications. Medications reviewed, 1111F entered: yes    Was patient discharged with a pulse oximeter? no    Non-face-to-face services provided:  Obtained and reviewed discharge summary and/or continuity of care documents  Communication with home health agencies or other community services the patient is currently using-they have contacted pt and are coming today. Offered patient enrollment in the Remote Patient Monitoring (RPM) program for in-home monitoring: Patient declined. Care Transitions 24 Hour Call    Do you have a copy of your discharge instructions?: Yes  Do you have all of your prescriptions and are they filled?: Yes  Have you been contacted by a [address]?: No  Have you scheduled your follow up appointment?: No (Comment: pt is calling today)  Post Acute Services: [address] you feel like you have everything you need to keep you well at home?: Yes  Care Transitions Interventions   Home Care Waiver: Completed              Follow Up  Future Appointments   Date Time Provider Kathy Diez   5/11/2023 11:00 AM Madelaine Fleischer., MD [address] Transition Nurse provided contact information. No further follow-up call indicated based on severity of symptoms and risk factors.       Omid Vela RN

## 2023-01-27 NOTE — TELEPHONE ENCOUNTER
Spoke with patient about scheduling her HFU. She will discuss with daughter and have daughter call us back.

## 2023-01-27 NOTE — TELEPHONE ENCOUNTER
Daughter would like to have you review patient's test results, she was in the hospital recently and she is concerned in particular to something that she said was noted in her chart regarding a pelvis mass I see in her chart that she has cyst but I did not see anything about a mass.   She does have an appt to see ou on 2/7 but daughter wanted to know if you call her back on Monday to discuss # [phone]

## 2023-01-30 ENCOUNTER — TELEPHONE (OUTPATIENT)
Dept: FAMILY MEDICINE CLINIC | Age: 83
End: 2023-01-30

## 2023-01-30 NOTE — TELEPHONE ENCOUNTER
Looks like benign cysts that haven't changed over a year per radiology but we will order pelvic ultrasound to confirm cystic

## 2023-01-30 NOTE — TELEPHONE ENCOUNTER
Pt called. She said that she has a few things that she needs to talk to you about. She has a HFU appt on 2/7/23, but she said she needs to talk to you about some things before then. She's asking for a call back today if possible by you.

## 2023-02-02 ENCOUNTER — TELEPHONE (OUTPATIENT)
Dept: FAMILY MEDICINE CLINIC | Age: 83
End: 2023-02-02

## 2023-02-02 DIAGNOSIS — M06.9 RHEUMATOID ARTHRITIS INVOLVING MULTIPLE JOINTS (HCC): Primary | ICD-10-CM

## 2023-02-02 RX ORDER — TRAMADOL HYDROCHLORIDE 50 MG/1
50-100 TABLET ORAL EVERY 8 HOURS PRN
Qty: 40 TABLET | Refills: 0 | Status: SHIPPED | OUTPATIENT
Start: 2023-02-02 | End: 2023-02-12

## 2023-02-02 RX ORDER — CYCLOBENZAPRINE HCL 10 MG
10 TABLET ORAL 3 TIMES DAILY PRN
Qty: 90 TABLET | Refills: 0 | Status: SHIPPED | OUTPATIENT
Start: 2023-02-02 | End: 2023-02-02 | Stop reason: SINTOL

## 2023-02-02 RX ORDER — NAPROXEN 500 MG/1
500 TABLET ORAL EVERY 12 HOURS
Qty: 60 TABLET | Refills: 0 | OUTPATIENT
Start: 2023-02-02 | End: 2023-03-04

## 2023-02-02 NOTE — TELEPHONE ENCOUNTER
Daughter called today and she said there is probably not much you can tell her over the phone and they can discuss everything with you at her visit Tuesday.

## 2023-02-02 NOTE — TELEPHONE ENCOUNTER
Daughter states she will discuss at her visit Tuesday. PSYCHIATRIC FOLLOW UP:      Reason for admission:   Intractable nausea vomiting  Reason for consult:psychosis   Requesting Physician: Dr. German    HPI:     Pt is a 65 y.o female w/ complicated PMH of Crohn's dz, multiple abdominal surgeries, and chronic pain. Pt was admitted for dehydration, decreased PO intake, nausea/vomiting, and pain control. Continues to be manic. Talking incessantly. Has eyes closed and talks on the phone, doesn't respond to me repeatedly calling her name, continues to talk.       Psychiatric Examination: observed phenomenon:  Vitals: /81   Pulse 94   Temp 36.7 °C (98 °F) (Temporal)   Resp 18   Ht 1.829 m (6')   Wt 68.5 kg (151 lb 0.2 oz)   SpO2 96%   Breastfeeding? No   BMI 20.48 kg/m²  Body mass index is 20.48 kg/m².    Review of Systems:  Psychiatric:  Pt does not participate in ROS  However, she is clearly manic   Has been complaining of nausea and pain     Psychiatric Examination: observed phenomenon:  Vitals: /81   Pulse 94   Temp 36.7 °C (98 °F) (Temporal)   Resp 18   Ht 1.829 m (6')   Wt 68.5 kg (151 lb 0.2 oz)   SpO2 96%   Breastfeeding? No   BMI 20.48 kg/m²  Body mass index is 20.48 kg/m².    Appearance: Grooming wnl   Muscle Strength/Tone:  wnl   Gait/Station:  Not assessed  Speech: pressured   Thought Process:  Tangential   Associations:no loose association   Abnormal/Psychotic Thoughts (ex): delusional, no a/vh.   Insight/Judgement:very poor   Orientation:oriented to self, place, situation   Memory:Grossly intact.   Attention/Concentration: very poor  Language: fluent   Fund of Knowledge:decreased  Mood:          Irritable   Affect:         Labile   SI/HI:   Denies   Soc history:    She is challenging her discharge     Lab results/tests:   No results found for this or any previous visit (from the past 48 hour(s)).  DX-LUMBAR SPINE-2 OR 3 VIEWS   Final Result         1.  No acute fracture.      2.  Minimal retrolisthesis at L5-S1.      3.  Status post  fusion at L4-5 with removal of hardware.      4.  Mild degenerative disc disease at L3-4.      IR-US GUIDED PIV   Final Result    Ultrasound-guided PERIPHERAL IV INSERTION performed by    qualified nursing staff as above.            IR-US GUIDED PIV   Final Result    Ultrasound-guided PERIPHERAL IV INSERTION performed by    qualified nursing staff as above.            IR-US GUIDED PIV   Final Result    Ultrasound-guided PERIPHERAL IV INSERTION performed by    qualified nursing staff as above.            DX-CHEST-PORTABLE (1 VIEW)   Final Result         1. No acute cardiopulmonary abnormalities are identified.      CT-ABDOMEN-PELVIS W/O   Final Result         1. No evidence of acute inflammatory change.  The study is however limited due to nonuse of intravenous contrast.      2. No CT evidence of small bowel obstruction. Left lower quadrant ostomy.         US-EXTREMITY VENOUS LOWER BILAT   Final Result      DX-ABDOMEN COMPLETE WITH AP OR PA CXR   Final Result         1. No acute abnormality detected.               Assessment:  Bipolar disorder, current episode manic   Esophageal spasm   Crohn's disease           Plan:  Giving depakote now IV  Recommend giving it daily if she will take it.

## 2023-02-02 NOTE — TELEPHONE ENCOUNTER
Dgt called. Pt is in a lot of pain. I see where we had to stop her Flexeril and her Naproxen due to issues with blood thinners and arrhythmia. She is not able to sleep at night as well. Keeley Coats is insistent on giving her something for her pain that won't cause any issues with her heart or her other medications. Pls send to [facility]. I offered her dgt an appt with you for tmrw but she said to wait until Tuesday b/c she has a rheum appt tomorrow that she cannot miss.

## 2023-02-02 NOTE — TELEPHONE ENCOUNTER
Pharmacy called with contraindication for the Cyclobenzaprine because patient has cardiac arrhythmia.

## 2023-02-07 ENCOUNTER — TELEPHONE (OUTPATIENT)
Dept: FAMILY MEDICINE CLINIC | Age: 83
End: 2023-02-07

## 2023-02-07 ENCOUNTER — HOSPITAL ENCOUNTER (EMERGENCY)
Age: 83
Discharge: LEFT BEFORE DISCHARGE | End: 2023-02-07
Payer: MEDICARE

## 2023-02-07 VITALS
OXYGEN SATURATION: 98 % | BODY MASS INDEX: 19.79 KG/M2 | TEMPERATURE: 97 F | WEIGHT: 118.8 LBS | RESPIRATION RATE: 20 BRPM | SYSTOLIC BLOOD PRESSURE: 153 MMHG | HEIGHT: 65 IN | HEART RATE: 76 BPM | DIASTOLIC BLOOD PRESSURE: 87 MMHG

## 2023-02-07 DIAGNOSIS — R10.10 PAIN OF UPPER ABDOMEN: Primary | ICD-10-CM

## 2023-02-07 LAB
A/G RATIO: 1.2 (ref 1.1–2.2)
ALBUMIN SERPL-MCNC: 4.3 G/DL (ref 3.4–5)
ALP BLD-CCNC: 83 U/L (ref 40–129)
ALT SERPL-CCNC: 15 U/L (ref 10–40)
ANION GAP SERPL CALCULATED.3IONS-SCNC: 12 MMOL/L (ref 3–16)
AST SERPL-CCNC: 22 U/L (ref 15–37)
BASOPHILS ABSOLUTE: 0 K/UL (ref 0–0.2)
BASOPHILS RELATIVE PERCENT: 0.4 %
BILIRUB SERPL-MCNC: 0.4 MG/DL (ref 0–1)
BUN BLDV-MCNC: 7 MG/DL (ref 7–20)
CALCIUM SERPL-MCNC: 9.9 MG/DL (ref 8.3–10.6)
CHLORIDE BLD-SCNC: 93 MMOL/L (ref 99–110)
CO2: 25 MMOL/L (ref 21–32)
CREAT SERPL-MCNC: <0.5 MG/DL (ref 0.6–1.2)
EOSINOPHILS ABSOLUTE: 0.2 K/UL (ref 0–0.6)
EOSINOPHILS RELATIVE PERCENT: 2.9 %
GFR SERPL CREATININE-BSD FRML MDRD: >60 ML/MIN/{1.73_M2}
GLUCOSE BLD-MCNC: 114 MG/DL (ref 70–99)
HCT VFR BLD CALC: 42.7 % (ref 36–48)
HEMOGLOBIN: 14.2 G/DL (ref 12–16)
LACTIC ACID: 1.2 MMOL/L (ref 0.4–2)
LIPASE: 15 U/L (ref 13–60)
LYMPHOCYTES ABSOLUTE: 1.2 K/UL (ref 1–5.1)
LYMPHOCYTES RELATIVE PERCENT: 15.3 %
MCH RBC QN AUTO: 32.9 PG (ref 26–34)
MCHC RBC AUTO-ENTMCNC: 33.3 G/DL (ref 31–36)
MCV RBC AUTO: 98.9 FL (ref 80–100)
MONOCYTES ABSOLUTE: 0.7 K/UL (ref 0–1.3)
MONOCYTES RELATIVE PERCENT: 9.1 %
NEUTROPHILS ABSOLUTE: 5.7 K/UL (ref 1.7–7.7)
NEUTROPHILS RELATIVE PERCENT: 72.3 %
PDW BLD-RTO: 14.5 % (ref 12.4–15.4)
PLATELET # BLD: 355 K/UL (ref 135–450)
PMV BLD AUTO: 6.7 FL (ref 5–10.5)
POTASSIUM REFLEX MAGNESIUM: 4.1 MMOL/L (ref 3.5–5.1)
RBC # BLD: 4.32 M/UL (ref 4–5.2)
SODIUM BLD-SCNC: 130 MMOL/L (ref 136–145)
TOTAL PROTEIN: 7.8 G/DL (ref 6.4–8.2)
WBC # BLD: 7.9 K/UL (ref 4–11)

## 2023-02-07 PROCEDURE — 83690 ASSAY OF LIPASE: CPT

## 2023-02-07 PROCEDURE — 85025 COMPLETE CBC W/AUTO DIFF WBC: CPT

## 2023-02-07 PROCEDURE — 99283 EMERGENCY DEPT VISIT LOW MDM: CPT

## 2023-02-07 PROCEDURE — 80053 COMPREHEN METABOLIC PANEL: CPT

## 2023-02-07 PROCEDURE — 83605 ASSAY OF LACTIC ACID: CPT

## 2023-02-07 ASSESSMENT — PAIN DESCRIPTION - DESCRIPTORS: DESCRIPTORS: BURNING

## 2023-02-07 ASSESSMENT — PAIN DESCRIPTION - LOCATION: LOCATION: ABDOMEN

## 2023-02-07 ASSESSMENT — PAIN DESCRIPTION - PAIN TYPE: TYPE: ACUTE PAIN

## 2023-02-07 ASSESSMENT — PAIN SCALES - GENERAL: PAINLEVEL_OUTOF10: 8

## 2023-02-07 ASSESSMENT — PAIN - FUNCTIONAL ASSESSMENT: PAIN_FUNCTIONAL_ASSESSMENT: 0-10

## 2023-02-07 NOTE — TELEPHONE ENCOUNTER
I discussed with daughter and patient on telephone. Patient's abdominal symptoms have worsened and she is having severe pain daughter states she is crying out in pain and this is a change from when she was in the emergency room before. Daughter also feels she may need to go to rehab stay for rib fractures if no other acute issues going on causing her pain. After discussion it was decided to take the patient to the emergency room for acute evaluation of abdominal pain.

## 2023-02-07 NOTE — TELEPHONE ENCOUNTER
Cant order STAT CT so would need to schedule it. May be best to see GI specialist because may need a scope.  And Please order RUQ ultrasound for abd pain and pelvic ultrasound for ovarian mass

## 2023-02-07 NOTE — TELEPHONE ENCOUNTER
Daughter called and they have been waiting in the ER for 2 1/2 hours and they can not wait any longer and they are leaving. She states patient has been able to sit there and not cry out in pain but she just can't sit there anymore. Daughter wants to know if you can just go ahead and order Ct and ultrasound.

## 2023-02-08 ENCOUNTER — TELEPHONE (OUTPATIENT)
Dept: FAMILY MEDICINE CLINIC | Age: 83
End: 2023-02-08

## 2023-02-08 NOTE — TELEPHONE ENCOUNTER
Pt called. You had recommended that she sees GI and they called her to make the appointment. She doesn't think she needs it now b/c she feels better that way. She said that she was extremely constipated. She said that the pills you gave her for her back is helping her too, just feels it every once in a while. She wants your opinion on whether she should still see GI or not. Pls advise.
Pt informed and vu
Up to her
initiation of breastfeeding/breast milk feeding

## 2023-02-08 NOTE — TELEPHONE ENCOUNTER
Spoke to daughter this morning and said patient had a good night last night. She agrees with going to the GI specialist and they will go ahead do that first before they go forward with any other testing. Referral sent to Dr. Derrell Dance.

## 2023-02-20 ENCOUNTER — TELEPHONE (OUTPATIENT)
Dept: CARDIOLOGY CLINIC | Age: 83
End: 2023-02-20

## 2023-02-21 DIAGNOSIS — M06.9 RHEUMATOID ARTHRITIS INVOLVING MULTIPLE JOINTS (HCC): ICD-10-CM

## 2023-02-21 RX ORDER — TRAMADOL HYDROCHLORIDE 50 MG/1
TABLET ORAL
Qty: 90 TABLET | Refills: 0 | Status: SHIPPED | OUTPATIENT
Start: 2023-02-21 | End: 2023-03-23

## 2023-02-21 NOTE — TELEPHONE ENCOUNTER
Date of last refill of this med was 2/2/23, # of pills given 40 and # of refills given 0. Their next appointment is None, the last date patient was seen was 11/14/23. Does patient have medication agreement on file? No  Has drug screen been done in last 12 months if needed?  N/A

## 2023-02-23 ENCOUNTER — OFFICE VISIT (OUTPATIENT)
Dept: CARDIOLOGY CLINIC | Age: 83
End: 2023-02-23

## 2023-02-23 VITALS
HEART RATE: 66 BPM | WEIGHT: 113.5 LBS | SYSTOLIC BLOOD PRESSURE: 130 MMHG | DIASTOLIC BLOOD PRESSURE: 80 MMHG | HEIGHT: 65 IN | OXYGEN SATURATION: 98 % | BODY MASS INDEX: 18.91 KG/M2

## 2023-02-23 DIAGNOSIS — I44.7 LBBB (LEFT BUNDLE BRANCH BLOCK): ICD-10-CM

## 2023-02-23 DIAGNOSIS — I48.0 PAROXYSMAL ATRIAL FIBRILLATION (HCC): Primary | ICD-10-CM

## 2023-02-23 NOTE — PATIENT INSTRUCTIONS
RECOMMENDATIONS:  Reconsider heart catheterization to potentially fix blockages and improve heart function. We would refer you back to Dr Marilee Lamb if you are interested. Discussed risks and benefits of defibrillator versus BiV ICD to prevent sudden cardiac death given weakened heart function. Patient would like to think about her options and will let us know. Follow up in May as scheduled.

## 2023-02-23 NOTE — PROGRESS NOTES
[facility]   Electrophysiology Consult Note     Date: 2/23/23  Patient Name: Mahi Chapin  YOB: 1940    Primary Care Physician: Franklin Watkins MD    CHIEF COMPLAINT:   Chief Complaint   Patient presents with    Follow-up    Atrial Fibrillation    Other     LBBB       HISTORY OF PRESENT ILLNESS: Mahi Chapin is a 80 y.o. female with a medical history significant for symptomatic paroxysmal atrial fibrillation, known left bundle branch block (negative stress test from [facility]), hypertension, rheumoatoid arthritis, hypothyroidism, and complicated diverticulitis who presented to the ED from home 6/2021 with symptomatic atrial fibrillation with RVR having recently undergone LUCY cardioversion. According to patient and her daughter she recently began to suffer from dyspnea on exertion and shortness of breath. She is unable to move from her bed to her recliner without becoming short of breath. She was seen by her PCP who directed her to a cardiologist.  Patient was found to be in atrial fibrillation with RVR. She underwent a LUCY cardioversion on 06/02/2021 ([facility]). She was discharged home on rate control and anticoagulation. Unfortunately patient began to suffer from worsening shortness of breath and dyspnea on exertion shortly thereafter and was brought to [facility] for further evaluation and care last night. Echo 6/3/2021 demonstrated an LVEF of 25%. Patient was evaluated emergency room 6/4/2021. Her CMP was reassuring outside of hypoalbuminemia and hypoproteinemia along with elevated AST and ALT. Her CBC was reassuring. Her BNP was elevated at 1216. Her chest radiograph showed pulmonary vascular congestion with chronic changes, cardiomegaly, small left pleural effusion. Patient was started on amiodarone 6/2021 ([facility]). Patient returned to ED 6/17/2201 due to acute on chronic CHF. Troponin elevated.     Cardiac event monitor worn 7/27/2021-7/30/2021 demonstrated predominately SB with an average HR of 57 (29-75) BPM, 4.1 second nocturnal pauses, 0.7% PACs, 0.13% PVCs. On 8/19/2021, patient's ECG demonstrated Sinus  Rhythm, left bundle branch block, 63 BPM. She reported that she would prefer to follow with [id number] [place] [address] for cardiology. She reported that her biggest complaint is the pain from her RA. She follows with rheumatology. She reported that she experiences dizziness with position change. She reported she is losign weight and attributes this to taking her diuretic as prescribed. She reported she does not wish to complete stress test. She reported she is able to ascend a flight or stairs without any problems. On 10/12/2021, ECG demonstrated SB (51). She stated that she has been having fatigue recently. She stated that she suffers from shoulder and neck pain. A LHC was planned at last visit, but patient decided against this. On 5/3/2022 she reported she is feeling very well. She does complain of increased bruising on her lower legs. She reported she stays active doing pilates several days a week and tolerates activity well. On 10/12/2022 the patients daughter called into the office to report that her mothers blood pressure and heart rate were elevated. The patient reported that she felt like her \"heart was heavy. \" A cardioversion was discussed but the patient declined and stated she felt better. On 11/08/2022, ECG demonstrates SR (63 BPM). She states that when she had the episode a few weeks ago she had eaten oatmeal porridge for breakfast. She states that she felt like it did not digest. She reports she was unable to sleep as well. She states that her heart rate was sustaining around 100 BPM. She felt some chest pressure along with this. She states that this lasted two days and she has not had any episodes since. She states she takes 50 mg every other morning of Amiodarone.     Interval History since last office visit: Today, 2/23/2023, she reports that she fell recently. She had fractured ribs as a result. She is currently suffering from an RA flare up. She has fatigue. She is taking her medications as prescribed. Denies recent issues with bleeding or bruising. Patient denies current edema, chest pain, sob, palpitations, dizziness or syncope. Past Medical History:   has a past medical history of Allergic rhinitis, CHF (congestive heart failure), NYHA class I, acute on chronic, combined ([address] Hyperlipidemia, Hypertension, Hypothyroidism, LBBB (left bundle branch block), and Rheumatoid arthritis(714.0). Past Surgical History:   has a past surgical history that includes Colonoscopy ();  section; and joint replacement (Left, 13). Allergies:  Patient has no known allergies. Social History:   reports that she has quit smoking. She has never used smokeless tobacco. She reports current alcohol use of about 2.0 standard drinks per week. She reports that she does not use drugs. Family History: family history includes Heart Disease in her father and mother; Thyroid Disease in her father. Home Medications:    Prior to Admission medications    Medication Sig Start Date End Date Taking? Authorizing Provider   traMADol (ULTRAM) 50 MG tablet TAKE 1-2 TABLETS BY MOUTH EVERY 8 HOURS AS NEEDED FOR PAIN FOR UP TO 10 DAYS 2/21/23 3/23/23 Yes Farhat Valenzuela MD   apixaban (ELIQUIS) 2.5 MG TABS tablet TAKE 1 TABLET TWICE A DAY 23  Yes GRACIELA Alvarez CNP   hydrOXYzine HCl (ATARAX) 10 MG tablet Take 1-2 tablets by mouth 3 times daily as needed for Itching 23  Yes Lorena Amado MD   triamcinolone (KENALOG) 0.1 % cream Apply topically 2 times daily as needed.  23  Yes Lorena Amado MD   levothyroxine (SYNTHROID) 25 MCG tablet TAKE 1 TABLET DAILY 22  Yes Farhat Valenzuela MD   spironolactone (ALDACTONE) 25 MG tablet TAKE 1 TABLET BY MOUTH EVERY DAY 22  Yes GRACIELA Alvarez CNP   amiodarone ([address]) 100 MG tablet TAKE 1 tablet every OTHER day 11/8/22  Yes Neela Payne MD   pantoprazole (PROTONIX) 40 MG tablet Take 1 tablet by mouth 2 times daily (before meals) 9/21/22  Yes PAZ Sarabia MD   folic acid (FOLVITE) 1 MG tablet Take 1 mg by mouth Six times weekly Except mondays   Yes Historical Provider, MD   methotrexate (RHEUMATREX) 2.5 MG chemo tablet TAKE 8 TABLETS EVERY MONDAY  Patient taking differently: TAKE 6 TABLETS EVERY MONDAY 2/12/21  Yes Bradley Vail MD   VITAMIN D PO Take 1,000 Units by mouth daily    Yes Historical Provider, MD   Probiotic Product (PROBIOTIC PO) Take 1 capsule by mouth daily    Yes Historical Provider, MD   Ascorbic Acid (VITAMIN C PO) Take 1,000 mg by mouth daily    Yes Historical Provider, MD   fish oil-omega-3 fatty acids 1000 MG capsule Take 4 g by mouth daily    Yes Historical Provider, MD   furosemide (LASIX) 40 MG tablet Take 1 tablet by mouth daily  Patient not taking: No sig reported 1/26/23   Milton Chandler MD       REVIEW OF SYSTEMS:    All 14-point review of systems are completed and  pertinent positives are mentioned in the history of present illness. Other  systems are reviewed and are negative. Physical Examination:    /80   Pulse 66   Ht 5' 5\" (1.651 m)   Wt 113 lb 8 oz (51.5 kg)   SpO2 98%   BMI 18.89 kg/m²      Constitutional and General Appearance:    alert, cooperative, no distress and appears stated age  [de-identified]:    PERRLA, no cervical lymphadenopathy. No masses palpable. Normal oral  mucosa  Respiratory:  Normal excursion and expansion without use of accessory muscles  Resp Auscultation: Normal breath sounds without dullness or wheezing  Cardiovascular: The apical impulse is not displaced  RRR S1S2 w/o M/G/R  Abdomen:  No masses or tenderness  Bowel sounds present  Extremities:   No Cyanosis or Clubbing   Lower extremity edema: No  Skin: Warm and dry  Neurological:  Alert and oriented.   Moves all extremities well  No abnormalities of mood, affect, memory, mentation, or behavior are noted    DATA:      ECG 2/23/23: Personally reviewed. Stress test 9/23/2021   Summary  Abnormal myocardial perfusion study. There is a medium-sized, moderate-intensity anteroseptal and apical  reversible defect consistent with ischemia in the territory of the LAD. Calculated LVEF of 44% with mild global hypokinesis. Overall findings represent an intermediate risk scan. Echo limited 9/23/2021   Summary   Limited echo for LV function. The left ventricular systolic function is mildly reduced with an ejection   fraction of 40%. Mild global hypokinesis. Grade II diastolic dysfunction with elevated filing pressure. The left atrium is severely dilated. Compared to last echo on 6/5/2021 (EF 25%), left ventricle systolic function   has improved. IMPRESSION:    1. Paroxysmal atrial fibrillation. 06/04/2021  1. Atrial fibrillation (new diagnosis). Patient is a pleasant 70-year-old female with a medical history significant for atrial fibrillation, hypertension, newly diagnosed cardiomyopathy, rheumatoid arthritis, and diverticulitis who presents from home with symptomatic atrial fibrillation with RVR. Patient follows at [address] [facility]. Work-up at [facility] unclear as far as etiology of her cardiomyopathy. Based on symptoms suspect most likely related to tachycardia cardiomyopathy however indices to be proved with ischemic evaluation. She was started on GDMT however this was eventually discontinued by [place] [facility] 08/19/2021  Patient presents for follow-up. She is now again to follow with [address] [facility]. She has been on amiodarone since 06/17/2021. She is not been on any juana agents given bradycardia. She wore a cardiac monitor which showed no atrial fibrillation however she did have some PSVT, likely AT. She states that while she is on amiodarone her symptoms significantly improved.   We discussed options yet again including antiarrhythmics, juana agents, ablation, pace and ablate strategy. At this point patient is not able to tolerate goal-directed medical therapy due to blood pressure and bradycardia as she had some pauses on her cardiac monitor for up to 4 seconds. We will see if her left ventricular ejection fraction has significantly improved since being on in normal sinus rhythm. We will also follow-up her cardiac stress test to see if ischemia may be the etiology of her symptoms/heart failure. Pending the results of these findings we will discuss the need for ICD and/or ablation. If patient requires an ICD she will need a biventricular ICD. Of note, patient concerned her RA may be getting worse and is concerned it may be her amiodarone which is reasonable. We discussed alternatives however patient would like to discuss with her rheumatologist before changing as it has made a great impact in her life. - Continue amiodarone 100 mg daily (discuss with rheumatologist). - Continue apixaban 2.5 mg BID.  - Follow up with me in 10/2021 after repeat echocardiogram and stress test to review options. 10/12/2021  Patient presents for follow-up. She has been doing well on her low-dose amiodarone. Upon her rheumatologist there is no firm contraindications from a rheumatologic standpoint to her amiodarone. We discussed options including alternative antiarrhythmics however at this point patient would like to continue on amiodarone as it has significantly improved her atrial fibrillation is help with recovery of her left ventricular ejection fraction. She will need labs every 6 months, chest radiograph every year and an eye exam every year  - Labs Q6 months (TSH, CBC, and CMP). - Chest radiograph yearly. - Eye exam every year. - Continue amiodarone 100 mg daily (ok per rheumatology). - Continue apixaban 2.5 mg BID.  - Follow up with me in 6 momths. 5/3/2022  Patient presents for follow up.   She has been doing well. No symptomatic atrial fibrillation. No heart failure. Patient feeling well so declined [facility] for ischemic disease. She will let me know if any chest pain or ischemic symptoms. We discussed decreasing her amiodarone to 50 mg Q48 hours and I agreed. - Labs Q6 months (TSH, CBC, and CMP). - Chest radiograph yearly. - Eye exam every year. - Continue amiodarone 100 mg QOD (ok per rheumatology). - Continue apixaban 2.5 mg BID. - Start protonix for GI prophylaxis given rare NSAID use. - Follow up with me in 6 momths. 11/08/2022  Patient presents for follow up. She recently had an episode of tachycardia that last 48 hours and resolved. I would like to increase he amiodarone to 100 mg QOD (only taking 50 mg over other day). - Labs Q6 months (TSH, CBC, and CMP, with labs today). - Chest radiograph yearly (CT scan from 01/24/2023 reassuring, CXR next year). - Eye exam every year. - Increase amiodarone to 100 mg QOD. - Continue apixaban 2.5 mg BID. - Start protonix for GI prophylaxis given rare NSAID use. - Follow up with me in 6 momths.    2/23/2023  Patient presents for follow up. While her family is interested in [address] and possibly an ablation however patient personally would like to continue current course with amiodarone and apixaban. - Labs Q6 months (TSH, CBC, and CMP, with labs today). - Chest radiograph yearly (at next visit). - Eye exam every year. - Increase amiodarone to 100 mg QOD. - Continue apixaban 2.5 mg BID. - Start protonix for GI prophylaxis given rare NSAID use. - Follow up with me in 6 momths. 2. Cardiomyopathy with wide LBBB. - Plan as per above. - Continue to follow with IC.    10/12/2021  Patient had a stress test was concerning for moderate ischemic disease. She is unclear as to whether or not she would like to move forward with left heart catheterization however after counseling she has decided to move forward.   I will asked Dr. Son [facility] to perform her left heart catheterization. We will continue medical therapy. She will follow up with cardiology as well as electrophysiology. At this point no ICD indication. Her NYHA is class is 1-2.  - Continue GDMT. - Schedule LHC with Dr. Smitha Mueller (I discussed briefly with him). - Follow up with IC.    5/3/2022  Patient declined ischemic evaluation with LHC. - Monitor for ischemic symptoms. 11/08/2022  - Per IC.    2/23/2023  Patient's family would like her to consider LHC. I will refer back to Dr. Smitha Mueller should she change her mind. - Follow up with Dr. Smitha Mueller. RECOMMENDATIONS:  Reconsider heart catheterization to potentially fix blockages and improve heart function. We would refer you back to Dr Smitha Mueller if you are interested. Discussed risks and benefits of defibrillator versus BiV ICD to prevent sudden cardiac death given weakened heart function. Patient would like to think about her options and will let us know. Follow up in May as scheduled. QUALITY MEASURES  1. Tobacco Cessation Counseling: NA  2. Retake of BP if >140/90:   NA  3. Documentation to PCP/referring for new patient:  Sent to PCP at close of office visit  4. CAD patient on anti-platelet: NA  5. CAD patient on STATIN therapy:  NA  6. Patient with CHF and aFib on anticoagulation:  Yes     I, July Mathews RN, am scribing for and in the presence of Dr. Katerine Mccann. 02/23/23 12:43 PM   July Mathews RN    I reviewed with the resident the medical history and the resident's findings on the physical examination. I discussed with the resident the patient's diagnosis and concur with the plan. Dr. Susan Dominguez MD  Electrophysiology  [facility] [facility]. [address]. [place] [address]  Phone: [phone]  Fax: [phone]     NOTE: This report was transcribed using voice recognition software. Every effort was made to ensure accuracy, however, inadvertent computerized transcription errors may be present.

## 2023-03-01 ENCOUNTER — TELEPHONE (OUTPATIENT)
Dept: FAMILY MEDICINE CLINIC | Age: 83
End: 2023-03-01

## 2023-03-01 NOTE — TELEPHONE ENCOUNTER
Daughter calling for patient, she is looking at putting patient in nursing home for some rehab.   She said she spoke to insurance and they said she could go to [facility] but you would need you to authorize it with insurance for her to go (daughter said they do not require hosp stay prior)

## 2023-03-07 ENCOUNTER — TELEPHONE (OUTPATIENT)
Dept: CARDIOLOGY CLINIC | Age: 83
End: 2023-03-07

## 2023-03-07 NOTE — TELEPHONE ENCOUNTER
Edgar COLLADO sent onve request for cardiac clearance in 3/3/23. Daughter calling to get status of this. Endoscopy has not been scheduled waiting on [facility]. Daughter states pt needs to be off blood thinner before and this procedure need don sooner than later. Please advise.

## 2023-03-07 NOTE — TELEPHONE ENCOUNTER
Pt's daughter calling again stating this message needs to answered quickly so pt can know if she can hold the blood thinners and get her procedure done quickly. Please advise.

## 2023-03-07 NOTE — TELEPHONE ENCOUNTER
[facility] [address] [place],[address] 2/23/2023    Please advise on clearance for endoscopy and holding [address] [facility]

## 2023-03-07 NOTE — TELEPHONE ENCOUNTER
Spoke with the patients daughter and informed her that the cardiac clearance letter was faxed and confirmation was received. She stated that eufemia COLLADO called and told her it was not received. She is going to call their office again tomorrow.

## 2023-03-07 NOTE — LETTER
[address] [facility] [address], [address] [place] [address] [facility]  Phone: [phone]  Fax: [phone]    Goran Wilcox MD    Re:Cara Chapin  1940 March 7, 2023    To whom it may concern,     Dejah Chapin is at intermediate risk for MACE during a low risk procedure. Patient declines invasive testing which we would prefer to perform but she declines. I think risk benefits moving forward. While off of oral anticoagulation risk for CVA increases however, risk benefits favor holding. She may hold Eliquis for 48 hours prior to the procedure and resume as soon as surgically appropriate.        Sincerely,        Goran Wilcox MD

## 2023-03-07 NOTE — TELEPHONE ENCOUNTER
Spoke with the daughter and she stated that the clearance letter needed to be sent to [facility] Ahumada - Dr. Aaron Walker office [phone]. Letter created and faxed.

## 2023-03-07 NOTE — TELEPHONE ENCOUNTER
Please help me write a letter. Patient is at intermediate risk for MACE during a low risk procedure. Patient declines invasive testing which we would prefer to perform but she declines. I think risk benefits favors moving forward. While off [address] risk for CVA increases while off [address] however risk benefits favors holding.

## 2023-03-08 ENCOUNTER — TELEMEDICINE (OUTPATIENT)
Dept: FAMILY MEDICINE CLINIC | Age: 83
End: 2023-03-08
Payer: MEDICARE

## 2023-03-08 DIAGNOSIS — B02.9 HERPES ZOSTER WITHOUT COMPLICATION: Primary | ICD-10-CM

## 2023-03-08 DIAGNOSIS — R63.0 POOR APPETITE: ICD-10-CM

## 2023-03-08 DIAGNOSIS — R63.4 WEIGHT LOSS: ICD-10-CM

## 2023-03-08 PROCEDURE — 1123F ACP DISCUSS/DSCN MKR DOCD: CPT | Performed by: NURSE PRACTITIONER

## 2023-03-08 PROCEDURE — 99213 OFFICE O/P EST LOW 20 MIN: CPT | Performed by: NURSE PRACTITIONER

## 2023-03-08 RX ORDER — MEGESTROL ACETATE 40 MG/ML
800 SUSPENSION ORAL DAILY
Qty: 600 ML | Refills: 5 | Status: SHIPPED | OUTPATIENT
Start: 2023-03-08 | End: 2023-04-07

## 2023-03-08 RX ORDER — ACYCLOVIR 800 MG/1
800 TABLET ORAL
Qty: 35 TABLET | Refills: 0 | Status: SHIPPED | OUTPATIENT
Start: 2023-03-08 | End: 2023-03-15

## 2023-03-08 ASSESSMENT — PATIENT HEALTH QUESTIONNAIRE - PHQ9
SUM OF ALL RESPONSES TO PHQ QUESTIONS 1-9: 0
2. FEELING DOWN, DEPRESSED OR HOPELESS: 0
SUM OF ALL RESPONSES TO PHQ9 QUESTIONS 1 & 2: 0
1. LITTLE INTEREST OR PLEASURE IN DOING THINGS: 0
SUM OF ALL RESPONSES TO PHQ QUESTIONS 1-9: 0

## 2023-03-08 ASSESSMENT — ENCOUNTER SYMPTOMS
EYES NEGATIVE: 1
RESPIRATORY NEGATIVE: 1

## 2023-03-08 NOTE — PROGRESS NOTES
3/8/2023    TELEHEALTH EVALUATION -- Audio/Visual (During ZHURJ-61 public health emergency)    HPI:    Triny Chapin (:  1940) has requested an audio/video evaluation for the following concern(s):    Patient presents today via virtual visit with complaints of erythematous and vesicular rash under right breast and around to her back. Patient reports associated burning sensation and sensitivity. Patient has also had a headache. Patient denies fever or any other associated symptoms. Patient/daughter also reports patient has a poor appetite and has had weight loss. Patient is interested in an appetite stimulant. Review of Systems   Constitutional:  Positive for appetite change (poor appetite) and unexpected weight change (weight loss due to poor appetite). Negative for fever. HENT: Negative. Eyes: Negative. Respiratory: Negative. Cardiovascular: Negative. Genitourinary: Negative. Skin:  Positive for rash (under right breast around to back). Prior to Visit Medications    Medication Sig Taking? Authorizing Provider   traMADol (ULTRAM) 50 MG tablet TAKE 1-2 TABLETS BY MOUTH EVERY 8 HOURS AS NEEDED FOR PAIN FOR UP TO 10 DAYS Yes Eladio Boyd MD   furosemide (LASIX) 40 MG tablet Take 40 mg by mouth daily Yes Leelee Baldwin MD   apixaban (ELIQUIS) 2.5 MG TABS tablet TAKE 1 TABLET TWICE A DAY Yes GRACIELA Pleitez CNP   hydrOXYzine HCl (ATARAX) 10 MG tablet Take 1-2 tablets by mouth 3 times daily as needed for Itching Yes Barber Byrd MD   triamcinolone (KENALOG) 0.1 % cream Apply topically 2 times daily as needed.  Yes Barber Byrd MD   levothyroxine (SYNTHROID) 25 MCG tablet TAKE 1 TABLET DAILY Yes Eladio Boyd MD   spironolactone (ALDACTONE) 25 MG tablet TAKE 1 TABLET BY MOUTH EVERY DAY Yes GRACIELA Pleitez CNP   amiodarone (PACERONE) 100 MG tablet TAKE 1 tablet every OTHER day Yes Marleny Dimas MD   pantoprazole (PROTONIX) 40 MG tablet Take 1 tablet by mouth 2 times daily (before meals) Yes Denisa Orlando MD   folic acid (FOLVITE) 1 MG tablet Take 1 mg by mouth Six times weekly Except  Yes Historical Provider, MD   methotrexate (RHEUMATREX) 2.5 MG chemo tablet TAKE [facility]  Patient taking differently: TAKE 6 TABLETS EVERY MONDAY Yes Darrius Matias MD   VITAMIN D PO Take 1,000 Units by mouth daily  Yes Historical Provider, MD   Probiotic Product (PROBIOTIC PO) Take 1 capsule by mouth daily  Yes Historical Provider, MD   Ascorbic Acid (VITAMIN C PO) Take 1,000 mg by mouth daily  Yes Historical Provider, MD   fish oil-omega-3 fatty acids 1000 MG capsule Take 4 g by mouth daily  Yes Historical Provider, MD       Social History     Tobacco Use    Smoking status: Former    Smokeless tobacco: Never    Tobacco comments:     quit age 40   Vaping Use    Vaping Use: Never used   Substance Use Topics    Alcohol use: Yes     Alcohol/week: 2.0 standard drinks     Types: 2 Glasses of wine per week     Comment: occasionally     Drug use: No        No Known Allergies,   Past Medical History:   Diagnosis Date    Allergic rhinitis     CHF (congestive heart failure), NYHA class I, acute on chronic, combined ([facility] [address] 2021    Hyperlipidemia     Hypertension     Hypothyroidism     probable autoimmune thyroiditis    LBBB (left bundle branch block)     ON EKG, had normal GXT    Rheumatoid arthritis(714.0)     Rheumatologist in [place]   ,   Past Surgical History:   Procedure Laterality Date     SECTION      x2    COLONOSCOPY  2005    JOINT REPLACEMENT Left 13    left total knee replacement   ,   Social History     Tobacco Use    Smoking status: Former    Smokeless tobacco: Never    Tobacco comments:     quit age 40   Vaping Use    Vaping Use: Never used   Substance Use Topics    Alcohol use:  Yes     Alcohol/week: 2.0 standard drinks     Types: 2 Glasses of wine per week     Comment: occasionally     Drug use: No   ,   Family History   Problem Relation Age of Onset    Heart Disease Mother         very slow heart beat    Thyroid Disease Father     Heart Disease Father        PHYSICAL EXAMINATION:  [ INSTRUCTIONS:  \"[x]\" Indicates a positive item  \"[]\" Indicates a negative item  -- DELETE ALL ITEMS NOT EXAMINED]  Vital Signs: (As obtained by patient/caregiver or practitioner observation)      Constitutional: [x] Appears well-developed and well-nourished [x] No apparent distress      [] Abnormal-   Mental status  [x] Alert and awake  [x] Oriented to person/place/time [x]Able to follow commands      Eyes:  EOM    [x]  Normal  [] Abnormal-  Sclera  [x]  Normal  [] Abnormal -         Discharge [x]  None visible  [] Abnormal -    HENT:   [x] Normocephalic, atraumatic. [] Abnormal   [x] Mouth/Throat: Mucous membranes are moist.     External Ears [x] Normal  [] Abnormal-     Neck: [x] No visualized mass     Pulmonary/Chest: [x] Respiratory effort normal.  [] No visualized signs of difficulty breathing or respiratory distress        [] Abnormal-      Musculoskeletal:   [] Normal gait with no signs of ataxia         [] Normal range of motion of neck        [] Abnormal-       Neurological:        [x] No Facial Asymmetry (Cranial nerve 7 motor function) (limited exam to video visit)          [] No gaze palsy        [] Abnormal-         Skin:        [x] No significant exanthematous lesions or discoloration noted on facial skin         [] Abnormal-            Psychiatric:       [x] Normal Affect [] No Hallucinations        [] Abnormal-     Other pertinent observable physical exam findings-     ASSESSMENT/PLAN:  1. Herpes zoster without complication  Erythematous vesicular rash under right breast and around back. Recommend treatment as below. Advised patient to follow-up with any problems or concerns. Patient/daughter verbalized understanding and agreeable to plan  - acyclovir (ZOVIRAX) 800 MG tablet;  Take 1 tablet by mouth 5 times daily for 7 days Dispense: 35 tablet; Refill: 0    2. Poor appetite  Patient reports poor appetite and weight loss as a result. Patient would like to try an appetite stimulant. Recommend patient trial Megace as below. Advised to follow-up with myself or PCP with any further problems or concerns. Patient/daughter verbalized understanding and agreeable to plan. - megestrol (MEGACE) 40 MG/ML suspension; Take 20 mLs by mouth daily  Dispense: 600 mL; Refill: 5    3. Weight loss  See #2.  - megestrol (MEGACE) 40 MG/ML suspension; Take 20 mLs by mouth daily  Dispense: 600 mL; Refill: 5    Return if symptoms worsen or fail to improve. [address] Sun[address] TI Chapin, was evaluated through a synchronous (real-time) audio-video encounter. The patient (or guardian if applicable) is aware that this is a billable service, which includes applicable co-pays. This Virtual Visit was conducted with patient's (and/or legal guardian's) consent. The visit was conducted pursuant to the emergency declaration under the [address] [place] [address], [address] [place] [address] [facility] authority and the [employer] General Act. Patient identification was verified, and a caregiver was present when appropriate. The patient was located at Home: [facility]. [address] [place] [address]  Provider was located at [facility] ([address] [place], [place] [address]t): [address],  [address] [place] [address]         Total time spent on this encounter: Not billed by time    --GRACIELA Maloney CNP on 3/8/2023 at 12:05 PM    An electronic signature was used to authenticate this note.

## 2023-03-08 NOTE — PATIENT INSTRUCTIONS
Please read the healthy family handout that you were given and share it with your family. Please compare this printed medication list with your medications at home to be sure they are the same. If you have any medications that are different please contact us immediately at [phone]. Also review your allergies that we have listed, these may also include medications that you have not been able to tolerate, make sure everything listed is correct. If you have any allergies that are different please contact us immediately at [phone]. You may receive a survey in the mail or by email asking about your experience during your visit today. Please complete and return to us so we know how we are serving you.

## 2023-03-09 ENCOUNTER — TELEPHONE (OUTPATIENT)
Dept: FAMILY MEDICINE CLINIC | Age: 83
End: 2023-03-09

## 2023-03-09 ENCOUNTER — TELEPHONE (OUTPATIENT)
Dept: ADMINISTRATIVE | Age: 83
End: 2023-03-09

## 2023-03-09 NOTE — TELEPHONE ENCOUNTER
Dgt called and states that the patient is miserable taking the thyroid medication in the morning with a full glass or water. Taking the full glass of water is an issue at this point. She states that it causes fullness and gas and causes her to burp a lot and it's very uncomfortable for her. This is part of the GI issue that she's dealing with. Is the thyroid medication really necessary at this time? She's not even able to get out of bed b/c she's so miserable. She's taking the anti-viral medications as well and her mother doesn't usually take a lot of medications, so that causes issues as well. Is there any way the dosing can be cut down or something b/c it's really miserable     Pls advise. Also, she was asking about the Megace and it is under review with insurance company. PA has been submitted. Dgt informed. She is going to check cash price as well.

## 2023-03-09 NOTE — TELEPHONE ENCOUNTER
PA submitted VIA CMM for  Megestrol Acetate 40MG/ML suspension  Key: [id number] - PA Case ID: [id number] - Rx #: [id number] PENDING    PA Case: [id number], Status: Denied. Notification: Completed.

## 2023-03-10 NOTE — TELEPHONE ENCOUNTER
Shirley sent me another message. Wants to know if she could get some Capsaicin cream for the rash. Was told that it helps when she itches and becomes painful. She knows it's OTC, but can she get a prescription for it?

## 2023-03-10 NOTE — TELEPHONE ENCOUNTER
Dgt informed and vu. She had a good night and a better day today. Eating is better but David Keyes doesn't know if it's because she's there with her and she's really trying to push the food b/c she lost 15lbs. OK to call in anti nausea medication in case she needs it over the weekend. Uses [address]

## 2023-03-10 NOTE — TELEPHONE ENCOUNTER
Its ok to just take the thyroid medicine with a drink of water and not the full glass of water, does she want some zofran to take as needed for the nausea?

## 2023-03-13 DIAGNOSIS — E03.9 HYPOTHYROIDISM (ACQUIRED): ICD-10-CM

## 2023-03-13 RX ORDER — LEVOTHYROXINE SODIUM 0.03 MG/1
TABLET ORAL
Qty: 90 TABLET | Refills: 0 | Status: SHIPPED | OUTPATIENT
Start: 2023-03-13

## 2023-03-13 RX ORDER — ONDANSETRON 4 MG/1
4 TABLET, ORALLY DISINTEGRATING ORAL 3 TIMES DAILY PRN
Qty: 21 TABLET | Refills: 0 | Status: SHIPPED | OUTPATIENT
Start: 2023-03-13

## 2023-03-14 DIAGNOSIS — E03.9 HYPOTHYROIDISM (ACQUIRED): ICD-10-CM

## 2023-03-16 ENCOUNTER — TELEPHONE (OUTPATIENT)
Dept: CARDIOLOGY CLINIC | Age: 83
End: 2023-03-16

## 2023-03-16 RX ORDER — MIRTAZAPINE 7.5 MG/1
7.5 TABLET, FILM COATED ORAL NIGHTLY
Qty: 30 TABLET | Refills: 5 | Status: SHIPPED | OUTPATIENT
Start: 2023-03-16

## 2023-03-16 NOTE — TELEPHONE ENCOUNTER
Spoke with pt.'s daughter (ok per HIPAA). She says pt has not symptoms. She just got over a bad case of shingles and was on an antiviral for 5 days. She would like to know if pt should increase any medication.  Please advise    Last OV NPDD 11/14/2022

## 2023-03-16 NOTE — TELEPHONE ENCOUNTER
Let's continue to monitor BP at home for now. The pain and inflammation from the shingles may be contributing to the higher BP. Follow a low sodium diet.    Thank you, Elida New

## 2023-03-16 NOTE — TELEPHONE ENCOUNTER
Pts daughter called into [facility] stated the pts BP has been high and that she is concerned. Please contact pts daughter after review. 03/10 157/91 164/99   03/11 178/99 157/108  03/12 136/98   03/13 150/97 155/87  03/14 144/99 128/74   03/15 157/88   03/15 151/86      Rambo [phone] d/o pt.

## 2023-03-23 ENCOUNTER — TELEPHONE (OUTPATIENT)
Dept: FAMILY MEDICINE CLINIC | Age: 83
End: 2023-03-23

## 2023-03-23 NOTE — TELEPHONE ENCOUNTER
Patient called requesting that she talk to Dr. Trae bess regarding her PT.  I informed her he was busy with a full schedule and tried to take a message so we could call her back with his recommendations but she insisted she talk with him personally would not give me more info besides PT

## 2023-03-23 NOTE — TELEPHONE ENCOUNTER
Daughter also called for patient, the concern is that  PT told her that today would be her last visit for PT and daughter is concerned that she is not ready to be discharged yet and wanted to know if there was anything you could do to have them continue coming for PT

## 2023-03-24 NOTE — TELEPHONE ENCOUNTER
Spoke to physical therapist, insurance will not cover for more visits at this time. He did speak with daughter regarding this issue already. He suggested that she either try outpatient or can wait about a month and if she is still having problems we can do new referral to restart care.   Discussed with daughter and she said she would talk options over with her mom

## 2023-04-15 DIAGNOSIS — I48.0 PAROXYSMAL ATRIAL FIBRILLATION (HCC): ICD-10-CM

## 2023-04-17 RX ORDER — AMIODARONE HYDROCHLORIDE 100 MG/1
TABLET ORAL
Qty: 45 TABLET | Refills: 1 | Status: SHIPPED | OUTPATIENT
Start: 2023-04-17

## 2023-04-19 RX ORDER — FLUTICASONE PROPIONATE 50 MCG
1 SPRAY, SUSPENSION (ML) NASAL DAILY PRN
Qty: 16 G | Refills: 3 | Status: SHIPPED | OUTPATIENT
Start: 2023-04-19

## 2023-04-21 RX ORDER — MIRTAZAPINE 7.5 MG/1
7.5 TABLET, FILM COATED ORAL NIGHTLY
Qty: 30 TABLET | Refills: 5 | Status: SHIPPED | OUTPATIENT
Start: 2023-04-21

## 2023-04-21 NOTE — TELEPHONE ENCOUNTER
Future appt scheduled 05/16/2023                            Last appt 10/04/2022      Last Written 03/16/2023    mirtazapine (REMERON) 7.5 MG tablet  #30  5 RF

## 2023-05-11 ENCOUNTER — OFFICE VISIT (OUTPATIENT)
Dept: CARDIOLOGY CLINIC | Age: 83
End: 2023-05-11

## 2023-05-11 VITALS
HEIGHT: 64 IN | SYSTOLIC BLOOD PRESSURE: 138 MMHG | BODY MASS INDEX: 17.55 KG/M2 | WEIGHT: 102.8 LBS | OXYGEN SATURATION: 98 % | HEART RATE: 69 BPM | DIASTOLIC BLOOD PRESSURE: 86 MMHG

## 2023-05-11 DIAGNOSIS — I48.91 ATRIAL FIBRILLATION, UNSPECIFIED TYPE (HCC): ICD-10-CM

## 2023-05-11 DIAGNOSIS — I48.0 PAROXYSMAL ATRIAL FIBRILLATION (HCC): Primary | ICD-10-CM

## 2023-05-11 RX ORDER — AMIODARONE HYDROCHLORIDE 100 MG/1
50 TABLET ORAL DAILY
Qty: 30 TABLET | Refills: 3 | Status: SHIPPED | OUTPATIENT
Start: 2023-05-11

## 2023-05-11 NOTE — PROGRESS NOTES
SB with an average HR of 57 (29-75) BPM, 4.1 second nocturnal pauses, 0.7% PACs, 0.13% PVCs. On 8/19/2021, patient's ECG demonstrated Sinus  Rhythm, left bundle branch block, 63 BPM. She reported that she would prefer to follow with [address] [place] [address] for cardiology. She reported that her biggest complaint is the pain from her RA. She follows with rheumatology. She reported that she experiences dizziness with position change. She reported she is losign weight and attributes this to taking her diuretic as prescribed. She reported she does not wish to complete stress test. She reported she is able to ascend a flight or stairs without any problems. On 10/12/2021, ECG demonstrated SB (51). She stated that she has been having fatigue recently. She stated that she suffers from shoulder and neck pain. A LHC was planned at last visit, but patient decided against this. On 5/3/2022 she reported she is feeling very well. She does complain of increased bruising on her lower legs. She reported she stays active doing pilates several days a week and tolerates activity well. On 10/12/2022 the patients daughter called into the office to report that her mothers blood pressure and heart rate were elevated. The patient reported that she felt like her \"heart was heavy. \" A cardioversion was discussed but the patient declined and stated she felt better. On 11/08/2022, ECG demonstrates SR (63 BPM). She states that when she had the episode a few weeks ago she had eaten oatmeal porridge for breakfast. She states that she felt like it did not digest. She reports she was unable to sleep as well. She states that her heart rate was sustaining around 100 BPM. She felt some chest pressure along with this. She states that this lasted two days and she has not had any episodes since. She states she takes 50 mg every other morning of Amiodarone. On 2/23/2023, she reports that she fell recently. She had fractured ribs as a result.  She is currently

## 2023-05-16 ENCOUNTER — TELEPHONE (OUTPATIENT)
Dept: CARDIOLOGY CLINIC | Age: 83
End: 2023-05-16

## 2023-05-16 ENCOUNTER — OFFICE VISIT (OUTPATIENT)
Dept: FAMILY MEDICINE CLINIC | Age: 83
End: 2023-05-16
Payer: MEDICARE

## 2023-05-16 VITALS
SYSTOLIC BLOOD PRESSURE: 148 MMHG | DIASTOLIC BLOOD PRESSURE: 86 MMHG | WEIGHT: 101 LBS | TEMPERATURE: 97.6 F | OXYGEN SATURATION: 97 % | HEART RATE: 67 BPM | BODY MASS INDEX: 17.34 KG/M2

## 2023-05-16 DIAGNOSIS — I10 ESSENTIAL HYPERTENSION: Primary | ICD-10-CM

## 2023-05-16 DIAGNOSIS — Z79.899 ON AMIODARONE THERAPY: Primary | ICD-10-CM

## 2023-05-16 DIAGNOSIS — R63.4 ABNORMAL WEIGHT LOSS: ICD-10-CM

## 2023-05-16 DIAGNOSIS — I50.22 CHRONIC SYSTOLIC CHF (CONGESTIVE HEART FAILURE) (HCC): ICD-10-CM

## 2023-05-16 DIAGNOSIS — I48.0 PAROXYSMAL ATRIAL FIBRILLATION (HCC): ICD-10-CM

## 2023-05-16 DIAGNOSIS — M06.9 RHEUMATOID ARTHRITIS INVOLVING MULTIPLE JOINTS (HCC): ICD-10-CM

## 2023-05-16 PROBLEM — F33.1 MAJOR DEPRESSIVE DISORDER, RECURRENT, MODERATE (HCC): Status: ACTIVE | Noted: 2023-05-16

## 2023-05-16 PROBLEM — F33.1 MAJOR DEPRESSIVE DISORDER, RECURRENT, MODERATE (HCC): Status: RESOLVED | Noted: 2023-05-16 | Resolved: 2023-05-16

## 2023-05-16 PROCEDURE — 1123F ACP DISCUSS/DSCN MKR DOCD: CPT | Performed by: FAMILY MEDICINE

## 2023-05-16 PROCEDURE — 3079F DIAST BP 80-89 MM HG: CPT | Performed by: FAMILY MEDICINE

## 2023-05-16 PROCEDURE — 3077F SYST BP >= 140 MM HG: CPT | Performed by: FAMILY MEDICINE

## 2023-05-16 PROCEDURE — 36415 COLL VENOUS BLD VENIPUNCTURE: CPT | Performed by: FAMILY MEDICINE

## 2023-05-16 PROCEDURE — 99214 OFFICE O/P EST MOD 30 MIN: CPT | Performed by: FAMILY MEDICINE

## 2023-05-16 NOTE — PROGRESS NOTES
She presents today for follow-up of her hypertension rheumatoid arthritis heart issues and depression. She had lab work done in February which showed hyponatremia her last lipid profile was noted in January. Patient fell and broke her ribs back in February. She also recently had a lot of problems with shingles. She had a cardiology follow-up with her EP specialist on 5/11/2023. She was very sick with her shingles and lost a lot of weight with decreased appetite. She never did take the appetite stimulants. She states her appetite has finally come back over the last 2 to 3 weeks but she eats a little bit and then gets full she has not been able to get her weight back up but has been low the last few weeks. She does not feel depressed she is on thyroid medication but low-dose. She is due to get lab work today including thyroid  Tests and documents reviewed today: Recent labs and note from EP cardiology reviewed   I also reviewed the CAT scan from February done at hospital  Objective:   BP (!) 148/86   Pulse 67   Temp 97.6 °F (36.4 °C) (Oral)   Wt 101 lb (45.8 kg)   SpO2 97%   BMI 17.34 kg/m²   BP Readings from Last 3 Encounters:   05/16/23 (!) 148/86   05/11/23 138/86   02/23/23 130/80     Physical Exam  Vitals reviewed. Constitutional:       Appearance: She is well-developed and underweight. She is not toxic-appearing. HENT:      Head: Normocephalic. Neck:      Thyroid: No thyroid mass or thyromegaly. Cardiovascular:      Rate and Rhythm: Normal rate and regular rhythm. Heart sounds: Normal heart sounds. No murmur heard. Pulmonary:      Effort: No respiratory distress. Breath sounds: Normal breath sounds. No wheezing or rales. Abdominal:      General: There is no distension. Palpations: Abdomen is soft. There is no mass. Tenderness: There is no abdominal tenderness. There is no guarding or rebound. Musculoskeletal:      Cervical back: Neck supple.    Lymphadenopathy:

## 2023-05-16 NOTE — TELEPHONE ENCOUNTER
Attempted to reach patient. No option for VM. Looks like PCP already ordered the lab work requested, just needs CXR. Order placed.

## 2023-05-16 NOTE — PATIENT INSTRUCTIONS
Please read the healthy family handout that you were given and share it with your family. Please compare this printed medication list with your medications at home to be sure they are the same. If you have any medications that are different please contact us immediately at [phone]. Also review your allergies that we have listed, these may also include medications that you have not been able to tolerate, make sure everything listed is correct. If you have any allergies that are different please contact us immediately at [phone]. You may receive a survey in the mail or by email asking about your experience during your visit today. Please complete and return to us so we know how we are serving you.

## 2023-05-16 NOTE — TELEPHONE ENCOUNTER
----- Message from Sid Mack., MD sent at 5/12/2023  5:11 PM EDT -----  Patient needs CBC, CMP, TSH and CXR before next visit, thanks for help bud! Can do after trip to [facility].

## 2023-05-17 LAB
ALBUMIN SERPL-MCNC: 4.4 G/DL (ref 3.4–5)
ALBUMIN/GLOB SERPL: 1.9 {RATIO} (ref 1.1–2.2)
ALP SERPL-CCNC: 85 U/L (ref 40–129)
ALT SERPL-CCNC: 12 U/L (ref 10–40)
ANION GAP SERPL CALCULATED.3IONS-SCNC: 16 MMOL/L (ref 3–16)
AST SERPL-CCNC: 19 U/L (ref 15–37)
BASOPHILS # BLD: 0 K/UL (ref 0–0.2)
BASOPHILS NFR BLD: 0.5 %
BILIRUB SERPL-MCNC: 0.4 MG/DL (ref 0–1)
BUN SERPL-MCNC: 13 MG/DL (ref 7–20)
CALCIUM SERPL-MCNC: 9.9 MG/DL (ref 8.3–10.6)
CHLORIDE SERPL-SCNC: 100 MMOL/L (ref 99–110)
CO2 SERPL-SCNC: 22 MMOL/L (ref 21–32)
CREAT SERPL-MCNC: 0.7 MG/DL (ref 0.6–1.2)
CRP SERPL-MCNC: 11.5 MG/L (ref 0–5.1)
DEPRECATED RDW RBC AUTO: 19.1 % (ref 12.4–15.4)
EOSINOPHIL # BLD: 0.1 K/UL (ref 0–0.6)
EOSINOPHIL NFR BLD: 1.7 %
ERYTHROCYTE [SEDIMENTATION RATE] IN BLOOD BY WESTERGREN METHOD: 26 MM/HR (ref 0–30)
GFR SERPLBLD CREATININE-BSD FMLA CKD-EPI: >60 ML/MIN/{1.73_M2}
GLUCOSE SERPL-MCNC: 97 MG/DL (ref 70–99)
HCT VFR BLD AUTO: 43 % (ref 36–48)
HGB BLD-MCNC: 14.3 G/DL (ref 12–16)
LYMPHOCYTES # BLD: 1.3 K/UL (ref 1–5.1)
LYMPHOCYTES NFR BLD: 17.8 %
MCH RBC QN AUTO: 33.2 PG (ref 26–34)
MCHC RBC AUTO-ENTMCNC: 33.2 G/DL (ref 31–36)
MCV RBC AUTO: 99.9 FL (ref 80–100)
MONOCYTES # BLD: 0.6 K/UL (ref 0–1.3)
MONOCYTES NFR BLD: 8.1 %
NEUTROPHILS # BLD: 5.3 K/UL (ref 1.7–7.7)
NEUTROPHILS NFR BLD: 71.9 %
PLATELET # BLD AUTO: 286 K/UL (ref 135–450)
PMV BLD AUTO: 8.1 FL (ref 5–10.5)
POTASSIUM SERPL-SCNC: 4.6 MMOL/L (ref 3.5–5.1)
PROT SERPL-MCNC: 6.7 G/DL (ref 6.4–8.2)
RBC # BLD AUTO: 4.31 M/UL (ref 4–5.2)
SODIUM SERPL-SCNC: 138 MMOL/L (ref 136–145)
TSH SERPL DL<=0.005 MIU/L-ACNC: 2.79 UIU/ML (ref 0.27–4.2)
WBC # BLD AUTO: 7.3 K/UL (ref 4–11)

## 2023-06-05 DIAGNOSIS — I48.0 PAROXYSMAL ATRIAL FIBRILLATION (HCC): ICD-10-CM

## 2023-06-06 RX ORDER — PANTOPRAZOLE SODIUM 40 MG/1
TABLET, DELAYED RELEASE ORAL
Qty: 180 TABLET | Refills: 3 | OUTPATIENT
Start: 2023-06-06

## 2023-06-18 DIAGNOSIS — E03.9 HYPOTHYROIDISM (ACQUIRED): ICD-10-CM

## 2023-06-19 RX ORDER — LEVOTHYROXINE SODIUM 0.03 MG/1
TABLET ORAL
Qty: 90 TABLET | Refills: 0 | Status: SHIPPED | OUTPATIENT
Start: 2023-06-19

## 2023-07-10 RX ORDER — FLUTICASONE PROPIONATE 50 MCG
SPRAY, SUSPENSION (ML) NASAL
Qty: 1 EACH | Refills: 3 | Status: SHIPPED | OUTPATIENT
Start: 2023-07-10

## 2023-07-16 DIAGNOSIS — I48.0 PAROXYSMAL ATRIAL FIBRILLATION (HCC): ICD-10-CM

## 2023-07-17 RX ORDER — PANTOPRAZOLE SODIUM 40 MG/1
TABLET, DELAYED RELEASE ORAL
Qty: 180 TABLET | Refills: 3 | Status: SHIPPED | OUTPATIENT
Start: 2023-07-17

## 2023-07-17 NOTE — TELEPHONE ENCOUNTER
Pts mom called and stated that she is out of Pantoprazole 40mg. Pt is going [place] on [place]18[place]23. Pts mom was advised that refills can take up to 48 hours. She v/u. Preferred pharmacy is:    [address] [phone]     Last ov 05/11/2023 agk. Next ov 11/14/20233 agk.

## 2023-09-16 DIAGNOSIS — E03.9 HYPOTHYROIDISM (ACQUIRED): ICD-10-CM

## 2023-09-19 RX ORDER — LEVOTHYROXINE SODIUM 0.03 MG/1
TABLET ORAL
Qty: 90 TABLET | Refills: 1 | Status: SHIPPED | OUTPATIENT
Start: 2023-09-19

## 2023-10-04 ENCOUNTER — TELEPHONE (OUTPATIENT)
Dept: CARDIOLOGY CLINIC | Age: 83
End: 2023-10-04

## 2023-10-04 NOTE — TELEPHONE ENCOUNTER
Spoke with patient and informed her that she has enough Eliquis right now and that ROGELIO prescribed that to her and that she sees him in November she needs to let him know if she needs more. Patient v/u.

## 2023-10-04 NOTE — TELEPHONE ENCOUNTER
Pts daughter called and stated PT needs to schedule her 1 yr follow up in November. NPDD currently has no avb. Pts daughter is worried that pt will run out of Eliquis. PT has about two months left of Eliquis. Pts daughter wants to know if npdd can fill her Eliquis again?

## 2023-10-16 ENCOUNTER — TELEMEDICINE (OUTPATIENT)
Dept: FAMILY MEDICINE CLINIC | Age: 83
End: 2023-10-16
Payer: MEDICARE

## 2023-10-16 DIAGNOSIS — J06.9 VIRAL URI: Primary | ICD-10-CM

## 2023-10-16 PROCEDURE — 1123F ACP DISCUSS/DSCN MKR DOCD: CPT | Performed by: NURSE PRACTITIONER

## 2023-10-16 PROCEDURE — 99213 OFFICE O/P EST LOW 20 MIN: CPT | Performed by: NURSE PRACTITIONER

## 2023-10-16 RX ORDER — BENZONATATE 100 MG/1
100 CAPSULE ORAL 3 TIMES DAILY PRN
Qty: 30 CAPSULE | Refills: 0 | Status: SHIPPED | OUTPATIENT
Start: 2023-10-16 | End: 2023-10-26

## 2023-10-16 RX ORDER — LEFLUNOMIDE 10 MG/1
TABLET ORAL
COMMUNITY
Start: 2023-09-11

## 2023-10-16 RX ORDER — AZITHROMYCIN 250 MG/1
250 TABLET, FILM COATED ORAL SEE ADMIN INSTRUCTIONS
Qty: 6 TABLET | Refills: 0 | Status: SHIPPED | OUTPATIENT
Start: 2023-10-16 | End: 2023-10-21

## 2023-10-16 SDOH — ECONOMIC STABILITY: FOOD INSECURITY: WITHIN THE PAST 12 MONTHS, THE FOOD YOU BOUGHT JUST DIDN'T LAST AND YOU DIDN'T HAVE MONEY TO GET MORE.: NEVER TRUE

## 2023-10-16 SDOH — ECONOMIC STABILITY: INCOME INSECURITY: HOW HARD IS IT FOR YOU TO PAY FOR THE VERY BASICS LIKE FOOD, HOUSING, MEDICAL CARE, AND HEATING?: NOT HARD AT ALL

## 2023-10-16 SDOH — ECONOMIC STABILITY: HOUSING INSECURITY
IN THE LAST 12 MONTHS, WAS THERE A TIME WHEN YOU DID NOT HAVE A STEADY PLACE TO SLEEP OR SLEPT IN A SHELTER (INCLUDING NOW)?: NO

## 2023-10-16 SDOH — ECONOMIC STABILITY: FOOD INSECURITY: WITHIN THE PAST 12 MONTHS, YOU WORRIED THAT YOUR FOOD WOULD RUN OUT BEFORE YOU GOT MONEY TO BUY MORE.: NEVER TRUE

## 2023-10-16 ASSESSMENT — ENCOUNTER SYMPTOMS
COUGH: 1
GASTROINTESTINAL NEGATIVE: 1

## 2023-10-16 NOTE — PROGRESS NOTES
10/16/2023    TELEHEALTH EVALUATION -- Audio/Visual    HPI:    Reginald Chapin (:  1940) has requested an audio/video evaluation for the following concern(s): Patient is accompanied by her daughter today for evaluation of cough and congestion. Patient reports symptoms initially were allergy-like however they have not improved. Patient's daughter concerned because of increase in cough and feels it is more congested. No reported fever or chills. Patient reports taking small dose of Robitussin. Review of Systems   Constitutional: Negative. HENT:  Positive for congestion. Respiratory:  Positive for cough. Cardiovascular: Negative. Gastrointestinal: Negative. Genitourinary: Negative. Musculoskeletal: Negative. Skin: Negative. Neurological:  Positive for headaches. Psychiatric/Behavioral: Negative. Prior to Visit Medications    Medication Sig Taking? Authorizing Provider   leflunomide (ARAVA) 10 MG tablet  Yes ProviderNery MD   azithromycin (ZITHROMAX) 250 MG tablet Take 1 tablet by mouth See Admin Instructions for 5 days 500mg on day 1 followed by 250mg on days 2 - 5 Yes GRACIELA Wick CNP   benzonatate (TESSALON) 100 MG capsule Take 1 capsule by mouth 3 times daily as needed for Cough Yes GRACIELA Wick CNP   levothyroxine (SYNTHROID) 25 MCG tablet TAKE 1 TABLET DAILY Yes Jodi Kenney MD   fluticasone (FLONASE) 50 MCG/ACT nasal spray PLACE 1 SPRAY IN EACH NOSTRIL ONCE DAILY AS NEEDED FOR ALLERGIES Yes Jodi Kenney MD   amiodarone (PACERONE) 100 MG tablet Take 0.5 tablets by mouth daily Yes Nino Pozo MD   apixaban (ELIQUIS) 2.5 MG TABS tablet TAKE 1 TABLET TWICE A DAY Yes Nino Pozo MD   triamcinolone (KENALOG) 0.1 % cream Apply topically 2 times daily as needed.  Yes Patrica Martinez MD   folic acid (FOLVITE) 1 MG tablet Take 1 tablet by mouth Six times weekly Except  Yes ProviderNery MD

## 2023-11-10 DIAGNOSIS — I48.0 PAROXYSMAL ATRIAL FIBRILLATION (HCC): ICD-10-CM

## 2023-11-10 NOTE — TELEPHONE ENCOUNTER
Pts daughter is requesting refill of Eliquis. Preferred pharmacy is     [address] [phone]     Last ov 05/11/2023 agk  Next ov 11/14/2023 agk    Pt is out of the medication.

## 2023-11-13 NOTE — PROGRESS NOTES
Dr. Rocio Gomez. 11/14/23 1:43 PM   Ami Cruz RN    The scribe's documentation has been prepared under my direction and personally reviewed by me in its entirety. I confirm that the note above accurately reflects all work, physical examination, the discussion of treatments and procedures, and medical decision making performed by me. Yoni Leslie MD personally performed the services described in this documentation as scribed by nurse in my presence, and is both accurate and complete. Electronically signed by Gill Laurent MD on 11/14/2023 at 3:02 PM     Dr. Johny Fajardo MD  Electrophysiology  [address] [place] [address]. [facility]. [facility]. [place] [address]  Phone: [phone]  Fax: [phone]     NOTE: This report was transcribed using voice recognition software. Every effort was made to ensure accuracy, however, inadvertent computerized transcription errors may be present.

## 2023-11-14 ENCOUNTER — OFFICE VISIT (OUTPATIENT)
Dept: CARDIOLOGY CLINIC | Age: 83
End: 2023-11-14
Payer: MEDICARE

## 2023-11-14 VITALS
WEIGHT: 104 LBS | HEIGHT: 64 IN | DIASTOLIC BLOOD PRESSURE: 78 MMHG | OXYGEN SATURATION: 98 % | BODY MASS INDEX: 17.75 KG/M2 | HEART RATE: 60 BPM | SYSTOLIC BLOOD PRESSURE: 132 MMHG

## 2023-11-14 DIAGNOSIS — I42.8 NON-ISCHEMIC CARDIOMYOPATHY (HCC): ICD-10-CM

## 2023-11-14 DIAGNOSIS — Z79.899 ON AMIODARONE THERAPY: Primary | ICD-10-CM

## 2023-11-14 DIAGNOSIS — I44.7 LBBB (LEFT BUNDLE BRANCH BLOCK): ICD-10-CM

## 2023-11-14 DIAGNOSIS — I48.0 PAROXYSMAL ATRIAL FIBRILLATION (HCC): ICD-10-CM

## 2023-11-14 PROCEDURE — 1123F ACP DISCUSS/DSCN MKR DOCD: CPT | Performed by: INTERNAL MEDICINE

## 2023-11-14 PROCEDURE — 3078F DIAST BP <80 MM HG: CPT | Performed by: INTERNAL MEDICINE

## 2023-11-14 PROCEDURE — 3075F SYST BP GE 130 - 139MM HG: CPT | Performed by: INTERNAL MEDICINE

## 2023-11-14 PROCEDURE — 93000 ELECTROCARDIOGRAM COMPLETE: CPT | Performed by: INTERNAL MEDICINE

## 2023-11-14 PROCEDURE — 99214 OFFICE O/P EST MOD 30 MIN: CPT | Performed by: INTERNAL MEDICINE

## 2023-11-14 NOTE — PATIENT INSTRUCTIONS
RECOMMENDATIONS:  Bi annual lab work due for amiodarone. Continue current medications. Follow up in 6 months.

## 2023-11-23 DIAGNOSIS — I48.0 PAROXYSMAL ATRIAL FIBRILLATION (HCC): ICD-10-CM

## 2023-11-24 RX ORDER — AMIODARONE HYDROCHLORIDE 100 MG/1
100 TABLET ORAL EVERY OTHER DAY
Qty: 45 TABLET | Refills: 1 | Status: SHIPPED | OUTPATIENT
Start: 2023-11-24

## 2023-11-24 NOTE — TELEPHONE ENCOUNTER
Last ov 11/14/2023  Upcoming ov 05/14/2023  TSH 05/2023  CBC 05/2023  CMP 05/2023  EKG 11/2023      VIC BEAVER

## 2023-11-28 ENCOUNTER — TELEPHONE (OUTPATIENT)
Dept: FAMILY MEDICINE CLINIC | Age: 83
End: 2023-11-28

## 2023-11-28 RX ORDER — SPIRONOLACTONE 25 MG/1
TABLET ORAL
Qty: 90 TABLET | Refills: 0 | Status: SHIPPED | OUTPATIENT
Start: 2023-11-28

## 2023-11-28 RX ORDER — BENZONATATE 100 MG/1
100-200 CAPSULE ORAL 3 TIMES DAILY PRN
Qty: 30 CAPSULE | Refills: 0 | Status: SHIPPED | OUTPATIENT
Start: 2023-11-28 | End: 2023-12-05

## 2023-11-28 NOTE — TELEPHONE ENCOUNTER
11/28- called pt @[phone]. Pt requested I call her daughter Lizy Dumas, pt stated her daughter does her scheduling. I called Marisela @[phone] (Listed under her emergency contacts) and pt is now scheduled 1/15/24 Camryn waldron/JORI for annual f/u.  Please review med request.

## 2023-11-28 NOTE — TELEPHONE ENCOUNTER
Pt is having a dry cough. No other symptoms. She's requesting cough medicine. Uses [facility] [address]

## 2023-11-28 NOTE — TELEPHONE ENCOUNTER
11/14/2023 [facility]  11/14/2022 NPDD  11/14/2023 cmp. cbc    Pt needs to see NPDD for yearly/med refill, please contact pt for an appt

## 2023-12-05 ENCOUNTER — OFFICE VISIT (OUTPATIENT)
Dept: FAMILY MEDICINE CLINIC | Age: 83
End: 2023-12-05
Payer: MEDICARE

## 2023-12-05 VITALS
HEART RATE: 62 BPM | OXYGEN SATURATION: 96 % | BODY MASS INDEX: 17.34 KG/M2 | TEMPERATURE: 98.1 F | WEIGHT: 101 LBS | DIASTOLIC BLOOD PRESSURE: 76 MMHG | SYSTOLIC BLOOD PRESSURE: 165 MMHG

## 2023-12-05 DIAGNOSIS — R63.4 WEIGHT LOSS: ICD-10-CM

## 2023-12-05 DIAGNOSIS — I48.0 PAROXYSMAL ATRIAL FIBRILLATION (HCC): ICD-10-CM

## 2023-12-05 DIAGNOSIS — I50.22 CHRONIC SYSTOLIC CHF (CONGESTIVE HEART FAILURE) (HCC): ICD-10-CM

## 2023-12-05 DIAGNOSIS — I10 ESSENTIAL HYPERTENSION: Primary | ICD-10-CM

## 2023-12-05 DIAGNOSIS — Z00.00 MEDICARE ANNUAL WELLNESS VISIT, SUBSEQUENT: ICD-10-CM

## 2023-12-05 DIAGNOSIS — M06.9 RHEUMATOID ARTHRITIS INVOLVING MULTIPLE JOINTS (HCC): ICD-10-CM

## 2023-12-05 DIAGNOSIS — E03.9 HYPOTHYROIDISM (ACQUIRED): ICD-10-CM

## 2023-12-05 LAB
DEPRECATED RDW RBC AUTO: 14.1 % (ref 12.4–15.4)
HCT VFR BLD AUTO: 40.5 % (ref 36–48)
HGB BLD-MCNC: 13.9 G/DL (ref 12–16)
MCH RBC QN AUTO: 33.6 PG (ref 26–34)
MCHC RBC AUTO-ENTMCNC: 34.3 G/DL (ref 31–36)
MCV RBC AUTO: 97.8 FL (ref 80–100)
PLATELET # BLD AUTO: 323 K/UL (ref 135–450)
PMV BLD AUTO: 7.8 FL (ref 5–10.5)
RBC # BLD AUTO: 4.14 M/UL (ref 4–5.2)
WBC # BLD AUTO: 9.1 K/UL (ref 4–11)

## 2023-12-05 PROCEDURE — 99214 OFFICE O/P EST MOD 30 MIN: CPT | Performed by: FAMILY MEDICINE

## 2023-12-05 PROCEDURE — G0439 PPPS, SUBSEQ VISIT: HCPCS | Performed by: FAMILY MEDICINE

## 2023-12-05 PROCEDURE — 3077F SYST BP >= 140 MM HG: CPT | Performed by: FAMILY MEDICINE

## 2023-12-05 PROCEDURE — 36415 COLL VENOUS BLD VENIPUNCTURE: CPT | Performed by: FAMILY MEDICINE

## 2023-12-05 PROCEDURE — 1123F ACP DISCUSS/DSCN MKR DOCD: CPT | Performed by: FAMILY MEDICINE

## 2023-12-05 PROCEDURE — 3078F DIAST BP <80 MM HG: CPT | Performed by: FAMILY MEDICINE

## 2023-12-05 RX ORDER — AZITHROMYCIN 250 MG/1
TABLET, FILM COATED ORAL
COMMUNITY
Start: 2023-11-30

## 2023-12-05 ASSESSMENT — PATIENT HEALTH QUESTIONNAIRE - PHQ9
1. LITTLE INTEREST OR PLEASURE IN DOING THINGS: 0
2. FEELING DOWN, DEPRESSED OR HOPELESS: 0
SUM OF ALL RESPONSES TO PHQ QUESTIONS 1-9: 0
SUM OF ALL RESPONSES TO PHQ9 QUESTIONS 1 & 2: 0
SUM OF ALL RESPONSES TO PHQ QUESTIONS 1-9: 0

## 2023-12-05 ASSESSMENT — LIFESTYLE VARIABLES
HOW OFTEN DO YOU HAVE A DRINK CONTAINING ALCOHOL: 2-4 TIMES A MONTH
HOW MANY STANDARD DRINKS CONTAINING ALCOHOL DO YOU HAVE ON A TYPICAL DAY: 1 OR 2

## 2023-12-05 NOTE — PATIENT INSTRUCTIONS
Please read the healthy family handout that you were given and share it with your family. Please compare this printed medication list with your medications at home to be sure they are the same. If you have any medications that are different please contact us immediately at [phone]. Also review your allergies that we have listed, these may also include medications that you have not been able to tolerate, make sure everything listed is correct. If you have any allergies that are different please contact us immediately at [phone]. You may receive a survey in the mail or by email asking about your experience during your visit today. Please complete and return to us so we know how we are serving you. Learning About Dental Care for Older Adults  Dental care for older adults: Overview  Dental care for older people is much the same as for younger adults. But older adults do have concerns that younger adults do not. Older adults may have problems with gum disease and decay on the roots of their teeth. They may need missing teeth replaced or broken fillings fixed. Or they may have dentures that need to be cared for. Some older adults may have trouble holding a toothbrush. You can help remind the person you are caring for to brush and floss their teeth or to clean their dentures. In some cases, you may need to do the brushing and other dental care tasks. People who have trouble using their hands or who have dementia may need this extra help. How can you help with dental care? Normal dental care  To keep the teeth and gums healthy:  Brush the teeth with fluoride toothpaste twice a day--in the morning and at night--and floss at least once a day. Plaque can quickly build up on the teeth of older adults. Watch for the signs of gum disease. These signs include gums that bleed after brushing or after eating hard foods, such as apples. See a dentist regularly.  Many experts recommend checkups every 6

## 2023-12-06 LAB
ALBUMIN SERPL-MCNC: 4 G/DL (ref 3.4–5)
ALBUMIN/GLOB SERPL: 1.3 {RATIO} (ref 1.1–2.2)
ALP SERPL-CCNC: 71 U/L (ref 40–129)
ALT SERPL-CCNC: 13 U/L (ref 10–40)
ANION GAP SERPL CALCULATED.3IONS-SCNC: 13 MMOL/L (ref 3–16)
AST SERPL-CCNC: 25 U/L (ref 15–37)
BILIRUB SERPL-MCNC: 0.3 MG/DL (ref 0–1)
BUN SERPL-MCNC: 13 MG/DL (ref 7–20)
CALCIUM SERPL-MCNC: 9.6 MG/DL (ref 8.3–10.6)
CHLORIDE SERPL-SCNC: 96 MMOL/L (ref 99–110)
CHOLEST SERPL-MCNC: 209 MG/DL (ref 0–199)
CO2 SERPL-SCNC: 24 MMOL/L (ref 21–32)
CREAT SERPL-MCNC: 0.6 MG/DL (ref 0.6–1.2)
CRP SERPL-MCNC: 17 MG/L (ref 0–5.1)
ERYTHROCYTE [SEDIMENTATION RATE] IN BLOOD BY WESTERGREN METHOD: 43 MM/HR (ref 0–30)
GFR SERPLBLD CREATININE-BSD FMLA CKD-EPI: >60 ML/MIN/{1.73_M2}
GLUCOSE SERPL-MCNC: 83 MG/DL (ref 70–99)
HDLC SERPL-MCNC: 76 MG/DL (ref 40–60)
LDLC SERPL CALC-MCNC: 117 MG/DL
POTASSIUM SERPL-SCNC: 4.7 MMOL/L (ref 3.5–5.1)
PROT SERPL-MCNC: 7.2 G/DL (ref 6.4–8.2)
RHEUMATOID FACT SER IA-ACNC: 148 IU/ML
SODIUM SERPL-SCNC: 133 MMOL/L (ref 136–145)
TRIGL SERPL-MCNC: 78 MG/DL (ref 0–150)
TSH SERPL DL<=0.005 MIU/L-ACNC: 3.27 UIU/ML (ref 0.27–4.2)
VLDLC SERPL CALC-MCNC: 16 MG/DL

## 2023-12-14 ENCOUNTER — TELEPHONE (OUTPATIENT)
Dept: FAMILY MEDICINE CLINIC | Age: 83
End: 2023-12-14

## 2023-12-14 RX ORDER — BENZONATATE 100 MG/1
100-200 CAPSULE ORAL 3 TIMES DAILY PRN
Qty: 30 CAPSULE | Refills: 0 | Status: SHIPPED | OUTPATIENT
Start: 2023-12-14 | End: 2023-12-21

## 2023-12-14 NOTE — TELEPHONE ENCOUNTER
Patient is requesting refill of cough medication, she said she is feeling better but still has lingering cough  (order pended if you can sign)    Send to [facility]

## 2024-01-15 ENCOUNTER — TELEPHONE (OUTPATIENT)
Dept: CARDIOLOGY CLINIC | Age: 84
End: 2024-01-15

## 2024-01-15 ENCOUNTER — OFFICE VISIT (OUTPATIENT)
Dept: CARDIOLOGY CLINIC | Age: 84
End: 2024-01-15
Payer: MEDICARE

## 2024-01-15 VITALS
HEIGHT: 64 IN | BODY MASS INDEX: 18.35 KG/M2 | SYSTOLIC BLOOD PRESSURE: 118 MMHG | OXYGEN SATURATION: 98 % | WEIGHT: 107.5 LBS | HEART RATE: 61 BPM | DIASTOLIC BLOOD PRESSURE: 70 MMHG

## 2024-01-15 DIAGNOSIS — I50.22 CHRONIC SYSTOLIC CONGESTIVE HEART FAILURE (HCC): ICD-10-CM

## 2024-01-15 DIAGNOSIS — I44.7 LBBB (LEFT BUNDLE BRANCH BLOCK): ICD-10-CM

## 2024-01-15 DIAGNOSIS — E78.00 PURE HYPERCHOLESTEROLEMIA: ICD-10-CM

## 2024-01-15 DIAGNOSIS — I48.0 PAROXYSMAL ATRIAL FIBRILLATION (HCC): ICD-10-CM

## 2024-01-15 DIAGNOSIS — I42.8 NON-ISCHEMIC CARDIOMYOPATHY (HCC): Primary | ICD-10-CM

## 2024-01-15 DIAGNOSIS — M06.9 RHEUMATOID ARTHRITIS INVOLVING MULTIPLE JOINTS (HCC): ICD-10-CM

## 2024-01-15 DIAGNOSIS — Z79.01 CHRONIC ANTICOAGULATION: ICD-10-CM

## 2024-01-15 DIAGNOSIS — I10 ESSENTIAL HYPERTENSION: ICD-10-CM

## 2024-01-15 PROCEDURE — 3078F DIAST BP <80 MM HG: CPT | Performed by: NURSE PRACTITIONER

## 2024-01-15 PROCEDURE — 1123F ACP DISCUSS/DSCN MKR DOCD: CPT | Performed by: NURSE PRACTITIONER

## 2024-01-15 PROCEDURE — 3074F SYST BP LT 130 MM HG: CPT | Performed by: NURSE PRACTITIONER

## 2024-01-15 PROCEDURE — 99215 OFFICE O/P EST HI 40 MIN: CPT | Performed by: NURSE PRACTITIONER

## 2024-01-15 NOTE — PROGRESS NOTES
Hyperlipidemia, Hypertension, Hypothyroidism, LBBB (left bundle branch block), and Rheumatoid arthritis(714.0).  Surgical History:   has a past surgical history that includes Colonoscopy ();  section; and joint replacement (Left, 13).   Social History:   reports that she has quit smoking. She has never used smokeless tobacco. She reports current alcohol use of about 14.0 standard drinks of alcohol per week. She reports that she does not use drugs.   Family History:   Family History   Problem Relation Age of Onset    Heart Disease Mother         very slow heart beat    Thyroid Disease Father     Heart Disease Father        Home Medications:  Prior to Admission medications    Medication Sig Start Date End Date Taking? Authorizing Provider   spironolactone (ALDACTONE) 25 MG tablet TAKE 1 TABLET BY MOUTH EVERY DAY 23  Yes Priya Altman APRN - CNP   amiodarone (PACERONE) 100 MG tablet TAKE 1 TABLET EVERY OTHER  DAY  Patient taking differently: Take 0.5 tablets by mouth daily 23  Yes Carmita Bey APRN - CNP   apixaban (ELIQUIS) 2.5 MG TABS tablet TAKE 1 TABLET TWICE A DAY 11/10/23  Yes PAZ Rao Jr., MD   folic acid (FOLVITE) 1 MG tablet Take 1 tablet by mouth Six times weekly Except    Yes Nery Kwan MD   methotrexate (RHEUMATREX) 2.5 MG chemo tablet TAKE 8 TABLETS EVERY 21  Yes Eron Kevin MD   VITAMIN D PO Take 1,000 Units by mouth daily    Yes Nery Kwan MD   Probiotic Product (PROBIOTIC PO) Take 1 capsule by mouth daily    Yes Nery Kwan MD   Ascorbic Acid (VITAMIN C PO) Take 1,000 mg by mouth daily    Yes Nery Kwan MD   fish oil-omega-3 fatty acids 1000 MG capsule Take 4 capsules by mouth daily   Yes Nery Kwan MD        Allergies:  Patient has no known allergies.     Physical Examination:    Vitals:    01/15/24 1406   BP: 118/70   Pulse: 61   SpO2: 98%   Weight: 48.8 kg (107 lb 8 oz)

## 2024-01-15 NOTE — TELEPHONE ENCOUNTER
Samples provided Entresto 24-26mg .5 tablet two times daily    [id number]  08/25  X2 bottles     Pending for [facility] to sign  Pt assistance ppw provided to pt daughter  Directions reviewed, pt and pt daughter v/u

## 2024-01-15 NOTE — PATIENT INSTRUCTIONS
Trial Entresto 24/26 mg, half tablet twice daily - samples  If you tolerate okay, then will send in a prescription and pursue patient assistance  No change in other heart medicines  Follow up with me in 2 months

## 2024-02-06 ENCOUNTER — TELEPHONE (OUTPATIENT)
Dept: FAMILY MEDICINE CLINIC | Age: 84
End: 2024-02-06

## 2024-03-18 DIAGNOSIS — I48.0 PAROXYSMAL ATRIAL FIBRILLATION (HCC): ICD-10-CM

## 2024-03-18 RX ORDER — SPIRONOLACTONE 25 MG/1
TABLET ORAL
Qty: 90 TABLET | Refills: 3 | Status: SHIPPED | OUTPATIENT
Start: 2024-03-18

## 2024-03-18 NOTE — TELEPHONE ENCOUNTER
Last ov 11/14/2023  Upcoming ov 05/14/2024  CBC & CMP 12/2023    Spoke with pt daughter Rambo informed of samples of eliquis 2.5. Rambo v/u. Rambo stated that pt as needs spironolactone refill. Per epic chart reffill of spironolactone sent to [facility].       Eliquis 2.5 mg samples:   Lot # [id number]  EXP: 04/2024       RX pending for refill for 90 day supply to go to [facility]

## 2024-03-18 NOTE — TELEPHONE ENCOUNTER
Called and spoke w/ pt and she stated she hasn't taken entresto for about a month and appointment isnt necessary

## 2024-03-18 NOTE — TELEPHONE ENCOUNTER
Refill  apixaban (ELIQUIS) 2.5 MG TABS tablet 90 day. Pt has changed to new mail order pharmacy.  [facility] fax [phone].   Pt only has 1 day left. Pt will need 7 day supply to local pharmacy   [facility]/pharmacy #[id number] [place], [place] - [address] - [address] [phone] - F [phone]   Please let daughter know when local script is sent. TY   LOV 11/14/23 AGK, 1/15/24 NPDD  Labs 12/5/23  Next 5/14/24 AGK

## 2024-04-17 DIAGNOSIS — I48.0 PAROXYSMAL ATRIAL FIBRILLATION (HCC): ICD-10-CM

## 2024-04-17 RX ORDER — AMIODARONE HYDROCHLORIDE 100 MG/1
100 TABLET ORAL EVERY OTHER DAY
Qty: 45 TABLET | Refills: 0 | OUTPATIENT
Start: 2024-04-17

## 2024-04-17 NOTE — TELEPHONE ENCOUNTER
Last ov 11/14/2023  Upcoming ov 05/14/2024  TSH 12/2023  CBC & CMP 12/2023  LFT 12/2023  EKG 11/2023      AGK- please advise on dose amio. Pt should be taking.

## 2024-04-18 NOTE — TELEPHONE ENCOUNTER
Spoke with pt, pt has enough amio medication. Pt also states that she takes half tablet of 100 mg every day.

## 2024-04-29 NOTE — TELEPHONE ENCOUNTER
PA for Tretinoin Cream was sent through Cover my meds because patient now has a new insurance    [address]: [id number]  PCN: Pool Read  GRP: [id number]  ID: [id number] Return call to patient.  Spoke with patient on the phone.   Patient advised Colace is safe in pregnancy.  Patient also advised to check handouts/book from first OB appointment for other helpful information regarding constipation. Discussed increased fiber and water. Discussed Senna or Dulcolax if patient needs more laxative like help.    Pt in agreement and reports understanding.    Radha KRAFT   [facility]

## 2024-05-14 ENCOUNTER — OFFICE VISIT (OUTPATIENT)
Dept: CARDIOLOGY CLINIC | Age: 84
End: 2024-05-14
Payer: MEDICARE

## 2024-05-14 VITALS
HEART RATE: 55 BPM | HEIGHT: 64 IN | BODY MASS INDEX: 18.16 KG/M2 | DIASTOLIC BLOOD PRESSURE: 64 MMHG | SYSTOLIC BLOOD PRESSURE: 124 MMHG | WEIGHT: 106.4 LBS | OXYGEN SATURATION: 98 %

## 2024-05-14 DIAGNOSIS — I44.7 LBBB (LEFT BUNDLE BRANCH BLOCK): ICD-10-CM

## 2024-05-14 DIAGNOSIS — Z79.899 ON AMIODARONE THERAPY: ICD-10-CM

## 2024-05-14 DIAGNOSIS — I48.0 PAROXYSMAL ATRIAL FIBRILLATION (HCC): ICD-10-CM

## 2024-05-14 PROCEDURE — 1123F ACP DISCUSS/DSCN MKR DOCD: CPT | Performed by: INTERNAL MEDICINE

## 2024-05-14 PROCEDURE — 3078F DIAST BP <80 MM HG: CPT | Performed by: INTERNAL MEDICINE

## 2024-05-14 PROCEDURE — 93000 ELECTROCARDIOGRAM COMPLETE: CPT | Performed by: INTERNAL MEDICINE

## 2024-05-14 PROCEDURE — 99214 OFFICE O/P EST MOD 30 MIN: CPT | Performed by: INTERNAL MEDICINE

## 2024-05-14 PROCEDURE — 3074F SYST BP LT 130 MM HG: CPT | Performed by: INTERNAL MEDICINE

## 2024-05-14 NOTE — PROGRESS NOTES
[facility]   Electrophysiology Consult Note     Date: 5/14/24  Patient Name: Cara Chapin  YOB: 1940    Primary Care Physician: Eron Kevin MD    CHIEF COMPLAINT:   Chief Complaint   Patient presents with    6 Month Follow-Up    Other     LBBB    Atrial Fibrillation     HISTORY OF PRESENT ILLNESS: Cara Chapin is a 84 y.o. female with a medical history significant for symptomatic paroxysmal atrial fibrillation, known left bundle branch block (negative stress test from [place]), hypertension, rheumoatoid arthritis, hypothyroidism, and complicated diverticulitis who presented to the ED from home 6/2021 with symptomatic atrial fibrillation with RVR having recently undergone LUCY cardioversion.  According to patient and her daughter she recently began to suffer from dyspnea on exertion and shortness of breath.  She is unable to move from her bed to her recliner without becoming short of breath.  She was seen by her PCP who directed her to a cardiologist.  Patient was found to be in atrial fibrillation with RVR.  She underwent a LUCY cardioversion on 06/02/2021 ([facility]).  She was discharged home on rate control and anticoagulation.  Unfortunately patient began to suffer from worsening shortness of breath and dyspnea on exertion shortly thereafter and was brought to [facility] for further evaluation and care last night.   Echo 6/3/2021 demonstrated an LVEF of 25%.    Patient was evaluated emergency room 6/4/2021.  Her CMP was reassuring outside of hypoalbuminemia and hypoproteinemia along with elevated AST and ALT.  Her CBC was reassuring.  Her BNP was elevated at 1216.  Her chest radiograph showed pulmonary vascular congestion with chronic changes, cardiomegaly, small left pleural effusion.    Patient was started on amiodarone 6/2021 ([facility]).    Patient returned to ED 6/17/2201 due to acute on chronic CHF. Troponin elevated.    Cardiac event monitor worn 7/27/2021-7/30/2021

## 2024-05-14 NOTE — PATIENT INSTRUCTIONS
RECOMMENDATIONS:  Continue amiodarone and Eliquis.   Bi annual lab work due in October.   Chest XR due.   Ok for teeth extractions from my standpoint.   Follow up in 6 months with SAMAN NP.

## 2024-06-18 ENCOUNTER — OFFICE VISIT (OUTPATIENT)
Dept: FAMILY MEDICINE CLINIC | Age: 84
End: 2024-06-18
Payer: MEDICARE

## 2024-06-18 ENCOUNTER — TELEPHONE (OUTPATIENT)
Dept: FAMILY MEDICINE CLINIC | Age: 84
End: 2024-06-18

## 2024-06-18 VITALS
SYSTOLIC BLOOD PRESSURE: 156 MMHG | DIASTOLIC BLOOD PRESSURE: 71 MMHG | BODY MASS INDEX: 18.36 KG/M2 | OXYGEN SATURATION: 98 % | HEART RATE: 52 BPM | WEIGHT: 107 LBS

## 2024-06-18 DIAGNOSIS — I50.23 CHF (CONGESTIVE HEART FAILURE), NYHA CLASS III, ACUTE ON CHRONIC, SYSTOLIC (HCC): ICD-10-CM

## 2024-06-18 DIAGNOSIS — I48.0 PAROXYSMAL ATRIAL FIBRILLATION (HCC): Primary | ICD-10-CM

## 2024-06-18 DIAGNOSIS — M06.9 RHEUMATOID ARTHRITIS INVOLVING MULTIPLE JOINTS (HCC): ICD-10-CM

## 2024-06-18 DIAGNOSIS — E03.9 HYPOTHYROIDISM (ACQUIRED): ICD-10-CM

## 2024-06-18 DIAGNOSIS — I10 ESSENTIAL HYPERTENSION: ICD-10-CM

## 2024-06-18 DIAGNOSIS — E55.9 VITAMIN D DEFICIENCY: ICD-10-CM

## 2024-06-18 PROCEDURE — 3077F SYST BP >= 140 MM HG: CPT | Performed by: FAMILY MEDICINE

## 2024-06-18 PROCEDURE — 99214 OFFICE O/P EST MOD 30 MIN: CPT | Performed by: FAMILY MEDICINE

## 2024-06-18 PROCEDURE — 3078F DIAST BP <80 MM HG: CPT | Performed by: FAMILY MEDICINE

## 2024-06-18 PROCEDURE — 1123F ACP DISCUSS/DSCN MKR DOCD: CPT | Performed by: FAMILY MEDICINE

## 2024-06-18 PROCEDURE — 36415 COLL VENOUS BLD VENIPUNCTURE: CPT | Performed by: FAMILY MEDICINE

## 2024-06-18 ASSESSMENT — PATIENT HEALTH QUESTIONNAIRE - PHQ9
SUM OF ALL RESPONSES TO PHQ9 QUESTIONS 1 & 2: 0
SUM OF ALL RESPONSES TO PHQ QUESTIONS 1-9: 0
1. LITTLE INTEREST OR PLEASURE IN DOING THINGS: NOT AT ALL
2. FEELING DOWN, DEPRESSED OR HOPELESS: NOT AT ALL
SUM OF ALL RESPONSES TO PHQ QUESTIONS 1-9: 0

## 2024-06-18 NOTE — PROGRESS NOTES
She presents today for routine follow-up of her heart issues, hypertension and rheumatoid arthritis, she has gained some weight back and her daughter is trying to get her to eat more.  She states she exercises about 10 to 20 minutes every day when she walks except for always very hot days.  Overall she seems to be doing better.  She is still continues to follow-up with cardiology.  Her blood pressures have been under good control she is not sure why it is elevated today she thinks because of the heat  Tests and documents reviewed today: Last labs     Objective:   BP (!) 156/71   Pulse 52   Wt 48.5 kg (107 lb)   SpO2 98%   BMI 18.36 kg/m²   BP Readings from Last 3 Encounters:   06/18/24 (!) 156/71   05/14/24 124/64   01/15/24 118/70     Physical Exam  Vitals reviewed.   Constitutional:       Appearance: She is well-developed. She is not toxic-appearing.   HENT:      Head: Normocephalic.   Neck:      Thyroid: No thyroid mass or thyromegaly.   Cardiovascular:      Rate and Rhythm: Normal rate. Rhythm irregular.      Heart sounds: Normal heart sounds. No murmur heard.  Pulmonary:      Effort: No respiratory distress.      Breath sounds: Normal breath sounds. No wheezing or rales.   Abdominal:      General: There is no distension.      Palpations: Abdomen is soft. There is no mass.      Tenderness: There is no abdominal tenderness. There is no guarding or rebound.   Musculoskeletal:      Cervical back: Neck supple.   Lymphadenopathy:      Cervical: No cervical adenopathy.      Upper Body:      Right upper body: No supraclavicular adenopathy.   Skin:     General: Skin is warm.   Neurological:      Mental Status: She is alert and oriented to person, place, and time.   Psychiatric:         Behavior: Behavior normal.         Thought Content: Thought content normal.         Judgment: Judgment normal.       Assessment and Plan:   Diagnosis Orders   1. Paroxysmal atrial fibrillation (HCC)        2. CHF (congestive heart

## 2024-06-18 NOTE — TELEPHONE ENCOUNTER
As patient was leaving the office, she states that she forgot to tell you that she is having a left ear issue.  She doesn't know if she has earwax in it or if it's from her RA b/c it can hurt from her ear down her jaw line.

## 2024-06-18 NOTE — TELEPHONE ENCOUNTER
Anders.   Her dgt was pulling around to get her when she was telling me this.   She said to just tell you.   I told her that you would have to look in her ear.

## 2024-06-18 NOTE — PATIENT INSTRUCTIONS
Please read the healthy family handout that you were given and share it with your family.       Please compare this printed medication list with your medications at home to be sure they are the same.  If you have any medications that are different please contact us immediately at [phone].     Also review your allergies that we have listed, these may also include medications that you have not been able to tolerate, make sure everything listed is correct. If you have any allergies that are different please contact us immediately at [phone].     You may receive a survey in the mail or by email asking about your experience during your visit today. Please complete and return to us so we know how we are serving you.

## 2024-06-19 LAB
25(OH)D3 SERPL-MCNC: 49.5 NG/ML
ALBUMIN SERPL-MCNC: 4.2 G/DL (ref 3.4–5)
ALBUMIN/GLOB SERPL: 1.7 {RATIO} (ref 1.1–2.2)
ALP SERPL-CCNC: 72 U/L (ref 40–129)
ALT SERPL-CCNC: 9 U/L (ref 10–40)
ANION GAP SERPL CALCULATED.3IONS-SCNC: 12 MMOL/L (ref 3–16)
AST SERPL-CCNC: 18 U/L (ref 15–37)
BASOPHILS # BLD: 0.1 K/UL (ref 0–0.2)
BASOPHILS NFR BLD: 0.9 %
BILIRUB SERPL-MCNC: 0.3 MG/DL (ref 0–1)
BUN SERPL-MCNC: 13 MG/DL (ref 7–20)
CALCIUM SERPL-MCNC: 9.7 MG/DL (ref 8.3–10.6)
CHLORIDE SERPL-SCNC: 103 MMOL/L (ref 99–110)
CO2 SERPL-SCNC: 23 MMOL/L (ref 21–32)
CREAT SERPL-MCNC: 0.6 MG/DL (ref 0.6–1.2)
DEPRECATED RDW RBC AUTO: 14.9 % (ref 12.4–15.4)
EOSINOPHIL # BLD: 0.2 K/UL (ref 0–0.6)
EOSINOPHIL NFR BLD: 2.1 %
GFR SERPLBLD CREATININE-BSD FMLA CKD-EPI: 88 ML/MIN/{1.73_M2}
GLUCOSE SERPL-MCNC: 86 MG/DL (ref 70–99)
HCT VFR BLD AUTO: 40.7 % (ref 36–48)
HGB BLD-MCNC: 13.5 G/DL (ref 12–16)
LYMPHOCYTES # BLD: 1.7 K/UL (ref 1–5.1)
LYMPHOCYTES NFR BLD: 22.4 %
MCH RBC QN AUTO: 33.4 PG (ref 26–34)
MCHC RBC AUTO-ENTMCNC: 33.2 G/DL (ref 31–36)
MCV RBC AUTO: 100.6 FL (ref 80–100)
MONOCYTES # BLD: 0.7 K/UL (ref 0–1.3)
MONOCYTES NFR BLD: 9 %
NEUTROPHILS # BLD: 5 K/UL (ref 1.7–7.7)
NEUTROPHILS NFR BLD: 65.6 %
PLATELET # BLD AUTO: 305 K/UL (ref 135–450)
PMV BLD AUTO: 8.4 FL (ref 5–10.5)
POTASSIUM SERPL-SCNC: 4.4 MMOL/L (ref 3.5–5.1)
PROT SERPL-MCNC: 6.7 G/DL (ref 6.4–8.2)
RBC # BLD AUTO: 4.05 M/UL (ref 4–5.2)
SODIUM SERPL-SCNC: 138 MMOL/L (ref 136–145)
T4 FREE SERPL-MCNC: 1.4 NG/DL (ref 0.9–1.8)
TSH SERPL DL<=0.005 MIU/L-ACNC: 4.51 UIU/ML (ref 0.27–4.2)
WBC # BLD AUTO: 7.6 K/UL (ref 4–11)

## 2024-06-20 NOTE — TELEPHONE ENCOUNTER
Pt states that she is having dental issues on that side.  She's thinking it could be an infection.   She said she doesn't have any bone on that side.  It was waking her up at nighttime b/c of the pain on that side but she's doing OK now.   She exercises her jaw and it helps.   Jessica Antonio is on vacation.  She will have her take her to urgent care when she gets back.

## 2024-07-30 ENCOUNTER — TELEPHONE (OUTPATIENT)
Dept: FAMILY MEDICINE CLINIC | Age: 84
End: 2024-07-30

## 2024-07-30 NOTE — TELEPHONE ENCOUNTER
Nurse spoke with patient related to scheduling the AWV. Patient will contact the office to schedule appointment

## 2024-08-02 DIAGNOSIS — I48.0 PAROXYSMAL ATRIAL FIBRILLATION (HCC): ICD-10-CM

## 2024-08-02 RX ORDER — AMIODARONE HYDROCHLORIDE 100 MG/1
100 TABLET ORAL EVERY OTHER DAY
Qty: 45 TABLET | Refills: 1 | Status: SHIPPED | OUTPATIENT
Start: 2024-08-02

## 2024-08-02 NOTE — TELEPHONE ENCOUNTER
Medication Refill    Medication needing refilled: amiodarone (PACERONE) 100 MG tablet [[id number]]    Dosage of the medication: 100mg    How are you taking this medication (QD, BID, TID, QID, PRN): Patient taking differently: Take 0.5 tablets by mouth daily     30 or 90 day supply called in: 90    When will you run out of your medication: 1 week    Which Pharmacy are we sending the medication to?:   [id number] [place] [facility] [address] - P [phone] - F [phone]  [address] [facility] [place] [place] [address]  Phone: [phone]  Fax: [phone]

## 2024-08-02 NOTE — TELEPHONE ENCOUNTER
Last OV 05/14/2024   Upcoming OV 11/07/2024 NPKK  TSH 06/2024  LFT 06/2024  CMP 06/2024  EKG 05/2024

## 2024-08-19 ENCOUNTER — TELEMEDICINE (OUTPATIENT)
Dept: FAMILY MEDICINE CLINIC | Age: 84
End: 2024-08-19

## 2024-08-19 DIAGNOSIS — U07.1 COVID-19: Primary | ICD-10-CM

## 2024-08-19 ASSESSMENT — ENCOUNTER SYMPTOMS
CHEST TIGHTNESS: 0
ALLERGIC/IMMUNOLOGIC NEGATIVE: 1
GASTROINTESTINAL NEGATIVE: 1
EYES NEGATIVE: 1
COUGH: 1
SHORTNESS OF BREATH: 0

## 2024-09-17 ENCOUNTER — TELEPHONE (OUTPATIENT)
Dept: FAMILY MEDICINE CLINIC | Age: 84
End: 2024-09-17

## 2024-09-20 ENCOUNTER — TELEPHONE (OUTPATIENT)
Dept: FAMILY MEDICINE CLINIC | Age: 84
End: 2024-09-20

## 2024-09-20 DIAGNOSIS — R21 RASH: Primary | ICD-10-CM

## 2024-09-20 RX ORDER — HYDROXYZINE HYDROCHLORIDE 25 MG/1
25 TABLET, FILM COATED ORAL EVERY 8 HOURS PRN
Qty: 30 TABLET | Refills: 1 | Status: SHIPPED | OUTPATIENT
Start: 2024-09-20 | End: 2024-10-10

## 2024-09-20 RX ORDER — HYDROXYZINE HYDROCHLORIDE 10 MG/1
10-20 TABLET, FILM COATED ORAL 3 TIMES DAILY PRN
Qty: 50 TABLET | Refills: 1 | Status: CANCELLED | OUTPATIENT
Start: 2024-09-20

## 2024-11-04 NOTE — PROGRESS NOTES
[facility]   Electrophysiology Outpatient Note              Date:  2024  Patient name: Cara Chapin  YOB: 1940    Primary Care physician: Eron Kevin MD    HISTORY OF PRESENT ILLNESS: The patient is a 84 y.o.  female with a history of paroxysmal atrial fibrillation, LBBB, hypertension, rheumatoid arthritis, hypothyroidism, diverticulitis    Patient follows with Dr Rao in EP clinic.  2021 patient presented to the emergency room with symptomatic rapid atrial fibrillation.  She underwent LUCY/DCCV on 2021 at [facility].  She was started on amiodarone.  She was discharged home and unfortunately again to complain of worsening shortness of breath and dyspnea on exertion.  She came in to [facility] and was found to be back in rapid atrial fibrillation. On 6/3/2021 echo reveals LVEF 25%.   Patient wore a cardiac event monitor worn 2021-2021 demonstrated predominately SB with an average HR of 57 (29-75) BPM, 4.1 second nocturnal pauses, 0.7% PACs, 0.13% PVCs.               On 2021 ECG demonstrated sinus rhythm, left bundle branch block, 63 BPM. She reported that she would prefer to follow with [facility] for cardiology.    Today she is being seen for history of paroxysmal atrial fibrillation. ECG shows SR LBBB with a HR of 59. Patient is accompanied by her daughter today.  Patient denies chest pain, shortness with and palpitations.  Patient states she is feeling good and remains active and walks daily. She lives [address] the [place] [place].  She is taking her medications as prescribed    Past Medical History:   has a past medical history of Allergic rhinitis, CHF (congestive heart failure), NYHA class I, acute on chronic, combined (HCC), Hyperlipidemia, Hypertension, Hypothyroidism, LBBB (left bundle branch block), and Rheumatoid arthritis(714.0).    Past Surgical History:   has a past surgical history that includes Colonoscopy ();

## 2024-11-07 ENCOUNTER — HOSPITAL ENCOUNTER (OUTPATIENT)
Age: 84
Discharge: HOME | End: 2024-11-07
Payer: MEDICARE

## 2024-11-07 ENCOUNTER — OFFICE VISIT (OUTPATIENT)
Dept: CARDIOLOGY CLINIC | Age: 84
End: 2024-11-07

## 2024-11-07 ENCOUNTER — HOSPITAL ENCOUNTER (OUTPATIENT)
Dept: GENERAL RADIOLOGY | Age: 84
Discharge: HOME | End: 2024-11-07
Payer: MEDICARE

## 2024-11-07 VITALS
SYSTOLIC BLOOD PRESSURE: 132 MMHG | HEART RATE: 59 BPM | DIASTOLIC BLOOD PRESSURE: 72 MMHG | BODY MASS INDEX: 18.51 KG/M2 | OXYGEN SATURATION: 97 % | WEIGHT: 108.4 LBS | HEIGHT: 64 IN

## 2024-11-07 DIAGNOSIS — Z79.899 ON AMIODARONE THERAPY: ICD-10-CM

## 2024-11-07 DIAGNOSIS — I44.7 LBBB (LEFT BUNDLE BRANCH BLOCK): Primary | ICD-10-CM

## 2024-11-07 DIAGNOSIS — I48.0 PAROXYSMAL ATRIAL FIBRILLATION (HCC): ICD-10-CM

## 2024-11-07 PROCEDURE — 71046 X-RAY EXAM CHEST 2 VIEWS: CPT

## 2024-11-07 RX ORDER — SPIRONOLACTONE 25 MG/1
25 TABLET ORAL DAILY
Qty: 90 TABLET | Refills: 3 | Status: SHIPPED | OUTPATIENT
Start: 2024-11-07

## 2024-11-07 RX ORDER — AMIODARONE HYDROCHLORIDE 100 MG/1
50 TABLET ORAL DAILY
Qty: 45 TABLET | Refills: 1 | Status: SHIPPED | OUTPATIENT
Start: 2024-11-07

## 2024-11-07 NOTE — PATIENT INSTRUCTIONS
Continue amiodarone 100 mg every other day  Continue Eliquis 2.5 mg twice daily for stroke risk reduction  Continue Aldactone 25 mg daily    Follow up in 6  months Dr Rao

## 2025-01-15 ENCOUNTER — APPOINTMENT (OUTPATIENT)
Dept: CT IMAGING | Age: 85
DRG: 872 | End: 2025-01-15
Attending: STUDENT IN AN ORGANIZED HEALTH CARE EDUCATION/TRAINING PROGRAM
Payer: MEDICARE

## 2025-01-15 ENCOUNTER — HOSPITAL ENCOUNTER (INPATIENT)
Age: 85
LOS: 4 days | Discharge: HOME HEALTH | DRG: 872 | End: 2025-01-19
Attending: STUDENT IN AN ORGANIZED HEALTH CARE EDUCATION/TRAINING PROGRAM | Admitting: STUDENT IN AN ORGANIZED HEALTH CARE EDUCATION/TRAINING PROGRAM
Payer: MEDICARE

## 2025-01-15 ENCOUNTER — TELEPHONE (OUTPATIENT)
Dept: FAMILY MEDICINE CLINIC | Age: 85
End: 2025-01-15

## 2025-01-15 DIAGNOSIS — R19.7 DIARRHEA, UNSPECIFIED TYPE: ICD-10-CM

## 2025-01-15 DIAGNOSIS — R10.84 GENERALIZED ABDOMINAL PAIN: ICD-10-CM

## 2025-01-15 DIAGNOSIS — R65.21 SEPTIC SHOCK (HCC): Primary | ICD-10-CM

## 2025-01-15 DIAGNOSIS — K57.20 DIVERTICULITIS OF LARGE INTESTINE WITH ABSCESS WITHOUT BLEEDING: ICD-10-CM

## 2025-01-15 DIAGNOSIS — I42.8 NON-ISCHEMIC CARDIOMYOPATHY (HCC): ICD-10-CM

## 2025-01-15 DIAGNOSIS — I47.20 VENTRICULAR TACHYCARDIA (HCC): ICD-10-CM

## 2025-01-15 DIAGNOSIS — A41.9 SEPTIC SHOCK (HCC): Primary | ICD-10-CM

## 2025-01-15 LAB
ALBUMIN SERPL-MCNC: 4.1 G/DL (ref 3.4–5)
ALBUMIN/GLOB SERPL: 1 {RATIO} (ref 1.1–2.2)
ALP SERPL-CCNC: 96 U/L (ref 40–129)
ALT SERPL-CCNC: 12 U/L (ref 10–40)
ANION GAP SERPL CALCULATED.3IONS-SCNC: 15 MMOL/L (ref 3–16)
AST SERPL-CCNC: 25 U/L (ref 15–37)
BASE EXCESS BLDV CALC-SCNC: -4.6 MMOL/L (ref -3–3)
BASOPHILS # BLD: 0.1 K/UL (ref 0–0.2)
BASOPHILS NFR BLD: 0.8 %
BILIRUB SERPL-MCNC: 0.5 MG/DL (ref 0–1)
BUN SERPL-MCNC: 8 MG/DL (ref 7–20)
CALCIUM SERPL-MCNC: 9.6 MG/DL (ref 8.3–10.6)
CHLORIDE SERPL-SCNC: 95 MMOL/L (ref 99–110)
CO2 BLDV-SCNC: 23 MMOL/L
CO2 SERPL-SCNC: 23 MMOL/L (ref 21–32)
COHGB MFR BLDV: 2 % (ref 0–1.5)
CREAT SERPL-MCNC: 0.7 MG/DL (ref 0.6–1.2)
DEPRECATED RDW RBC AUTO: 14.7 % (ref 12.4–15.4)
EKG DIAGNOSIS: NORMAL
EKG Q-T INTERVAL: 330 MS
EKG QRS DURATION: 138 MS
EKG QTC CALCULATION (BAZETT): 535 MS
EKG R AXIS: 141 DEGREES
EKG T AXIS: -27 DEGREES
EKG VENTRICULAR RATE: 158 BPM
EOSINOPHIL # BLD: 0 K/UL (ref 0–0.6)
EOSINOPHIL NFR BLD: 0.3 %
FLUAV RNA RESP QL NAA+PROBE: NOT DETECTED
FLUBV RNA RESP QL NAA+PROBE: NOT DETECTED
GFR SERPLBLD CREATININE-BSD FMLA CKD-EPI: 85 ML/MIN/{1.73_M2}
GLUCOSE SERPL-MCNC: 161 MG/DL (ref 70–99)
HCO3 BLDV-SCNC: 21.7 MMOL/L (ref 23–29)
HCT VFR BLD AUTO: 42.8 % (ref 36–48)
HGB BLD-MCNC: 14.2 G/DL (ref 12–16)
LACTATE BLDV-SCNC: 1.8 MMOL/L (ref 0.4–1.9)
LACTATE BLDV-SCNC: 3 MMOL/L (ref 0.4–1.9)
LIPASE SERPL-CCNC: 16 U/L (ref 13–60)
LYMPHOCYTES # BLD: 1.4 K/UL (ref 1–5.1)
LYMPHOCYTES NFR BLD: 10.7 %
MCH RBC QN AUTO: 33.7 PG (ref 26–34)
MCHC RBC AUTO-ENTMCNC: 33.2 G/DL (ref 31–36)
MCV RBC AUTO: 101.5 FL (ref 80–100)
METHGB MFR BLDV: 0.4 %
MONOCYTES # BLD: 0.4 K/UL (ref 0–1.3)
MONOCYTES NFR BLD: 3.1 %
NEUTROPHILS # BLD: 11.1 K/UL (ref 1.7–7.7)
NEUTROPHILS NFR BLD: 85.1 %
O2 THERAPY: ABNORMAL
PCO2 BLDV: 44.6 MMHG (ref 40–50)
PH BLDV: 7.3 [PH] (ref 7.35–7.45)
PLATELET # BLD AUTO: 378 K/UL (ref 135–450)
PMV BLD AUTO: 7.6 FL (ref 5–10.5)
PO2 BLDV: 21.8 MMHG (ref 25–40)
POTASSIUM SERPL-SCNC: 4.8 MMOL/L (ref 3.5–5.1)
PROT SERPL-MCNC: 8.1 G/DL (ref 6.4–8.2)
RBC # BLD AUTO: 4.22 M/UL (ref 4–5.2)
SAO2 % BLDV: 33 %
SARS-COV-2 RNA RESP QL NAA+PROBE: NOT DETECTED
SODIUM SERPL-SCNC: 133 MMOL/L (ref 136–145)
TROPONIN, HIGH SENSITIVITY: 137 NG/L (ref 0–14)
TROPONIN, HIGH SENSITIVITY: 145 NG/L (ref 0–14)
TROPONIN, HIGH SENSITIVITY: 17 NG/L (ref 0–14)
TROPONIN, HIGH SENSITIVITY: 81 NG/L (ref 0–14)
WBC # BLD AUTO: 13 K/UL (ref 4–11)

## 2025-01-15 PROCEDURE — 2580000003 HC RX 258: Performed by: PHYSICIAN ASSISTANT

## 2025-01-15 PROCEDURE — 96365 THER/PROPH/DIAG IV INF INIT: CPT

## 2025-01-15 PROCEDURE — 96366 THER/PROPH/DIAG IV INF ADDON: CPT

## 2025-01-15 PROCEDURE — 2500000003 HC RX 250 WO HCPCS: Performed by: STUDENT IN AN ORGANIZED HEALTH CARE EDUCATION/TRAINING PROGRAM

## 2025-01-15 PROCEDURE — 93005 ELECTROCARDIOGRAM TRACING: CPT | Performed by: PHYSICIAN ASSISTANT

## 2025-01-15 PROCEDURE — 83690 ASSAY OF LIPASE: CPT

## 2025-01-15 PROCEDURE — 94761 N-INVAS EAR/PLS OXIMETRY MLT: CPT

## 2025-01-15 PROCEDURE — 87186 SC STD MICRODIL/AGAR DIL: CPT

## 2025-01-15 PROCEDURE — 6360000002 HC RX W HCPCS: Performed by: PHYSICIAN ASSISTANT

## 2025-01-15 PROCEDURE — 6360000002 HC RX W HCPCS: Performed by: STUDENT IN AN ORGANIZED HEALTH CARE EDUCATION/TRAINING PROGRAM

## 2025-01-15 PROCEDURE — 87040 BLOOD CULTURE FOR BACTERIA: CPT

## 2025-01-15 PROCEDURE — 82803 BLOOD GASES ANY COMBINATION: CPT

## 2025-01-15 PROCEDURE — 2060000000 HC ICU INTERMEDIATE R&B

## 2025-01-15 PROCEDURE — 6360000004 HC RX CONTRAST MEDICATION: Performed by: PHYSICIAN ASSISTANT

## 2025-01-15 PROCEDURE — 2580000003 HC RX 258: Performed by: STUDENT IN AN ORGANIZED HEALTH CARE EDUCATION/TRAINING PROGRAM

## 2025-01-15 PROCEDURE — 6370000000 HC RX 637 (ALT 250 FOR IP): Performed by: PHYSICIAN ASSISTANT

## 2025-01-15 PROCEDURE — 71260 CT THORAX DX C+: CPT

## 2025-01-15 PROCEDURE — 87506 IADNA-DNA/RNA PROBE TQ 6-11: CPT

## 2025-01-15 PROCEDURE — 2700000000 HC OXYGEN THERAPY PER DAY

## 2025-01-15 PROCEDURE — 99285 EMERGENCY DEPT VISIT HI MDM: CPT

## 2025-01-15 PROCEDURE — 83605 ASSAY OF LACTIC ACID: CPT

## 2025-01-15 PROCEDURE — 85025 COMPLETE CBC W/AUTO DIFF WBC: CPT

## 2025-01-15 PROCEDURE — 93010 ELECTROCARDIOGRAM REPORT: CPT | Performed by: INTERNAL MEDICINE

## 2025-01-15 PROCEDURE — 80053 COMPREHEN METABOLIC PANEL: CPT

## 2025-01-15 PROCEDURE — 74177 CT ABD & PELVIS W/CONTRAST: CPT

## 2025-01-15 PROCEDURE — 84484 ASSAY OF TROPONIN QUANT: CPT

## 2025-01-15 PROCEDURE — 96361 HYDRATE IV INFUSION ADD-ON: CPT

## 2025-01-15 PROCEDURE — 87636 SARSCOV2 & INF A&B AMP PRB: CPT

## 2025-01-15 PROCEDURE — 87150 DNA/RNA AMPLIFIED PROBE: CPT

## 2025-01-15 PROCEDURE — 96375 TX/PRO/DX INJ NEW DRUG ADDON: CPT

## 2025-01-15 PROCEDURE — 36415 COLL VENOUS BLD VENIPUNCTURE: CPT

## 2025-01-15 RX ORDER — ONDANSETRON 2 MG/ML
4 INJECTION INTRAMUSCULAR; INTRAVENOUS ONCE
Status: COMPLETED | OUTPATIENT
Start: 2025-01-15 | End: 2025-01-15

## 2025-01-15 RX ORDER — VANCOMYCIN HCL IN 5 % DEXTROSE 1.25 G/25
25 PLASTIC BAG, INJECTION (ML) INTRAVENOUS ONCE
Status: DISCONTINUED | OUTPATIENT
Start: 2025-01-15 | End: 2025-01-15

## 2025-01-15 RX ORDER — NITROFURANTOIN 25; 75 MG/1; MG/1
100 CAPSULE ORAL 2 TIMES DAILY
Status: ON HOLD | COMMUNITY
Start: 2025-01-14 | End: 2025-01-19 | Stop reason: HOSPADM

## 2025-01-15 RX ORDER — SODIUM CHLORIDE 0.9 % (FLUSH) 0.9 %
5-40 SYRINGE (ML) INJECTION EVERY 12 HOURS SCHEDULED
Status: DISCONTINUED | OUTPATIENT
Start: 2025-01-15 | End: 2025-01-19 | Stop reason: HOSPADM

## 2025-01-15 RX ORDER — 0.9 % SODIUM CHLORIDE 0.9 %
1000 INTRAVENOUS SOLUTION INTRAVENOUS ONCE
Status: COMPLETED | OUTPATIENT
Start: 2025-01-15 | End: 2025-01-15

## 2025-01-15 RX ORDER — SODIUM CHLORIDE 0.9 % (FLUSH) 0.9 %
5-40 SYRINGE (ML) INJECTION PRN
Status: DISCONTINUED | OUTPATIENT
Start: 2025-01-15 | End: 2025-01-19 | Stop reason: HOSPADM

## 2025-01-15 RX ORDER — POTASSIUM CHLORIDE 7.45 MG/ML
10 INJECTION INTRAVENOUS PRN
Status: DISCONTINUED | OUTPATIENT
Start: 2025-01-15 | End: 2025-01-16

## 2025-01-15 RX ORDER — ACETAMINOPHEN 500 MG
1000 TABLET ORAL ONCE
Status: COMPLETED | OUTPATIENT
Start: 2025-01-15 | End: 2025-01-15

## 2025-01-15 RX ORDER — AMIODARONE HYDROCHLORIDE 200 MG/1
50 TABLET ORAL DAILY
Status: DISCONTINUED | OUTPATIENT
Start: 2025-01-16 | End: 2025-01-19 | Stop reason: HOSPADM

## 2025-01-15 RX ORDER — AMIODARONE HYDROCHLORIDE 150 MG/3ML
150 INJECTION, SOLUTION INTRAVENOUS ONCE
Status: DISCONTINUED | OUTPATIENT
Start: 2025-01-15 | End: 2025-01-15

## 2025-01-15 RX ORDER — MAGNESIUM SULFATE IN WATER 40 MG/ML
2000 INJECTION, SOLUTION INTRAVENOUS PRN
Status: DISCONTINUED | OUTPATIENT
Start: 2025-01-15 | End: 2025-01-16

## 2025-01-15 RX ORDER — IOPAMIDOL 755 MG/ML
75 INJECTION, SOLUTION INTRAVASCULAR
Status: COMPLETED | OUTPATIENT
Start: 2025-01-15 | End: 2025-01-15

## 2025-01-15 RX ORDER — ONDANSETRON 4 MG/1
4 TABLET, ORALLY DISINTEGRATING ORAL EVERY 8 HOURS PRN
Status: DISCONTINUED | OUTPATIENT
Start: 2025-01-15 | End: 2025-01-16

## 2025-01-15 RX ORDER — KETOROLAC TROMETHAMINE 30 MG/ML
15 INJECTION, SOLUTION INTRAMUSCULAR; INTRAVENOUS ONCE
Status: COMPLETED | OUTPATIENT
Start: 2025-01-15 | End: 2025-01-15

## 2025-01-15 RX ORDER — ONDANSETRON 2 MG/ML
4 INJECTION INTRAMUSCULAR; INTRAVENOUS EVERY 6 HOURS PRN
Status: DISCONTINUED | OUTPATIENT
Start: 2025-01-15 | End: 2025-01-16

## 2025-01-15 RX ORDER — SODIUM CHLORIDE 9 MG/ML
INJECTION, SOLUTION INTRAVENOUS PRN
Status: DISCONTINUED | OUTPATIENT
Start: 2025-01-15 | End: 2025-01-19 | Stop reason: HOSPADM

## 2025-01-15 RX ORDER — FOLIC ACID 1 MG/1
1 TABLET ORAL DAILY
Status: DISCONTINUED | OUTPATIENT
Start: 2025-01-16 | End: 2025-01-19 | Stop reason: HOSPADM

## 2025-01-15 RX ORDER — PANTOPRAZOLE SODIUM 40 MG/1
40 TABLET, DELAYED RELEASE ORAL
Status: DISCONTINUED | OUTPATIENT
Start: 2025-01-16 | End: 2025-01-19 | Stop reason: HOSPADM

## 2025-01-15 RX ORDER — SPIRONOLACTONE 25 MG/1
25 TABLET ORAL DAILY
Status: DISCONTINUED | OUTPATIENT
Start: 2025-01-15 | End: 2025-01-19 | Stop reason: HOSPADM

## 2025-01-15 RX ADMIN — PIPERACILLIN AND TAZOBACTAM 3375 MG: 3; .375 INJECTION, POWDER, LYOPHILIZED, FOR SOLUTION INTRAVENOUS at 20:35

## 2025-01-15 RX ADMIN — SODIUM CHLORIDE 1000 ML: 9 INJECTION, SOLUTION INTRAVENOUS at 17:10

## 2025-01-15 RX ADMIN — CEFEPIME 2000 MG: 2 INJECTION, POWDER, FOR SOLUTION INTRAVENOUS at 17:12

## 2025-01-15 RX ADMIN — ONDANSETRON 4 MG: 2 INJECTION, SOLUTION INTRAMUSCULAR; INTRAVENOUS at 17:16

## 2025-01-15 RX ADMIN — Medication 10 ML: at 20:34

## 2025-01-15 RX ADMIN — IOPAMIDOL 75 ML: 755 INJECTION, SOLUTION INTRAVENOUS at 18:23

## 2025-01-15 RX ADMIN — VANCOMYCIN HYDROCHLORIDE 1250 MG: 1.25 INJECTION, SOLUTION INTRAVITREAL at 18:05

## 2025-01-15 RX ADMIN — AMIODARONE HYDROCHLORIDE 150 MG: 1.5 INJECTION, SOLUTION INTRAVENOUS at 17:14

## 2025-01-15 RX ADMIN — SODIUM CHLORIDE 1000 ML: 9 INJECTION, SOLUTION INTRAVENOUS at 16:12

## 2025-01-15 RX ADMIN — KETOROLAC TROMETHAMINE 15 MG: 30 INJECTION, SOLUTION INTRAMUSCULAR at 18:05

## 2025-01-15 RX ADMIN — ACETAMINOPHEN 1000 MG: 500 TABLET ORAL at 16:49

## 2025-01-15 ASSESSMENT — PAIN SCALES - GENERAL
PAINLEVEL_OUTOF10: 7
PAINLEVEL_OUTOF10: 8
PAINLEVEL_OUTOF10: 6

## 2025-01-15 ASSESSMENT — PAIN DESCRIPTION - LOCATION: LOCATION: ABDOMEN

## 2025-01-15 ASSESSMENT — PAIN - FUNCTIONAL ASSESSMENT: PAIN_FUNCTIONAL_ASSESSMENT: 0-10

## 2025-01-15 NOTE — TELEPHONE ENCOUNTER
Daughter called and patient may be having flare up of her diverticulosis and may be now diverticulitis. Has been going on 5 days with abdominal pain and some diarrhea. Her pain is doubling over at times and she is not eating well at all. Daughter is going to call the squad to have them to take her to the hospital.

## 2025-01-15 NOTE — ED NOTES
Patient incontinent of copious amounts of stool. While cleaning pt up a rectal temperature was obtained which showed 103.5. (primary nurse documented.) Patient cleaned and changed after moving to room one. She had another episode of incontinence. ED techs at pt bedside obtaining a urine sample as well as cleaning her up for a second time. Linens changed.

## 2025-01-15 NOTE — ED PROVIDER NOTES
[facility] EMERGENCY DEPARTMENT  EMERGENCY DEPARTMENT ENCOUNTER        Pt Name: Cara Chapin  MRN: [id number]  Birthdate 1940  Date of evaluation: 1/15/2025  Provider: PARADISE MENDEZ PA-C  PCP: Eron Kevin MD  ED Attending: Emmy Molina MD       I have seen and evaluated this patient with my supervising physician Emmy Molina MD.    CHIEF COMPLAINT:     Chief Complaint   Patient presents with    Abdominal Pain     Patient a resident of [facility]. Came to the ED today with c/o abdominal pain for the past three days. She reports hx of diverticulitis. Denies any vomiting.        HISTORY OF PRESENT ILLNESS:      History provided by the patient. No limitations.    Cara Chapin is a 84 y.o. female who arrives to the ED by EMS from home.  Patient is a resident at [facility] which she describes as a independent living facility.  She states for the last 4 days she has had generalized abdominal pain with diarrhea.  She denies any nausea or vomiting.  She has not had any fevers to her knowledge.  She denies any chest pain or dyspnea.  She does have a history of diverticulitis. Her daughter checked on her today and based on how the patient looked, she called 911 to have her brought in.    Nursing Notes were reviewed     REVIEW OF SYSTEMS:     Review of Systems  Positives and pertinent negatives as per HPI.      PAST MEDICAL HISTORY:     Past Medical History:   Diagnosis Date    Allergic rhinitis     CHF (congestive heart failure), NYHA class I, acute on chronic, combined (HCC) 2021    Hyperlipidemia     Hypertension     Hypothyroidism     probable autoimmune thyroiditis    LBBB (left bundle branch block)     ON EKG, had normal GXT    Rheumatoid arthritis(714.0)     Rheumatologist in [place]       SURGICAL HISTORY:      Past Surgical History:   Procedure Laterality Date     SECTION      x2    COLONOSCOPY  2005    JOINT REPLACEMENT Left 13    left total knee

## 2025-01-15 NOTE — ED NOTES
Blood culture set #1 drawn from left forearm.  Bottle tops scrubbed with alcohol pads.  Site prepped with [employer] swab prior to venipuncture.  Waste tube drawn prior to collection of specimen.      Second set of blood cultures drawn by MADY Edwards.

## 2025-01-15 NOTE — ED NOTES
Med rec completed with information provided by pt and pts Daughter. All information thought to be true and accurate.

## 2025-01-15 NOTE — ED NOTES
Maykel RN printed out patients rhythm strip showing patient was in v-tach.   This RN took rhythm strip and gave it to Delfino Cohen.   EKG was performed on pt d/t rhythm change.   Pt moved to room 1 to be monitored closer.    at bedside and Report given to Jazzy Reynoso RN.

## 2025-01-15 NOTE — CONSULTS
Perfect served General Surgery @ 1846  RE: abdominal pain, significant diarrhea. CT abdomen showing air fluid collection 4cm anterior and inferior to midportion of sigmoid colon concerning for abscess per MD Kavita Molina MD called back @ 1845

## 2025-01-16 ENCOUNTER — APPOINTMENT (OUTPATIENT)
Age: 85
DRG: 872 | End: 2025-01-16
Attending: STUDENT IN AN ORGANIZED HEALTH CARE EDUCATION/TRAINING PROGRAM
Payer: MEDICARE

## 2025-01-16 ENCOUNTER — CARE COORDINATION (OUTPATIENT)
Dept: CASE MANAGEMENT | Age: 85
End: 2025-01-16

## 2025-01-16 LAB
ALBUMIN SERPL-MCNC: 2.9 G/DL (ref 3.4–5)
ALBUMIN/GLOB SERPL: 1 {RATIO} (ref 1.1–2.2)
ALP SERPL-CCNC: 71 U/L (ref 40–129)
ALT SERPL-CCNC: 10 U/L (ref 10–40)
ANION GAP SERPL CALCULATED.3IONS-SCNC: 11 MMOL/L (ref 3–16)
AST SERPL-CCNC: 21 U/L (ref 15–37)
BASOPHILS # BLD: 0 K/UL (ref 0–0.2)
BASOPHILS NFR BLD: 0.3 %
BILIRUB SERPL-MCNC: 0.6 MG/DL (ref 0–1)
BILIRUB UR QL STRIP.AUTO: NEGATIVE
BUN SERPL-MCNC: 9 MG/DL (ref 7–20)
CALCIUM SERPL-MCNC: 7.9 MG/DL (ref 8.3–10.6)
CHLORIDE SERPL-SCNC: 101 MMOL/L (ref 99–110)
CLARITY UR: CLEAR
CO2 SERPL-SCNC: 20 MMOL/L (ref 21–32)
COLOR UR: YELLOW
CREAT SERPL-MCNC: 0.7 MG/DL (ref 0.6–1.2)
DEPRECATED RDW RBC AUTO: 14.8 % (ref 12.4–15.4)
ECHO AO ASC DIAM: 3.6 CM
ECHO AO ASCENDING AORTA INDEX: 2.38 CM/M2
ECHO AO ROOT DIAM: 2.9 CM
ECHO AO ROOT INDEX: 1.92 CM/M2
ECHO AV AREA PEAK VELOCITY: 1.6 CM2
ECHO AV AREA VTI: 1.7 CM2
ECHO AV AREA/BSA PEAK VELOCITY: 1.1 CM2/M2
ECHO AV AREA/BSA VTI: 1.1 CM2/M2
ECHO AV MEAN GRADIENT: 4 MMHG
ECHO AV MEAN GRADIENT: 4 MMHG
ECHO AV MEAN VELOCITY: 0.9 M/S
ECHO AV PEAK GRADIENT: 9 MMHG
ECHO AV PEAK VELOCITY: 1.5 M/S
ECHO AV VELOCITY RATIO: 0.53
ECHO AV VTI: 27 CM
ECHO BSA: 1.49 M2
ECHO EST RA PRESSURE: 8 MMHG
ECHO LA AREA 2C: 23.1 CM2
ECHO LA AREA 4C: 26.5 CM2
ECHO LA DIAMETER INDEX: 3.05 CM/M2
ECHO LA DIAMETER: 4.6 CM
ECHO LA MAJOR AXIS: 6.5 CM
ECHO LA MINOR AXIS: 5.4 CM
ECHO LA TO AORTIC ROOT RATIO: 1.59
ECHO LA VOL BP: 90 ML (ref 22–52)
ECHO LA VOL MOD A2C: 78 ML (ref 22–52)
ECHO LA VOL MOD A4C: 87 ML (ref 22–52)
ECHO LA VOL/BSA BIPLANE: 60 ML/M2 (ref 16–34)
ECHO LA VOLUME INDEX MOD A2C: 52 ML/M2 (ref 16–34)
ECHO LA VOLUME INDEX MOD A4C: 58 ML/M2 (ref 16–34)
ECHO LV E' LATERAL VELOCITY: 6.08 CM/S
ECHO LV E' SEPTAL VELOCITY: 4.95 CM/S
ECHO LV EDV 3D: 133 ML
ECHO LV EDV A2C: 46 ML
ECHO LV EDV A4C: 62 ML
ECHO LV EDV INDEX 3D: 88 ML/M2
ECHO LV EDV INDEX A4C: 41 ML/M2
ECHO LV EDV NDEX A2C: 30 ML/M2
ECHO LV EF PHYSICIAN: 35 %
ECHO LV EJECTION FRACTION 3D: 52 %
ECHO LV EJECTION FRACTION A2C: 53 %
ECHO LV EJECTION FRACTION A4C: 62 %
ECHO LV EJECTION FRACTION BIPLANE: 57 % (ref 55–100)
ECHO LV ESV 3D: 64 ML
ECHO LV ESV A2C: 22 ML
ECHO LV ESV A4C: 24 ML
ECHO LV ESV INDEX 3D: 42 ML/M2
ECHO LV ESV INDEX A2C: 15 ML/M2
ECHO LV ESV INDEX A4C: 16 ML/M2
ECHO LV FRACTIONAL SHORTENING: 20 % (ref 28–44)
ECHO LV INTERNAL DIMENSION DIASTOLE INDEX: 2.98 CM/M2
ECHO LV INTERNAL DIMENSION DIASTOLIC: 4.5 CM (ref 3.9–5.3)
ECHO LV INTERNAL DIMENSION SYSTOLIC INDEX: 2.38 CM/M2
ECHO LV INTERNAL DIMENSION SYSTOLIC: 3.6 CM
ECHO LV IVSD: 1.3 CM (ref 0.6–0.9)
ECHO LV MASS 2D: 198.1 G (ref 67–162)
ECHO LV MASS 3D INDEX: 93.4 G/M2
ECHO LV MASS 3D: 141 G
ECHO LV MASS INDEX 2D: 131.2 G/M2 (ref 43–95)
ECHO LV POSTERIOR WALL DIASTOLIC: 1.1 CM (ref 0.6–0.9)
ECHO LV RELATIVE WALL THICKNESS RATIO: 0.49
ECHO LVOT AREA: 2.8 CM2
ECHO LVOT AV VTI INDEX: 0.6
ECHO LVOT DIAM: 1.9 CM
ECHO LVOT MEAN GRADIENT: 1 MMHG
ECHO LVOT PEAK GRADIENT: 3 MMHG
ECHO LVOT PEAK VELOCITY: 0.8 M/S
ECHO LVOT STROKE VOLUME INDEX: 30.2 ML/M2
ECHO LVOT SV: 45.6 ML
ECHO LVOT VTI: 16.1 CM
ECHO MV A VELOCITY: 0.17 M/S
ECHO MV E DECELERATION TIME (DT): 306 MS
ECHO MV E VELOCITY: 0.71 M/S
ECHO MV E/A RATIO: 4.18
ECHO MV E/E' LATERAL: 11.68
ECHO MV E/E' RATIO (AVERAGED): 13.01
ECHO MV E/E' SEPTAL: 14.34
ECHO RA AREA 4C: 14.5 CM2
ECHO RA END SYSTOLIC VOLUME APICAL 4 CHAMBER INDEX BSA: 23 ML/M2
ECHO RA VOLUME: 34 ML
ECHO RIGHT VENTRICULAR SYSTOLIC PRESSURE (RVSP): 39 MMHG
ECHO RV BASAL DIMENSION: 3.6 CM
ECHO RV FREE WALL PEAK S': 11.5 CM/S
ECHO RV LONGITUDINAL DIMENSION: 6.5 CM
ECHO RV MID DIMENSION: 2.9 CM
ECHO RV TAPSE: 2 CM (ref 1.7–?)
ECHO TV REGURGITANT MAX VELOCITY: 2.78 M/S
ECHO TV REGURGITANT PEAK GRADIENT: 31 MMHG
EOSINOPHIL # BLD: 0 K/UL (ref 0–0.6)
EOSINOPHIL NFR BLD: 0.2 %
EPI CELLS #/AREA URNS HPF: NORMAL /HPF (ref 0–5)
FOLATE SERPL-MCNC: 12.1 NG/ML (ref 4.78–24.2)
GFR SERPLBLD CREATININE-BSD FMLA CKD-EPI: 85 ML/MIN/{1.73_M2}
GI PATHOGENS PNL STL NAA+PROBE: NORMAL
GLUCOSE SERPL-MCNC: 113 MG/DL (ref 70–99)
GLUCOSE UR STRIP.AUTO-MCNC: NEGATIVE MG/DL
HCT VFR BLD AUTO: 37.4 % (ref 36–48)
HGB BLD-MCNC: 12.5 G/DL (ref 12–16)
HGB UR QL STRIP.AUTO: ABNORMAL
INR PPP: 1.3 (ref 0.85–1.15)
KETONES UR STRIP.AUTO-MCNC: ABNORMAL MG/DL
LEFT VENTRICULAR EJECTION FRACTION MODE: NORMAL
LEUKOCYTE ESTERASE UR QL STRIP.AUTO: NEGATIVE
LV EF: 35 %
LYMPHOCYTES # BLD: 1.1 K/UL (ref 1–5.1)
LYMPHOCYTES NFR BLD: 7.4 %
MAGNESIUM SERPL-MCNC: 1.84 MG/DL (ref 1.8–2.4)
MCH RBC QN AUTO: 34 PG (ref 26–34)
MCHC RBC AUTO-ENTMCNC: 33.3 G/DL (ref 31–36)
MCV RBC AUTO: 101.9 FL (ref 80–100)
MONOCYTES # BLD: 1.4 K/UL (ref 0–1.3)
MONOCYTES NFR BLD: 9.2 %
NEUTROPHILS # BLD: 12.3 K/UL (ref 1.7–7.7)
NEUTROPHILS NFR BLD: 82.9 %
NITRITE UR QL STRIP.AUTO: NEGATIVE
NT-PROBNP SERPL-MCNC: 6795 PG/ML (ref 0–449)
PH UR STRIP.AUTO: 6 [PH] (ref 5–8)
PLATELET # BLD AUTO: 242 K/UL (ref 135–450)
PMV BLD AUTO: 7.6 FL (ref 5–10.5)
POTASSIUM SERPL-SCNC: 4.2 MMOL/L (ref 3.5–5.1)
PROT SERPL-MCNC: 5.7 G/DL (ref 6.4–8.2)
PROT UR STRIP.AUTO-MCNC: 30 MG/DL
PROTHROMBIN TIME: 16.4 SEC (ref 11.9–14.9)
RBC # BLD AUTO: 3.67 M/UL (ref 4–5.2)
RBC #/AREA URNS HPF: NORMAL /HPF (ref 0–4)
SODIUM SERPL-SCNC: 132 MMOL/L (ref 136–145)
SP GR UR STRIP.AUTO: 1.01 (ref 1–1.03)
TSH SERPL DL<=0.005 MIU/L-ACNC: 1.69 UIU/ML (ref 0.27–4.2)
UA COMPLETE W REFLEX CULTURE PNL UR: ABNORMAL
UA DIPSTICK W REFLEX MICRO PNL UR: YES
URN SPEC COLLECT METH UR: ABNORMAL
UROBILINOGEN UR STRIP-ACNC: 0.2 E.U./DL
VIT B12 SERPL-MCNC: 598 PG/ML (ref 211–911)
WBC # BLD AUTO: 14.8 K/UL (ref 4–11)
WBC #/AREA URNS HPF: NORMAL /HPF (ref 0–5)

## 2025-01-16 PROCEDURE — 2580000003 HC RX 258: Performed by: STUDENT IN AN ORGANIZED HEALTH CARE EDUCATION/TRAINING PROGRAM

## 2025-01-16 PROCEDURE — 85610 PROTHROMBIN TIME: CPT

## 2025-01-16 PROCEDURE — 99223 1ST HOSP IP/OBS HIGH 75: CPT | Performed by: INTERNAL MEDICINE

## 2025-01-16 PROCEDURE — 82746 ASSAY OF FOLIC ACID SERUM: CPT

## 2025-01-16 PROCEDURE — 81001 URINALYSIS AUTO W/SCOPE: CPT

## 2025-01-16 PROCEDURE — 6360000002 HC RX W HCPCS: Performed by: STUDENT IN AN ORGANIZED HEALTH CARE EDUCATION/TRAINING PROGRAM

## 2025-01-16 PROCEDURE — 2060000000 HC ICU INTERMEDIATE R&B

## 2025-01-16 PROCEDURE — APPNB60 APP NON BILLABLE TIME 46-60 MINS: Performed by: CLINICAL NURSE SPECIALIST

## 2025-01-16 PROCEDURE — 99222 1ST HOSP IP/OBS MODERATE 55: CPT | Performed by: SURGERY

## 2025-01-16 PROCEDURE — 2500000003 HC RX 250 WO HCPCS: Performed by: STUDENT IN AN ORGANIZED HEALTH CARE EDUCATION/TRAINING PROGRAM

## 2025-01-16 PROCEDURE — 85025 COMPLETE CBC W/AUTO DIFF WBC: CPT

## 2025-01-16 PROCEDURE — 6370000000 HC RX 637 (ALT 250 FOR IP)

## 2025-01-16 PROCEDURE — 93306 TTE W/DOPPLER COMPLETE: CPT

## 2025-01-16 PROCEDURE — 84443 ASSAY THYROID STIM HORMONE: CPT

## 2025-01-16 PROCEDURE — 51798 US URINE CAPACITY MEASURE: CPT

## 2025-01-16 PROCEDURE — 6370000000 HC RX 637 (ALT 250 FOR IP): Performed by: NURSE PRACTITIONER

## 2025-01-16 PROCEDURE — 80053 COMPREHEN METABOLIC PANEL: CPT

## 2025-01-16 PROCEDURE — 6370000000 HC RX 637 (ALT 250 FOR IP): Performed by: STUDENT IN AN ORGANIZED HEALTH CARE EDUCATION/TRAINING PROGRAM

## 2025-01-16 PROCEDURE — 83880 ASSAY OF NATRIURETIC PEPTIDE: CPT

## 2025-01-16 PROCEDURE — 36415 COLL VENOUS BLD VENIPUNCTURE: CPT

## 2025-01-16 PROCEDURE — 83735 ASSAY OF MAGNESIUM: CPT

## 2025-01-16 PROCEDURE — 82607 VITAMIN B-12: CPT

## 2025-01-16 PROCEDURE — 93306 TTE W/DOPPLER COMPLETE: CPT | Performed by: INTERNAL MEDICINE

## 2025-01-16 RX ORDER — PROCHLORPERAZINE EDISYLATE 5 MG/ML
5 INJECTION INTRAMUSCULAR; INTRAVENOUS EVERY 6 HOURS PRN
Status: DISCONTINUED | OUTPATIENT
Start: 2025-01-16 | End: 2025-01-19 | Stop reason: HOSPADM

## 2025-01-16 RX ORDER — PROCHLORPERAZINE MALEATE 5 MG/1
5 TABLET ORAL EVERY 6 HOURS PRN
Status: DISCONTINUED | OUTPATIENT
Start: 2025-01-16 | End: 2025-01-16

## 2025-01-16 RX ORDER — ACETAMINOPHEN 325 MG/1
650 TABLET ORAL EVERY 4 HOURS PRN
Status: DISCONTINUED | OUTPATIENT
Start: 2025-01-16 | End: 2025-01-19 | Stop reason: HOSPADM

## 2025-01-16 RX ORDER — MINERAL OIL AND WHITE PETROLATUM 150; 830 MG/G; MG/G
OINTMENT OPHTHALMIC PRN
Status: DISCONTINUED | OUTPATIENT
Start: 2025-01-16 | End: 2025-01-19 | Stop reason: HOSPADM

## 2025-01-16 RX ORDER — PROCHLORPERAZINE EDISYLATE 5 MG/ML
5 INJECTION INTRAMUSCULAR; INTRAVENOUS EVERY 6 HOURS PRN
Status: DISCONTINUED | OUTPATIENT
Start: 2025-01-16 | End: 2025-01-16

## 2025-01-16 RX ORDER — PROCHLORPERAZINE MALEATE 5 MG/1
5 TABLET ORAL EVERY 6 HOURS PRN
Status: DISCONTINUED | OUTPATIENT
Start: 2025-01-16 | End: 2025-01-19 | Stop reason: HOSPADM

## 2025-01-16 RX ADMIN — WHITE PETROLATUM 57.7 %-MINERAL OIL 31.9 % EYE OINTMENT: at 11:23

## 2025-01-16 RX ADMIN — PIPERACILLIN AND TAZOBACTAM 3375 MG: 3; .375 INJECTION, POWDER, LYOPHILIZED, FOR SOLUTION INTRAVENOUS at 04:44

## 2025-01-16 RX ADMIN — PIPERACILLIN AND TAZOBACTAM 3375 MG: 3; .375 INJECTION, POWDER, LYOPHILIZED, FOR SOLUTION INTRAVENOUS at 13:39

## 2025-01-16 RX ADMIN — DICLOFENAC SODIUM 2 G: 10 GEL TOPICAL at 15:46

## 2025-01-16 RX ADMIN — PANTOPRAZOLE SODIUM 40 MG: 40 TABLET, DELAYED RELEASE ORAL at 08:23

## 2025-01-16 RX ADMIN — ACETAMINOPHEN 650 MG: 325 TABLET ORAL at 23:01

## 2025-01-16 RX ADMIN — Medication 10 ML: at 08:24

## 2025-01-16 RX ADMIN — AMIODARONE HYDROCHLORIDE 50 MG: 200 TABLET ORAL at 08:23

## 2025-01-16 RX ADMIN — PIPERACILLIN AND TAZOBACTAM 3375 MG: 3; .375 INJECTION, POWDER, LYOPHILIZED, FOR SOLUTION INTRAVENOUS at 21:02

## 2025-01-16 RX ADMIN — Medication 10 ML: at 20:57

## 2025-01-16 RX ADMIN — FOLIC ACID 1 MG: 1 TABLET ORAL at 08:23

## 2025-01-16 ASSESSMENT — PAIN SCALES - GENERAL: PAINLEVEL_OUTOF10: 4

## 2025-01-16 NOTE — ED NOTES
Diaz catheter removed d/t being in the wrong position. Attempted to place a new diaz catheter, but met resistance and wouldn't advance all the way. Notified MD who stated okay to leave diaz catheter out. Pt stated she wanted to try to urinate on her own. Pt up to BS and did urinate, but she also had diarrhea so unable to send sample. Still in need of urine sample.

## 2025-01-16 NOTE — CONSULTS
Department of General Surgery Consult    PATIENT NAME: Cara Chapin   YOB: 1940    ADMISSION DATE: 1/15/2025  3:35 PM      TODAY'S DATE: 2025    Reason for Consult:  diverticulitis    Chief Complaint: abd pain    Historian: patient, EMR    Requesting Practitioner:  Olivia    HISTORY OF PRESENT ILLNESS:              The patient is a 84 y.o. female who presents with complaints of abdominal pain that has been ongoing for a few days. She states she initially felt this was related to her bladder, but it did not get better. The pt's family reports that the patient had the GI flu a couple of weeks ago with vomiting, diarrhea, fever and that they are wondering if this started a flare up of her diverticulitis.   This morning she reports only mild pain and minimal tenderness.     Past Medical History:        Diagnosis Date    Allergic rhinitis     CHF (congestive heart failure), NYHA class I, acute on chronic, combined ([facility]) 2021    Hyperlipidemia     Hypertension     Hypothyroidism     probable autoimmune thyroiditis    LBBB (left bundle branch block)     ON EKG, had normal GXT    Rheumatoid arthritis(714.0)     Rheumatologist in [place]       Past Surgical History:        Procedure Laterality Date     SECTION      x2    COLONOSCOPY  2005    JOINT REPLACEMENT Left 13    left total knee replacement       Current Medications:   Current Facility-Administered Medications: prochlorperazine (COMPAZINE) tablet 5 mg, 5 mg, Oral, Q6H PRN **OR** prochlorperazine (COMPAZINE) injection 5 mg, 5 mg, IntraVENous, Q6H PRN  lubrifresh P.M. (artificial tears) ophthalmic ointment, , Both Eyes, PRN  piperacillin-tazobactam (ZOSYN) 3,375 mg in sodium chloride 0.9 % 50 mL IVPB (mini-bag), 3,375 mg, IntraVENous, 3 times per day  amiodarone (CORDARONE) tablet 50 mg, 50 mg, Oral, Daily  [Held by provider] apixaban (ELIQUIS) tablet 2.5 mg, 2.5 mg, Oral, BID  folic acid (FOLVITE) tablet 1 mg, 1 mg,  standard drinks of alcohol per week.  DRUGS:   reports no history of drug use.      Family History:        Problem Relation Age of Onset    Heart Disease Mother         very slow heart beat    Thyroid Disease Father     Heart Disease Father        REVIEW OF SYSTEMS:  CONSTITUTIONAL:  positive for  fevers  HEENT:  negative  RESPIRATORY:  negative  CARDIOVASCULAR:  negative  GASTROINTESTINAL:  negative except for diarrhea and abdominal pain  GENITOURINARY:  negative  HEMATOLOGIC/LYMPHATIC:  negative  NEUROLOGICAL:  Negative  * All other ROS reviewed and negative.       PHYSICAL EXAM:  VITALS:  BP (!) 140/91   Pulse 58   Temp 97.9 °F (36.6 °C) (Oral)   Resp 20   Ht 1.626 m (5' 4\")   Wt 49.4 kg (109 lb)   SpO2 100%   BMI 18.71 kg/m²   24HR INTAKE/OUTPUT:    I/O last 3 completed shifts:  In: 2460 [I.V.:110; IV Piggyback:2350]  Out: -   I/O this shift:  In: 120 [P.O.:120]  Out: -       CONSTITUTIONAL:  alert, no apparent distress and thin  EYES:  PERRL, sclera clear  ENT:  Normocephalic,atraumatic, without obvious abnormality  NECK:  supple, symmetrical, trachea midline  LUNGS: Resp effort easy and unlabored, no crackles or wheezing  CARDIOVASCULAR:  NO JVD, regular rate and rhythm   ABDOMEN:   hypoactive bowel sounds, soft, non-distended, non-tender,   MUSCULOSKELETAL: No clubbing or cyanosis, 0+ pitting edema lower extremities  NEUROLOGIC:  Mental Status Exam:  Level of Alertness:   awake  PSYCHIATRIC:   person, place, time  SKIN:  normal skin color, texture, turgor    DATA:    CBC:   Recent Labs     01/15/25  1605 01/16/25  0417   WBC 13.0* 14.8*   HGB 14.2 12.5   HCT 42.8 37.4    242     BMP:    Recent Labs     01/15/25  1605 01/16/25  0417   * 132*   K 4.8 4.2   CL 95* 101   CO2 23 20*   BUN 8 9   CREATININE 0.7 0.7   GLUCOSE 161* 113*     Hepatic:   Recent Labs     01/15/25  1605 01/16/25  0417   AST 25 21   ALT 12 10   BILITOT 0.5 0.6   ALKPHOS 96 71     Mag:      Recent Labs     01/16/25 0417

## 2025-01-16 NOTE — ED NOTES
Notified BARBARA Cameron in person that pts trop has continued to increase. Fourth troponin sent.

## 2025-01-16 NOTE — PLAN OF CARE
Problem: Chronic Conditions and Co-morbidities  Goal: Patient's chronic conditions and co-morbidity symptoms are monitored and maintained or improved  Outcome: Progressing     Problem: Discharge Planning  Goal: Discharge to home or other facility with appropriate resources  Outcome: Progressing     Problem: Pain  Goal: Verbalizes/displays adequate comfort level or baseline comfort level  Outcome: Progressing     Problem: Safety - Adult  Goal: Free from fall injury  Outcome: Progressing   Pt will remain free from falls throughout hospital stay. Fall precautions in place, bed alarm on, bed in lowest position with wheels locked and side rails 2/4 up. Room door open and hourly rounding completed. Will continue to monitor throughout shift.     Problem: ABCDS Injury Assessment  Goal: Absence of physical injury  Outcome: Progressing

## 2025-01-16 NOTE — ED PROVIDER NOTES
Emergency Department Attending Provider Note  Location: [facility] EMERGENCY DEPARTMENT  1/15/2025     Patient Identification  Cara Chapin is a 84 y.o. female      Cara Chapin was evaluated in the Emergency Department for abdominal pain and diarrhea. Although initial history and physical exam information was obtained by Delfino HELM (who also dictated a record of this visit), I personally saw the patient and performed a substantive portion of the visit including all aspects of the medical decision making.    Patient seen and evaluated.  Relevant records reviewed.  Patient is a 84-year-old female with history of left bundle branch block, hypertension, A-fib, CHF who presents with abdominal pain and diarrhea.  Symptoms have progressed over the last 3 days.  She is currently a resident at [facility].  She has no associated vomiting.  She is endorsing subjective fever.  Abdominal pain is generalized.    Patient presented hypertensive, febrile, tachycardic and became hypoxic requiring 2 L of supplemental O2.  On exam she has generalized abdominal tenderness however she does appear to be more tender in the left lower quadrant.  No rebound or guarding.    Chronic medical conditions reviewed  Social determinants reviewed    Diagnostic studies reviewed and interpreted  EKG with monomorphic wide-complex V. tach with a rate of 158, QRS of 138, QTc of 435, no STEMI  CBC with a leukocytosis of 13, no evidence of anemia, normal platelets  CMP with hyponatremia to 133, low chloride of 95, normal renal function, hyperglycemic to 161, normal LFTs and bilirubin  Elevated lactic acid to 3 with repeat 1.8  Elevated troponin to 17 with repeat 81  VBG with acidosis with a pH of 7.3  COVID and influenza not detected  CT chest PE study with IV contrast showing mild cardiomegaly and pulmonary edema with trace right pleural effusion.  Age-indeterminate T7 compression deformity with 90% height loss  CT abdomen pelvis with

## 2025-01-16 NOTE — CONSULTS
Called cardiology @ 1930  PER:  Emmy Molina MD  RE:  uptrending troponin with monomorphic V tach, septic, question whether to start heparin  Jesenia Samson CNP responded @ 1940

## 2025-01-16 NOTE — PLAN OF CARE
Problem: Chronic Conditions and Co-morbidities  Goal: Patient's chronic conditions and co-morbidity symptoms are monitored and maintained or improved  1/16/2025 1146 by Chirag Joyce, RN  Outcome: Progressing     Problem: Discharge Planning  Goal: Discharge to home or other facility with appropriate resources  1/16/2025 1146 by Chirag Joyce, RN  Outcome: Progressing     Problem: Pain  Goal: Verbalizes/displays adequate comfort level or baseline comfort level  1/16/2025 1146 by Chirag Joyce, RN  Outcome: Progressing     Problem: Safety - Adult  Goal: Free from fall injury  1/16/2025 1146 by Chirag Joyce, RN  Outcome: Progressing

## 2025-01-16 NOTE — ED NOTES
Pt is an inpatient hold in the ED at this time. All inpatient orders released. Changed pt to Q4 VS per inpatient VS policy. Pt remains on bedside telemetry. Pt denies needs at this time.

## 2025-01-16 NOTE — ED NOTES
Pt left ED via stretcher on [facility] tele in stable condition at this time with XAVI Felipe. All belongings sent with pt. Care transferred.

## 2025-01-16 NOTE — H&P
History and Physical      Name:  Cara Chapin /Age/Sex: 1940  (84 y.o. female)   MRN & CSN:  [id number] & [id number] Encounter Date/Time: 1/15/2025 7:07 PM   Location:   PCP: Eron Kevin MD       Hospital Day: 1    Assessment and Plan:     Patient is a 84 y.o. female who presented with abdominal pain.     # Severe sepsis (temp, RR, HR, WBC and LA) secondary to diverticulitis with abscess  - Reported worsening lower abdominal pain with subjective fevers and non-bloody loose stools for past 3 days. Immunocompromised state while on MTX. Last colonoscopy in .   - Fever of 103.5 °F, leukocytosis of 13.0 with normal repeat, LA 3.0, CT suspicious for diverticulitis with 4 cm abscess in mid sigmoid colon.   - ED provider discussed with GSx, follow-up. Started on IVF and Vanc/Cefepime in the ED, switch to Zosyn. Follow-up cultures, supportive care, NPO past midnight, might require repeat colonoscopy outpatient.     # Monomorphic ventricular tachycardia  # Troponin elevation  - Had episode of MMVT and persistent tachycardia for over 2 hours in ED, resolved with IVP of amiodarone x1. Electrolytes normal. ECG without acute ischemic changes.   - ED provider discussed with cardiology, ok to hold off amiodarone gtt and anticoagulation.   - Continue home PO amiodarone. Keep K 4.0-4.5 and Mg 2.0-2.2. Monitor Tn trend.     # NICM / HFrEF, compensated  - Last TTE in 2023 with LVEF of 30-35% with G2DD.   - Hold Aldactone in setting of sepsis, not on other GDMT. Repeat TTE, might be candidate for ICD for above after resolution of infection vs trial of optimization of GDMT. Avoid further IVF, monitor volume status closely.     # Paroxysmal atrial fibrillation  - Continue amiodarone, hold Eliquis as above.      # Rheumatoid arthritis  - On MTX and FA.     # Essential hypertension  - Hold Aldactone as above.      Critical care time, excluding time doing procedures, was 35 minutes.    Checklist:  Advanced  EMBOLISM W CONTRAST   Final Result      1.  No evidence of pulmonary embolism.   2.  Mild cardiomegaly and pulmonary edema. Trace right pleural effusion.   3.  Age-indeterminate T7 compression deformity with 90% height loss and 3 mm   retropulsion with mild spinal canal stenosis. There was previously 20% height   loss and no retropulsion on 1/24/2023.   4.  Stable mild 3.9 cm ectasia of the ascending aorta.         Electronically signed by Timmy Linda MD  01/15/25 7:07 PM

## 2025-01-16 NOTE — ED NOTES
Cara Chapin is a 84 y.o. female admitted for  Principal Problem:    Septic shock (HCC)  Resolved Problems:    * No resolved hospital problems. *  .   Patient Home via EMS transportation with   Chief Complaint   Patient presents with    Abdominal Pain     Patient a resident of [facility]. Came to the ED today with c/o abdominal pain for the past three days. She reports hx of diverticulitis. Denies any vomiting.    .  Patient is alert and Person, Place, Time, and Situation  Patient's baseline mobility: Baseline Mobility: Walker  Code Status: Full Code   Cardiac Rhythm: Cardiac Rhythm: Sinus tachy  O2 Flow Rate (L/min): 2 L/min  Is patient on baseline Oxygen: no how many Liters:       Isolation: None      NIH Score:    C-SSRS: Risk of Suicide: No Risk  Bedside swallow:        Active LDA's:   Peripheral IV 01/15/25 Right Antecubital (Active)       Peripheral IV 01/15/25 Left Forearm (Active)   Site Assessment Clean, dry & intact 01/15/25 1728   Line Status Brisk blood return;Flushed;Normal saline locked;Specimen collected 01/15/25 1728   Phlebitis Assessment No symptoms 01/15/25 1728   Infiltration Assessment 0 01/15/25 1728   Dressing Status Clean, dry & intact 01/15/25 1728   Dressing Type Transparent 01/15/25 1728   Dressing Intervention New 01/15/25 1728     Patient admitted with a diaz: no If the diaz is chronic was it exchanged:NA  Reason for diaz:   Patient admitted with Central Line:  NA . PICC line placement confirmed: YES OR NO:[id number]}   Reason for Central line:   Was central line Inserted from an outside facility:        Family/Caregiver Present no Any Concerns: no   Restraints no  Sitter no         Vitals: MEWS Score: 7    Vitals:    01/15/25 1835 01/15/25 1906 01/15/25 1938 01/15/25 2013   BP: 97/73 102/68 100/71 104/67   Pulse: (!) 108 84 79 79   Resp: 18 24 23 17   Temp:   98.3 °F (36.8 °C) 98.3 °F (36.8 °C)   TempSrc:   Oral Oral   SpO2: 94% 96% 98% 95%   Weight:       Height:           Last

## 2025-01-16 NOTE — PROGRESS NOTES
Pt complaining of stomach pain.  Upon inspection, pt bladder looked distended.  Bladder scan showed 248ml.  Pt said this happened in the ED upon adm as well and was able to void shortly after.  Will monitor for urine output.

## 2025-01-16 NOTE — PROGRESS NOTES
[facility] Medicine Progress Note  V 1.6      Date of Admission: 1/15/2025    Hospital Day: 2      Chief Admission Complaint: abdominal pain     Subjective:  Patient awake and alert this morning sitting up with daughter at bedside. No able to obtain UA overnight or stool sample. Patient stated that she felt fine and did not recall certain overnight events. Patient does wish to have invasive procedures. She denied N/V/D, CP, palpitations, weakness, dizziness.     Presenting Admission History:       Patient is a 84 y.o. female with a PMHx HTN, HLD, hypothyroidism, RA, CHF, RBB who presented to the ED from [facility] with worsening lower abdominal pain with subjective fevers and non-bloody loose stools for past 3 days. Denied any CP, SOB, orthopnea, PND, LE edema, cough, N/V or urinary changes.     Assessment/Plan:      Current Principal Problem:  Septic shock (HCC)    1.Severe sepsis - resolved  -Likely 2/2 diverticulitis with abscess   -On arrival fever of 103.5 °F, leukocytosis of 13.0 with normal repeat, LA 3.0  -CT suspicious for diverticulitis with 4 cm abscess in mid sigmoid colon  -Started on IVF Vanc/Cefepime in ED, switched to Zosyn   -UA trace blood and ketones   -WBC 14.8 01/16   -GI pathogen and BC report in progress   -General surgery following - reccs pending radiology review   -consider drain, otherwise clear liquid diet   -continue abx     2. Wide complex tachycardia, RBBB   Troponin elevation  -Had episode of MMVT and persistent tachycardia for over 2 hours in ED, resolved with IVP of amiodarone x1. Electrolytes normal. ECG without acute ischemic changes.    -Keep K 4.0-4.5 and Mg 2.0-2.2  -EP following - s/o 01.16    -Patient declined LHC and ICD   -Continue amiodarone  -Continue apixaban when surgically able.      3. HFrEF, mildly recovered EF   -Last TTE in 1/2023 with LVEF of 30-35%  -BNP 6,795 01/16  -Aldactone on hold  -Not on other GDMT  -Repeat TTE pending      4. Paroxysmal atrial

## 2025-01-16 NOTE — CONSULTS
Electrophysiology Consultation   Date: 1/16/2025  Admit Date:  1/15/2025  Reason for Consultation: Wide complex tachycardia  Consult Requesting Physician: Coreen Silverio DO     Chief Complaint   Patient presents with    Abdominal Pain     Patient a resident of [facility]. Came to the ED today with c/o abdominal pain for the past three days. She reports hx of diverticulitis. Denies any vomiting.      HPI:   Mrs. Chapin is a pleasant 84 year old female with a medical history significant for paroxysmal atrial fibrillation, cardiomyopathy, hypertension, recurrent diverticulitis, rheumatoid arthritis, left bundle branch block, and hyperlipidemia who presents from home with diverticulitis and abdominal abscess with sepsis and wide complex tachycardia.  According the patient she typically has a bout of diverticulitis after the holidays with increased p.o. intake of known fibrous foods.  She states that approximately a week ago she began to suffer from progressively worsening abdominal pain that was worse with defecation and less so with urination.  Given her symptoms and history [facility] and [facility]    Patient denies fevers, chest pain, orthopnea, PND, lower extremity edema, abdominal swelling, shortness of breath, dyspnea on exertion, chills, visual changes, headaches, sore throat, cough, abdominal pain, nausea, vomiting, bleeding, bruising, dysuria, muscle/joint pain, confusion, depression, anxiety, skin lesions, etc.    Emergency Room/Hospital Course:  Patient was evaluated in the ER on 01/15/2025.  Her CMP was notable for hyperglycemia and elevated lactic acid.  Her CBC was notable for leukocytosis and macrocytosis.  Her troponin enzymes were elevated and eventually peaked at 145.   Her CTPE was negative for acute pathology.  CT abdomina noted diverticulitis with possible abscess.  Patient's EKG showed wide complex tachycardia.  She was given a bolus of

## 2025-01-17 PROBLEM — B95.5 STREPTOCOCCAL BACTEREMIA: Status: ACTIVE | Noted: 2025-01-17

## 2025-01-17 PROBLEM — K57.20 DIVERTICULITIS OF LARGE INTESTINE WITH ABSCESS WITHOUT BLEEDING: Status: ACTIVE | Noted: 2025-01-17

## 2025-01-17 PROBLEM — R78.81 STREPTOCOCCAL BACTEREMIA: Status: ACTIVE | Noted: 2025-01-17

## 2025-01-17 LAB
ANION GAP SERPL CALCULATED.3IONS-SCNC: 6 MMOL/L (ref 3–16)
ANISOCYTOSIS BLD QL SMEAR: ABNORMAL
BASOPHILS # BLD: 0 K/UL (ref 0–0.2)
BASOPHILS NFR BLD: 0 %
BUN SERPL-MCNC: 7 MG/DL (ref 7–20)
CALCIUM SERPL-MCNC: 8.5 MG/DL (ref 8.3–10.6)
CHLORIDE SERPL-SCNC: 106 MMOL/L (ref 99–110)
CO2 SERPL-SCNC: 24 MMOL/L (ref 21–32)
CREAT SERPL-MCNC: 0.7 MG/DL (ref 0.6–1.2)
DEPRECATED RDW RBC AUTO: 14.5 % (ref 12.4–15.4)
EOSINOPHIL # BLD: 0.2 K/UL (ref 0–0.6)
EOSINOPHIL NFR BLD: 2 %
GFR SERPLBLD CREATININE-BSD FMLA CKD-EPI: 85 ML/MIN/{1.73_M2}
GLUCOSE SERPL-MCNC: 99 MG/DL (ref 70–99)
HCT VFR BLD AUTO: 34.8 % (ref 36–48)
HGB BLD-MCNC: 11.8 G/DL (ref 12–16)
LYMPHOCYTES # BLD: 1.3 K/UL (ref 1–5.1)
LYMPHOCYTES NFR BLD: 11 %
MACROCYTES BLD QL SMEAR: ABNORMAL
MCH RBC QN AUTO: 34 PG (ref 26–34)
MCHC RBC AUTO-ENTMCNC: 33.9 G/DL (ref 31–36)
MCV RBC AUTO: 100.2 FL (ref 80–100)
MONOCYTES # BLD: 0.6 K/UL (ref 0–1.3)
MONOCYTES NFR BLD: 6 %
NEUTROPHILS # BLD: 8.6 K/UL (ref 1.7–7.7)
NEUTROPHILS NFR BLD: 70 %
NEUTS BAND NFR BLD MANUAL: 10 % (ref 0–7)
OVALOCYTES BLD QL SMEAR: ABNORMAL
PLATELET # BLD AUTO: 281 K/UL (ref 135–450)
PLATELET BLD QL SMEAR: ADEQUATE
PMV BLD AUTO: 8 FL (ref 5–10.5)
POIKILOCYTOSIS BLD QL SMEAR: ABNORMAL
POLYCHROMASIA BLD QL SMEAR: ABNORMAL
POTASSIUM SERPL-SCNC: 4.5 MMOL/L (ref 3.5–5.1)
RBC # BLD AUTO: 3.47 M/UL (ref 4–5.2)
REPORT: NORMAL
REPORT: NORMAL
SLIDE REVIEW: ABNORMAL
SODIUM SERPL-SCNC: 136 MMOL/L (ref 136–145)
VARIANT LYMPHS NFR BLD MANUAL: 1 % (ref 0–6)
WBC # BLD AUTO: 10.8 K/UL (ref 4–11)

## 2025-01-17 PROCEDURE — 6370000000 HC RX 637 (ALT 250 FOR IP): Performed by: STUDENT IN AN ORGANIZED HEALTH CARE EDUCATION/TRAINING PROGRAM

## 2025-01-17 PROCEDURE — 2580000003 HC RX 258: Performed by: STUDENT IN AN ORGANIZED HEALTH CARE EDUCATION/TRAINING PROGRAM

## 2025-01-17 PROCEDURE — 85025 COMPLETE CBC W/AUTO DIFF WBC: CPT

## 2025-01-17 PROCEDURE — 2060000000 HC ICU INTERMEDIATE R&B

## 2025-01-17 PROCEDURE — 99223 1ST HOSP IP/OBS HIGH 75: CPT | Performed by: INTERNAL MEDICINE

## 2025-01-17 PROCEDURE — 87040 BLOOD CULTURE FOR BACTERIA: CPT

## 2025-01-17 PROCEDURE — 6360000002 HC RX W HCPCS: Performed by: STUDENT IN AN ORGANIZED HEALTH CARE EDUCATION/TRAINING PROGRAM

## 2025-01-17 PROCEDURE — 36415 COLL VENOUS BLD VENIPUNCTURE: CPT

## 2025-01-17 PROCEDURE — 80048 BASIC METABOLIC PNL TOTAL CA: CPT

## 2025-01-17 PROCEDURE — 99232 SBSQ HOSP IP/OBS MODERATE 35: CPT | Performed by: SURGERY

## 2025-01-17 PROCEDURE — 2500000003 HC RX 250 WO HCPCS: Performed by: STUDENT IN AN ORGANIZED HEALTH CARE EDUCATION/TRAINING PROGRAM

## 2025-01-17 RX ADMIN — PIPERACILLIN AND TAZOBACTAM 3375 MG: 3; .375 INJECTION, POWDER, LYOPHILIZED, FOR SOLUTION INTRAVENOUS at 16:12

## 2025-01-17 RX ADMIN — PIPERACILLIN AND TAZOBACTAM 3375 MG: 3; .375 INJECTION, POWDER, LYOPHILIZED, FOR SOLUTION INTRAVENOUS at 21:46

## 2025-01-17 RX ADMIN — Medication 10 ML: at 10:27

## 2025-01-17 RX ADMIN — AMIODARONE HYDROCHLORIDE 50 MG: 200 TABLET ORAL at 10:26

## 2025-01-17 RX ADMIN — DICLOFENAC SODIUM 2 G: 10 GEL TOPICAL at 21:37

## 2025-01-17 RX ADMIN — PANTOPRAZOLE SODIUM 40 MG: 40 TABLET, DELAYED RELEASE ORAL at 10:26

## 2025-01-17 RX ADMIN — DICLOFENAC SODIUM 2 G: 10 GEL TOPICAL at 10:27

## 2025-01-17 RX ADMIN — PIPERACILLIN AND TAZOBACTAM 3375 MG: 3; .375 INJECTION, POWDER, LYOPHILIZED, FOR SOLUTION INTRAVENOUS at 04:38

## 2025-01-17 RX ADMIN — FOLIC ACID 1 MG: 1 TABLET ORAL at 10:26

## 2025-01-17 NOTE — PROGRESS NOTES
Department of Pharmacy    Notification received from laboratory of positive blood culture results.    Organism(s) detected: Streptococcus + Enterobacterales   Pt. Currently covered on Radha Martin RPH 1/17/2025 4:24 AM

## 2025-01-17 NOTE — CARE COORDINATION
Case Management Assessment  Initial Evaluation    Date/Time of Evaluation: 1/17/2025 4:12 PM  Assessment Completed by: Sagrario Rivera RN    If patient is discharged prior to next notation, then this note serves as note for discharge by case management.    Patient Name: Cara Chapin                   YOB: 1940  Diagnosis: Ventricular tachycardia (HCC) [I47.20]  Generalized abdominal pain [R10.84]  Non-ischemic cardiomyopathy (HCC) [I42.8]  Septic shock (HCC) [A41.9, R65.21]  Diverticulitis of large intestine with abscess without bleeding [K57.20]  Diarrhea, unspecified type [R19.7]                   Date / Time: 1/15/2025  3:35 PM    Patient Admission Status: Inpatient   Readmission Risk (Low < 19, Mod (19-27), High > 27): Readmission Risk Score: 10.8    Current PCP: Eron Kevin MD  PCP verified by CM? Yes    Chart Reviewed: Yes      History Provided by: Patient  Patient Orientation: Alert and Oriented    Patient Cognition: Alert    Hospitalization in the last 30 days (Readmission):  No    If yes, Readmission Assessment in CM Navigator will be completed.    Advance Directives:      Code Status: Full Code   Patient's Primary Decision Maker is:      Primary Decision Maker: LitaLolita starskMarisela  Child - [phone]    Secondary Decision Maker: LitaraudelConstantino Ruiz - [phone]    Discharge Planning:    Patient lives with: Alone Type of Home: Apartment  Primary Care Giver: Self  Patient Support Systems include: Children, Family Members   Current Financial resources: Medicare  Current community resources: ECF/Home Care  Current services prior to admission: Durable Medical Equipment, Home Care            Current DME: Shower Chair            Type of Home Care services:  Nursing Services    ADLS  Prior functional level: Independent in ADLs/IADLs  Current functional level: Independent in ADLs/IADLs    PT AM-PAC:   /24  OT AM-PAC:   /24    Family can provide assistance at DC: Yes  Would you

## 2025-01-17 NOTE — PLAN OF CARE
Problem: Chronic Conditions and Co-morbidities  Goal: Patient's chronic conditions and co-morbidity symptoms are monitored and maintained or improved  1/17/2025 0036 by Kalyani Tovar RN  Outcome: Progressing     Problem: Discharge Planning  Goal: Discharge to home or other facility with appropriate resources  1/17/2025 0036 by Kalyani Tovar RN  Outcome: Progressing     Problem: Pain  Goal: Verbalizes/displays adequate comfort level or baseline comfort level  1/17/2025 0036 by Kalyani Tovar RN  Outcome: Progressing  Pt will be satisfied with pain control. Pt uses numeric pain rating scale with reassessments after pain med administration. Will continue to monitor progression throughout shift.      Problem: Safety - Adult  Goal: Free from fall injury  1/17/2025 0036 by Kalyani Tovar RN  Outcome: Progressing   Pt will remain free from falls throughout hospital stay. Fall precautions in place, bed alarm on, bed in lowest position with wheels locked and side rails 2/4 up. Room door open and hourly rounding completed. Will continue to monitor throughout shift.     Problem: ABCDS Injury Assessment  Goal: Absence of physical injury  1/17/2025 0036 by Kalyani Tovar RN  Outcome: Progressing

## 2025-01-17 NOTE — PLAN OF CARE
Problem: Chronic Conditions and Co-morbidities  Goal: Patient's chronic conditions and co-morbidity symptoms are monitored and maintained or improved  1/17/2025 1211 by Chirag Joyce, RN  Outcome: Progressing     Problem: Discharge Planning  Goal: Discharge to home or other facility with appropriate resources  1/17/2025 1211 by Chirag Joyce, RN  Outcome: Progressing     Problem: Pain  Goal: Verbalizes/displays adequate comfort level or baseline comfort level  1/17/2025 1211 by Chirag Joyce, RN  Outcome: Progressing     Problem: Safety - Adult  Goal: Free from fall injury  1/17/2025 1211 by Chirag Joyce, RN  Outcome: Progressing

## 2025-01-17 NOTE — PROGRESS NOTES
[facility]    PATIENT NAME: Cara Chapin     TODAY'S DATE: 1/17/2025    CHIEF COMPLAINT: none    INTERVAL HISTORY/HPI:    Pt without pain or nausea.     REVIEW OF SYSTEMS:  Pertinent positives and negatives as per interval history section    OBJECTIVE:  VITALS:  BP 95/73   Pulse 71   Temp 98.4 °F (36.9 °C) (Oral)   Resp 20   Ht 1.626 m (5' 4\")   Wt 49.4 kg (109 lb)   SpO2 100%   BMI 18.71 kg/m²     INTAKE/OUTPUT:    I/O last 3 completed shifts:  In: 600 [P.O.:340; I.V.:10; IV Piggyback:250]  Out: -   No intake/output data recorded.    CONSTITUTIONAL:  awake and alert  LUNGS:  Respirations easy and unlabored, no crackles or wheezing  CARD:  regular rate and rhythm  ABDOMEN:  normal bowel sounds, soft, non-distended, non-tender     Data:  CBC:   Recent Labs     01/15/25  1605 01/16/25  0417 01/17/25 0423   WBC 13.0* 14.8* 10.8   HGB 14.2 12.5 11.8*   HCT 42.8 37.4 34.8*    242 281     BMP:    Recent Labs     01/15/25  1605 01/16/25  0417 01/17/25  0423   * 132* 136   K 4.8 4.2 4.5   CL 95* 101 106   CO2 23 20* 24   BUN 8 9 7   CREATININE 0.7 0.7 0.7   GLUCOSE 161* 113* 99     Hepatic:   Recent Labs     01/15/25  1605 01/16/25 0417   AST 25 21   ALT 12 10   BILITOT 0.5 0.6   ALKPHOS 96 71     Mag:      Recent Labs     01/16/25 0417   MG 1.84      Phos:   No results for input(s): \"PHOS\" in the last 72 hours.   INR:   Recent Labs     01/16/25 0417   INR 1.30*       Radiology Review:  *Imaging personally reviewed by me.   NA      ASSESSMENT AND PLAN:  84 monik with diverticulitis and abscess  Symptoms resolved and wbc improved  Advance diet today.  If tolerates then possible discharge tomorrow on oral abx.     Electronically signed by Ever Walls MD     [id number]

## 2025-01-17 NOTE — PROGRESS NOTES
[facility] Medicine Progress Note  V 1.6      Date of Admission: 1/15/2025    Hospital Day: 4      Chief Admission Complaint: abdominal pain     Subjective:  Patient awake and alert this morning sitting up with daughter at bedside. Reports fever overnight, repeat blood cultures pending. Patient does wish to have invasive procedures. She denied N/V/D, CP, palpitations, weakness, dizziness. Discussed code staus with daughter and patient.     Presenting Admission History:       Patient is a 84 y.o. female with a PMHx HTN, HLD, hypothyroidism, RA, CHF, RBB who presented to the ED from [facility] with worsening lower abdominal pain with subjective fevers and non-bloody loose stools for past 3 days. Denied any CP, SOB, orthopnea, PND, LE edema, cough, N/V or urinary changes.     Assessment/Plan:      Current Principal Problem:  Septic shock (HCC)    1.Severe sepsis - resolved  -Likely 2/2 diverticulitis with abscess   -On arrival fever of 103.5 °F, leukocytosis of 13.0 with normal repeat, LA 3.0  -CT suspicious for diverticulitis with 4 cm abscess in mid sigmoid colon  -Started on IVF Vanc/Cefepime in ED, switched to Zosyn   -UA trace blood and ketones   -WBC 10.8 01/17  -Repeat blood cultures pending   -General surgery following - reccs pending radiology review   -continue abx    -clear liquid diet   -ID consulted; appreciate recs    2. Wide complex tachycardia, RBBB   Troponin elevation  -Had episode of MMVT and persistent tachycardia for over 2 hours in ED, resolved with IVP of amiodarone x1. Electrolytes normal. ECG without acute ischemic changes.    -Keep K 4.0-4.5 and Mg 2.0-2.2  -EP following - s/o 01.16    -Patient declined LHC and ICD   -Continue amiodarone  -Continue apixaban when surgically able     3. HFrEF, mildly recovered EF   -Last Echo completed on 1/15/2025 revealed an EF of 35% and grade III diastolic dysfunction   -BNP 6,795 01/16  - GDMT: spironolactone - held      4. Paroxysmal atrial

## 2025-01-17 NOTE — CONSULTS
Infectious Diseases   Consult Note      Reason for Consult:  streptococcal bacteremia and diverticulitis   Requesting Physician:  Alexei       Date of Admission: 1/15/2025    Subjective:   CHIEF COMPLAINT:  none given       HPI:    Cara Chapin is an 85yo [place] female with history of RA, HTN, pAfib, CHF                She was admitted through the ED with sepsis from complicated diverticulitis with adjacent abscess.  She has improved with conservative measures.    Pain is now minimal.  Having small BMs  Now on regular diet   No fever  WBC wnl     One of 2 sets of BC collected on admission is positive for strep sp   BC were repeated this AM        Current abx:  Zosyn 3.375 q8        Past Surgical History:       Diagnosis Date    Allergic rhinitis     CHF (congestive heart failure), NYHA class I, acute on chronic, combined ([facility]) 2021    Hyperlipidemia     Hypertension     Hypothyroidism     probable autoimmune thyroiditis    LBBB (left bundle branch block)     ON EKG, had normal GXT    Rheumatoid arthritis(714.0)     Rheumatologist in [place]         Procedure Laterality Date     SECTION      x2    COLONOSCOPY  2005    JOINT REPLACEMENT Left 13    left total knee replacement         Social History:    TOBACCO:   reports that she has quit smoking. She has never used smokeless tobacco.  ETOH:   reports current alcohol use of about 14.0 standard drinks of alcohol per week.  There is no history of illicit drug use or other significant epidemiologic exposures.      Family History:       Problem Relation Age of Onset    Heart Disease Mother         very slow heart beat    Thyroid Disease Father     Heart Disease Father        Current Medications:    Current Facility-Administered Medications: prochlorperazine (COMPAZINE) tablet 5 mg, 5 mg, Oral, Q6H PRN **OR** prochlorperazine (COMPAZINE) injection 5 mg, 5 mg, IntraVENous, Q6H PRN  lubrifresh P.M. (artificial tears) ophthalmic ointment, , Both Eyes,  mm/Hr Final     CRP   Date Value Ref Range Status   2023 17.0 (H) 0.0 - 5.1 mg/L Final     Comment:     WR-CRP Reference range:  30D-199Y    <5.1  <30D        Not established    CRP is used in the detection and evaluation of infection,  tissue injury, inflammatory disorders, and associated  disease.  Increases in CRP values are non-specific and  should not be interpreted without a complete clinical  evaluation.   reference ranges have not been  established and values should be interpreted within clinical  context and with serial measurements, if clinically  appropriate.     2023 11.5 (H) 0.0 - 5.1 mg/L Final     Comment:     WR-CRP Reference range:  30D-199Y    <5.1  <30D        Not established    CRP is used in the detection and evaluation of infection,  tissue injury, inflammatory disorders, and associated  disease.  Increases in CRP values are non-specific and  should not be interpreted without a complete clinical  evaluation.   reference ranges have not been  established and values should be interpreted within clinical  context and with serial measurements, if clinically  appropriate.     2023 <3.0 0.0 - 5.1 mg/L Final     Comment:     WR-CRP Reference range:  30D-199Y    <5.1  <30D        Not established    CRP is used in the detection and evaluation of infection,  tissue injury, inflammatory disorders, and associated  disease.  Increases in CRP values are non-specific and  should not be interpreted without a complete clinical  evaluation.   reference ranges have not been  established and values should be interpreted within clinical  context and with serial measurements, if clinically  appropriate.     2022 11.3 (H) 0.0 - 5.1 mg/L Final     Comment:     WR-CRP Reference range:  30D-199Y    <5.1  <30D        Not established    CRP is used in the detection and evaluation of infection,  tissue injury, inflammatory disorders, and associated  disease.  Increases in CRP

## 2025-01-18 PROCEDURE — 2060000000 HC ICU INTERMEDIATE R&B

## 2025-01-18 PROCEDURE — 99232 SBSQ HOSP IP/OBS MODERATE 35: CPT | Performed by: SURGERY

## 2025-01-18 PROCEDURE — 6360000002 HC RX W HCPCS: Performed by: STUDENT IN AN ORGANIZED HEALTH CARE EDUCATION/TRAINING PROGRAM

## 2025-01-18 PROCEDURE — 2500000003 HC RX 250 WO HCPCS: Performed by: STUDENT IN AN ORGANIZED HEALTH CARE EDUCATION/TRAINING PROGRAM

## 2025-01-18 PROCEDURE — 6370000000 HC RX 637 (ALT 250 FOR IP): Performed by: STUDENT IN AN ORGANIZED HEALTH CARE EDUCATION/TRAINING PROGRAM

## 2025-01-18 PROCEDURE — 2580000003 HC RX 258: Performed by: STUDENT IN AN ORGANIZED HEALTH CARE EDUCATION/TRAINING PROGRAM

## 2025-01-18 RX ADMIN — DICLOFENAC SODIUM 2 G: 10 GEL TOPICAL at 20:26

## 2025-01-18 RX ADMIN — PIPERACILLIN AND TAZOBACTAM 3375 MG: 3; .375 INJECTION, POWDER, LYOPHILIZED, FOR SOLUTION INTRAVENOUS at 06:25

## 2025-01-18 RX ADMIN — AMIODARONE HYDROCHLORIDE 50 MG: 200 TABLET ORAL at 08:46

## 2025-01-18 RX ADMIN — PANTOPRAZOLE SODIUM 40 MG: 40 TABLET, DELAYED RELEASE ORAL at 06:25

## 2025-01-18 RX ADMIN — Medication 10 ML: at 21:19

## 2025-01-18 RX ADMIN — Medication 10 ML: at 08:48

## 2025-01-18 RX ADMIN — PIPERACILLIN AND TAZOBACTAM 3375 MG: 3; .375 INJECTION, POWDER, LYOPHILIZED, FOR SOLUTION INTRAVENOUS at 14:31

## 2025-01-18 RX ADMIN — FOLIC ACID 1 MG: 1 TABLET ORAL at 08:47

## 2025-01-18 RX ADMIN — DICLOFENAC SODIUM 2 G: 10 GEL TOPICAL at 08:47

## 2025-01-18 RX ADMIN — PIPERACILLIN AND TAZOBACTAM 3375 MG: 3; .375 INJECTION, POWDER, LYOPHILIZED, FOR SOLUTION INTRAVENOUS at 21:22

## 2025-01-18 ASSESSMENT — PAIN SCALES - GENERAL
PAINLEVEL_OUTOF10: 3
PAINLEVEL_OUTOF10: 0

## 2025-01-18 ASSESSMENT — PAIN DESCRIPTION - DESCRIPTORS: DESCRIPTORS: THROBBING

## 2025-01-18 ASSESSMENT — PAIN DESCRIPTION - LOCATION: LOCATION: NECK

## 2025-01-18 ASSESSMENT — PAIN DESCRIPTION - ORIENTATION: ORIENTATION: RIGHT

## 2025-01-18 ASSESSMENT — PAIN - FUNCTIONAL ASSESSMENT: PAIN_FUNCTIONAL_ASSESSMENT: ACTIVITIES ARE NOT PREVENTED

## 2025-01-18 NOTE — PROGRESS NOTES
Patient refusing labs. Paged MD to inform him of refusal and that patient wants to be discharged by 11 am.  MD resident waiting to talk to hospitalist.

## 2025-01-18 NOTE — PROGRESS NOTES
[facility]    PATIENT NAME: Cara Chapin     TODAY'S DATE: 1/18/2025    CHIEF COMPLAINT: None    INTERVAL HISTORY/HPI:    Pt feels well and wants to home. Tolerating a diet.      REVIEW OF SYSTEMS:  Pertinent positives and negatives as per interval history section    OBJECTIVE:  VITALS:  /81   Pulse 54   Temp 97.2 °F (36.2 °C) (Oral)   Resp 18   Ht 1.626 m (5' 4\")   Wt 49.4 kg (109 lb)   SpO2 98%   BMI 18.71 kg/m²     INTAKE/OUTPUT:    I/O last 3 completed shifts:  In: 1060 [P.O.:1060]  Out: 0   I/O this shift:  In: 240 [P.O.:240]  Out: -     CONSTITUTIONAL:  awake and alert  LUNGS:  Respirations easy and unlabored, clear to auscultation  CARD:  bradycardic with regular rhythm  ABDOMEN:  normal bowel sounds, soft, non-distended, non-tender     Data:  CBC:   Recent Labs     01/15/25  1605 01/16/25 0417 01/17/25 0423   WBC 13.0* 14.8* 10.8   HGB 14.2 12.5 11.8*   HCT 42.8 37.4 34.8*    242 281     BMP:    Recent Labs     01/15/25  1605 01/16/25 0417 01/17/25 0423   * 132* 136   K 4.8 4.2 4.5   CL 95* 101 106   CO2 23 20* 24   BUN 8 9 7   CREATININE 0.7 0.7 0.7   GLUCOSE 161* 113* 99     Hepatic:   Recent Labs     01/15/25  1605 01/16/25 0417   AST 25 21   ALT 12 10   BILITOT 0.5 0.6   ALKPHOS 96 71     Mag:      Recent Labs     01/16/25 0417   MG 1.84      Phos:   No results for input(s): \"PHOS\" in the last 72 hours.   INR:   Recent Labs     01/16/25 0417   INR 1.30*       Radiology Review:  *Imaging personally reviewed by me.   NA      ASSESSMENT AND PLAN:  85 YO with diverticulitis and small abscess. Clinically symptoms resolved. No fevers. Tolerating a diet. Repeat blood cultures NGSF. OK for discharge on oral antibiotics from surgical standpoint when medically stable     Electronically signed by TESFAYE HERRERA MD     [id number]

## 2025-01-18 NOTE — PROGRESS NOTES
Body mass index is 18.54 kg/m².  General: Awake, frail.    HEENT: PERRLA. Vision grossly intact. Normal hearing. Oropharynx clear, dry MM.  Neck: Supple. No JVD.   CV: RRR. NL S1/S2. 2/6 SM at LLSB. No BLE pitting edema.   Pulm: NL effort on RA. CTAB.   GI: +BS x4. Soft. Moderately tender in RLQ, no rebound, guarding or rigidity.   : No CVA tenderness.   Skin: Intact, warm and dry.  MSK: Severe arthritic changes of both hands. No gross joint deformities. Full ROM.   Neuro: AAOx3. CNs grossly intact. Normal speech. No focal deficit.   Psych: Good judgement and reason.    /70   Pulse 62   Temp 97.5 °F (36.4 °C) (Oral)   Resp 18   Ht 1.626 m (5' 4\")   Wt 49.4 kg (109 lb)   SpO2 97%   BMI 18.71 kg/m²     Telemetry:      Diet: ADULT DIET; Regular; Low Fiber    DVT Prophylaxis: []PPx LMWH  []SQ Heparin  []IPC/SCDs  [x]Eliquis  []Xarelto  []Coumadin  [] Heparin Drip  []Other -      Code status: Full Code    PT/OT Eval Status:   [x]NOT yet ordered  []Ordered and Pending   []Seen with Recommendations for:   []Home independently  []Home w/ assist  []HHC  []SNF  []Acute Rehab    Multi-Disciplinary Rounds with Case Management completed on 1/18/2025 with the following recommendations:  Anticipated Discharge Location: [x]Home w/ []HHC vs []SNF  []Acute Rehab  []LTAC  []Hospice  []Other -    Anticipated Discharge Day/Date: TBD  Barriers to Discharge:   --------------------------------------------------    MDM (any 2 required for High level billing)    A. Problems (any 1)  [x] Acute/Chronic Illness/injury posing ongoing threat to life and/or bodily function without ongoing treatment    [] Severe exacerbation of chronic illness    --------------------------------------------------  B. Risk of Treatment (any 1)    [] Drugs/treatments that require intensive monitoring for toxicity    [] IV ABX (Vancomycin, Aminoglycosides, etc)     [] Post-Cath/Contrast study requiring serial monitoring    [] IV Narcotic analgesia    []  \"LABURIN\"  Blood Cultures:   Lab Results   Component Value Date/Time    BC  01/17/2025 04:23 AM     No Growth to date.  Any change in status will be called.     Lab Results   Component Value Date/Time    BLOODCULT2  01/17/2025 04:23 AM     No Growth to date.  Any change in status will be called.     Organism:   Lab Results   Component Value Date/Time    ORG Streptococcus species DNA Detected 01/15/2025 04:56 PM    ORG Enterobacterales DNA Detected 01/15/2025 04:56 PM

## 2025-01-18 NOTE — DISCHARGE INSTRUCTIONS
Discharge Instructions  You were admitted to [facility] with Diverticulitis.     You are now ready to discharge and will be discharging to: Home     Please take your medications as prescribed. Medication changes are listed below and a full list of medications is in this discharge packet. Please keep your follow-up appointments after the hospital for ongoing care. It has been a pleasure taking care of you, we wish you the best.     MEDICATION CHANGES:  --Start taking Cefdinir 300 mg twice daily for 10 days  --Start taking Flagyl 500 mg 3 times daily for 10 days     FOLLOW-UP:  -- It is important to see your primary care provider (PCP) after being in the hospital. Your PCP is Eron Kevin the phone number of their office is [phone].     Surgery Discharge Instructions:   Your information:  Name: Cara LUKE Tavares  : 1940    Your instructions:    Take antibiotics until finished  Low fiber diet.   Call for follow-up with Dr. Walls in 7-10 days or sooner if needed, [phone]    What to do after you leave the hospital:    Recommended diet:  low fiber diet    Recommended activity: activity as tolerated    The following personal items were collected during your admission and were returned to you:    Belongings  Dental Appliances: None  Vision - Corrective Lenses: Eyeglasses  Hearing Aid: None  Clothing: Socks, Shirt, At bedside  Jewelry: Bracelet  Electronic Devices: Cell Phone,   Weapons (Notify Protective Services/Security): None  Other Valuables: At bedside  Home Medications: None  Valuables Given To: Patient, Family (Comment)  Provide Name(s) of Who Valuable(s) Were Given To: Cara    Information obtained by:  By signing below, I understand that if any problems occur once I leave the hospital I am to contact my primary care doctor.  I understand and acknowledge receipt of the instructions indicated above.

## 2025-01-18 NOTE — PROGRESS NOTES
Physician Progress Note      PATIENT:               LINDA CULLEN  [facility] #:                  [id number]  :                       1940  ADMIT DATE:       1/15/2025 3:35 PM  DISCH DATE:  RESPONDING  PROVIDER #:        Coreen Silverio DO          QUERY TEXT:    Patient admitted with sepsis. Noted troponin trend of 17->81->145->137 of   admission. If possible, please document in the progress notes and discharge   summary if you are evaluating and/or treating any of the following:    The medical record reflects the following:  Risk Factors: PAF with wide complex tachycardia, cannot rule out VT, LBBB,   sepsis due to diverticulitis with abscess    Clinical Indicators: Troponin trend 17->81->145->137  Echo with EF 35% moderate global hypokinesis with regional variation  pt presents with abdominal pain and no clear chest pains  Per cardiology consult note -\"cardiomyopathy (likely multifactorial   secondary to ischemia based on stress test and atrial fibrillation),   hypertension, LBBB, and hyperlipidemia\"    Treatment: Cardiology consult, EKG, Echo, telemetry, amiodarone, to resume   apixaban when surgically able, pt refusing left heart cath and ICD   implantation.  Options provided:  -- Type 2 MI due to sepsis  -- NSTEMI that cannot be ruled out  -- Myocardial injury from sepsis, no MI  -- Other - I will add my own diagnosis  -- Disagree - Not applicable / Not valid  -- Disagree - Clinically unable to determine / Unknown  -- Refer to Clinical Documentation Reviewer    PROVIDER RESPONSE TEXT:    Myocardial injury secondary to sepsis and wide complex tachycardia    Query created by: Lisa Sanchez on 2025 12:30 PM      Electronically signed by:  Coreen Silverio DO 2025 8:41 PM

## 2025-01-18 NOTE — DISCHARGE INSTR - COC
Continuity of Care Form    Patient Name: Cara Chapin   :  1940  MRN:  [id number]    Admit date:  1/15/2025  Discharge date:  ***    Code Status Order: Full Code   Advance Directives:   Advance Care Flowsheet Documentation             Admitting Physician:  Clovis Linda MD  PCP: Eron Kevin MD    Discharging Nurse: ***  Discharging Hospital Unit/Room#: 0219/0219-01  Discharging Unit Phone Number: ***    Emergency Contact:   Extended Emergency Contact Information  Primary Emergency Contact: TavaresJessicaLizandro   [facility]  Home Phone: [phone]  Mobile Phone: [phone]  Relation: Child  Secondary Emergency Contact: AniyahConstantino diaz   [facility]  Home Phone: [phone]  Relation: Child    Past Surgical History:  Past Surgical History:   Procedure Laterality Date     SECTION      x2    COLONOSCOPY  2005    JOINT REPLACEMENT Left 13    left total knee replacement       Immunization History:   Immunization History   Administered Date(s) Administered    COVID-19, MODERNA BLUE border, Primary or Immunocompromised, (age 12y+), IM, 100 mcg/0.5mL 2021, 2021    Influenza Vaccine, unspecified formulation 10/27/2015, 10/15/2016    Influenza Virus Vaccine 10/01/2010, 10/01/2011, 10/01/2014, 10/01/2017, 2018    Pneumococcal, PCV-13, PREVNAR 13, (age 6w+), IM, 0.5mL 2018    Pneumococcal, PPSV23, PNEUMOVAX 23, (age 2y+), SC/IM, 0.5mL 10/01/2008, 2022       Active Problems:  Patient Active Problem List   Diagnosis Code    Hyperlipidemia E78.5    Hypothyroidism (acquired) E03.9    LBBB (left bundle branch block) I44.7    left TKR Z96.659    Trigger finger, acquired M65.30    Rheumatoid arthritis involving multiple joints (HCC) M06.9    Essential hypertension I10    Foot callus L84    Hammertoes of both feet M20.41, M20.42    Valgus deformity of both great toes M20.11, M20.12    Arthritis of right acromioclavicular joint M19.011

## 2025-01-19 VITALS
SYSTOLIC BLOOD PRESSURE: 135 MMHG | TEMPERATURE: 97.5 F | BODY MASS INDEX: 18.61 KG/M2 | HEART RATE: 62 BPM | DIASTOLIC BLOOD PRESSURE: 70 MMHG | RESPIRATION RATE: 18 BRPM | WEIGHT: 109 LBS | HEIGHT: 64 IN | OXYGEN SATURATION: 98 %

## 2025-01-19 LAB
BACTERIA BLD CULT ORG #2: NORMAL
BACTERIA BLD CULT: ABNORMAL
ORGANISM: ABNORMAL

## 2025-01-19 PROCEDURE — 6360000002 HC RX W HCPCS: Performed by: STUDENT IN AN ORGANIZED HEALTH CARE EDUCATION/TRAINING PROGRAM

## 2025-01-19 PROCEDURE — 2500000003 HC RX 250 WO HCPCS: Performed by: STUDENT IN AN ORGANIZED HEALTH CARE EDUCATION/TRAINING PROGRAM

## 2025-01-19 PROCEDURE — 99232 SBSQ HOSP IP/OBS MODERATE 35: CPT | Performed by: SURGERY

## 2025-01-19 PROCEDURE — 2580000003 HC RX 258: Performed by: STUDENT IN AN ORGANIZED HEALTH CARE EDUCATION/TRAINING PROGRAM

## 2025-01-19 PROCEDURE — 6370000000 HC RX 637 (ALT 250 FOR IP)

## 2025-01-19 PROCEDURE — 6370000000 HC RX 637 (ALT 250 FOR IP): Performed by: STUDENT IN AN ORGANIZED HEALTH CARE EDUCATION/TRAINING PROGRAM

## 2025-01-19 RX ORDER — METRONIDAZOLE 500 MG/1
500 TABLET ORAL 3 TIMES DAILY
Qty: 30 TABLET | Refills: 0 | Status: SHIPPED | OUTPATIENT
Start: 2025-01-19 | End: 2025-01-29

## 2025-01-19 RX ORDER — LEVOFLOXACIN 750 MG/1
750 TABLET, FILM COATED ORAL DAILY
Qty: 10 TABLET | Refills: 0 | Status: CANCELLED | OUTPATIENT
Start: 2025-01-19 | End: 2025-01-29

## 2025-01-19 RX ORDER — CEFDINIR 300 MG/1
300 CAPSULE ORAL 2 TIMES DAILY
Qty: 20 CAPSULE | Refills: 0 | Status: SHIPPED | OUTPATIENT
Start: 2025-01-19 | End: 2025-01-29

## 2025-01-19 RX ADMIN — PIPERACILLIN AND TAZOBACTAM 3375 MG: 3; .375 INJECTION, POWDER, LYOPHILIZED, FOR SOLUTION INTRAVENOUS at 06:23

## 2025-01-19 RX ADMIN — PANTOPRAZOLE SODIUM 40 MG: 40 TABLET, DELAYED RELEASE ORAL at 06:23

## 2025-01-19 RX ADMIN — DICLOFENAC SODIUM 2 G: 10 GEL TOPICAL at 08:57

## 2025-01-19 RX ADMIN — APIXABAN 2.5 MG: 2.5 TABLET, FILM COATED ORAL at 11:28

## 2025-01-19 RX ADMIN — FOLIC ACID 1 MG: 1 TABLET ORAL at 08:56

## 2025-01-19 RX ADMIN — SPIRONOLACTONE 25 MG: 25 TABLET ORAL at 11:27

## 2025-01-19 RX ADMIN — AMIODARONE HYDROCHLORIDE 50 MG: 200 TABLET ORAL at 08:56

## 2025-01-19 RX ADMIN — Medication 10 ML: at 08:58

## 2025-01-19 ASSESSMENT — PAIN DESCRIPTION - LOCATION
LOCATION: NECK
LOCATION: NECK

## 2025-01-19 ASSESSMENT — PAIN SCALES - GENERAL
PAINLEVEL_OUTOF10: 0
PAINLEVEL_OUTOF10: 2

## 2025-01-19 ASSESSMENT — PAIN DESCRIPTION - ORIENTATION: ORIENTATION: RIGHT

## 2025-01-19 ASSESSMENT — PAIN DESCRIPTION - DESCRIPTORS: DESCRIPTORS: TENDER

## 2025-01-19 NOTE — DISCHARGE SUMMARY
Hospital Medicine Discharge Summary    Patient: Cara Chapin   : 1940     Hospital:  [facility]  Admit Date: 1/15/2025   Discharge Date:   25  Disposition:  [x]Home   []HHC  []SNF  []Acute Rehab  []LTAC  []Hospice  Code status:  [x]Full  []DNR/CCA  []Limited (DNR/CCA with Do Not Intubate)  []DNRCC  Condition at Discharge: Stable  Primary Care Provider: Eron Kevin MD    Admitting Provider: Clovis Linda MD  Discharge Provider: Deb Diehl DO     Discharge Diagnoses:      Active Hospital Problems    Diagnosis     Streptococcal bacteremia [R78.81, B95.5]     Diverticulitis of large intestine with abscess without bleeding [K57.20]     Septic shock (HCC) [A41.9, R65.21]        Presenting Admission History:      Patient is a 84 y.o. female with a PMHx HTN, HLD, hypothyroidism, RA, CHF, RBB who presented to the ED from [place] with worsening lower abdominal pain with subjective fevers and non-bloody loose stools for past 3 days. Denied any CP, SOB, orthopnea, PND, LE edema, cough, N/V or urinary changes.      Assessment/Plan:      Severe Sepsis   Diverticulitis with Abscess  - CT scan revealed 4 cm abscess in mid sigmoid colon   - Blood cultures growing Streptococcal bacteremia, present on 1 of 2 sets, collected on admission  -  Blood cultures showed Enterobacterales, Morganella morgani  - Repeat cultures ordered , no growth to date  - General surgery consulted - non-operative management, continue IV antibiotics,   - Spoke with ID  Prior to discharge, recommending following:   --Start taking Cefdinir 300 mg twice daily for 10 days  --Start taking Flagyl 500 mg 3 times daily for 10 days   - General Surgery recommends Low-fiber diet at discharge and follow-up in one week  -PCP follow-up in one week     Wide Complex Tachycardia   Elevated Troponin   RBBB  - Continue Amiodarone   - EP consulted - continue amiodarone, resume oral anticoagulation when

## 2025-01-19 NOTE — PROGRESS NOTES
Patient given discharge instructions and voiced understanding via teach back. Patient discharged in stable condition with all belongings. To car via wheelchair. Home with daughter.

## 2025-01-19 NOTE — PROGRESS NOTES
[facility]    PATIENT NAME: Cara Chapin     TODAY'S DATE: 1/19/2025    CHIEF COMPLAINT: None    INTERVAL HISTORY/HPI:    Pt states she feels fine.  She is tolerating a diet.  She wants to go home.     REVIEW OF SYSTEMS:  Pertinent positives and negatives as per interval history section    OBJECTIVE:  VITALS:  BP (!) 146/95   Pulse 60   Temp 97.2 °F (36.2 °C) (Oral)   Resp 18   Ht 1.626 m (5' 4\")   Wt 49.4 kg (109 lb)   SpO2 99%   BMI 18.71 kg/m²     INTAKE/OUTPUT:    I/O last 3 completed shifts:  In: 466.7 [P.O.:360; IV Piggyback:106.7]  Out: -   No intake/output data recorded.    CONSTITUTIONAL:  awake and alert  LUNGS:  Respirations easy and unlabored, clear to auscultation  CARD:  regular rate and rhythm  ABDOMEN:  normal bowel sounds, soft, non-distended, non-tender     Data:  CBC:   Recent Labs     01/17/25  0423   WBC 10.8   HGB 11.8*   HCT 34.8*        BMP:    Recent Labs     01/17/25  0423      K 4.5      CO2 24   BUN 7   CREATININE 0.7   GLUCOSE 99     Hepatic: No results for input(s): \"AST\", \"ALT\", \"BILITOT\", \"ALKPHOS\" in the last 72 hours.    Invalid input(s): \"ALB\"  Mag:    No results for input(s): \"MG\" in the last 72 hours.   Phos:   No results for input(s): \"PHOS\" in the last 72 hours.   INR: No results for input(s): \"INR\" in the last 72 hours.    Radiology Review:  *Imaging personally reviewed by me.   N/A      ASSESSMENT AND PLAN:  85 YO with diverticulitis and small abscess. Clinically symptoms resolved. No fevers. Tolerating a diet. Repeat blood cultures NGSF. OK for discharge on oral antibiotics from surgical standpoint when medically stable      Electronically signed by TESFAYE HERRERA MD     [id number]

## 2025-01-19 NOTE — CARE COORDINATION
CASE MANAGEMENT DISCHARGE SUMMARY      Discharge to: Home to [facility]    IMM given: (date) 1/17      Transportation:    Family/car: yes     Confirmed discharge plan with:     Patient: yes per RN     Family:  no per pt     RN, name: Alvarez HURLEY    Note: Discharging nurse to complete JOSELIN, reconcile AVS, and place final copy with patient's discharge packet. RN to ensure that written prescriptions for  Level II medications are sent with patient to the facility as per protocol.

## 2025-01-20 ENCOUNTER — CARE COORDINATION (OUTPATIENT)
Dept: CASE MANAGEMENT | Age: 85
End: 2025-01-20

## 2025-01-20 DIAGNOSIS — I50.23 CHF (CONGESTIVE HEART FAILURE), NYHA CLASS III, ACUTE ON CHRONIC, SYSTOLIC (HCC): Primary | ICD-10-CM

## 2025-01-20 PROCEDURE — 1111F DSCHRG MED/CURRENT MED MERGE: CPT | Performed by: FAMILY MEDICINE

## 2025-01-20 NOTE — CARE COORDINATION
staffing. States spread over 3 days. She has already spoken with the coordinator and expects her helper tomorrow for 2 hours. Has a friend who also comes to help on Mondays and Wednesdays and good family support. States she walks outside every day for 25-30 minutes when she can (too cold right now). Reviewed her upcoming appointment with Dr Kevin and she will keep as long as the weather is okay.    Care Transition Nurse reviewed discharge instructions, medical action plan, and red flags with patient. The patient was given an opportunity to ask questions; all questions answered at this time.. The patient verbalized understanding.   Were discharge instructions available to patient? Yes.   Reviewed appropriate site of care based on symptoms and resources available to patient including: PCP  Specialist  Home health  Provider home visits. The patient agrees to contact the primary care provider and/or specialist office for questions related to their healthcare.      Advance Care Planning:   Does patient have an Advance Directive:     Primary Decision Maker: Rambo Chapin  Sara - [phone]    Secondary Decision Maker: Constantino Chapin - [phone] .    Medication Reconciliation:  Medication reconciliation was performed with patient,1111F entered: yes.     Remote Patient Monitoring:  Offered patient enrollment in the Remote Patient Monitoring (RPM) program for in-home monitoring: Yes, but did not enroll at this time: [place] .    Assessments:  Care Transitions 24 Hour Call    Schedule Follow Up Appointment with PCP: Completed  Do you have a copy of your discharge instructions?: Yes  Do you have all of your prescriptions and are they filled?: Yes  Have you been contacted by a [facility] Pharmacist?: No  Have you scheduled your follow up appointment?: Yes  How are you going to get to your appointment?: Car - family or friend to transport  Post Acute Services: Home Health  Do you feel like you have everything you

## 2025-01-21 LAB
BACTERIA BLD CULT ORG #2: NORMAL
BACTERIA BLD CULT: NORMAL

## 2025-01-22 ENCOUNTER — TELEPHONE (OUTPATIENT)
Dept: FAMILY MEDICINE CLINIC | Age: 85
End: 2025-01-22

## 2025-01-22 NOTE — TELEPHONE ENCOUNTER
Pt calling and states she got out of the hospital 1/19 and is still having diarrhea. Pt is asking if she can take imodium.

## 2025-01-24 ENCOUNTER — TELEPHONE (OUTPATIENT)
Dept: FAMILY MEDICINE CLINIC | Age: 85
End: 2025-01-24

## 2025-01-24 NOTE — TELEPHONE ENCOUNTER
Patient calling to report that she has no appetite at all and has been unable to eat. Would like something to help increase appetite. Please advise.    [facility]

## 2025-01-25 RX ORDER — MIRTAZAPINE 7.5 MG/1
7.5 TABLET, FILM COATED ORAL NIGHTLY
Qty: 30 TABLET | Refills: 5 | Status: SHIPPED | OUTPATIENT
Start: 2025-01-25 | End: 2025-01-28

## 2025-01-25 NOTE — PROGRESS NOTES
Physician Progress Note      PATIENT:               LINDA CULLEN  [facility] #:                  [id number]  :                       1940  ADMIT DATE:       1/15/2025 3:35 PM  DISCH DATE:        2025 3:31 PM  RESPONDING  PROVIDER #:        GARY JACOME          QUERY TEXT:    Patient admitted with diverticulitis with abscess. If possible, please   document in progress notes and discharge summary further specificity regarding   the location of the abscess:  The medical record reflects the following:    Risk Factors: diverticulitis with abscess, sepsis , CHF    Clinical Indicators: CT abdomen 1/15-Adjacent to the sigmoid colon, there   appears to be a separate air-fluid collection measuring approximately 4 cm   which appears to be separate from the sigmoid colon lying just anterior and   inferior to the midportion of the sigmoid colon. This is  suspicious for abscess.  Per cardiology consult  \"presented with sepsis secondary to abdominal   abscess\"  Per ID consult - Undated Active problem list-\"Intra-abdominal abscess   ...Admitted 1/15/25 with sepsis from complicated diverticulitis with adjacent   abscess...Streptococcal bacteremia, present on 1 of 2 sets of BC collected on   admission.  This is regarded as a secondary bacteremia of colonic source\"  Per Discharge summary- Severe Sepsis, Diverticulitis with Abscess - CT scan   revealed 4 cm abscess in mid sigmoid colon\"    Treatment: IVF bolus, Cardiology/General surgery and ID consults, CT imaging,   ABX  Options provided:  -- Diverticulitis with peritoneal abscess  -- Diverticulitis with intra-abdominal cavity abscess  -- Other - I will add my own diagnosis  -- Disagree - Not applicable / Not valid  -- Disagree - Clinically unable to determine / Unknown  -- Refer to Clinical Documentation Reviewer    PROVIDER RESPONSE TEXT:    Provider disagreed with this query.  n/a    Query created by: Lisa Sanchez on 2025 1:58

## 2025-01-28 ENCOUNTER — OFFICE VISIT (OUTPATIENT)
Dept: FAMILY MEDICINE CLINIC | Age: 85
End: 2025-01-28
Payer: MEDICARE

## 2025-01-28 VITALS
WEIGHT: 107 LBS | BODY MASS INDEX: 18.37 KG/M2 | OXYGEN SATURATION: 95 % | DIASTOLIC BLOOD PRESSURE: 77 MMHG | SYSTOLIC BLOOD PRESSURE: 110 MMHG | HEART RATE: 131 BPM

## 2025-01-28 DIAGNOSIS — R78.81 STREPTOCOCCAL BACTEREMIA: ICD-10-CM

## 2025-01-28 DIAGNOSIS — B95.5 STREPTOCOCCAL BACTEREMIA: ICD-10-CM

## 2025-01-28 DIAGNOSIS — I50.23 CHF (CONGESTIVE HEART FAILURE), NYHA CLASS III, ACUTE ON CHRONIC, SYSTOLIC (HCC): ICD-10-CM

## 2025-01-28 DIAGNOSIS — K57.20 DIVERTICULITIS OF LARGE INTESTINE WITH ABSCESS WITHOUT BLEEDING: Primary | ICD-10-CM

## 2025-01-28 DIAGNOSIS — I48.0 PAROXYSMAL ATRIAL FIBRILLATION (HCC): ICD-10-CM

## 2025-01-28 PROCEDURE — 1160F RVW MEDS BY RX/DR IN RCRD: CPT | Performed by: FAMILY MEDICINE

## 2025-01-28 PROCEDURE — 99214 OFFICE O/P EST MOD 30 MIN: CPT | Performed by: FAMILY MEDICINE

## 2025-01-28 PROCEDURE — 3078F DIAST BP <80 MM HG: CPT | Performed by: FAMILY MEDICINE

## 2025-01-28 PROCEDURE — 1123F ACP DISCUSS/DSCN MKR DOCD: CPT | Performed by: FAMILY MEDICINE

## 2025-01-28 PROCEDURE — 3074F SYST BP LT 130 MM HG: CPT | Performed by: FAMILY MEDICINE

## 2025-01-28 PROCEDURE — 1159F MED LIST DOCD IN RCRD: CPT | Performed by: FAMILY MEDICINE

## 2025-01-28 PROCEDURE — 93000 ELECTROCARDIOGRAM COMPLETE: CPT | Performed by: FAMILY MEDICINE

## 2025-01-28 ASSESSMENT — PATIENT HEALTH QUESTIONNAIRE - PHQ9
SUM OF ALL RESPONSES TO PHQ QUESTIONS 1-9: 2
2. FEELING DOWN, DEPRESSED OR HOPELESS: SEVERAL DAYS
1. LITTLE INTEREST OR PLEASURE IN DOING THINGS: SEVERAL DAYS
SUM OF ALL RESPONSES TO PHQ QUESTIONS 1-9: 2
SUM OF ALL RESPONSES TO PHQ QUESTIONS 1-9: 2
SUM OF ALL RESPONSES TO PHQ9 QUESTIONS 1 & 2: 2
SUM OF ALL RESPONSES TO PHQ QUESTIONS 1-9: 2

## 2025-01-28 NOTE — PATIENT INSTRUCTIONS
Please read the healthy family handout that you were given and share it with your family.       Please compare this printed medication list with your medications at home to be sure they are the same.  If you have any medications that are different please contact us immediately at [phone].     Also review your allergies that we have listed, these may also include medications that you have not been able to tolerate, make sure everything listed is correct. If you have any allergies that are different please contact us immediately at [phone].     You may receive a survey in the mail or by email asking about your experience during your visit today. Please complete and return to us so we know how we are serving you.

## 2025-01-28 NOTE — PROGRESS NOTES
She presents today for hospital follow-up for sepsis from diverticulitis and intra-abdominal abscess.  Daughter states they had her on Zosyn because they opted not to drain the abscess surgically.  She has slowly improved she still states her stomach feels upset from the antibiotics they sent her home on.  She is on Flagyl and cefdinir for 10 days.  She finishes them up tonight.  She has to go back to see the general surgeon again who saw her at the hospital.  Her heart rate has been up some but she has not been active since she has been in the hospital.  Her daughter is trying to encourage her to eat and drink more fluids.  They are holding all of her vitamins so the antibiotics are absorbed properly.  Tests and documents reviewed today: Discharge summary reviewed.  Patient grew out 4 different types of bacteria in her initial blood cultures.  Repeat blood cultures negative.  Labs reviewed     Objective:   /77   Pulse (!) 131   Wt 48.5 kg (107 lb)   SpO2 95%   BMI 18.37 kg/m²   BP Readings from Last 3 Encounters:   01/28/25 110/77   01/19/25 135/70   11/07/24 132/72     Physical Exam  Vitals reviewed.   Constitutional:       Appearance: She is well-developed.   HENT:      Head: Normocephalic.   Neck:      Thyroid: No thyromegaly.   Cardiovascular:      Rate and Rhythm: Regular rhythm. Tachycardia present.      Heart sounds: Normal heart sounds. No murmur heard.  Pulmonary:      Effort: No respiratory distress.      Breath sounds: Normal breath sounds. No wheezing or rales.   Abdominal:      Palpations: Abdomen is soft.      Tenderness: There is no abdominal tenderness.   Musculoskeletal:      Cervical back: Neck supple.   Lymphadenopathy:      Cervical: No cervical adenopathy.   Neurological:      Mental Status: She is alert.   Psychiatric:         Behavior: Behavior normal.       Assessment and Plan:   Diagnosis Orders   1. Diverticulitis of large intestine with abscess without bleeding        2. CHF

## 2025-01-29 ENCOUNTER — CARE COORDINATION (OUTPATIENT)
Dept: CASE MANAGEMENT | Age: 85
End: 2025-01-29

## 2025-01-29 ENCOUNTER — TELEPHONE (OUTPATIENT)
Dept: CARDIOLOGY CLINIC | Age: 85
End: 2025-01-29

## 2025-01-29 DIAGNOSIS — I48.0 PAROXYSMAL ATRIAL FIBRILLATION (HCC): Primary | ICD-10-CM

## 2025-01-29 NOTE — TELEPHONE ENCOUNTER
Pt called and stated that she thinks she is in AFIB.  Pt was very quick to get off phone, unable to get any additional information.  Please contact pt to discuss

## 2025-01-29 NOTE — TELEPHONE ENCOUNTER
Sounds like patient is in atrial fibrillation with RVR.  She has a history of cardiomyopathy that resolved and likely has some ischemic disease that she has declined intervention on.  Can we please arrange for urgent outpatient cardioversion on Friday with me after my generator change?.  Can we please reach out to her daughter as well.  I am happy to speak with her on Thursday if she needs to chat.

## 2025-01-29 NOTE — TELEPHONE ENCOUNTER
Called patient to get more information. Informed patient that I am VIC's RN and would be happy to answer any questions she may have but she only wants to talk to AGK. Again tried to get more information to triage call but only wants to talk to MD. Patient states \"call will only take 1 min of his time\"

## 2025-01-29 NOTE — CARE COORDINATION
Care Transitions Note    Follow Up Call     Patient: Cara Chapin                  Patient : 1940   MRN: [id number]                             Reason for Admission: [facility] x 4 days -> severe sepsis, diverticulitis w/ abscess, wide complex tachycardia, elevated troponin, RBBB, CHF no AE-EF 35% 2025, A Fib on Eliquis, RA on methotrexate, HTN-controlled, hypothyroidism, T7 compression fx, ascending thoracic aortic ectasia -> return to [facility] ([facility]) w/ Supportive [facility], low fiber diet, f/up Dr Walls  Discharge Date: 25       RURS: Readmission Risk Score: 9.4    Patient Current Location:  Home: [address] [place] [address].  [id number]  [facility] [address]    Care Transition Nurse contacted the patient by telephone. Verified name and  as identifiers.    Additional needs identified to be addressed with provider   No needs identified         Method of communication with provider: none.    Care Summary Note:   She took Imodium today for diarrhea. She completed abx. Her PCP was agreeable to the Imodium. She is monitoring. Her daughter will schedule her appt to Dr Walls because she is the transportation. States the nurse comes EOD to check her vitals. She is brief, super polite, states she doesn't need anything but is going to call Dr Rao now per PCP request as well.     Plan of care updates since last contact:  Review of patient management of conditions/medications:       Advance Care Planning:   Does patient have an Advance Directive: reviewed during previous call, see note. .    Medication Review:  No changes since last call.     Remote Patient Monitoring:  Offered patient enrollment in the Remote Patient Monitoring (RPM) program for in-home monitoring: Yes, but did not enroll at this time:   .    Assessments:  No changes since last call    Follow Up Appointment:   MAGAN appointment attended as scheduled     Future Appointments         Provider Specialty Dept Phone    2025 1:30 PM

## 2025-01-30 ENCOUNTER — ANESTHESIA EVENT (OUTPATIENT)
Dept: CARDIAC CATH/INVASIVE PROCEDURES | Age: 85
End: 2025-01-30
Payer: MEDICARE

## 2025-01-30 RX ORDER — MIDAZOLAM HYDROCHLORIDE 1 MG/ML
2 INJECTION, SOLUTION INTRAMUSCULAR; INTRAVENOUS
Status: CANCELLED | OUTPATIENT
Start: 2025-01-31 | End: 2025-02-01

## 2025-01-30 RX ORDER — SODIUM CHLORIDE 0.9 % (FLUSH) 0.9 %
5-40 SYRINGE (ML) INJECTION EVERY 12 HOURS SCHEDULED
Status: CANCELLED | OUTPATIENT
Start: 2025-01-31

## 2025-01-30 RX ORDER — LIDOCAINE HYDROCHLORIDE 10 MG/ML
1 INJECTION, SOLUTION EPIDURAL; INFILTRATION; INTRACAUDAL; PERINEURAL
Status: CANCELLED | OUTPATIENT
Start: 2025-01-31 | End: 2025-02-01

## 2025-01-30 RX ORDER — SODIUM CHLORIDE 9 MG/ML
INJECTION, SOLUTION INTRAVENOUS PRN
Status: CANCELLED | OUTPATIENT
Start: 2025-01-31

## 2025-01-30 RX ORDER — SODIUM CHLORIDE 0.9 % (FLUSH) 0.9 %
5-40 SYRINGE (ML) INJECTION PRN
Status: CANCELLED | OUTPATIENT
Start: 2025-01-31

## 2025-01-30 RX ORDER — SODIUM CHLORIDE, SODIUM LACTATE, POTASSIUM CHLORIDE, CALCIUM CHLORIDE 600; 310; 30; 20 MG/100ML; MG/100ML; MG/100ML; MG/100ML
INJECTION, SOLUTION INTRAVENOUS CONTINUOUS
Status: CANCELLED | OUTPATIENT
Start: 2025-01-31

## 2025-01-30 NOTE — TELEPHONE ENCOUNTER
Placed order for cardioversion. Mahogany-please call patient to schedule--per AGK wants added to Friday after his gen change.     MEDS TO HOLD:  -probiotic and spironolactone the morning of the procedure.     AGK-routing back for you to call patient as she requested to speak with you.

## 2025-01-30 NOTE — TELEPHONE ENCOUNTER
Procedure:  CV  Doctor:  Dr. Rao  Date:  1/31/25  Time:  11:30am  Arrival:  10am  Reps:  n/a  Anesthesia:  Yes      Spoke with patient & daughter. Please have patient arrive to the main entrance of [facility] ([address] [place], [place] [id number]) and check in with the registration desk.  They will be directed to the Cath Lab.  Remind patient to be NPO after midnight (8 hours prior). Do not apply lotions/creams on skin the day of procedure.

## 2025-01-31 ENCOUNTER — HOSPITAL ENCOUNTER (OUTPATIENT)
Dept: CARDIAC CATH/INVASIVE PROCEDURES | Age: 85
Discharge: HOME | End: 2025-01-31
Attending: INTERNAL MEDICINE | Admitting: INTERNAL MEDICINE
Payer: MEDICARE

## 2025-01-31 ENCOUNTER — TELEPHONE (OUTPATIENT)
Dept: CARDIAC CATH/INVASIVE PROCEDURES | Age: 85
End: 2025-01-31

## 2025-01-31 ENCOUNTER — OFFICE VISIT (OUTPATIENT)
Dept: SURGERY | Age: 85
End: 2025-01-31

## 2025-01-31 ENCOUNTER — ANESTHESIA (OUTPATIENT)
Dept: CARDIAC CATH/INVASIVE PROCEDURES | Age: 85
End: 2025-01-31
Payer: MEDICARE

## 2025-01-31 VITALS
DIASTOLIC BLOOD PRESSURE: 76 MMHG | HEIGHT: 64 IN | HEART RATE: 86 BPM | SYSTOLIC BLOOD PRESSURE: 129 MMHG | BODY MASS INDEX: 18.22 KG/M2

## 2025-01-31 VITALS
OXYGEN SATURATION: 100 % | RESPIRATION RATE: 21 BRPM | HEART RATE: 61 BPM | SYSTOLIC BLOOD PRESSURE: 110 MMHG | DIASTOLIC BLOOD PRESSURE: 79 MMHG | WEIGHT: 106.2 LBS | BODY MASS INDEX: 18.13 KG/M2 | HEIGHT: 64 IN

## 2025-01-31 DIAGNOSIS — I48.0 PAROXYSMAL ATRIAL FIBRILLATION (HCC): ICD-10-CM

## 2025-01-31 DIAGNOSIS — K57.20 DIVERTICULITIS OF LARGE INTESTINE WITH ABSCESS WITHOUT BLEEDING: Primary | ICD-10-CM

## 2025-01-31 LAB
ANION GAP SERPL CALCULATED.3IONS-SCNC: 9 MMOL/L (ref 3–16)
BUN SERPL-MCNC: 11 MG/DL (ref 7–20)
CALCIUM SERPL-MCNC: 9.1 MG/DL (ref 8.3–10.6)
CHLORIDE SERPL-SCNC: 99 MMOL/L (ref 99–110)
CO2 SERPL-SCNC: 23 MMOL/L (ref 21–32)
CREAT SERPL-MCNC: 0.7 MG/DL (ref 0.6–1.2)
DEPRECATED RDW RBC AUTO: 15.6 % (ref 12.4–15.4)
ECHO BSA: 1.47 M2
EKG ATRIAL RATE: 62 BPM
EKG DIAGNOSIS: NORMAL
EKG DIAGNOSIS: NORMAL
EKG P AXIS: 78 DEGREES
EKG P-R INTERVAL: 152 MS
EKG Q-T INTERVAL: 378 MS
EKG Q-T INTERVAL: 436 MS
EKG QRS DURATION: 138 MS
EKG QRS DURATION: 146 MS
EKG QTC CALCULATION (BAZETT): 442 MS
EKG QTC CALCULATION (BAZETT): 545 MS
EKG R AXIS: -5 DEGREES
EKG R AXIS: 18 DEGREES
EKG T AXIS: 145 DEGREES
EKG T AXIS: 171 DEGREES
EKG VENTRICULAR RATE: 125 BPM
EKG VENTRICULAR RATE: 62 BPM
GFR SERPLBLD CREATININE-BSD FMLA CKD-EPI: 85 ML/MIN/{1.73_M2}
GLUCOSE SERPL-MCNC: 109 MG/DL (ref 70–99)
HCT VFR BLD AUTO: 38.1 % (ref 36–48)
HGB BLD-MCNC: 12.8 G/DL (ref 12–16)
MCH RBC QN AUTO: 33.7 PG (ref 26–34)
MCHC RBC AUTO-ENTMCNC: 33.6 G/DL (ref 31–36)
MCV RBC AUTO: 100.2 FL (ref 80–100)
PLATELET # BLD AUTO: 394 K/UL (ref 135–450)
PMV BLD AUTO: 7.7 FL (ref 5–10.5)
POTASSIUM SERPL-SCNC: 4.2 MMOL/L (ref 3.5–5.1)
RBC # BLD AUTO: 3.81 M/UL (ref 4–5.2)
SODIUM SERPL-SCNC: 131 MMOL/L (ref 136–145)
WBC # BLD AUTO: 8.7 K/UL (ref 4–11)

## 2025-01-31 PROCEDURE — 7100000010 HC PHASE II RECOVERY - FIRST 15 MIN: Performed by: INTERNAL MEDICINE

## 2025-01-31 PROCEDURE — 7100000011 HC PHASE II RECOVERY - ADDTL 15 MIN: Performed by: INTERNAL MEDICINE

## 2025-01-31 PROCEDURE — 93319 3D ECHO IMG CGEN CAR ANOMAL: CPT | Performed by: INTERNAL MEDICINE

## 2025-01-31 PROCEDURE — 93325 DOPPLER ECHO COLOR FLOW MAPG: CPT

## 2025-01-31 PROCEDURE — 2580000003 HC RX 258: Performed by: NURSE ANESTHETIST, CERTIFIED REGISTERED

## 2025-01-31 PROCEDURE — 93320 DOPPLER ECHO COMPLETE: CPT | Performed by: INTERNAL MEDICINE

## 2025-01-31 PROCEDURE — 93005 ELECTROCARDIOGRAM TRACING: CPT | Performed by: INTERNAL MEDICINE

## 2025-01-31 PROCEDURE — 85027 COMPLETE CBC AUTOMATED: CPT

## 2025-01-31 PROCEDURE — 3700000000 HC ANESTHESIA ATTENDED CARE: Performed by: INTERNAL MEDICINE

## 2025-01-31 PROCEDURE — 93312 ECHO TRANSESOPHAGEAL: CPT | Performed by: INTERNAL MEDICINE

## 2025-01-31 PROCEDURE — 92960 CARDIOVERSION ELECTRIC EXT: CPT | Performed by: INTERNAL MEDICINE

## 2025-01-31 PROCEDURE — 80048 BASIC METABOLIC PNL TOTAL CA: CPT

## 2025-01-31 PROCEDURE — 93010 ELECTROCARDIOGRAM REPORT: CPT | Performed by: INTERNAL MEDICINE

## 2025-01-31 PROCEDURE — 93312 ECHO TRANSESOPHAGEAL: CPT

## 2025-01-31 PROCEDURE — 6360000002 HC RX W HCPCS: Performed by: NURSE ANESTHETIST, CERTIFIED REGISTERED

## 2025-01-31 RX ORDER — SODIUM CHLORIDE 0.9 % (FLUSH) 0.9 %
5-40 SYRINGE (ML) INJECTION EVERY 12 HOURS SCHEDULED
Status: DISCONTINUED | OUTPATIENT
Start: 2025-01-31 | End: 2025-01-31 | Stop reason: HOSPADM

## 2025-01-31 RX ORDER — SODIUM CHLORIDE 9 MG/ML
INJECTION, SOLUTION INTRAVENOUS PRN
Status: DISCONTINUED | OUTPATIENT
Start: 2025-01-31 | End: 2025-01-31 | Stop reason: HOSPADM

## 2025-01-31 RX ORDER — SODIUM CHLORIDE 9 MG/ML
INJECTION, SOLUTION INTRAVENOUS PRN
Status: CANCELLED | OUTPATIENT
Start: 2025-01-31

## 2025-01-31 RX ORDER — ONDANSETRON 2 MG/ML
4 INJECTION INTRAMUSCULAR; INTRAVENOUS EVERY 30 MIN PRN
Status: CANCELLED | OUTPATIENT
Start: 2025-01-31

## 2025-01-31 RX ORDER — SODIUM CHLORIDE 0.9 % (FLUSH) 0.9 %
5-40 SYRINGE (ML) INJECTION EVERY 12 HOURS SCHEDULED
Status: CANCELLED | OUTPATIENT
Start: 2025-01-31

## 2025-01-31 RX ORDER — SODIUM CHLORIDE 0.9 % (FLUSH) 0.9 %
5-40 SYRINGE (ML) INJECTION PRN
Status: DISCONTINUED | OUTPATIENT
Start: 2025-01-31 | End: 2025-01-31 | Stop reason: HOSPADM

## 2025-01-31 RX ORDER — AMIODARONE HYDROCHLORIDE 200 MG/1
200 TABLET ORAL DAILY
Qty: 90 TABLET | Refills: 3 | Status: SHIPPED | OUTPATIENT
Start: 2025-01-31 | End: 2026-01-26

## 2025-01-31 RX ORDER — OXYCODONE HYDROCHLORIDE 5 MG/1
10 TABLET ORAL PRN
Status: CANCELLED | OUTPATIENT
Start: 2025-01-31 | End: 2025-01-31

## 2025-01-31 RX ORDER — PROPOFOL 10 MG/ML
INJECTION, EMULSION INTRAVENOUS
Status: DISCONTINUED | OUTPATIENT
Start: 2025-01-31 | End: 2025-01-31 | Stop reason: SDUPTHER

## 2025-01-31 RX ORDER — OXYCODONE HYDROCHLORIDE 5 MG/1
5 TABLET ORAL PRN
Status: CANCELLED | OUTPATIENT
Start: 2025-01-31 | End: 2025-01-31

## 2025-01-31 RX ORDER — NALOXONE HYDROCHLORIDE 0.4 MG/ML
INJECTION, SOLUTION INTRAMUSCULAR; INTRAVENOUS; SUBCUTANEOUS PRN
Status: CANCELLED | OUTPATIENT
Start: 2025-01-31

## 2025-01-31 RX ORDER — LABETALOL HYDROCHLORIDE 5 MG/ML
10 INJECTION, SOLUTION INTRAVENOUS
Status: CANCELLED | OUTPATIENT
Start: 2025-01-31

## 2025-01-31 RX ORDER — SODIUM CHLORIDE 0.9 % (FLUSH) 0.9 %
5-40 SYRINGE (ML) INJECTION PRN
Status: CANCELLED | OUTPATIENT
Start: 2025-01-31

## 2025-01-31 RX ORDER — SODIUM CHLORIDE 9 MG/ML
INJECTION, SOLUTION INTRAVENOUS
Status: DISCONTINUED | OUTPATIENT
Start: 2025-01-31 | End: 2025-01-31 | Stop reason: SDUPTHER

## 2025-01-31 RX ADMIN — SODIUM CHLORIDE: 9 INJECTION, SOLUTION INTRAVENOUS at 11:23

## 2025-01-31 RX ADMIN — PROPOFOL 50 MG: 10 INJECTION, EMULSION INTRAVENOUS at 11:33

## 2025-01-31 RX ADMIN — PROPOFOL 50 MG: 10 INJECTION, EMULSION INTRAVENOUS at 11:31

## 2025-01-31 NOTE — ANESTHESIA POSTPROCEDURE EVALUATION
Department of Anesthesiology  Postprocedure Note    Patient: Cara Chapin  MRN: [id number]  YOB: 1940  Date of evaluation: 1/31/2025    Procedure Summary       Date: 01/31/25 Room / Location: [facility]    Anesthesia Start: 1126 Anesthesia Stop: 1136    Procedure: LUCY W/ POSSIBLE CARDIOVERSION (PRN CONTRAST/BUBBLE/3D) Diagnosis:       Paroxysmal atrial fibrillation (HCC)      Paroxysmal atrial fibrillation (HCC)    Scheduled Providers: PAZ Rao Jr., MD Responsible Provider: Houston Naik MD    Anesthesia Type: MAC ASA Status: 3            Anesthesia Type: No value filed.    Chava Phase I:      Chava Phase II:      Anesthesia Post Evaluation    Patient location during evaluation: PACU  Level of consciousness: awake  Airway patency: patent  Nausea & Vomiting: no vomiting  Cardiovascular status: blood pressure returned to baseline  Hydration status: stable  Pain management: adequate    No notable events documented.

## 2025-01-31 NOTE — PROGRESS NOTES
VSS room air. Discharge instructions discussed with patient and her daughter. IV removed after getting dressed. Wheeled out.

## 2025-01-31 NOTE — DISCHARGE INSTRUCTIONS
Cath Labs at  [facility]                      1/31/2025  Cara Chapin   Date of Birth 1940     TRANSESOPHAGEAL ECHOCARDIOGRAM DISCHARGE INSTRUCTIONS      You have been given sedation for your procedure so you may not drive, operate any machinery, or sign any legal documentation for 24 hours.    Do not drink or eat for at least 2 hours AND until your gag reflex returns.  Check by touching the back of the tongue to be sure you can gag.  Do not drink any alcohol today.  Eat soft foods at first then gradually return to your normal diet.      CALL YOUR DOCTOR If you should develop a fever of 101, cough up teaspoon amounts of blood, develop severe shortness of breath or chest pain.  If symptoms are severe, go to the emergency room.    Contact your doctor for a follow-up visit and/or results of your procedure.      SEDATION DISCHARGE INSTRUCTION:  For the next 24 hours do not drive a car, operate machinery, power tools or kitchen appliances.  Do not drink alcohol; including beer or wine.  Do not make any important decisions or sign any important papers.  For the next 24 hours you can expect drowsiness, light-headed or dizziness, nausea/ vomiting, inability to concentrate, fatigue and desire to sleep.  We strongly suggest that a responsible adult be with for the next 24 hours, for your protection and safety.  You are not allowed to drive yourself home, or take any type of public transportation.  If any questions, please call your doctor.  If you cannot reach your Doctor then call the Emergency Department at  [phone].  Explain to the Emergency Department the procedure you had performed and they will be able to assist you.  If you seek Emergency Care, bring this form with you.    If your condition worsens or if you have any concerns, call your doctor or seek emergency medical services as needed. If you have any of the following symptoms/conditions, call your doctor.

## 2025-01-31 NOTE — ANESTHESIA PRE PROCEDURE
Department of Anesthesiology  Preprocedure Note       Name:  Cara Chapin   Age:  84 y.o.  :  1940                                          MRN:  [id number]         Date:  2025      Surgeon: * No surgeons listed *    Procedure: * No procedures listed *    Medications prior to admission:   Prior to Admission medications    Medication Sig Start Date End Date Taking? Authorizing Provider   apixaban (ELIQUIS) 2.5 MG TABS tablet TAKE 1 TABLET TWICE A DAY 24   Shana Francis APRN - CNP   spironolactone (ALDACTONE) 25 MG tablet Take 1 tablet by mouth daily 24   Shana Francis APRN - CNP   amiodarone (PACERONE) 100 MG tablet Take 0.5 tablets by mouth daily 24   Shana Francis APRN - CNP   Pantoprazole Sodium (PROTONIX PO) Take by mouth Takes OTC prn    Nery Kwan MD   methotrexate (RHEUMATREX) 2.5 MG chemo tablet TAKE 8 TABLETS EVERY MONDAY  Patient taking differently: PT TAKES 6 TABLETS EVERY 21   Eron Kevin MD   Probiotic Product (PROBIOTIC PO) Take 1 capsule by mouth daily     ProviderNery MD       Current medications:    Current Facility-Administered Medications   Medication Dose Route Frequency Provider Last Rate Last Admin   • sodium chloride flush 0.9 % injection 5-40 mL  5-40 mL IntraVENous 2 times per day PAZ Rao Jr., MD       • sodium chloride flush 0.9 % injection 5-40 mL  5-40 mL IntraVENous PRN PAZ Rao Jr., MD       • 0.9 % sodium chloride infusion   IntraVENous PRN PAZ Rao Jr., MD           Allergies:  No Known Allergies    Problem List:    Patient Active Problem List   Diagnosis Code   • Hyperlipidemia E78.5   • Hypothyroidism (acquired) E03.9   • LBBB (left bundle branch block) I44.7   • left TKR Z96.659   • Trigger finger, acquired M65.30   • Rheumatoid arthritis involving multiple joints (HCC) M06.9   • Essential hypertension I10   • Foot callus L84   • Hammertoes of both feet M20.41, M20.42   •

## 2025-01-31 NOTE — PROGRESS NOTES
Department of General Surgery Consult    PATIENT NAME: Cara Chapin   YOB: 1940     TODAY'S DATE: 2025    Reason for Consult: Diverticulitis    Chief Complaint: Left lower quadrant pain    Historian: Patient herself and daughter      HISTORY OF PRESENT ILLNESS:  The patient is a 84 y.o. female who presents after hospitalization for sepsis from diverticulitis and intra-abdominal abscess.  Patient was discharged and on Flagyl and cefdinir for 10 days.  Received cardioversion earlier today for PAF.  Today patient endorses feeling much better with no continuing abdominal pain fevers or chills.  Patient did states she had a difficult time taking the antibiotics as they cause her nausea but was able to get them down and is completed her course at this time.  Patient states she does not want to have any type of surgery and would  like to go home.  She has been able to increase her diet overall but is still feeling quite gassy and has had some loose stools for which she has taken Imodium for.  When asked about her history of diverticulitis she says she had 1 other episode where she has been hospitalized 6 years ago aside from this current bout.  Patient endorses gassiness and loose stools.  Patient denies any nausea, vomiting, fever, chills, chest pain, shortness of breath, abdominal pain, diarrhea.    Past Medical History:        Diagnosis Date    Allergic rhinitis     CHF (congestive heart failure), NYHA class I, acute on chronic, combined ([facility]) 2021    Hyperlipidemia     Hypertension     Hypothyroidism     probable autoimmune thyroiditis    LBBB (left bundle branch block)     ON EKG, had normal GXT    Rheumatoid arthritis(714.0)     Rheumatologist in [place]       Past Surgical History:        Procedure Laterality Date     SECTION      x2    COLONOSCOPY  2005    JOINT REPLACEMENT Left 13    left total knee replacement       Current Medications:   No current

## 2025-02-05 ENCOUNTER — CARE COORDINATION (OUTPATIENT)
Dept: CASE MANAGEMENT | Age: 85
End: 2025-02-05

## 2025-02-05 NOTE — CARE COORDINATION
which has no nutritional benefit. She voices understanding. States she hasn't ever eaten much in the past but is down 3# and feels she has lost some muscle to gain back. Denies needs at this time.     Plan of care updates since last contact:  Review of patient management of conditions/medications:       Advance Care Planning:   Does patient have an Advance Directive: reviewed during previous call, see note. .    Medication Review:  No changes since last call.     Remote Patient Monitoring:  Offered patient enrollment in the Remote Patient Monitoring (RPM) program for in-home monitoring: Yes, but did not enroll at this time: already monitoring with home equipment.    Assessments:  No changes since last call    Follow Up Appointment:   MAGAN appointment attended as scheduled   Future Appointments         Provider Specialty Dept Phone    3/20/2025 10:30 AM PAZ Rao Jr., MD Cardiology [phone]    5/13/2025 1:30 PM PAZ Rao Jr., MD Cardiology [phone]          Care Transition Nurse provided contact information.  Plan for follow-up call in 6-10 days based on severity of symptoms and risk factors.  Plan for next call: symptom management-       Marleny Joseph RN

## 2025-02-06 ENCOUNTER — TELEPHONE (OUTPATIENT)
Dept: FAMILY MEDICINE CLINIC | Age: 85
End: 2025-02-06

## 2025-02-06 NOTE — TELEPHONE ENCOUNTER
Daughter needs you to call her. Her mom is deteriorating rapidly and wants you to discuss hospice with her.  He diarrhea is blow outs at times, she spikes fever. She needs your help guiding her to Hospice because she is having hard time taking care of her and her mom will listen to you.   Jessica Antonio's cell phone is out of commission so she only has her land line. She said if you leave her message if she doesn't answer and tell her when you are are available she will call you back, even if it is tomorrow.   [phone].

## 2025-02-07 ENCOUNTER — HOSPITAL ENCOUNTER (EMERGENCY)
Age: 85
Discharge: HOME | End: 2025-02-07
Payer: MEDICARE

## 2025-02-07 ENCOUNTER — APPOINTMENT (OUTPATIENT)
Dept: CT IMAGING | Age: 85
End: 2025-02-07
Payer: MEDICARE

## 2025-02-07 VITALS
OXYGEN SATURATION: 100 % | DIASTOLIC BLOOD PRESSURE: 85 MMHG | SYSTOLIC BLOOD PRESSURE: 130 MMHG | RESPIRATION RATE: 16 BRPM | TEMPERATURE: 98.1 F | HEART RATE: 54 BPM

## 2025-02-07 DIAGNOSIS — R31.9 URINARY TRACT INFECTION WITH HEMATURIA, SITE UNSPECIFIED: Primary | ICD-10-CM

## 2025-02-07 DIAGNOSIS — N39.0 URINARY TRACT INFECTION WITH HEMATURIA, SITE UNSPECIFIED: Primary | ICD-10-CM

## 2025-02-07 LAB
ALBUMIN SERPL-MCNC: 3.4 G/DL (ref 3.4–5)
ALBUMIN/GLOB SERPL: 1 {RATIO} (ref 1.1–2.2)
ALP SERPL-CCNC: 56 U/L (ref 40–129)
ALT SERPL-CCNC: 10 U/L (ref 10–40)
ANION GAP SERPL CALCULATED.3IONS-SCNC: 12 MMOL/L (ref 3–16)
AST SERPL-CCNC: 25 U/L (ref 15–37)
BACTERIA URNS QL MICRO: ABNORMAL /HPF
BASOPHILS # BLD: 0 K/UL (ref 0–0.2)
BASOPHILS NFR BLD: 0.4 %
BILIRUB SERPL-MCNC: 0.5 MG/DL (ref 0–1)
BILIRUB UR QL STRIP.AUTO: NEGATIVE
BUN SERPL-MCNC: 10 MG/DL (ref 7–20)
CALCIUM SERPL-MCNC: 9 MG/DL (ref 8.3–10.6)
CHLORIDE SERPL-SCNC: 101 MMOL/L (ref 99–110)
CLARITY UR: ABNORMAL
CO2 SERPL-SCNC: 21 MMOL/L (ref 21–32)
COLOR UR: YELLOW
CREAT SERPL-MCNC: 0.7 MG/DL (ref 0.6–1.2)
DEPRECATED RDW RBC AUTO: 15.2 % (ref 12.4–15.4)
EOSINOPHIL # BLD: 0.1 K/UL (ref 0–0.6)
EOSINOPHIL NFR BLD: 1.7 %
GFR SERPLBLD CREATININE-BSD FMLA CKD-EPI: 85 ML/MIN/{1.73_M2}
GLUCOSE SERPL-MCNC: 121 MG/DL (ref 70–99)
GLUCOSE UR STRIP.AUTO-MCNC: NEGATIVE MG/DL
HCT VFR BLD AUTO: 36.8 % (ref 36–48)
HGB BLD-MCNC: 12.5 G/DL (ref 12–16)
HGB UR QL STRIP.AUTO: ABNORMAL
KETONES UR STRIP.AUTO-MCNC: NEGATIVE MG/DL
LEUKOCYTE ESTERASE UR QL STRIP.AUTO: ABNORMAL
LIPASE SERPL-CCNC: 69 U/L (ref 13–60)
LYMPHOCYTES # BLD: 1.2 K/UL (ref 1–5.1)
LYMPHOCYTES NFR BLD: 16.1 %
MCH RBC QN AUTO: 33.8 PG (ref 26–34)
MCHC RBC AUTO-ENTMCNC: 34 G/DL (ref 31–36)
MCV RBC AUTO: 99.5 FL (ref 80–100)
MONOCYTES # BLD: 0.6 K/UL (ref 0–1.3)
MONOCYTES NFR BLD: 8.5 %
NEUTROPHILS # BLD: 5.5 K/UL (ref 1.7–7.7)
NEUTROPHILS NFR BLD: 73.3 %
NITRITE UR QL STRIP.AUTO: NEGATIVE
PH UR STRIP.AUTO: 6 [PH] (ref 5–8)
PLATELET # BLD AUTO: 295 K/UL (ref 135–450)
PMV BLD AUTO: 7.8 FL (ref 5–10.5)
POTASSIUM SERPL-SCNC: 4.4 MMOL/L (ref 3.5–5.1)
PROT SERPL-MCNC: 6.7 G/DL (ref 6.4–8.2)
PROT UR STRIP.AUTO-MCNC: 30 MG/DL
RBC # BLD AUTO: 3.7 M/UL (ref 4–5.2)
RBC #/AREA URNS HPF: ABNORMAL /HPF (ref 0–4)
RENAL EPI CELLS #/AREA UR COMP ASSIST: ABNORMAL /HPF (ref 0–1)
SODIUM SERPL-SCNC: 134 MMOL/L (ref 136–145)
SP GR UR STRIP.AUTO: <=1.005 (ref 1–1.03)
UA COMPLETE W REFLEX CULTURE PNL UR: YES
UA DIPSTICK W REFLEX MICRO PNL UR: YES
URN SPEC COLLECT METH UR: ABNORMAL
UROBILINOGEN UR STRIP-ACNC: 0.2 E.U./DL
WBC # BLD AUTO: 7.4 K/UL (ref 4–11)
WBC #/AREA URNS HPF: >100 /HPF (ref 0–5)

## 2025-02-07 PROCEDURE — 74177 CT ABD & PELVIS W/CONTRAST: CPT

## 2025-02-07 PROCEDURE — 80053 COMPREHEN METABOLIC PANEL: CPT

## 2025-02-07 PROCEDURE — 87086 URINE CULTURE/COLONY COUNT: CPT

## 2025-02-07 PROCEDURE — 83690 ASSAY OF LIPASE: CPT

## 2025-02-07 PROCEDURE — 81001 URINALYSIS AUTO W/SCOPE: CPT

## 2025-02-07 PROCEDURE — 85025 COMPLETE CBC W/AUTO DIFF WBC: CPT

## 2025-02-07 PROCEDURE — 2580000003 HC RX 258

## 2025-02-07 PROCEDURE — 99285 EMERGENCY DEPT VISIT HI MDM: CPT

## 2025-02-07 PROCEDURE — 6370000000 HC RX 637 (ALT 250 FOR IP)

## 2025-02-07 PROCEDURE — 6360000004 HC RX CONTRAST MEDICATION

## 2025-02-07 RX ORDER — IOPAMIDOL 755 MG/ML
75 INJECTION, SOLUTION INTRAVASCULAR
Status: COMPLETED | OUTPATIENT
Start: 2025-02-07 | End: 2025-02-07

## 2025-02-07 RX ORDER — CEFDINIR 300 MG/1
300 CAPSULE ORAL DAILY
Status: DISCONTINUED | OUTPATIENT
Start: 2025-02-07 | End: 2025-02-07 | Stop reason: HOSPADM

## 2025-02-07 RX ORDER — 0.9 % SODIUM CHLORIDE 0.9 %
1000 INTRAVENOUS SOLUTION INTRAVENOUS ONCE
Status: COMPLETED | OUTPATIENT
Start: 2025-02-07 | End: 2025-02-07

## 2025-02-07 RX ORDER — CEFDINIR 300 MG/1
300 CAPSULE ORAL 2 TIMES DAILY
Qty: 14 CAPSULE | Refills: 0 | Status: SHIPPED | OUTPATIENT
Start: 2025-02-07 | End: 2025-02-14

## 2025-02-07 RX ADMIN — CEFDINIR 300 MG: 300 CAPSULE ORAL at 17:29

## 2025-02-07 RX ADMIN — SODIUM CHLORIDE 1000 ML: 9 INJECTION, SOLUTION INTRAVENOUS at 14:04

## 2025-02-07 RX ADMIN — IOPAMIDOL 75 ML: 755 INJECTION, SOLUTION INTRAVENOUS at 15:16

## 2025-02-07 ASSESSMENT — LIFESTYLE VARIABLES
HOW OFTEN DO YOU HAVE A DRINK CONTAINING ALCOHOL: NEVER
HOW MANY STANDARD DRINKS CONTAINING ALCOHOL DO YOU HAVE ON A TYPICAL DAY: PATIENT DOES NOT DRINK

## 2025-02-07 ASSESSMENT — PAIN - FUNCTIONAL ASSESSMENT: PAIN_FUNCTIONAL_ASSESSMENT: NONE - DENIES PAIN

## 2025-02-07 NOTE — TELEPHONE ENCOUNTER
She may have c diff colitis now from the antibiotics she has been  on if she is having severe diarrhea or her abscess and diabetes may be getting worse.  She should take her back to the emergency room to be evaluated if she has deteriorated that quickly unless she is refusing further care then we could consider hospice.

## 2025-02-07 NOTE — TELEPHONE ENCOUNTER
Pts son calling and states that pt now has blood in her urine and pts son is nervous that maybe the abscess on her bladder ruptured and was not sure about want to do. States pt doesn't remember what the hospital told her about the abscess on her bladder. I advised pts son that it would be beneficial for pt to go to the ER to be evaluated. Pts son verbalized understanding and would let pcp know if anything else happens.

## 2025-02-07 NOTE — ED PROVIDER NOTES
[facility] EMERGENCY DEPARTMENT  Emergency Department Encounter    Patient Name: Cara Chapin  MRN: [id number]  YOB: 1940  Date of Evaluation: 2/7/2025  Provider: Eron Kevin MD  Note Started: 5:44 PM EST 2/7/25    CHIEF COMPLAINT  Hematuria (Brought by EMS for hematuria, had history of bladder abscess and was admitted few days back and given antibiotics. With fever history as claimed, denies abdominal pain, vomiting and problem urination.)    SHARED SERVICE VISIT  ELVER. I have evaluated this patient.      HISTORY OF PRESENT ILLNESS  History From: Patient and family.    Limitations to history : None    Cara Chapin is a 84 y.o. female who presents to the ED for evaluation of hematuria onset this morning.  Patient states that she was recently in the hospital after being diagnosed with diverticulitis and diverticular abscess.  Patient states that she was not a surgical candidate at that time and states that she was treated conservatively with oral antibiotics.  Patient states that her abdominal pain and diarrhea have improved.  Patient states however that she has continued to have intermittent fevers at times.  Patient states that she woke up this morning at 4 AM and use the bathroom and noticed blood in the urine.  Patient states that she is subsequently had improvement in her hematuria since then however she is concerned that this may be somehow related to her recent diverticulitis.  Patient denies any abdominal pain at this time.  Patient denies current fever, chills, nausea, vomiting, changes in bowels, neck pain, back pain, and lightheadedness.  Denies shortness of breath and chest pain.    No other complaints, modifying factors or associated symptoms.     Nursing notes reviewed were all reviewed and agreed with or any disagreements were addressed in the HPI.    PMH:  Past Medical History:   Diagnosis Date    Allergic rhinitis     CHF (congestive heart failure), NYHA

## 2025-02-07 NOTE — TELEPHONE ENCOUNTER
If it is a reversible condition would not be appropriate for hospice unless patient is refusing care

## 2025-02-07 NOTE — TELEPHONE ENCOUNTER
Dgt messaged me.  [facility] offers palliative care and they offer in home care that's billed to insurance.  She states this would be a good step prior to Hospice care.  They just need a referral from you to get the ball rolling.   States she spoke with Carissa at length yesterday and she was going to ask you to call daughter.

## 2025-02-08 LAB — BACTERIA UR CULT: NORMAL

## 2025-02-10 ENCOUNTER — CARE COORDINATION (OUTPATIENT)
Dept: CASE MANAGEMENT | Age: 85
End: 2025-02-10

## 2025-02-10 NOTE — CARE COORDINATION
Care Transitions Note    ED Follow Up Call     Patient: Cara Chapin                  Patient : 1940   MRN: [id number]                             Reason for Admission: MHA x 4 days -> severe sepsis, diverticulitis w/ abscess, wide complex tachycardia, elevated troponin, RBBB, CHF no AE-EF 35% 2025, A Fib on Eliquis, RA on methotrexate, HTN-controlled, hypothyroidism, T7 compression fx, ascending thoracic aortic ectasia -> return to [facility] ([facility]) w/ [facility]ive [facility], low fiber diet, f/up Dr Walls  Discharge Date: 25       RURS: Readmission Risk Score: 9.4    Patient Current Location:  Home: [address] [place] [address].  [id number]  [facility] [address]    Care Transition Nurse contacted the patient by telephone. Verified name and  as identifiers.    Additional needs identified to be addressed with provider   No needs identified         Method of communication with provider: none.    Care Summary Note:   Had ED visit with CT scan. States no longer any abd discomfort/pain. Feeling UTI is clearing with abx and she is tolerating. States the abx do not make her feel sick, are not difficult to swallow and she is doing well. In good spirits. Denies f/c/n/v/d. Feels her UTI was from her diarrhea and wiping any direction she could. She voices understanding to wipe front to back. Lena, her friend, is helping her today. She denies needs/referrals at this time.     Plan of care updates since last contact:  Review of patient management of conditions/medications:       Advance Care Planning:   Does patient have an Advance Directive: reviewed during previous call, see note. .    Medication Review:  Medications changed since last call, reviewed today.     Remote Patient Monitoring:  Offered patient enrollment in the Remote Patient Monitoring (RPM) program for in-home monitoring: did not enroll.    Assessments:  Care Transitions ED Follow Up    Care Transitions Interventions  Do you have any ongoing

## 2025-02-17 ENCOUNTER — TELEPHONE (OUTPATIENT)
Dept: FAMILY MEDICINE CLINIC | Age: 85
End: 2025-02-17

## 2025-02-17 DIAGNOSIS — R63.4 WEIGHT LOSS: ICD-10-CM

## 2025-02-17 DIAGNOSIS — R63.0 POOR APPETITE: ICD-10-CM

## 2025-02-17 DIAGNOSIS — I50.23 CHF (CONGESTIVE HEART FAILURE), NYHA CLASS III, ACUTE ON CHRONIC, SYSTOLIC (HCC): ICD-10-CM

## 2025-02-17 DIAGNOSIS — K57.20 DIVERTICULITIS OF LARGE INTESTINE WITH ABSCESS WITHOUT BLEEDING: Primary | ICD-10-CM

## 2025-02-17 DIAGNOSIS — I48.0 PAROXYSMAL ATRIAL FIBRILLATION (HCC): ICD-10-CM

## 2025-02-17 NOTE — TELEPHONE ENCOUNTER
Daughter called back, she hadn't heard from Dr Kevin.  I advised her that he left a message on 2/11 she said must have missed the message. Daughter said she wants to go with pallative care and not hospice. Order was placed for pallative care.   Daughter chose [facility]. Order faxed to [phone]

## 2025-02-19 ENCOUNTER — CARE COORDINATION (OUTPATIENT)
Dept: CASE MANAGEMENT | Age: 85
End: 2025-02-19

## 2025-02-19 NOTE — CARE COORDINATION
Care Transitions Note    Final Call     Patient: Cara Chapin                  Patient : 1940   MRN: [id number]                             Reason for Admission: MHA x 4 days -> severe sepsis, diverticulitis w/ abscess, wide complex tachycardia, elevated troponin, RBBB, CHF no AE-EF 35% 2025, A Fib on Eliquis, RA on methotrexate, HTN-controlled, hypothyroidism, T7 compression fx, ascending thoracic aortic ectasia -> return to [facility] w/ Supportive [facility], low fiber diet, f/up Dr Walls  Discharge Date: 25       RURS: Readmission Risk Score: 9.4    Patient Current Location:  Home: [address] [place] [address].  [id number]  [facility] [address]    Care Transition Nurse contacted the patient by telephone. Verified name and  as identifiers.    Patient graduated from the Care Transitions program on 25.  Patient/family progressing towards self management. .      Advance Care Planning:   Does patient have an Advance Directive: reviewed during previous call, see note. .    Handoff:   Patient was not referred to the ACM team due to  AL.      Care Summary Note:   States she continues with low energy and was not able to participate in an event at [facility]. Looking forward to her birthday party tomorrow. States she is going to start taking B12 and D3 because of recommendation by a neighbor. Confirms Victorina Fofana NP with [facility] visited yesterday. Has concern for her daughter who is ill with the flu and her son who works all the time has also been ill. She denies s/s illness. Feels well and tolerating PO without n/v/d. Denies s/s UTI recurrence. She is eating yogurt to help with her gut after all the abx. States her appetite has finally returned but through all this she lost 9#. She will continue to monitor. She denies needs today.     Assessments:  No changes since last call    Upcoming Appointments:    Future Appointments         Provider Specialty Dept Phone

## 2025-02-20 ENCOUNTER — TELEPHONE (OUTPATIENT)
Dept: CARDIOLOGY CLINIC | Age: 85
End: 2025-02-20

## 2025-02-20 NOTE — TELEPHONE ENCOUNTER
Patient has called twice in the last 20 minutes demanding to speak with AGK. Spoke with patient and informed her that as previously stated, AGK will be unavailable for the remainder of the day and will call her tomorrow. V/u.

## 2025-02-20 NOTE — TELEPHONE ENCOUNTER
Informed Carmita that [facility] will be calling patient directly tomorrow per her request.     VIC, please call patient tomorrow 2/21/2025. Thanks!

## 2025-02-20 NOTE — TELEPHONE ENCOUNTER
Carmita with [facility] sts pt's pulse today is 38 and her B/P is 102/54. Pt sts she feels ok. AGK had increased pt's amiodarone (CORDARONE) to 200 MG tablet QD approximately 2 weeks ago. Carmita wanting to know AGK wants to lower that back down to 100 mg due to vitals. Please call Carmita at [phone]. TY

## 2025-02-20 NOTE — TELEPHONE ENCOUNTER
Pt calling to tell VIC that she did NOT take her   amiodarone (CORDARONE) 200 MG tablet today.  Pt has birthday party to go to and will be gone between 3-4 pm today.

## 2025-03-10 ENCOUNTER — TELEPHONE (OUTPATIENT)
Dept: FAMILY MEDICINE CLINIC | Age: 85
End: 2025-03-10

## 2025-03-10 ENCOUNTER — TELEPHONE (OUTPATIENT)
Dept: CARDIOLOGY CLINIC | Age: 85
End: 2025-03-10

## 2025-03-10 DIAGNOSIS — R31.0 GROSS HEMATURIA: Primary | ICD-10-CM

## 2025-03-10 NOTE — TELEPHONE ENCOUNTER
JONHK- you have spoken to pt on 02/20/2025 per encounter you reduced pts amiodarone to 100 mg daily

## 2025-03-10 NOTE — TELEPHONE ENCOUNTER
Dgt states that her mom has another UTI.   Did a home test on her yesterday b/c she's had brown vaginal discharge for a couple of weeks.   Wants to know if you would be able to prescribe her an antibiotic and send it to [facility] in [place]

## 2025-03-10 NOTE — TELEPHONE ENCOUNTER
Called and spoke with patient, relayed [facility] recommendation. Patient states she was already in the ED twice recently. She states she wants to speak with her son about this before going. I again informed patient of [facility] recommendation of ED evaluation and to have someone drive her or call EMS.     [facility] FYI only. Patient is scheduled with you 3/20/2025 for a follow up appointment.

## 2025-03-10 NOTE — TELEPHONE ENCOUNTER
Pt called stating her HR is fluctuating from being low to over 100. She stated she feels fatigued. She stated she took her extra dose of 100mg amiodarone. She stated she felt good yesterday and took a walk with her daughter. She is scheduled for a hsfu with aniket on 3/20/25. Please advise, pt can be reached at [phone]. She would like to speak DIRECTLY with deepalik.

## 2025-03-10 NOTE — TELEPHONE ENCOUNTER
Eliu Puja  Signed 11:09 AM     Copy     Pt called stating her HR is fluctuating from being low to over 100. She stated she feels fatigued. She stated she took her extra dose of 100mg amiodarone. She stated she felt good yesterday and took a walk with her daughter. She is scheduled for a hsfu with aniket on 3/20/25. Please advise, pt can be reached at [phone]. She would like to speak DIRECTLY with k.

## 2025-03-10 NOTE — TELEPHONE ENCOUNTER
PT daughter called left message MFF VM that pt pulse went from , pt is still taking the Amiodarone, they did not leave the current dosages however they would like to know if they should stop the Amiodarone?    Pls advise

## 2025-03-11 NOTE — TELEPHONE ENCOUNTER
No, she does not.    Having frequency, heart pulse is up and down, some clamminess.  Just not feeling well.    She has to wear the liners all the time due to leakage.   She said that she is worried b/c the discharge is brown (old blood?)    Her daughter is leaving for [place] today, FYI.

## 2025-03-11 NOTE — TELEPHONE ENCOUNTER
No Hospice per Jessica Antonio.   She has about 13 hours of palliative care per week.   She has a friend that will be there tomorrow.  Jessica Antonio said that she can take her for the urine test.  Please place orders.   [facility]   Called the patient to let her know what the plan is.  She said this was the start of when she had the bacteria in her gut and had to see ID and was treated with 2 antibiotics b/c the first antibiotic was sensitive but did absolutely NOTHING.

## 2025-03-12 ENCOUNTER — ANESTHESIA (OUTPATIENT)
Dept: CARDIAC CATH/INVASIVE PROCEDURES | Age: 85
DRG: 309 | End: 2025-03-12
Payer: MEDICARE

## 2025-03-12 ENCOUNTER — APPOINTMENT (OUTPATIENT)
Dept: GENERAL RADIOLOGY | Age: 85
DRG: 309 | End: 2025-03-12
Payer: MEDICARE

## 2025-03-12 ENCOUNTER — HOSPITAL ENCOUNTER (INPATIENT)
Dept: CARDIAC CATH/INVASIVE PROCEDURES | Age: 85
Discharge: HOME | DRG: 309 | End: 2025-03-14
Attending: INTERNAL MEDICINE
Payer: MEDICARE

## 2025-03-12 ENCOUNTER — ANESTHESIA EVENT (OUTPATIENT)
Dept: CARDIAC CATH/INVASIVE PROCEDURES | Age: 85
DRG: 309 | End: 2025-03-12
Payer: MEDICARE

## 2025-03-12 ENCOUNTER — HOSPITAL ENCOUNTER (INPATIENT)
Age: 85
LOS: 1 days | Discharge: HOME | DRG: 309 | End: 2025-03-13
Attending: STUDENT IN AN ORGANIZED HEALTH CARE EDUCATION/TRAINING PROGRAM | Admitting: INTERNAL MEDICINE
Payer: MEDICARE

## 2025-03-12 VITALS
HEART RATE: 126 BPM | RESPIRATION RATE: 24 BRPM | SYSTOLIC BLOOD PRESSURE: 120 MMHG | DIASTOLIC BLOOD PRESSURE: 103 MMHG | OXYGEN SATURATION: 100 %

## 2025-03-12 VITALS
RESPIRATION RATE: 23 BRPM | SYSTOLIC BLOOD PRESSURE: 108 MMHG | OXYGEN SATURATION: 98 % | HEART RATE: 60 BPM | DIASTOLIC BLOOD PRESSURE: 94 MMHG

## 2025-03-12 DIAGNOSIS — I48.91 ATRIAL FIBRILLATION WITH RVR (HCC): Primary | ICD-10-CM

## 2025-03-12 DIAGNOSIS — I48.0 PAROXYSMAL ATRIAL FIBRILLATION (HCC): ICD-10-CM

## 2025-03-12 LAB
ALBUMIN SERPL-MCNC: 4 G/DL (ref 3.4–5)
ALBUMIN/GLOB SERPL: 1.1 {RATIO} (ref 1.1–2.2)
ALP SERPL-CCNC: 69 U/L (ref 40–129)
ALT SERPL-CCNC: 12 U/L (ref 10–40)
ANION GAP SERPL CALCULATED.3IONS-SCNC: 14 MMOL/L (ref 3–16)
AST SERPL-CCNC: 26 U/L (ref 15–37)
BACTERIA URNS QL MICRO: ABNORMAL /HPF
BASOPHILS # BLD: 0 K/UL (ref 0–0.2)
BASOPHILS NFR BLD: 0.4 %
BILIRUB SERPL-MCNC: 0.5 MG/DL (ref 0–1)
BILIRUB UR QL STRIP.AUTO: NEGATIVE
BUN SERPL-MCNC: 15 MG/DL (ref 7–20)
CALCIUM SERPL-MCNC: 9.7 MG/DL (ref 8.3–10.6)
CHLORIDE SERPL-SCNC: 97 MMOL/L (ref 99–110)
CLARITY UR: ABNORMAL
CO2 SERPL-SCNC: 22 MMOL/L (ref 21–32)
COLOR UR: YELLOW
CREAT SERPL-MCNC: 0.9 MG/DL (ref 0.6–1.2)
DEPRECATED RDW RBC AUTO: 16.4 % (ref 12.4–15.4)
EOSINOPHIL # BLD: 0.2 K/UL (ref 0–0.6)
EOSINOPHIL NFR BLD: 2.6 %
GFR SERPLBLD CREATININE-BSD FMLA CKD-EPI: 63 ML/MIN/{1.73_M2}
GLUCOSE SERPL-MCNC: 160 MG/DL (ref 70–99)
GLUCOSE UR STRIP.AUTO-MCNC: NEGATIVE MG/DL
HCT VFR BLD AUTO: 42.2 % (ref 36–48)
HGB BLD-MCNC: 14.3 G/DL (ref 12–16)
HGB UR QL STRIP.AUTO: ABNORMAL
KETONES UR STRIP.AUTO-MCNC: NEGATIVE MG/DL
LEUKOCYTE ESTERASE UR QL STRIP.AUTO: ABNORMAL
LIPASE SERPL-CCNC: 35 U/L (ref 13–60)
LYMPHOCYTES # BLD: 2.3 K/UL (ref 1–5.1)
LYMPHOCYTES NFR BLD: 24.7 %
MCH RBC QN AUTO: 33.7 PG (ref 26–34)
MCHC RBC AUTO-ENTMCNC: 33.9 G/DL (ref 31–36)
MCV RBC AUTO: 99.5 FL (ref 80–100)
MONOCYTES # BLD: 1.1 K/UL (ref 0–1.3)
MONOCYTES NFR BLD: 11.7 %
NEUTROPHILS # BLD: 5.5 K/UL (ref 1.7–7.7)
NEUTROPHILS NFR BLD: 60.6 %
NITRITE UR QL STRIP.AUTO: NEGATIVE
NT-PROBNP SERPL-MCNC: 3152 PG/ML (ref 0–449)
PH UR STRIP.AUTO: 7 [PH] (ref 5–8)
PLATELET # BLD AUTO: 352 K/UL (ref 135–450)
PMV BLD AUTO: 7.4 FL (ref 5–10.5)
POTASSIUM SERPL-SCNC: 5.1 MMOL/L (ref 3.5–5.1)
PROT SERPL-MCNC: 7.5 G/DL (ref 6.4–8.2)
PROT UR STRIP.AUTO-MCNC: 100 MG/DL
RBC # BLD AUTO: 4.24 M/UL (ref 4–5.2)
RBC #/AREA URNS HPF: ABNORMAL /HPF (ref 0–4)
SODIUM SERPL-SCNC: 133 MMOL/L (ref 136–145)
SP GR UR STRIP.AUTO: 1.01 (ref 1–1.03)
TROPONIN, HIGH SENSITIVITY: 22 NG/L (ref 0–14)
TROPONIN, HIGH SENSITIVITY: 27 NG/L (ref 0–14)
UA DIPSTICK W REFLEX MICRO PNL UR: YES
URN SPEC COLLECT METH UR: ABNORMAL
UROBILINOGEN UR STRIP-ACNC: 0.2 E.U./DL
WBC # BLD AUTO: 9.1 K/UL (ref 4–11)
WBC #/AREA URNS HPF: >100 /HPF (ref 0–5)

## 2025-03-12 PROCEDURE — 81001 URINALYSIS AUTO W/SCOPE: CPT

## 2025-03-12 PROCEDURE — 83690 ASSAY OF LIPASE: CPT

## 2025-03-12 PROCEDURE — 93005 ELECTROCARDIOGRAM TRACING: CPT | Performed by: STUDENT IN AN ORGANIZED HEALTH CARE EDUCATION/TRAINING PROGRAM

## 2025-03-12 PROCEDURE — 85025 COMPLETE CBC W/AUTO DIFF WBC: CPT

## 2025-03-12 PROCEDURE — 92960 CARDIOVERSION ELECTRIC EXT: CPT

## 2025-03-12 PROCEDURE — 6370000000 HC RX 637 (ALT 250 FOR IP): Performed by: INTERNAL MEDICINE

## 2025-03-12 PROCEDURE — 84484 ASSAY OF TROPONIN QUANT: CPT

## 2025-03-12 PROCEDURE — 36415 COLL VENOUS BLD VENIPUNCTURE: CPT

## 2025-03-12 PROCEDURE — 99285 EMERGENCY DEPT VISIT HI MDM: CPT

## 2025-03-12 PROCEDURE — 71045 X-RAY EXAM CHEST 1 VIEW: CPT

## 2025-03-12 PROCEDURE — 83880 ASSAY OF NATRIURETIC PEPTIDE: CPT

## 2025-03-12 PROCEDURE — 2580000003 HC RX 258

## 2025-03-12 PROCEDURE — 80053 COMPREHEN METABOLIC PANEL: CPT

## 2025-03-12 PROCEDURE — 3700000001 HC ADD 15 MINUTES (ANESTHESIA)

## 2025-03-12 PROCEDURE — 6360000002 HC RX W HCPCS

## 2025-03-12 PROCEDURE — 3700000000 HC ANESTHESIA ATTENDED CARE

## 2025-03-12 PROCEDURE — 5A2204Z RESTORATION OF CARDIAC RHYTHM, SINGLE: ICD-10-PCS | Performed by: INTERNAL MEDICINE

## 2025-03-12 PROCEDURE — 7100000010 HC PHASE II RECOVERY - FIRST 15 MIN

## 2025-03-12 PROCEDURE — 2000000000 HC ICU R&B

## 2025-03-12 RX ORDER — OXYCODONE HYDROCHLORIDE 5 MG/1
5 TABLET ORAL PRN
Refills: 0 | Status: DISCONTINUED | OUTPATIENT
Start: 2025-03-12 | End: 2025-03-12

## 2025-03-12 RX ORDER — POLYETHYLENE GLYCOL 3350 17 G/17G
17 POWDER, FOR SOLUTION ORAL DAILY PRN
Status: DISCONTINUED | OUTPATIENT
Start: 2025-03-12 | End: 2025-03-13 | Stop reason: HOSPADM

## 2025-03-12 RX ORDER — SODIUM CHLORIDE 9 MG/ML
INJECTION, SOLUTION INTRAVENOUS PRN
Status: CANCELLED | OUTPATIENT
Start: 2025-03-12

## 2025-03-12 RX ORDER — ONDANSETRON 2 MG/ML
4 INJECTION INTRAMUSCULAR; INTRAVENOUS EVERY 30 MIN PRN
Status: CANCELLED | OUTPATIENT
Start: 2025-03-12

## 2025-03-12 RX ORDER — NALOXONE HYDROCHLORIDE 0.4 MG/ML
INJECTION, SOLUTION INTRAMUSCULAR; INTRAVENOUS; SUBCUTANEOUS PRN
Status: CANCELLED | OUTPATIENT
Start: 2025-03-12

## 2025-03-12 RX ORDER — SODIUM CHLORIDE 9 MG/ML
INJECTION, SOLUTION INTRAVENOUS
Status: DISCONTINUED | OUTPATIENT
Start: 2025-03-12 | End: 2025-03-12

## 2025-03-12 RX ORDER — ONDANSETRON 4 MG/1
4 TABLET, ORALLY DISINTEGRATING ORAL EVERY 8 HOURS PRN
Status: DISCONTINUED | OUTPATIENT
Start: 2025-03-12 | End: 2025-03-13 | Stop reason: HOSPADM

## 2025-03-12 RX ORDER — SODIUM CHLORIDE 9 MG/ML
INJECTION, SOLUTION INTRAVENOUS
Status: DISCONTINUED | OUTPATIENT
Start: 2025-03-12 | End: 2025-03-12 | Stop reason: SDUPTHER

## 2025-03-12 RX ORDER — SODIUM CHLORIDE 0.9 % (FLUSH) 0.9 %
5-40 SYRINGE (ML) INJECTION EVERY 12 HOURS SCHEDULED
Status: CANCELLED | OUTPATIENT
Start: 2025-03-12

## 2025-03-12 RX ORDER — AMIODARONE HYDROCHLORIDE 200 MG/1
100 TABLET ORAL DAILY
Status: DISCONTINUED | OUTPATIENT
Start: 2025-03-13 | End: 2025-03-13

## 2025-03-12 RX ORDER — ONDANSETRON 2 MG/ML
4 INJECTION INTRAMUSCULAR; INTRAVENOUS EVERY 30 MIN PRN
Status: DISCONTINUED | OUTPATIENT
Start: 2025-03-12 | End: 2025-03-12

## 2025-03-12 RX ORDER — OXYCODONE HYDROCHLORIDE 5 MG/1
5 TABLET ORAL PRN
Refills: 0 | Status: CANCELLED | OUTPATIENT
Start: 2025-03-12 | End: 2025-03-12

## 2025-03-12 RX ORDER — ACETAMINOPHEN 325 MG/1
650 TABLET ORAL EVERY 6 HOURS PRN
Status: DISCONTINUED | OUTPATIENT
Start: 2025-03-12 | End: 2025-03-13 | Stop reason: HOSPADM

## 2025-03-12 RX ORDER — PROPOFOL 10 MG/ML
INJECTION, EMULSION INTRAVENOUS
Status: DISCONTINUED | OUTPATIENT
Start: 2025-03-12 | End: 2025-03-12 | Stop reason: SDUPTHER

## 2025-03-12 RX ORDER — AMIODARONE HYDROCHLORIDE 200 MG/1
400 TABLET ORAL 2 TIMES DAILY
Status: DISCONTINUED | OUTPATIENT
Start: 2025-03-12 | End: 2025-03-13 | Stop reason: HOSPADM

## 2025-03-12 RX ORDER — NALOXONE HYDROCHLORIDE 0.4 MG/ML
INJECTION, SOLUTION INTRAMUSCULAR; INTRAVENOUS; SUBCUTANEOUS PRN
Status: DISCONTINUED | OUTPATIENT
Start: 2025-03-12 | End: 2025-03-12

## 2025-03-12 RX ORDER — SODIUM CHLORIDE 0.9 % (FLUSH) 0.9 %
5-40 SYRINGE (ML) INJECTION PRN
Status: DISCONTINUED | OUTPATIENT
Start: 2025-03-12 | End: 2025-03-12

## 2025-03-12 RX ORDER — SODIUM CHLORIDE 0.9 % (FLUSH) 0.9 %
5-40 SYRINGE (ML) INJECTION PRN
Status: DISCONTINUED | OUTPATIENT
Start: 2025-03-12 | End: 2025-03-13 | Stop reason: HOSPADM

## 2025-03-12 RX ORDER — SODIUM CHLORIDE 9 MG/ML
INJECTION, SOLUTION INTRAVENOUS PRN
Status: DISCONTINUED | OUTPATIENT
Start: 2025-03-12 | End: 2025-03-13 | Stop reason: HOSPADM

## 2025-03-12 RX ORDER — SPIRONOLACTONE 25 MG/1
25 TABLET ORAL DAILY
Status: DISCONTINUED | OUTPATIENT
Start: 2025-03-13 | End: 2025-03-13 | Stop reason: HOSPADM

## 2025-03-12 RX ORDER — ACETAMINOPHEN 650 MG/1
650 SUPPOSITORY RECTAL EVERY 6 HOURS PRN
Status: DISCONTINUED | OUTPATIENT
Start: 2025-03-12 | End: 2025-03-13 | Stop reason: HOSPADM

## 2025-03-12 RX ORDER — SODIUM CHLORIDE 0.9 % (FLUSH) 0.9 %
5-40 SYRINGE (ML) INJECTION EVERY 12 HOURS SCHEDULED
Status: DISCONTINUED | OUTPATIENT
Start: 2025-03-12 | End: 2025-03-12

## 2025-03-12 RX ORDER — AMIODARONE HYDROCHLORIDE 200 MG/1
400 TABLET ORAL DAILY
Status: DISCONTINUED | OUTPATIENT
Start: 2025-03-12 | End: 2025-03-12

## 2025-03-12 RX ORDER — ONDANSETRON 2 MG/ML
4 INJECTION INTRAMUSCULAR; INTRAVENOUS EVERY 6 HOURS PRN
Status: DISCONTINUED | OUTPATIENT
Start: 2025-03-12 | End: 2025-03-13 | Stop reason: HOSPADM

## 2025-03-12 RX ORDER — SODIUM CHLORIDE 0.9 % (FLUSH) 0.9 %
5-40 SYRINGE (ML) INJECTION EVERY 12 HOURS SCHEDULED
Status: DISCONTINUED | OUTPATIENT
Start: 2025-03-12 | End: 2025-03-13 | Stop reason: HOSPADM

## 2025-03-12 RX ORDER — OXYCODONE HYDROCHLORIDE 5 MG/1
10 TABLET ORAL PRN
Refills: 0 | Status: DISCONTINUED | OUTPATIENT
Start: 2025-03-12 | End: 2025-03-12

## 2025-03-12 RX ORDER — OXYCODONE HYDROCHLORIDE 5 MG/1
10 TABLET ORAL PRN
Refills: 0 | Status: CANCELLED | OUTPATIENT
Start: 2025-03-12 | End: 2025-03-12

## 2025-03-12 RX ORDER — SODIUM CHLORIDE 9 MG/ML
INJECTION, SOLUTION INTRAVENOUS PRN
Status: DISCONTINUED | OUTPATIENT
Start: 2025-03-12 | End: 2025-03-12

## 2025-03-12 RX ORDER — PANTOPRAZOLE SODIUM 40 MG/1
40 TABLET, DELAYED RELEASE ORAL
Status: DISCONTINUED | OUTPATIENT
Start: 2025-03-13 | End: 2025-03-13 | Stop reason: HOSPADM

## 2025-03-12 RX ORDER — LIDOCAINE HYDROCHLORIDE 20 MG/ML
INJECTION, SOLUTION INFILTRATION; PERINEURAL
Status: DISCONTINUED | OUTPATIENT
Start: 2025-03-12 | End: 2025-03-12 | Stop reason: SDUPTHER

## 2025-03-12 RX ORDER — SODIUM CHLORIDE 0.9 % (FLUSH) 0.9 %
5-40 SYRINGE (ML) INJECTION PRN
Status: CANCELLED | OUTPATIENT
Start: 2025-03-12

## 2025-03-12 RX ADMIN — APIXABAN 2.5 MG: 2.5 TABLET, FILM COATED ORAL at 21:42

## 2025-03-12 RX ADMIN — SODIUM CHLORIDE: 9 INJECTION, SOLUTION INTRAVENOUS at 16:30

## 2025-03-12 RX ADMIN — PROPOFOL 50 MG: 10 INJECTION, EMULSION INTRAVENOUS at 17:01

## 2025-03-12 RX ADMIN — LIDOCAINE HYDROCHLORIDE 40 MG: 20 INJECTION, SOLUTION INFILTRATION; PERINEURAL at 17:01

## 2025-03-12 RX ADMIN — PROPOFOL 50 MG: 10 INJECTION, EMULSION INTRAVENOUS at 17:02

## 2025-03-12 ASSESSMENT — LIFESTYLE VARIABLES
HOW OFTEN DO YOU HAVE A DRINK CONTAINING ALCOHOL: MONTHLY OR LESS
HOW MANY STANDARD DRINKS CONTAINING ALCOHOL DO YOU HAVE ON A TYPICAL DAY: 1 OR 2

## 2025-03-12 ASSESSMENT — PAIN - FUNCTIONAL ASSESSMENT: PAIN_FUNCTIONAL_ASSESSMENT: 0-10

## 2025-03-12 ASSESSMENT — PAIN SCALES - GENERAL: PAINLEVEL_OUTOF10: 6

## 2025-03-12 NOTE — FLOWSHEET NOTE
Dr Rao at bedside aware of HR dropping into the 30s. SB with Junctional beats. Plan discussed with Saulo Meeks at beside.

## 2025-03-12 NOTE — ED PROVIDER NOTES
[facility] EMERGENCY DEPARTMENT     EMERGENCY DEPARTMENT ENCOUNTER            Pt Name: Cara Chapin   MRN: [id number]   Birthdate 1940   Date of evaluation: 3/12/2025   Provider: Nicole Taylor MD   PCP: Eron Kevin MD   Note Started: 1:18 PM EDT 3/12/25          CHIEF COMPLAINT     Chief Complaint   Patient presents with    Tachycardia     Presents from home in SVT. C/o chest tightness and diaphoresis. EMS gave a total of 18 of adenosine. She temporarily converted out but then went back into SVT.       HISTORY OF PRESENT ILLNESS:   History from : Patient   Limitations to history : None     Cara Chapin is a 85 y.o. female who presents complaining of substernal chest pain and shortness of breath.  Patient states that she was getting ready for a party this morning and she developed her symptoms.  She states that she took her home dose of amiodarone and was not feeling better so little bit later took an additional 100 mg.  She does follow with Dr. Rao.  She is anticoagulated on Eliquis and states she is compliant with her medications and has not missed any doses.  She denies any other complaints or concerns.    Nursing Notes were all reviewed and agreed with, or any disagreements were addressed in the HPI.     REVIEW OF SYSTEMS :    Positives and Pertinent negatives as per HPI.      MEDICAL HISTORY   has a past medical history of Allergic rhinitis, CHF (congestive heart failure), NYHA class I, acute on chronic, combined (HCC) (2021), Hyperlipidemia, Hypertension, Hypothyroidism, LBBB (left bundle branch block), and Rheumatoid arthritis(714.0).    Past Surgical History:   Procedure Laterality Date     SECTION      x2    COLONOSCOPY  2005    JOINT REPLACEMENT Left 13    left total knee replacement      CURRENTMEDICATIONS       Previous Medications    AMIODARONE (CORDARONE) 200 MG TABLET    Take 1 tablet by mouth daily    APIXABAN (ELIQUIS) 2.5 MG TABS TABLET

## 2025-03-12 NOTE — TELEPHONE ENCOUNTER
Pts pulse is now in 150's and she can not breathe. Pt is going to the emergency room and wanted to let AGK know.

## 2025-03-12 NOTE — ANESTHESIA PRE PROCEDURE
Department of Anesthesiology  Preprocedure Note       Name:  Cara Chapin   Age:  85 y.o.  :  1940                                          MRN:  [id number]         Date:  3/12/2025      Surgeon: * No surgeons listed *    Procedure: * No procedures listed *    Medications prior to admission:   Prior to Admission medications    Medication Sig Start Date End Date Taking? Authorizing Provider   amiodarone (CORDARONE) 200 MG tablet Take 1 tablet by mouth daily  Patient taking differently: Take 0.5 tablets by mouth daily Amiodarone decreased to 100 mg per Dr. Rao on 2025. 25  PAZ Rao Jr., MD   apixaban (ELIQUIS) 2.5 MG TABS tablet TAKE 1 TABLET TWICE A DAY 24   Shana Francis APRN - CNP   spironolactone (ALDACTONE) 25 MG tablet Take 1 tablet by mouth daily 24   Shana Francis APRN - CNP   Pantoprazole Sodium (PROTONIX PO) Take by mouth Takes OTC prn    Nery Kwan MD   methotrexate (RHEUMATREX) 2.5 MG chemo tablet TAKE 8 TABLETS EVERY MONDAY  Patient taking differently: PT TAKES 6 TABLETS EVERY 21   Eron Kevni MD   Probiotic Product (PROBIOTIC PO) Take 1 capsule by mouth daily     ProviderNery MD       Current medications:    Current Outpatient Medications   Medication Sig Dispense Refill   • amiodarone (CORDARONE) 200 MG tablet Take 1 tablet by mouth daily (Patient taking differently: Take 0.5 tablets by mouth daily Amiodarone decreased to 100 mg per Dr. Rao on 2025.) 90 tablet 3   • apixaban (ELIQUIS) 2.5 MG TABS tablet TAKE 1 TABLET TWICE A  tablet 3   • spironolactone (ALDACTONE) 25 MG tablet Take 1 tablet by mouth daily 90 tablet 3   • Pantoprazole Sodium (PROTONIX PO) Take by mouth Takes OTC prn     • methotrexate (RHEUMATREX) 2.5 MG chemo tablet TAKE 8 TABLETS EVERY MONDAY (Patient taking differently: PT TAKES 6 TABLETS EVERY MONDAY) 96 tablet 5   • Probiotic Product (PROBIOTIC PO) Take 1 capsule by

## 2025-03-12 NOTE — H&P
[facility] Medicine History & Physical    V 1.6    Date of Admission: 3/12/2025    Date of Service:  Pt seen/examined on 03/12/25     [x]Admitted to Inpatient with expected LOS greater than two midnights due to medical therapy.  []Placed in Observation status.    Chief Admission Complaint:  tachycardia    Presenting Admission History:      85 y.o. female who presented to [facility] with tachycardia.  PMHx significant for PAF, CHF, RA, HTN. Patient started having some chest tightness and diaphoresis this morning. She has known Afib and checked her HR which was markedly elevated. Her HR has improved since coming in to the ED and her symptoms have now resolved. EP was notified in the ED with a plan for a cardioversion.    Assessment/Plan:      Current Principal Problem:  Atrial fibrillation with RVR (HCC)    Afib with RVR  - cardiology consulted  - continue amiodarone  - plan for cardioversion today  - continue eliquis for AC    Chronic systolic heart failure  - appears euvolemic  - echo 1/25 with EF 35-40%  - continue aldactone  - unclear why patient is not on GDMT. Will discuss with cardiology tomorrow    HTN  - well controlled  - continue aldactone    RA  - on weekly methotrexate    Discussed management and the need for Hospitalization of the patient w/ the Emergency Department Provider: Dr. Taylor    CXR: I have reviewed the CXR with the following interpretation: mild pulmonary edema  EKG:  I have reviewed the EKG with the following interpretation: afib with RVR    Physical Exam Performed:      /84   Pulse (!) 114   Temp 97.6 °F (36.4 °C)   Resp 16   SpO2 100%     General appearance:  No apparent distress, appears stated age and cooperative.  HEENT:  Pupils equal, round, and reactive to light. Conjunctivae/corneas clear.  Respiratory:  Normal respiratory effort. Clear to auscultation bilaterally without Rales/Wheezes/Rhonchi.  Cardiovascular:  tachycardia, irregular rhythm with normal

## 2025-03-12 NOTE — ANESTHESIA PRE PROCEDURE
Department of Anesthesiology  Preprocedure Note       Name:  Cara Chapin   Age:  85 y.o.  :  1940                                          MRN:  [id number]         Date:  3/12/2025      Surgeon: * No surgeons listed *    Procedure: * No procedures listed *    Medications prior to admission:   Prior to Admission medications    Medication Sig Start Date End Date Taking? Authorizing Provider   amiodarone (CORDARONE) 200 MG tablet Take 1 tablet by mouth daily  Patient taking differently: Take 0.5 tablets by mouth daily Amiodarone decreased to 100 mg per Dr. Rao on 2025. 25  PAZ Rao Jr., MD   apixaban (ELIQUIS) 2.5 MG TABS tablet TAKE 1 TABLET TWICE A DAY 24   Shana Francis APRN - CNP   spironolactone (ALDACTONE) 25 MG tablet Take 1 tablet by mouth daily 24   Shana Francis APRN - CNP   Pantoprazole Sodium (PROTONIX PO) Take by mouth Takes OTC prn    ProviderNery MD   methotrexate (RHEUMATREX) 2.5 MG chemo tablet TAKE 8 TABLETS EVERY MONDAY  Patient taking differently: PT TAKES 6 TABLETS EVERY 21   Eron Kevin MD   Probiotic Product (PROBIOTIC PO) Take 1 capsule by mouth daily     ProviderNery MD       Current medications:    No current facility-administered medications for this encounter.     No current outpatient medications on file.     Facility-Administered Medications Ordered in Other Encounters   Medication Dose Route Frequency Provider Last Rate Last Admin   • [START ON 3/13/2025] amiodarone (CORDARONE) tablet 100 mg  100 mg Oral Daily Js Hernandez MD       • apixaban (ELIQUIS) tablet 2.5 mg  2.5 mg Oral BID Js Hernandez MD       • [START ON 3/13/2025] pantoprazole (PROTONIX) tablet 40 mg  40 mg Oral QAM AC Js Hernandez MD       • [START ON 3/13/2025] spironolactone (ALDACTONE) tablet 25 mg  25 mg Oral Daily Js Hernandez MD       • sodium chloride flush 0.9 % injection 5-40 mL  5-40 mL IntraVENous 2 times

## 2025-03-12 NOTE — CONSULTS
symptomatic atrial fibrillation with RVR with marked shortness of breath.  Patient, her son and daughter, her caregiver, and myself discussed options including increasing amiodarone and cardioversion versus pace and ablate strategy.  After prolonged discussion with everyone patient opted for increasing amiodarone and should she have recurrence of her atrial fibrillation she would like to move forward with pace and ablate strategy.  All questions concerns addressed.  Plan for cardioversion today.  - IV amiodarone for 12 hours.  - Start amiodarone 400 mg BID for 1 week then 400 mg daily thereafter.  - Continue apixaban 5 mg BID.  - We will continue to follow along with you.    2. Cardiomyopathy with moderately depressed LVEF.  Stable.  BNP has decreased as compared to previous admission.  - Continue to monitor for recurrence of clinical heart failure.    3. Urinary tract infection.  UA was concerning for urinary tract infection.  This may explain abdominal pain.  - Defer to primary team.    Due to complex nature of patient's disease process, medication adjustments and education I spent 85 minutes with patient care.    Thank you for allowing me to participate in the care of Cara TI Chapin . If you have any questions/comments, please do not hesitate to contact us.    Steve Rao MD  Cardiac Electrophysiology  [facility]  [phone] [place] Office

## 2025-03-12 NOTE — ANESTHESIA POSTPROCEDURE EVALUATION
Department of Anesthesiology  Postprocedure Note    Patient: Cara Chapin  MRN: [id number]  YOB: 1940  Date of evaluation: 3/12/2025    Procedure Summary       Date: 03/12/25 Room / Location: [facility]    Anesthesia Start: 1655 Anesthesia Stop: 1716    Procedure: CARDIOVERSION EXTERNAL Diagnosis: Paroxysmal atrial fibrillation (HCC)    Scheduled Providers: PAZ Rao Jr., MD Responsible Provider: Houston Naik MD    Anesthesia Type: MAC ASA Status: 3            Anesthesia Type: No value filed.    Chava Phase I:      Chava Phase II:      Anesthesia Post Evaluation    Patient location during evaluation: PACU  Level of consciousness: awake  Airway patency: patent  Nausea & Vomiting: no vomiting  Cardiovascular status: blood pressure returned to baseline  Respiratory status: acceptable  Hydration status: stable  Pain management: adequate    No notable events documented.

## 2025-03-13 ENCOUNTER — CARE COORDINATION (OUTPATIENT)
Dept: CASE MANAGEMENT | Age: 85
End: 2025-03-13

## 2025-03-13 ENCOUNTER — TELEPHONE (OUTPATIENT)
Dept: CARDIOLOGY CLINIC | Age: 85
End: 2025-03-13

## 2025-03-13 ENCOUNTER — TELEPHONE (OUTPATIENT)
Dept: FAMILY MEDICINE CLINIC | Age: 85
End: 2025-03-13

## 2025-03-13 VITALS
SYSTOLIC BLOOD PRESSURE: 166 MMHG | WEIGHT: 105.38 LBS | HEART RATE: 50 BPM | DIASTOLIC BLOOD PRESSURE: 94 MMHG | OXYGEN SATURATION: 94 % | HEIGHT: 64 IN | BODY MASS INDEX: 17.99 KG/M2 | TEMPERATURE: 97 F | RESPIRATION RATE: 29 BRPM

## 2025-03-13 LAB
ANION GAP SERPL CALCULATED.3IONS-SCNC: 11 MMOL/L (ref 3–16)
BASOPHILS # BLD: 0 K/UL (ref 0–0.2)
BASOPHILS NFR BLD: 0.6 %
BUN SERPL-MCNC: 15 MG/DL (ref 7–20)
CALCIUM SERPL-MCNC: 8.9 MG/DL (ref 8.3–10.6)
CHLORIDE SERPL-SCNC: 104 MMOL/L (ref 99–110)
CO2 SERPL-SCNC: 23 MMOL/L (ref 21–32)
CREAT SERPL-MCNC: 0.7 MG/DL (ref 0.6–1.2)
DEPRECATED RDW RBC AUTO: 16.2 % (ref 12.4–15.4)
EKG ATRIAL RATE: 57 BPM
EKG DIAGNOSIS: NORMAL
EKG DIAGNOSIS: NORMAL
EKG P AXIS: 80 DEGREES
EKG P-R INTERVAL: 144 MS
EKG Q-T INTERVAL: 312 MS
EKG Q-T INTERVAL: 498 MS
EKG QRS DURATION: 138 MS
EKG QRS DURATION: 142 MS
EKG QTC CALCULATION (BAZETT): 484 MS
EKG QTC CALCULATION (BAZETT): 516 MS
EKG R AXIS: -28 DEGREES
EKG R AXIS: -51 DEGREES
EKG T AXIS: 142 DEGREES
EKG T AXIS: 171 DEGREES
EKG VENTRICULAR RATE: 165 BPM
EKG VENTRICULAR RATE: 57 BPM
EOSINOPHIL # BLD: 0.2 K/UL (ref 0–0.6)
EOSINOPHIL NFR BLD: 4.3 %
GFR SERPLBLD CREATININE-BSD FMLA CKD-EPI: 85 ML/MIN/{1.73_M2}
GLUCOSE SERPL-MCNC: 77 MG/DL (ref 70–99)
HCT VFR BLD AUTO: 37.2 % (ref 36–48)
HGB BLD-MCNC: 12.5 G/DL (ref 12–16)
LYMPHOCYTES # BLD: 1.5 K/UL (ref 1–5.1)
LYMPHOCYTES NFR BLD: 30.4 %
MCH RBC QN AUTO: 33.8 PG (ref 26–34)
MCHC RBC AUTO-ENTMCNC: 33.7 G/DL (ref 31–36)
MCV RBC AUTO: 100.2 FL (ref 80–100)
MONOCYTES # BLD: 0.7 K/UL (ref 0–1.3)
MONOCYTES NFR BLD: 14.7 %
NEUTROPHILS # BLD: 2.5 K/UL (ref 1.7–7.7)
NEUTROPHILS NFR BLD: 50 %
PLATELET # BLD AUTO: 280 K/UL (ref 135–450)
PMV BLD AUTO: 7.8 FL (ref 5–10.5)
POTASSIUM SERPL-SCNC: 4.5 MMOL/L (ref 3.5–5.1)
RBC # BLD AUTO: 3.71 M/UL (ref 4–5.2)
SODIUM SERPL-SCNC: 138 MMOL/L (ref 136–145)
WBC # BLD AUTO: 5 K/UL (ref 4–11)

## 2025-03-13 PROCEDURE — 93010 ELECTROCARDIOGRAM REPORT: CPT | Performed by: INTERNAL MEDICINE

## 2025-03-13 PROCEDURE — 6370000000 HC RX 637 (ALT 250 FOR IP): Performed by: INTERNAL MEDICINE

## 2025-03-13 PROCEDURE — 80048 BASIC METABOLIC PNL TOTAL CA: CPT

## 2025-03-13 PROCEDURE — 85025 COMPLETE CBC W/AUTO DIFF WBC: CPT

## 2025-03-13 PROCEDURE — 99232 SBSQ HOSP IP/OBS MODERATE 35: CPT | Performed by: NURSE PRACTITIONER

## 2025-03-13 PROCEDURE — 2500000003 HC RX 250 WO HCPCS: Performed by: INTERNAL MEDICINE

## 2025-03-13 PROCEDURE — 6360000002 HC RX W HCPCS: Performed by: INTERNAL MEDICINE

## 2025-03-13 RX ORDER — VALSARTAN 80 MG/1
80 TABLET ORAL DAILY
Status: DISCONTINUED | OUTPATIENT
Start: 2025-03-13 | End: 2025-03-13 | Stop reason: HOSPADM

## 2025-03-13 RX ORDER — MUPIROCIN 20 MG/G
OINTMENT TOPICAL 2 TIMES DAILY
Status: DISCONTINUED | OUTPATIENT
Start: 2025-03-13 | End: 2025-03-13 | Stop reason: HOSPADM

## 2025-03-13 RX ORDER — VALSARTAN 80 MG/1
80 TABLET ORAL DAILY
Qty: 30 TABLET | Refills: 0 | Status: SHIPPED | OUTPATIENT
Start: 2025-03-14

## 2025-03-13 RX ORDER — CEFDINIR 300 MG/1
300 CAPSULE ORAL 2 TIMES DAILY
Qty: 12 CAPSULE | Refills: 0 | Status: SHIPPED | OUTPATIENT
Start: 2025-03-13 | End: 2025-03-19

## 2025-03-13 RX ORDER — AMIODARONE HYDROCHLORIDE 400 MG/1
400 TABLET ORAL 2 TIMES DAILY
Qty: 60 TABLET | Refills: 0 | Status: SHIPPED | OUTPATIENT
Start: 2025-03-13

## 2025-03-13 RX ADMIN — MUPIROCIN: 20 OINTMENT TOPICAL at 08:55

## 2025-03-13 RX ADMIN — CEFTRIAXONE SODIUM 1000 MG: 1 INJECTION, POWDER, FOR SOLUTION INTRAMUSCULAR; INTRAVENOUS at 08:51

## 2025-03-13 RX ADMIN — VALSARTAN 80 MG: 80 TABLET, FILM COATED ORAL at 14:14

## 2025-03-13 RX ADMIN — SODIUM CHLORIDE, PRESERVATIVE FREE 10 ML: 5 INJECTION INTRAVENOUS at 08:55

## 2025-03-13 RX ADMIN — PANTOPRAZOLE SODIUM 40 MG: 40 TABLET, DELAYED RELEASE ORAL at 07:31

## 2025-03-13 RX ADMIN — SPIRONOLACTONE 25 MG: 25 TABLET ORAL at 08:51

## 2025-03-13 NOTE — PLAN OF CARE
Problem: Discharge Planning  Goal: Discharge to home or other facility with appropriate resources  Outcome: Progressing  Flowsheets  Taken 3/12/2025 2139  Discharge to home or other facility with appropriate resources:   Identify barriers to discharge with patient and caregiver   Identify discharge learning needs (meds, wound care, etc)   Refer to discharge planning if patient needs post-hospital services based on physician order or complex needs related to functional status, cognitive ability or social support system   Arrange for needed discharge resources and transportation as appropriate   Arrange for interpreters to assist at discharge as needed  Taken 3/12/2025 2000  Discharge to home or other facility with appropriate resources:   Identify barriers to discharge with patient and caregiver   Arrange for needed discharge resources and transportation as appropriate   Identify discharge learning needs (meds, wound care, etc)   Arrange for interpreters to assist at discharge as needed   Refer to discharge planning if patient needs post-hospital services based on physician order or complex needs related to functional status, cognitive ability or social support system     Problem: Chronic Conditions and Co-morbidities  Goal: Patient's chronic conditions and co-morbidity symptoms are monitored and maintained or improved  Outcome: Progressing  Flowsheets (Taken 3/12/2025 2000)  Care Plan - Patient's Chronic Conditions and Co-Morbidity Symptoms are Monitored and Maintained or Improved: Monitor and assess patient's chronic conditions and comorbid symptoms for stability, deterioration, or improvement     Problem: Pain  Goal: Verbalizes/displays adequate comfort level or baseline comfort level  Outcome: Progressing     Problem: Safety - Adult  Goal: Free from fall injury  Outcome: Progressing     Problem: ABCDS Injury Assessment  Goal: Absence of physical injury  Outcome: Progressing

## 2025-03-13 NOTE — CARE COORDINATION
Case Management Assessment  Initial Evaluation    Date/Time of Evaluation: 3/13/2025 10:50 AM  Assessment Completed by: Kimmie Roberts RN    If patient is discharged prior to next notation, then this note serves as note for discharge by case management.    Patient Name: Cara Chapin                   YOB: 1940  Diagnosis: Paroxysmal atrial fibrillation (HCC) [I48.0]  Atrial fibrillation with RVR (HCC) [I48.91]                   Date / Time: 3/12/2025 12:22 PM    Patient Admission Status: Inpatient   Readmission Risk (Low < 19, Mod (19-27), High > 27): Readmission Risk Score: 16.5    Current PCP: Eron Kevin MD  PCP verified by CM? Yes    Chart Reviewed: Yes      History Provided by: Patient  Patient Orientation: Alert and Oriented    Patient Cognition: Alert    Hospitalization in the last 30 days (Readmission):  No    If yes, Readmission Assessment in CM Navigator will be completed.    Advance Directives:      Code Status: Full Code   Patient's Primary Decision Maker is: Named in Scanned ACP Document    Primary Decision Maker: LitaraudelJessicaPreetiMarisela  Child - [phone]    Secondary Decision Maker: SylviaanConstantino - [phone]    Discharge Planning:    Patient lives with: Alone Type of Home: Independent Living  Primary Care Giver: Self  Patient Support Systems include: Children, Family Members   Current Financial resources: Medicare  Current community resources: Other (Comment) ([place])  Current services prior to admission: Other (Comment) ([facility] for 8 hrs a week)            Current DME:              Type of Home Care services:  [facility] (8 hours)    ADLS  Prior functional level: Assistance with the following:, Shopping, Housework, Cooking  Current functional level: Assistance with the following:, Shopping, Housework, Cooking    PT AM-PAC:   /24  OT AM-PAC:   /24    Family can provide assistance at DC: No  Would you like Case Management to discuss the discharge plan

## 2025-03-13 NOTE — TELEPHONE ENCOUNTER
Constantino, pt's son, contacted office asking to speak directly with AGK. I informed Constantino that this is an inpatient and that they would need to speak with the [facility] RN caring for the pt. Constantino stated they are not getting any answers. I informed him that since this is an inpatient the best we can do is chart this msg. I also informed our hospital [facility] RN to reach MD or NPAM that is seeing the pt

## 2025-03-13 NOTE — CARE COORDINATION
CTN contacted [facility] inpatient  on C2 and [phone] regarding patient eligibility for RPM.     Victorina Storm, MSN, RN, [facility]  Care Transition Nurse  [phone] mobile

## 2025-03-13 NOTE — TELEPHONE ENCOUNTER
Pt sts she is in hospital and has not had anything to eat. She asked her nurse who tells they are waiting on something. Pt wants to know what they are waiting on. Please advise.

## 2025-03-13 NOTE — TELEPHONE ENCOUNTER
Received a message from Rambo as follows:   So mom went to [facility] ER yesterday with a 170 pulse-had AFIB again, had a cardioversion (refused pacemaker).  She's doing okay today but still in ICU.  Did doc get his urine sample results for her UTI?

## 2025-03-13 NOTE — PROGRESS NOTES
Pt was concerned about her Amiodaron dose this evening at 400mg with her HR ranged from 57 to 60s. Spoke with Dr. Jesenia Samson and asked to hold the dose tonight.       Electronically signed by Moni Sagastume RN on 3/12/25 at 10:55 PM EDT

## 2025-03-13 NOTE — PROGRESS NOTES
[facility]     Electrophysiology                                     Progress Note    Admission date:  3/12/2025    Reason for follow up visit: AF    HPI/CC: Cara Chapin was admitted on 3/122/2025 with rapid atrial fibrillation and urinary tract infection.  She had a cardioversion.  Amiodarone was increased.  Rhythm has been sinus.     Subjective: She is anxious for discharge. Denies chest pain, palpitations, shortness of breath, and dizziness.       Vitals:  Blood pressure 134/78, pulse 50, temperature 97 °F (36.1 °C), temperature source Oral, resp. rate 29, height 1.626 m (5' 4.02\"), weight 47.8 kg (105 lb 6.1 oz), SpO2 95%.  Temp  Av.8 °F (36.6 °C)  Min: 97 °F (36.1 °C)  Max: 98.2 °F (36.8 °C)  Pulse  Av.7  Min: 36  Max: 165  BP  Min: 98/54  Max: 150/78  SpO2  Av.8 %  Min: 95 %  Max: 100 %    24 hour I/O    Intake/Output Summary (Last 24 hours) at 3/13/2025 1112  Last data filed at 3/12/2025 2100  Gross per 24 hour   Intake 400 ml   Output 500 ml   Net -100 ml     Current Facility-Administered Medications   Medication Dose Route Frequency Provider Last Rate Last Admin    mupirocin (BACTROBAN) 2 % ointment   Each Nostril BID Js Hernandez MD   Given at 25 0855    cefTRIAXone (ROCEPHIN) 1,000 mg in sterile water 10 mL IV syringe  1,000 mg IntraVENous Q24H Js Hernandez MD   1,000 mg at 25 0851    apixaban (ELIQUIS) tablet 2.5 mg  2.5 mg Oral BID Js Hernandez MD   2.5 mg at 25 2142    pantoprazole (PROTONIX) tablet 40 mg  40 mg Oral QAM AC Js Hernandez MD   40 mg at 25 0731    spironolactone (ALDACTONE) tablet 25 mg  25 mg Oral Daily Js Hernandez MD   25 mg at 25 0851    sodium chloride flush 0.9 % injection 5-40 mL  5-40 mL IntraVENous 2 times per day Js Hernandez MD   10 mL at 25 0855    sodium chloride flush 0.9 % injection 5-40 mL  5-40 mL IntraVENous PRN Js Hernandez MD        0.9 % sodium chloride infusion   IntraVENous

## 2025-03-13 NOTE — TELEPHONE ENCOUNTER
I called and spoke with pt and since it states in her chart she is still scheduled for a pacemaker she is NPO for the procedure, pt and AGK ended up agreeing not to do the pacemaker so pt is allowed to eat but nurses on floor need confirmation. AGK was made aware of the issue.

## 2025-03-13 NOTE — PROGRESS NOTES
Comprehensive Nutrition Assessment    Type and Reason for Visit:  Initial (Low BMI of 18.08 kg/m2)    Nutrition Recommendations/Plan:   Continue NPO (advance as medically feasible per MD).  Trial Ensure once/day following diet advancement.   Monitor nutrition adequacy, pertinent labs, bowel habits, wt changes, and clinical progress.     Malnutrition Assessment:  Malnutrition Status:  At risk for malnutrition (03/13/25 1217)    Context:  Chronic Illness     Findings of the 6 clinical characteristics of malnutrition:  Energy Intake:  Mild decrease in energy intake  Weight Loss:  Mild weight loss (mild wt fluctuations likely r/t fluid shifts)     Body Fat Loss:  Mild body fat loss (question natural aging versus malnutrition) Orbital   Muscle Mass Loss:  Mild muscle mass loss (question natural aging versus malnutrition) Clavicles (pectoralis & deltoids), Temples (temporalis)  Fluid Accumulation:  No fluid accumulation     Strength:  Not Performed    Nutrition Assessment:    Pt visited for low BMI of < 18.5. Admitted w/ A-fib. PMH of CHF, HTN, and HLD. Pt is s/p external cardioversion yesterday. Currently NPO. Reports her appetite was \"perfect\" PTA and that she typically eats 3 meals/day. Wts appear relatively stable the past year in EMR at 105-109 lbs. Agreeable to trial Ensure once/day following diet advancement. Encouraged PO as tolerated follwing diet advancement. Denies nutrition-related questions at this time. Will continue to monitor.    Nutrition Related Findings:    No BM documented this admit. Labs reviewed. No edema. Wound Type: None       Current Nutrition Intake & Therapies:    Average Meal Intake: NPO  Average Supplements Intake: NPO  Diet NPO    Anthropometric Measures:  Height: 162.6 cm (5' 4.02\")  Ideal Body Weight (IBW): 120 lbs (55 kg)    Current Body Weight: 47.8 kg (105 lb 6.1 oz), 87.8 % IBW. Weight Source: Bed scale  Current BMI (kg/m2): 18.1  % Weight Change (Calculated): -15.7  Weight

## 2025-03-13 NOTE — TELEPHONE ENCOUNTER
Rambo has access to her chart.  She will look at them through there.   I advised her that all the labs, including the urine, were done through the hospital.

## 2025-03-13 NOTE — TELEPHONE ENCOUNTER
Pt called office again stating she is in hospital and is starving and has not had anything to eat. Pt is demanding AGK tell her what is going on because she just wants to eat when she gets out of hospital and is starving. Pt states that that her and AGK agreed last night that there was no longer going to be a pacemaker put in. Please advise. Pt is very disgruntled and she said she will continue to call office. Thank you! Best call back:[phone]

## 2025-03-13 NOTE — DISCHARGE SUMMARY
Hospital Medicine Discharge Summary    Patient: Cara Chapin   : 1940     Hospital:  [facility]  Admit Date: 3/12/2025   Discharge Date: 3/13/2025    Disposition:  [x]Home   []HHC  []SNF  []Acute Rehab  []LTAC  []Hospice  Code status:  [x]Full  []DNR/CCA  []Limited (DNR/CCA with Do Not Intubate)  []DNRCC  Condition at Discharge: Stable  Primary Care Provider: Eron Kevin MD    Admitting Provider: Js Hernandez MD  Discharge Provider: Js Hernandez MD     Discharge Diagnoses:      Active Hospital Problems    Diagnosis     Atrial fibrillation with RVR (HCC) [I48.91]        Presenting Admission History:      85 y.o. female who presented to [facility] with tachycardia.  PMHx significant for PAF, CHF, RA, HTN. Patient started having some chest tightness and diaphoresis this morning. She has known Afib and checked her HR which was markedly elevated. Her HR has improved since coming in to the ED and her symptoms have now resolved. EP was notified in the ED with a plan for a cardioversion.      Assessment/Plan:      Afib with RVR  - cardiology consulted  - s/p cardioversion  - increased amiodarone with taper plan  - continue eliquis for AC  - follow up with EP next week     Chronic systolic heart failure  - appears euvolemic  - echo  with EF 35-40%  - continue aldactone  - started on valsartan. 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  Respiratory:  Normal respiratory effort.   Cardiovascular:  Regular rate and rhythm.  Abdomen:  Soft, non-tender, non-distended.  Musculoskeletal:  No edema  Neurologic:

## 2025-03-14 ENCOUNTER — CARE COORDINATION (OUTPATIENT)
Dept: CASE MANAGEMENT | Age: 85
End: 2025-03-14

## 2025-03-14 NOTE — CARE COORDINATION
Care Transitions Note    Initial Call - Call within 2 business days of discharge: Yes    Patient Current Location:  Home: [address] [place] [address] [address]  [facility] [address]    Care Transition Nurse contacted the patient by telephone to perform post hospital discharge assessment, verified name and  as identifiers. Provided introduction to self, and explanation of the Care Transition Nurse role.     Patient: Cara Chapin    Patient : 1940   MRN: [id number]    Reason for Admission: Tachycardia  Discharge Date: 3/13/25  RURS: Readmission Risk Score: 16.3      Last Discharge Facility       Date Complaint Diagnosis Description Type Department Provider    3/12/25 Tachycardia Atrial fibrillation with RVR (HCC) ... ED to Hosp-Admission (Discharged) (ADMITTED) Js Chatterjee MD; Nasrin Taylor...            Was this an external facility discharge? No    Additional needs identified to be addressed with provider   No needs identified             Method of communication with provider: none.    Patients top risk factors for readmission: medical condition-+tachycardia    Interventions to address risk factors:   Review of patient management of conditions/medications: tachycardia    Care Summary Note:  KATIE brandt states she is doing well.  Cara states she occasionally monitors her B/P at home. She states she has not done that today.  She denies any palpitations, Chest pain,SOB,or edema at this time.  Cara states her HR this morning = 58.  She states she did not weigh herself today.  Cara states she tries to follow a low sodium/no added salt diet and monitor her fluid intake daily.  She states she has a HHA and private pay assistance at home.  Cara states she is up and about on her own for ambulation.  She states she has answered enough questions and had enough discussion for 1 day. Cara denies any needs at this time and is agreeable to CT follow up phone calls.    Care Transition Nurse reviewed medical action

## 2025-03-20 ENCOUNTER — OFFICE VISIT (OUTPATIENT)
Dept: CARDIOLOGY CLINIC | Age: 85
End: 2025-03-20
Payer: MEDICARE

## 2025-03-20 VITALS
HEART RATE: 40 BPM | DIASTOLIC BLOOD PRESSURE: 56 MMHG | SYSTOLIC BLOOD PRESSURE: 110 MMHG | OXYGEN SATURATION: 98 % | HEIGHT: 64 IN | WEIGHT: 103 LBS | BODY MASS INDEX: 17.58 KG/M2

## 2025-03-20 DIAGNOSIS — I48.0 PAROXYSMAL ATRIAL FIBRILLATION (HCC): Primary | ICD-10-CM

## 2025-03-20 PROCEDURE — 1160F RVW MEDS BY RX/DR IN RCRD: CPT | Performed by: INTERNAL MEDICINE

## 2025-03-20 PROCEDURE — 93000 ELECTROCARDIOGRAM COMPLETE: CPT | Performed by: INTERNAL MEDICINE

## 2025-03-20 PROCEDURE — 1123F ACP DISCUSS/DSCN MKR DOCD: CPT | Performed by: INTERNAL MEDICINE

## 2025-03-20 PROCEDURE — 99214 OFFICE O/P EST MOD 30 MIN: CPT | Performed by: INTERNAL MEDICINE

## 2025-03-20 PROCEDURE — 3078F DIAST BP <80 MM HG: CPT | Performed by: INTERNAL MEDICINE

## 2025-03-20 PROCEDURE — 1159F MED LIST DOCD IN RCRD: CPT | Performed by: INTERNAL MEDICINE

## 2025-03-20 PROCEDURE — 3074F SYST BP LT 130 MM HG: CPT | Performed by: INTERNAL MEDICINE

## 2025-03-20 RX ORDER — AMIODARONE HYDROCHLORIDE 200 MG/1
200 TABLET ORAL SEE ADMIN INSTRUCTIONS
Qty: 90 TABLET | Refills: 1
Start: 2025-03-20 | End: 2025-03-20 | Stop reason: SDUPTHER

## 2025-03-20 RX ORDER — AMIODARONE HYDROCHLORIDE 200 MG/1
200 TABLET ORAL SEE ADMIN INSTRUCTIONS
Qty: 180 TABLET | Refills: 3 | Status: SHIPPED | OUTPATIENT
Start: 2025-03-20

## 2025-03-20 NOTE — PROGRESS NOTES
[facility]   Electrophysiology Consult Note     Date: 3/20/25  Patient Name: Cara Chapin  YOB: 1940    Primary Care Physician: Eron Kevin MD    CHIEF COMPLAINT:   Chief Complaint   Patient presents with    Follow-Up from Hospital    Hyperlipidemia    Atrial Fibrillation    Cardiomyopathy    Other     \"PURPLE LEGS'     HISTORY OF PRESENT ILLNESS: Cara Chapin is a 85 y.o. female with a medical history significant for symptomatic paroxysmal atrial fibrillation, known left bundle branch block (negative stress test from [place]), hypertension, rheumoatoid arthritis, hypothyroidism, and complicated diverticulitis who presented to the ED from home 6/2021 with symptomatic atrial fibrillation with RVR having recently undergone LUCY cardioversion.  According to patient and her daughter she recently began to suffer from dyspnea on exertion and shortness of breath.  She is unable to move from her bed to her recliner without becoming short of breath.  She was seen by her PCP who directed her to a cardiologist.  Patient was found to be in atrial fibrillation with RVR.  She underwent a LUCY cardioversion on 06/02/2021 ([facility]).  She was discharged home on rate control and anticoagulation.  Unfortunately patient began to suffer from worsening shortness of breath and dyspnea on exertion shortly thereafter and was brought to [facility] for further evaluation and care last night.   Echo 6/3/2021 demonstrated an LVEF of 25%.    Patient was evaluated emergency room 6/4/2021.  Her CMP was reassuring outside of hypoalbuminemia and hypoproteinemia along with elevated AST and ALT.  Her CBC was reassuring.  Her BNP was elevated at 1216.  Her chest radiograph showed pulmonary vascular congestion with chronic changes, cardiomegaly, small left pleural effusion.    Patient was started on amiodarone 6/2021 ([facility]).    Patient returned to ED 6/17/2201 due to acute on chronic CHF. Troponin

## 2025-03-20 NOTE — PATIENT INSTRUCTIONS
RECOMMENDATIONS:  Discussed pacemaker for tachy-elliot syndrome. Patient would like to defer at this time.   Change amiodarone to 300mg daily. (200mg AM, 100mg PM).   Follow up with me as scheduled.   -let us know if you start to experience dizziness, shortness of breath, chest pain, or fatigue.

## 2025-03-21 ENCOUNTER — CARE COORDINATION (OUTPATIENT)
Dept: CASE MANAGEMENT | Age: 85
End: 2025-03-21

## 2025-03-21 NOTE — CARE COORDINATION
contact information.  Plan for follow-up call in 6-10 days based on severity of symptoms and risk factors.  Plan for next call:  palpitations      Radha Nunez RN BSN  Care Transition Nurse  [phone]

## 2025-03-28 ENCOUNTER — CARE COORDINATION (OUTPATIENT)
Dept: CASE MANAGEMENT | Age: 85
End: 2025-03-28

## 2025-03-28 NOTE — CARE COORDINATION
Care Transitions Note    Follow Up Call     Patient Current Location:  Home: [address]  [facility] [address]    Care Transition Nurse contacted the patient by telephone. Verified name and  as identifiers.    Additional needs identified to be addressed with provider   No needs identified                 Method of communication with provider: none.    Care Summary Note: Spoke with patient.    States she has started vaginal bleeding recently and still continues. States if she is still, and overnight, there is not much, but if up moving during the day, then amount increases. States she has a fu with GYN physician on Tuesday and an ultrasound is scheduled.  States appetite is good. States no complications with b/b.  States has episodes of pulse going into the 30's. Denies any SOB, CP, dizziness or passing out. Discussed when to go the the ED for complications/concerns, and patient verbalizes good knowledge.    Plan of care updates since last contact:  Review of patient management of conditions/medications:         Advance Care Planning:   Does patient have an Advance Directive: reviewed during previous call, see note. .    Medication Review:  No changes since last call.     Remote Patient Monitoring:  Offered patient enrollment in the Remote Patient Monitoring (RPM) program for in-home monitoring: Yes, but did not enroll at this time: already monitoring with home equipment.    Assessments:  Care Transitions Subsequent and Final Call    Subsequent and Final Calls  Care Transitions Interventions  Other Interventions:              Follow Up Appointment:   Reviewed upcoming appointment(s).  Future Appointments         Provider Specialty Dept Phone    2025 9:00 AM Nhi Carlisle MD Obstetrics and Gynecology [phone]    2025 9:45 AM [facility]  RM 2 Obstetrics and Gynecology [phone]    2025 1:30 PM PAZ Rao Jr., MD Cardiology [phone]            Care Transition Nurse

## 2025-04-01 ENCOUNTER — OFFICE VISIT (OUTPATIENT)
Dept: OBGYN CLINIC | Age: 85
End: 2025-04-01
Payer: MEDICARE

## 2025-04-01 VITALS
WEIGHT: 104.6 LBS | HEART RATE: 42 BPM | OXYGEN SATURATION: 97 % | BODY MASS INDEX: 17.95 KG/M2 | DIASTOLIC BLOOD PRESSURE: 83 MMHG | SYSTOLIC BLOOD PRESSURE: 174 MMHG

## 2025-04-01 DIAGNOSIS — N82.8 VAGINAL FISTULA: ICD-10-CM

## 2025-04-01 DIAGNOSIS — N89.8 VAGINAL DISCHARGE: Primary | ICD-10-CM

## 2025-04-01 PROCEDURE — 3077F SYST BP >= 140 MM HG: CPT | Performed by: OBSTETRICS & GYNECOLOGY

## 2025-04-01 PROCEDURE — 1159F MED LIST DOCD IN RCRD: CPT | Performed by: OBSTETRICS & GYNECOLOGY

## 2025-04-01 PROCEDURE — 1123F ACP DISCUSS/DSCN MKR DOCD: CPT | Performed by: OBSTETRICS & GYNECOLOGY

## 2025-04-01 PROCEDURE — 99204 OFFICE O/P NEW MOD 45 MIN: CPT | Performed by: OBSTETRICS & GYNECOLOGY

## 2025-04-01 PROCEDURE — 3079F DIAST BP 80-89 MM HG: CPT | Performed by: OBSTETRICS & GYNECOLOGY

## 2025-04-01 ASSESSMENT — ENCOUNTER SYMPTOMS
VOMITING: 0
NAUSEA: 0
ABDOMINAL PAIN: 0
DIARRHEA: 0
CONSTIPATION: 0

## 2025-04-01 NOTE — PROGRESS NOTES
New GYN Visit      CC:   Chief Complaint   Patient presents with    New Patient       HPI:  85 y.o.  presents for new patient visit. 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 Was seen in the ER about a month ago, diagnosed with diverticulosis at that time (2025). Admitted and feels this started around that time as well. Now is having daily drainage from area daily, changing panty liners 2-3x/day. Thinks it is coming from the vagina. Mostly brown and malodorous, only 1-2 episode of bleeding that she knows of, otherwise just the brown.    Review of Systems:   Review of Systems   Gastrointestinal:  Negative for abdominal pain, constipation, diarrhea, nausea and vomiting.   Genitourinary:  Positive for vaginal discharge. Negative for difficulty urinating, dysuria, frequency, menstrual problem and vaginal bleeding.       Obstetric History  OB History    Para Term  AB Living   2     2   SAB IAB Ectopic Molar Multiple Live Births        2      # Outcome Date GA Lbr Sylvain/2nd Weight Sex Type Anes PTL Lv   2             1                 Gynecologic History  Menstrual History:  LMP: s/p menopause  Menstrual pattern: s/p menopause  Sexual History:  Contraception: n/a  Currently is not sexually active  Denies history of STIs  No sexual problems  Pap History:  Last pap smear: 20+ years ago, normal per pt report  History of abnormal pap smears: denies    Medical History:  Past Medical History:   Diagnosis Date    Allergic rhinitis     CHF (congestive heart failure), NYHA class I, acute on chronic, combined ([facility]) 2021    Hyperlipidemia     Hypertension     Hypothyroidism     probable autoimmune thyroiditis    LBBB (left bundle branch block)     ON EKG, had normal GXT    Rheumatoid arthritis(714.0)     Rheumatologist in [place]       Surgical History:  Past Surgical History:   Procedure Laterality Date     SECTION      x2    COLONOSCOPY  2005    JOINT REPLACEMENT Left 13

## 2025-04-03 ENCOUNTER — CARE COORDINATION (OUTPATIENT)
Dept: CASE MANAGEMENT | Age: 85
End: 2025-04-03

## 2025-04-03 NOTE — CARE COORDINATION
Care Transitions Note    Follow Up Call     Patient Current Location:  Home: [address]  [place] [id number]    [facility] Care Coordinator contacted the patient by telephone. Verified name and  as identifiers.    Additional needs identified to be addressed with provider   No needs identified                 Method of communication with provider: none.    Care Summary Note:   Spoke with patient. She reports she isn't doing so well. She is concerned about the vaginal discharge that she has. It is brownish in color. Patient wants to know what is her next step.Chart researched and referral for colorectal surgery given. Good appetite and fluid intake.  lbs. No bladder or bowel problems. Denies cp, palpitations, falls, fever, chills or pain. She has some weakness. No needs voiced at this time.     Plan of care updates since last contact:         Advance Care Planning:   Does patient have an Advance Directive: reviewed and current.    Medication Review:  No changes since last call.     Remote Patient Monitoring:  Offered patient enrollment in the Remote Patient Monitoring (RPM) program for in-home monitoring: Yes, but did not enroll at this time: already monitoring with home equipment.    Assessments:  Care Transitions Subsequent and Final Call    Subsequent and Final Calls  Do you have any ongoing symptoms?: Yes  Onset of Patient-reported symptoms: Other  Patient-reported symptoms: Other  Interventions for patient-reported symptoms: Other  Have your medications changed?: No  Do you have any questions related to your medications?: No  Do you currently have any active services?: No  Are you currently active with any services?: Home Health  Identified Barriers: None  Care Transitions Interventions   Home Care Waiver: Completed     Other Interventions:              Follow Up Appointment:   MAGAN appointment attended as scheduled   Future Appointments         Provider Specialty Dept Phone    2025 1:30 PM

## 2025-04-10 ENCOUNTER — CARE COORDINATION (OUTPATIENT)
Dept: CASE MANAGEMENT | Age: 85
End: 2025-04-10

## 2025-04-10 NOTE — CARE COORDINATION
Care Transitions Note    Follow Up Call     Patient Current Location:  Home: [address]  [facility] [address]    Care Transition Nurse contacted the patient by telephone. Verified name and  as identifiers.    Additional needs identified to be addressed with provider   No needs identified                 Method of communication with provider: none.    Care Summary Note:   Pt saw GYN last week: plan to refer to colorectal surgery initially for evaluation and recommendations re:additional testing/imaging rather than ordering additional imaging at this time given patient has already had several scans within the last 3 months  - referral placed     Regarding heart, she is doing very well; denies CP, palpitations, falls, fever, chills or pain. She is more concerned at this time about the vaginal issue and the fistula. There is a referral for colo rectal. Pt states she is not having any bleeding but still with stool in the discharge from the fistula area. Denies issues with urination or bowels.      Pt asking what her next course is. Explained referral to colo rectal. Will make final call next week to be sure she has received a call.     Plan of care updates since last contact:  Referrals: colo rectal        Advance Care Planning:   Does patient have an Advance Directive: reviewed during previous call, see note. .    Medication Review:  No changes since last call.     Remote Patient Monitoring:  Offered patient enrollment in the Remote Patient Monitoring (RPM) program for in-home monitoring: Yes, but did not enroll at this time: declined to enroll in the program becauseself monitoring .    Assessments:  Care Transitions Subsequent and Final Call    Schedule Follow Up Appointment with PCP: Completed  Subsequent and Final Calls  Do you have any ongoing symptoms?: Yes  Onset of Patient-reported symptoms: Other  Patient-reported symptoms: Other  Interventions for patient-reported symptoms: Other  Have your

## 2025-04-14 ENCOUNTER — CARE COORDINATION (OUTPATIENT)
Dept: CASE MANAGEMENT | Age: 85
End: 2025-04-14

## 2025-04-14 NOTE — CARE COORDINATION
Care Transitions Note    Follow Up Call     Patient Current Location:  Home: [address]  [facility] [address]    Care Transition Nurse contacted the patient by telephone. Verified name and  as identifiers.    Additional needs identified to be addressed with provider   No needs identified                 Method of communication with provider: none.    Care Summary Note: Spoke with Cara. She states \"this leakage is really bothersome.\"  Cara states she has not heard anything about the colo- rectal referral.    Call placed to Dr. Nhi Carlisle's office. Her office states that the referral was placed on 2025 to Dr. Timmy Sorensen's office.    Call placed to Dr.John Sorensen's office. Spoke with reception. They state they have the referral. Reception states they have been having issues with the referral queue. Writer asked that the patient be called today. Reception states they will send a message to 's clinical team.    Writer placed 2nd call to Cara. Provided her with the name and contact info for the [facility] physician. She states her daughter will call them tomorrow. CT team will follow up.    Plan of care updates since last contact:  [facility] [place] rectal referral       Advance Care Planning:   Does patient have an Advance Directive: reviewed during previous call, see note. .    Medication Review:  Did not discuss medications today.    Remote Patient Monitoring:  Offered patient enrollment in the Remote Patient Monitoring (RPM) program for in-home monitoring: Yes, but did not enroll at this time: not interested .    Assessments:  Care Transitions Subsequent and Final Call    Subsequent and Final Calls  Are you currently active with any services?: Home Health  Care Transitions Interventions   Home Care Waiver: Completed       Specialty Service Referral: Completed    Other Interventions:              Follow Up Appointment:   MAGAN appointment attended as scheduled   Future Appointments

## 2025-04-16 ENCOUNTER — TELEPHONE (OUTPATIENT)
Dept: CARDIOLOGY CLINIC | Age: 85
End: 2025-04-16

## 2025-04-16 NOTE — TELEPHONE ENCOUNTER
Medication Refill    Medication needing refilled:  valsartan (DIOVAN) 80 MG tablet     Dosage of the medication: 80mg per day.    How are you taking this medication (QD, BID, TID, QID, PRN): 1tablet by mouth daily    30 or 90 day supply called in: 90 day supply with reoccurring prescriptions per pt daughter.    Is there a reason why he did not want her on valsartain over the last three years - is there a concern about it? Pts daughter is inquiring.     When will you run out of your medication:  Pt ran out 3 days ago.    Which Pharmacy are we sending the medication to?:  [facility]    NOV 5/13/25 VIC

## 2025-04-16 NOTE — TELEPHONE ENCOUNTER
Pts daughter Rambo called and wanted to report that that pt is taking very little of the amiodarone, as pts pulse has been in the in the 30-40's recently.     Pts daughter states that another NP stated that if pt is in the 30s, prescription should not be taken. Pts daughter would like a call back regarding this medication -- pts daughter would like to know if this medication should be discontinued or lowered. Please advise.   Best number [phone] or [phone] (mother).

## 2025-04-17 ENCOUNTER — CARE COORDINATION (OUTPATIENT)
Dept: CASE MANAGEMENT | Age: 85
End: 2025-04-17

## 2025-04-17 ENCOUNTER — TELEPHONE (OUTPATIENT)
Dept: SURGERY | Age: 85
End: 2025-04-17

## 2025-04-17 NOTE — TELEPHONE ENCOUNTER
Radha- [facility] called inquiring on if the patient was scheduled to see Dr. Sorensen yet. There is no appointment scheduled at this time.     Radha requested to speak with Michelle. She said she would like to inform her the referral was originally sent on 04/02/2025 by Dr. 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     Radha called the office on 04/14/2025. She cannot recall the name of the person she spoke with, but they informed her the office was having trouble with the referral queues. Radha asked the person if that patient could be called that date as several days had already lapsed since the referral was first put in. They told her they would send a message.     Radha is now calling back on 04/17/2025 and the patient is still not scheduled.     I informed Radha I would reach out to the patient to schedule her in to see Dr. Sorensen. Radha stated her daughter is her transportation so the patient may tell me to call her daughter. I informed Radha I would reach out to the patient's daughter to attempt to get the patient scheduled in to see Dr. Sorensen.     Call placed to Rambo and she gave additional information about patient having a decreased heart rate and patient's current condition. No appointment scheduled at this time. Additional information routed to the clinical team for further review. Rambo is okay with that plan.     Radha requests a callback from Michelle. Radha can be reached at [phone].

## 2025-04-17 NOTE — CARE COORDINATION
Care Transitions Note    Follow Up Call     Patient Current Location:  Home: [address]  [facility] [address]    Care Transition Nurse contacted the patient by telephone. Verified name and  as identifiers.    Additional needs identified to be addressed with provider   No needs identified                 Method of communication with provider: none.    Care Summary Note: Spoke briefly with Cara. She states she is not aware of any appt being made with [facility]. She asked that writer reach out to her daughter - Rambo to confirm.    Call placed to Rambo. Left message. Contact info provided. Requested return call to CTN.    Call placed to 's office. Spoke with Denita. She states she can not see any outreach to the patient and no appt has been scheduled at this time. Writer asked to speak with the . Denita states Nathalia is not available at this time. Denita states she will send Nathalia a message and ask her to contact writer. Denita will also reach out to Rambo to get an appt scheduled for Cara.    CT team will follow.              Follow Up Appointment:     Future Appointments         Provider Specialty Dept Phone    2025 1:30 PM PAZ Rao Jr., MD Cardiology [phone]            Care Transition Nurse provided contact information.  Plan for follow-up call in 2-5 days based on severity of symptoms and risk factors.  Plan for next call:  check on [facility] appt      Radha Nunez RN BSN  Care Transition Nurse  [phone]

## 2025-04-17 NOTE — TELEPHONE ENCOUNTER
Called patient's daughter Rambo to schedule the patient to be seen for the referral listed in the medical record.     Rambo states the patient is currently experiencing decreased heart rate at the moment and patient is in congestive heart failure. Heart rate is between 30 and 50 resting and with activity. The patient just started Valsartan 80 mg and Amiodarone 100-300 mg a day but they are cutting back due to the decreased heart rate.     Rambo states the patient refuses surgeries and will not agree to a pacemaker and she was in A-Fib in the past. Patient is concerned with going under anesthesia but may agree to [place] per daughter. 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     Appointment was not scheduled at this time so the clinical team is able to review additional information provider by Rambo prior to scheduling.     Rambo can be reached at [phone].

## 2025-04-18 ENCOUNTER — CARE COORDINATION (OUTPATIENT)
Dept: CASE MANAGEMENT | Age: 85
End: 2025-04-18

## 2025-04-18 RX ORDER — VALSARTAN 40 MG/1
40 TABLET ORAL DAILY
Qty: 90 TABLET | Refills: 3 | Status: SHIPPED | OUTPATIENT
Start: 2025-04-18 | End: 2026-04-13

## 2025-04-18 NOTE — TELEPHONE ENCOUNTER
I have sent in valsartan, 40 mg daily.  Thank you.    Steve Rao MD  Cardiac Electrophysiology  [facility]  [phone] [facility]

## 2025-04-18 NOTE — CARE COORDINATION
Care Transitions Note    Final Call     Patient Current Location:  Home: [address]  [facility] [address]    Care Transition Nurse contacted the  family  by telephone. Verified name and  as identifiers.    Patient graduated from the Care Transitions program on 2025.  Patient/family verbalizes confidence in the ability to self-manage at this time..      Advance Care Planning:   Does patient have an Advance Directive: reviewed during previous call, see note. .    Handoff:   Patient was not referred to the ACM team due to no additional needs identified.       Care Summary Note: Call placed to 's office. Spoke with Katy. She states Nathalia is not in the office today. Writer did not receive a return call from Nathalia.    Call placed to Jessica Antonio. She states she is awaiting a return call from the [facility] office. She will follow up for her mother.    Assessments:  Care Transitions Subsequent and Final Call    Subsequent and Final Calls  Are you currently active with any services?: Home Health  Care Transitions Interventions   Home Care Waiver: Completed       Specialty Service Referral: Completed    Other Interventions:              Upcoming Appointments:    Future Appointments         Provider Specialty Dept Phone    2025 1:30 PM PAZ Rao Jr., MD Cardiology [phone]            Patient has agreed to contact primary care provider and/or specialist for any further questions, concerns, or needs.    Radha Nunez RN BSN  Care Transition Nurse  [phone]

## 2025-04-18 NOTE — TELEPHONE ENCOUNTER
Radha- Care Transitions at [facility] called requesting to speak with Michelle to follow up on a previous request for a return call.       Radha would like a returned call from Michelle.    Radha- Care Transitions at [facility] can be reached at [phone].

## 2025-04-21 NOTE — TELEPHONE ENCOUNTER
Sounds like she needs to see her cardiologist for her cardiac issues.    The treatment for this is surgical so if she isn't interested in surgery there is nothing else we can offer.     Happy to see her in office to discuss    Timmy Sorensen M.D.  4/21/25   1:09 PM

## 2025-04-21 NOTE — TELEPHONE ENCOUNTER
4/21 2nd Attempt: called pts daughter Jessica Antonio at [phone] (ok per HIPAA). Pt needs to be scheduled with npdd for yearly ov.

## 2025-04-21 NOTE — TELEPHONE ENCOUNTER
Spoke with patients daughter, and relayed the message below from Dr. Sorensen. She said she will speak with her mother about getting the pace maker.     Advised that we are happy to schedule an apt to discuss this further if she changes her mind or wants to discuss surgical intervention.

## 2025-04-23 RX ORDER — VALSARTAN 80 MG/1
80 TABLET ORAL DAILY
Qty: 30 TABLET | Refills: 2 | Status: SHIPPED | OUTPATIENT
Start: 2025-04-23

## 2025-05-13 ENCOUNTER — OFFICE VISIT (OUTPATIENT)
Dept: CARDIOLOGY CLINIC | Age: 85
End: 2025-05-13
Payer: MEDICARE

## 2025-05-13 VITALS
HEART RATE: 53 BPM | DIASTOLIC BLOOD PRESSURE: 64 MMHG | HEIGHT: 64 IN | OXYGEN SATURATION: 98 % | BODY MASS INDEX: 17.88 KG/M2 | WEIGHT: 104.72 LBS | SYSTOLIC BLOOD PRESSURE: 138 MMHG

## 2025-05-13 DIAGNOSIS — I44.7 LBBB (LEFT BUNDLE BRANCH BLOCK): Primary | ICD-10-CM

## 2025-05-13 DIAGNOSIS — I48.0 PAROXYSMAL ATRIAL FIBRILLATION (HCC): ICD-10-CM

## 2025-05-13 LAB
EKG ATRIAL RATE: 53 BPM
EKG DIAGNOSIS: NORMAL
EKG P AXIS: 56 DEGREES
EKG P-R INTERVAL: 134 MS
EKG Q-T INTERVAL: 484 MS
EKG QRS DURATION: 138 MS
EKG QTC CALCULATION (BAZETT): 454 MS
EKG R AXIS: -31 DEGREES
EKG T AXIS: 146 DEGREES
EKG VENTRICULAR RATE: 53 BPM

## 2025-05-13 PROCEDURE — G2211 COMPLEX E/M VISIT ADD ON: HCPCS | Performed by: INTERNAL MEDICINE

## 2025-05-13 PROCEDURE — 1123F ACP DISCUSS/DSCN MKR DOCD: CPT | Performed by: INTERNAL MEDICINE

## 2025-05-13 PROCEDURE — 3075F SYST BP GE 130 - 139MM HG: CPT | Performed by: INTERNAL MEDICINE

## 2025-05-13 PROCEDURE — 99214 OFFICE O/P EST MOD 30 MIN: CPT | Performed by: INTERNAL MEDICINE

## 2025-05-13 PROCEDURE — 1159F MED LIST DOCD IN RCRD: CPT | Performed by: INTERNAL MEDICINE

## 2025-05-13 PROCEDURE — 1160F RVW MEDS BY RX/DR IN RCRD: CPT | Performed by: INTERNAL MEDICINE

## 2025-05-13 PROCEDURE — 3078F DIAST BP <80 MM HG: CPT | Performed by: INTERNAL MEDICINE

## 2025-05-13 NOTE — PATIENT INSTRUCTIONS
RECOMMENDATIONS:  Recommend PPM for tachy-elliot syndrome. Patient would like to defer at this time.   Continue cardiac medications as prescribed.    -Patient would like to stop amiodarone; discussed risks and benefits.   Follow up with me in 6 months.

## 2025-05-13 NOTE — PROGRESS NOTES
[facility]   Electrophysiology Consult Note     Date: 5/13/25  Patient Name: Cara Chapin  YOB: 1940    Primary Care Physician: Eron Kevin MD    CHIEF COMPLAINT:   Chief Complaint   Patient presents with    6 Month Follow-Up    Other     LBBB    Atrial Fibrillation     HISTORY OF PRESENT ILLNESS: Cara Chapin is a 85 y.o. female with a medical history significant for symptomatic paroxysmal atrial fibrillation, known left bundle branch block (negative stress test from [place]), hypertension, rheumoatoid arthritis, hypothyroidism, and complicated diverticulitis who presented to the ED from home 6/2021 with symptomatic atrial fibrillation with RVR having recently undergone LUCY cardioversion.  According to patient and her daughter she recently began to suffer from dyspnea on exertion and shortness of breath.  She is unable to move from her bed to her recliner without becoming short of breath.  She was seen by her PCP who directed her to a cardiologist.  Patient was found to be in atrial fibrillation with RVR.  She underwent a LUCY cardioversion on 06/02/2021 ([facility]).  She was discharged home on rate control and anticoagulation.  Unfortunately patient began to suffer from worsening shortness of breath and dyspnea on exertion shortly thereafter and was brought to [facility] for further evaluation and care last night.   Echo 6/3/2021 demonstrated an LVEF of 25%.    Patient was evaluated emergency room 6/4/2021.  Her CMP was reassuring outside of hypoalbuminemia and hypoproteinemia along with elevated AST and ALT.  Her CBC was reassuring.  Her BNP was elevated at 1216.  Her chest radiograph showed pulmonary vascular congestion with chronic changes, cardiomegaly, small left pleural effusion.    Patient was started on amiodarone 6/2021 ([facility]).    Patient returned to ED 6/17/2201 due to acute on chronic CHF. Troponin elevated.    Cardiac event monitor worn 7/27/2021-7/30/2021

## 2025-05-28 ENCOUNTER — PATIENT MESSAGE (OUTPATIENT)
Dept: FAMILY MEDICINE CLINIC | Age: 85
End: 2025-05-28

## 2025-06-14 ENCOUNTER — APPOINTMENT (OUTPATIENT)
Dept: GENERAL RADIOLOGY | Age: 85
DRG: 872 | End: 2025-06-14
Payer: MEDICARE

## 2025-06-14 ENCOUNTER — HOSPITAL ENCOUNTER (INPATIENT)
Age: 85
LOS: 1 days | Discharge: LEFT BEFORE DISCHARGE | DRG: 872 | End: 2025-06-15
Attending: STUDENT IN AN ORGANIZED HEALTH CARE EDUCATION/TRAINING PROGRAM | Admitting: STUDENT IN AN ORGANIZED HEALTH CARE EDUCATION/TRAINING PROGRAM
Payer: MEDICARE

## 2025-06-14 DIAGNOSIS — I50.9 ACUTE ON CHRONIC CONGESTIVE HEART FAILURE, UNSPECIFIED HEART FAILURE TYPE (HCC): ICD-10-CM

## 2025-06-14 DIAGNOSIS — I21.4 NSTEMI (NON-ST ELEVATED MYOCARDIAL INFARCTION) (HCC): ICD-10-CM

## 2025-06-14 DIAGNOSIS — N39.0 ACUTE UTI: ICD-10-CM

## 2025-06-14 DIAGNOSIS — Z71.89 GOALS OF CARE, COUNSELING/DISCUSSION: ICD-10-CM

## 2025-06-14 DIAGNOSIS — I48.91 ATRIAL FIBRILLATION WITH RVR (HCC): Primary | ICD-10-CM

## 2025-06-14 DIAGNOSIS — E87.1 HYPONATREMIA: ICD-10-CM

## 2025-06-14 LAB
ALBUMIN SERPL-MCNC: 4.2 G/DL (ref 3.4–5)
ALBUMIN/GLOB SERPL: 1.4 {RATIO} (ref 1.1–2.2)
ALP SERPL-CCNC: 89 U/L (ref 40–129)
ALT SERPL-CCNC: 10 U/L (ref 10–40)
ANION GAP SERPL CALCULATED.3IONS-SCNC: 13 MMOL/L (ref 3–16)
APTT BLD: 30.8 SEC (ref 22.8–35.8)
AST SERPL-CCNC: 22 U/L (ref 15–37)
BACTERIA URNS QL MICRO: ABNORMAL /HPF
BASE EXCESS BLDV CALC-SCNC: -1.8 MMOL/L (ref -3–3)
BASOPHILS # BLD: 0 K/UL (ref 0–0.2)
BASOPHILS NFR BLD: 0.7 %
BILIRUB SERPL-MCNC: <0.2 MG/DL (ref 0–1)
BILIRUB UR QL STRIP.AUTO: NEGATIVE
BUN SERPL-MCNC: 8 MG/DL (ref 7–20)
CALCIUM SERPL-MCNC: 9.2 MG/DL (ref 8.3–10.6)
CHLORIDE SERPL-SCNC: 98 MMOL/L (ref 99–110)
CLARITY UR: ABNORMAL
CO2 BLDV-SCNC: 23 MMOL/L
CO2 SERPL-SCNC: 20 MMOL/L (ref 21–32)
COHGB MFR BLDV: 2.9 % (ref 0–1.5)
COLOR UR: YELLOW
CREAT SERPL-MCNC: 0.6 MG/DL (ref 0.6–1.2)
DEPRECATED RDW RBC AUTO: 14.8 % (ref 12.4–15.4)
EOSINOPHIL # BLD: 0.3 K/UL (ref 0–0.6)
EOSINOPHIL NFR BLD: 5.3 %
EPI CELLS #/AREA URNS HPF: ABNORMAL /HPF (ref 0–5)
GFR SERPLBLD CREATININE-BSD FMLA CKD-EPI: 88 ML/MIN/{1.73_M2}
GLUCOSE SERPL-MCNC: 162 MG/DL (ref 70–99)
GLUCOSE UR STRIP.AUTO-MCNC: NEGATIVE MG/DL
HCO3 BLDV-SCNC: 21.6 MMOL/L (ref 23–29)
HCT VFR BLD AUTO: 38.9 % (ref 36–48)
HGB BLD-MCNC: 13.1 G/DL (ref 12–16)
HGB UR QL STRIP.AUTO: ABNORMAL
INR PPP: 1.03 (ref 0.86–1.14)
KETONES UR STRIP.AUTO-MCNC: NEGATIVE MG/DL
LACTATE BLDV-SCNC: 1.2 MMOL/L (ref 0.4–1.9)
LACTATE BLDV-SCNC: 2.1 MMOL/L (ref 0.4–1.9)
LEUKOCYTE ESTERASE UR QL STRIP.AUTO: ABNORMAL
LYMPHOCYTES # BLD: 1.9 K/UL (ref 1–5.1)
LYMPHOCYTES NFR BLD: 30.4 %
MAGNESIUM SERPL-MCNC: 1.92 MG/DL (ref 1.8–2.4)
MCH RBC QN AUTO: 34.4 PG (ref 26–34)
MCHC RBC AUTO-ENTMCNC: 33.8 G/DL (ref 31–36)
MCV RBC AUTO: 101.8 FL (ref 80–100)
METHGB MFR BLDV: 0.3 %
MONOCYTES # BLD: 0.6 K/UL (ref 0–1.3)
MONOCYTES NFR BLD: 10.3 %
NEUTROPHILS # BLD: 3.3 K/UL (ref 1.7–7.7)
NEUTROPHILS NFR BLD: 53.3 %
NITRITE UR QL STRIP.AUTO: NEGATIVE
NT-PROBNP SERPL-MCNC: 1078 PG/ML (ref 0–449)
O2 THERAPY: ABNORMAL
PCO2 BLDV: 33.1 MMHG (ref 40–50)
PH BLDV: 7.43 [PH] (ref 7.35–7.45)
PH UR STRIP.AUTO: 6 [PH] (ref 5–8)
PLATELET # BLD AUTO: 289 K/UL (ref 135–450)
PMV BLD AUTO: 7 FL (ref 5–10.5)
PO2 BLDV: 50.2 MMHG (ref 25–40)
POTASSIUM SERPL-SCNC: 4 MMOL/L (ref 3.5–5.1)
PROT SERPL-MCNC: 7.1 G/DL (ref 6.4–8.2)
PROT UR STRIP.AUTO-MCNC: 30 MG/DL
PROTHROMBIN TIME: 13.8 SEC (ref 12.1–14.9)
RBC # BLD AUTO: 3.82 M/UL (ref 4–5.2)
RBC #/AREA URNS HPF: ABNORMAL /HPF (ref 0–4)
SAO2 % BLDV: 87 %
SODIUM SERPL-SCNC: 131 MMOL/L (ref 136–145)
SP GR UR STRIP.AUTO: <=1.005 (ref 1–1.03)
TROPONIN, HIGH SENSITIVITY: 15 NG/L (ref 0–14)
TROPONIN, HIGH SENSITIVITY: 38 NG/L (ref 0–14)
UA COMPLETE W REFLEX CULTURE PNL UR: YES
UA DIPSTICK W REFLEX MICRO PNL UR: YES
URN SPEC COLLECT METH UR: ABNORMAL
UROBILINOGEN UR STRIP-ACNC: 0.2 E.U./DL
WBC # BLD AUTO: 6.3 K/UL (ref 4–11)
WBC #/AREA URNS HPF: ABNORMAL /HPF (ref 0–5)

## 2025-06-14 PROCEDURE — 85025 COMPLETE CBC W/AUTO DIFF WBC: CPT

## 2025-06-14 PROCEDURE — 87040 BLOOD CULTURE FOR BACTERIA: CPT

## 2025-06-14 PROCEDURE — 84439 ASSAY OF FREE THYROXINE: CPT

## 2025-06-14 PROCEDURE — 96367 TX/PROPH/DG ADDL SEQ IV INF: CPT

## 2025-06-14 PROCEDURE — 83880 ASSAY OF NATRIURETIC PEPTIDE: CPT

## 2025-06-14 PROCEDURE — 84443 ASSAY THYROID STIM HORMONE: CPT

## 2025-06-14 PROCEDURE — 84484 ASSAY OF TROPONIN QUANT: CPT

## 2025-06-14 PROCEDURE — 83735 ASSAY OF MAGNESIUM: CPT

## 2025-06-14 PROCEDURE — 96365 THER/PROPH/DIAG IV INF INIT: CPT

## 2025-06-14 PROCEDURE — 99291 CRITICAL CARE FIRST HOUR: CPT

## 2025-06-14 PROCEDURE — 71045 X-RAY EXAM CHEST 1 VIEW: CPT

## 2025-06-14 PROCEDURE — 80053 COMPREHEN METABOLIC PANEL: CPT

## 2025-06-14 PROCEDURE — 87086 URINE CULTURE/COLONY COUNT: CPT

## 2025-06-14 PROCEDURE — 2580000003 HC RX 258: Performed by: STUDENT IN AN ORGANIZED HEALTH CARE EDUCATION/TRAINING PROGRAM

## 2025-06-14 PROCEDURE — 85730 THROMBOPLASTIN TIME PARTIAL: CPT

## 2025-06-14 PROCEDURE — 93005 ELECTROCARDIOGRAM TRACING: CPT | Performed by: STUDENT IN AN ORGANIZED HEALTH CARE EDUCATION/TRAINING PROGRAM

## 2025-06-14 PROCEDURE — 83605 ASSAY OF LACTIC ACID: CPT

## 2025-06-14 PROCEDURE — 6360000002 HC RX W HCPCS: Performed by: STUDENT IN AN ORGANIZED HEALTH CARE EDUCATION/TRAINING PROGRAM

## 2025-06-14 PROCEDURE — 85610 PROTHROMBIN TIME: CPT

## 2025-06-14 PROCEDURE — 82803 BLOOD GASES ANY COMBINATION: CPT

## 2025-06-14 PROCEDURE — 81001 URINALYSIS AUTO W/SCOPE: CPT

## 2025-06-14 PROCEDURE — 99285 EMERGENCY DEPT VISIT HI MDM: CPT

## 2025-06-14 RX ORDER — 0.9 % SODIUM CHLORIDE 0.9 %
1000 INTRAVENOUS SOLUTION INTRAVENOUS ONCE
Status: COMPLETED | OUTPATIENT
Start: 2025-06-14 | End: 2025-06-15

## 2025-06-14 RX ADMIN — SODIUM CHLORIDE 1000 ML: 0.9 INJECTION, SOLUTION INTRAVENOUS at 23:32

## 2025-06-14 RX ADMIN — VANCOMYCIN HYDROCHLORIDE 1000 MG: 1 INJECTION, POWDER, LYOPHILIZED, FOR SOLUTION INTRAVENOUS at 23:32

## 2025-06-14 RX ADMIN — CEFEPIME 2000 MG: 2 INJECTION, POWDER, FOR SOLUTION INTRAVENOUS at 21:55

## 2025-06-14 ASSESSMENT — PAIN SCALES - GENERAL: PAINLEVEL_OUTOF10: 0

## 2025-06-14 ASSESSMENT — PAIN - FUNCTIONAL ASSESSMENT: PAIN_FUNCTIONAL_ASSESSMENT: 0-10

## 2025-06-15 VITALS
SYSTOLIC BLOOD PRESSURE: 144 MMHG | BODY MASS INDEX: 18.18 KG/M2 | WEIGHT: 106.5 LBS | HEIGHT: 64 IN | HEART RATE: 41 BPM | OXYGEN SATURATION: 93 % | DIASTOLIC BLOOD PRESSURE: 62 MMHG | TEMPERATURE: 97.1 F | RESPIRATION RATE: 18 BRPM

## 2025-06-15 PROBLEM — I48.91 ATRIAL FIBRILLATION WITH RAPID VENTRICULAR RESPONSE (HCC): Status: ACTIVE | Noted: 2025-06-15

## 2025-06-15 LAB
ALBUMIN SERPL-MCNC: 3.5 G/DL (ref 3.4–5)
ALBUMIN/GLOB SERPL: 1.5 {RATIO} (ref 1.1–2.2)
ALP SERPL-CCNC: 69 U/L (ref 40–129)
ALT SERPL-CCNC: 8 U/L (ref 10–40)
ANION GAP SERPL CALCULATED.3IONS-SCNC: 8 MMOL/L (ref 3–16)
AST SERPL-CCNC: 19 U/L (ref 15–37)
BASOPHILS # BLD: 0 K/UL (ref 0–0.2)
BASOPHILS NFR BLD: 0.5 %
BILIRUB SERPL-MCNC: 0.4 MG/DL (ref 0–1)
BUN SERPL-MCNC: 9 MG/DL (ref 7–20)
CALCIUM SERPL-MCNC: 8.6 MG/DL (ref 8.3–10.6)
CHLORIDE SERPL-SCNC: 109 MMOL/L (ref 99–110)
CO2 SERPL-SCNC: 22 MMOL/L (ref 21–32)
CREAT SERPL-MCNC: 0.5 MG/DL (ref 0.6–1.2)
DEPRECATED RDW RBC AUTO: 15.1 % (ref 12.4–15.4)
EKG ATRIAL RATE: 58 BPM
EKG DIAGNOSIS: NORMAL
EKG DIAGNOSIS: NORMAL
EKG P AXIS: 83 DEGREES
EKG P-R INTERVAL: 152 MS
EKG Q-T INTERVAL: 322 MS
EKG Q-T INTERVAL: 526 MS
EKG QRS DURATION: 140 MS
EKG QRS DURATION: 140 MS
EKG QTC CALCULATION (BAZETT): 500 MS
EKG QTC CALCULATION (BAZETT): 516 MS
EKG R AXIS: -19 DEGREES
EKG R AXIS: -29 DEGREES
EKG T AXIS: 144 DEGREES
EKG T AXIS: 145 DEGREES
EKG VENTRICULAR RATE: 145 BPM
EKG VENTRICULAR RATE: 58 BPM
EOSINOPHIL # BLD: 0.3 K/UL (ref 0–0.6)
EOSINOPHIL NFR BLD: 6.1 %
GFR SERPLBLD CREATININE-BSD FMLA CKD-EPI: >90 ML/MIN/{1.73_M2}
GLUCOSE SERPL-MCNC: 84 MG/DL (ref 70–99)
HCT VFR BLD AUTO: 37 % (ref 36–48)
HGB BLD-MCNC: 12.4 G/DL (ref 12–16)
LYMPHOCYTES # BLD: 1.5 K/UL (ref 1–5.1)
LYMPHOCYTES NFR BLD: 26.8 %
MCH RBC QN AUTO: 34.3 PG (ref 26–34)
MCHC RBC AUTO-ENTMCNC: 33.6 G/DL (ref 31–36)
MCV RBC AUTO: 101.9 FL (ref 80–100)
MONOCYTES # BLD: 0.6 K/UL (ref 0–1.3)
MONOCYTES NFR BLD: 11.3 %
NEUTROPHILS # BLD: 3 K/UL (ref 1.7–7.7)
NEUTROPHILS NFR BLD: 55.3 %
PLATELET # BLD AUTO: 251 K/UL (ref 135–450)
PMV BLD AUTO: 7.3 FL (ref 5–10.5)
POTASSIUM SERPL-SCNC: 4 MMOL/L (ref 3.5–5.1)
PROT SERPL-MCNC: 5.8 G/DL (ref 6.4–8.2)
RBC # BLD AUTO: 3.63 M/UL (ref 4–5.2)
SODIUM SERPL-SCNC: 139 MMOL/L (ref 136–145)
T4 FREE SERPL-MCNC: 1.1 NG/DL (ref 0.9–1.8)
TROPONIN, HIGH SENSITIVITY: 42 NG/L (ref 0–14)
TROPONIN, HIGH SENSITIVITY: 49 NG/L (ref 0–14)
TROPONIN, HIGH SENSITIVITY: 66 NG/L (ref 0–14)
TSH SERPL DL<=0.005 MIU/L-ACNC: 9.19 UIU/ML (ref 0.27–4.2)
TSH SERPL DL<=0.005 MIU/L-ACNC: 9.19 UIU/ML (ref 0.27–4.2)
VANCOMYCIN SERPL-MCNC: 14.1 UG/ML
WBC # BLD AUTO: 5.4 K/UL (ref 4–11)

## 2025-06-15 PROCEDURE — 84484 ASSAY OF TROPONIN QUANT: CPT

## 2025-06-15 PROCEDURE — 80202 ASSAY OF VANCOMYCIN: CPT

## 2025-06-15 PROCEDURE — 80053 COMPREHEN METABOLIC PANEL: CPT

## 2025-06-15 PROCEDURE — 36415 COLL VENOUS BLD VENIPUNCTURE: CPT

## 2025-06-15 PROCEDURE — 2580000003 HC RX 258: Performed by: STUDENT IN AN ORGANIZED HEALTH CARE EDUCATION/TRAINING PROGRAM

## 2025-06-15 PROCEDURE — 96366 THER/PROPH/DIAG IV INF ADDON: CPT

## 2025-06-15 PROCEDURE — 6370000000 HC RX 637 (ALT 250 FOR IP): Performed by: NURSE PRACTITIONER

## 2025-06-15 PROCEDURE — 2060000000 HC ICU INTERMEDIATE R&B

## 2025-06-15 PROCEDURE — 84443 ASSAY THYROID STIM HORMONE: CPT

## 2025-06-15 PROCEDURE — 84439 ASSAY OF FREE THYROXINE: CPT

## 2025-06-15 PROCEDURE — 6360000002 HC RX W HCPCS: Performed by: NURSE PRACTITIONER

## 2025-06-15 PROCEDURE — 85025 COMPLETE CBC W/AUTO DIFF WBC: CPT

## 2025-06-15 PROCEDURE — 2580000003 HC RX 258: Performed by: NURSE PRACTITIONER

## 2025-06-15 PROCEDURE — 6360000002 HC RX W HCPCS: Performed by: STUDENT IN AN ORGANIZED HEALTH CARE EDUCATION/TRAINING PROGRAM

## 2025-06-15 PROCEDURE — 2500000003 HC RX 250 WO HCPCS: Performed by: NURSE PRACTITIONER

## 2025-06-15 RX ORDER — ACETAMINOPHEN 325 MG/1
650 TABLET ORAL EVERY 6 HOURS PRN
Status: DISCONTINUED | OUTPATIENT
Start: 2025-06-15 | End: 2025-06-15 | Stop reason: HOSPADM

## 2025-06-15 RX ORDER — SODIUM CHLORIDE 9 MG/ML
INJECTION, SOLUTION INTRAVENOUS PRN
Status: DISCONTINUED | OUTPATIENT
Start: 2025-06-15 | End: 2025-06-15 | Stop reason: HOSPADM

## 2025-06-15 RX ORDER — PANTOPRAZOLE SODIUM 20 MG/1
20 TABLET, DELAYED RELEASE ORAL
Status: DISCONTINUED | OUTPATIENT
Start: 2025-06-15 | End: 2025-06-15 | Stop reason: HOSPADM

## 2025-06-15 RX ORDER — VALSARTAN 80 MG/1
40 TABLET ORAL DAILY
Status: DISCONTINUED | OUTPATIENT
Start: 2025-06-15 | End: 2025-06-15 | Stop reason: HOSPADM

## 2025-06-15 RX ORDER — SPIRONOLACTONE 25 MG/1
25 TABLET ORAL DAILY
Status: DISCONTINUED | OUTPATIENT
Start: 2025-06-15 | End: 2025-06-15 | Stop reason: HOSPADM

## 2025-06-15 RX ORDER — SODIUM CHLORIDE 0.9 % (FLUSH) 0.9 %
5-40 SYRINGE (ML) INJECTION EVERY 12 HOURS SCHEDULED
Status: DISCONTINUED | OUTPATIENT
Start: 2025-06-15 | End: 2025-06-15 | Stop reason: HOSPADM

## 2025-06-15 RX ORDER — MAGNESIUM SULFATE IN WATER 40 MG/ML
2000 INJECTION, SOLUTION INTRAVENOUS PRN
Status: DISCONTINUED | OUTPATIENT
Start: 2025-06-15 | End: 2025-06-15 | Stop reason: HOSPADM

## 2025-06-15 RX ORDER — AMIODARONE HYDROCHLORIDE 200 MG/1
300 TABLET ORAL DAILY
Status: DISCONTINUED | OUTPATIENT
Start: 2025-06-15 | End: 2025-06-15 | Stop reason: HOSPADM

## 2025-06-15 RX ORDER — POTASSIUM CHLORIDE 1500 MG/1
40 TABLET, EXTENDED RELEASE ORAL PRN
Status: DISCONTINUED | OUTPATIENT
Start: 2025-06-15 | End: 2025-06-15 | Stop reason: HOSPADM

## 2025-06-15 RX ORDER — ACETAMINOPHEN 650 MG/1
650 SUPPOSITORY RECTAL EVERY 6 HOURS PRN
Status: DISCONTINUED | OUTPATIENT
Start: 2025-06-15 | End: 2025-06-15 | Stop reason: HOSPADM

## 2025-06-15 RX ORDER — SODIUM CHLORIDE 0.9 % (FLUSH) 0.9 %
5-40 SYRINGE (ML) INJECTION PRN
Status: DISCONTINUED | OUTPATIENT
Start: 2025-06-15 | End: 2025-06-15 | Stop reason: HOSPADM

## 2025-06-15 RX ORDER — POLYETHYLENE GLYCOL 3350 17 G/17G
17 POWDER, FOR SOLUTION ORAL DAILY PRN
Status: DISCONTINUED | OUTPATIENT
Start: 2025-06-15 | End: 2025-06-15 | Stop reason: HOSPADM

## 2025-06-15 RX ORDER — AMIODARONE HYDROCHLORIDE 200 MG/1
200 TABLET ORAL SEE ADMIN INSTRUCTIONS
Status: DISCONTINUED | OUTPATIENT
Start: 2025-06-15 | End: 2025-06-15

## 2025-06-15 RX ORDER — POTASSIUM CHLORIDE 7.45 MG/ML
10 INJECTION INTRAVENOUS PRN
Status: DISCONTINUED | OUTPATIENT
Start: 2025-06-15 | End: 2025-06-15 | Stop reason: HOSPADM

## 2025-06-15 RX ADMIN — CEFEPIME 2000 MG: 2 INJECTION, POWDER, FOR SOLUTION INTRAVENOUS at 10:24

## 2025-06-15 RX ADMIN — VANCOMYCIN HYDROCHLORIDE 1250 MG: 10 INJECTION, POWDER, LYOPHILIZED, FOR SOLUTION INTRAVENOUS at 04:47

## 2025-06-15 RX ADMIN — VALSARTAN 40 MG: 80 TABLET, FILM COATED ORAL at 10:25

## 2025-06-15 RX ADMIN — SODIUM CHLORIDE, PRESERVATIVE FREE 10 ML: 5 INJECTION INTRAVENOUS at 10:34

## 2025-06-15 RX ADMIN — APIXABAN 2.5 MG: 5 TABLET, FILM COATED ORAL at 10:25

## 2025-06-15 RX ADMIN — PANTOPRAZOLE SODIUM 20 MG: 20 TABLET, DELAYED RELEASE ORAL at 06:19

## 2025-06-15 ASSESSMENT — PAIN SCALES - GENERAL: PAINLEVEL_OUTOF10: 0

## 2025-06-15 NOTE — CONSULTS
Pharmacy Note  Vancomycin Consult    Cara Chapin is a 85 y.o. female started on Vancomycin for Sepsis; consult received from Dr. Hinkle to manage therapy. Also receiving the following antibiotics: Cefepime.    Allergies:  Patient has no known allergies.     Tmax: 98.5    Micro: processing      Wt Readings from Last 1 Encounters:   06/14/25 47.5 kg (104 lb 11.5 oz)         Body mass index is 17.97 kg/m².    Loading dose (critically ill or in ICU, require dialysis or renal replacement therapy): Vancomycin 25 mg/kg IVPB x 1 (maximum 2500 mg).  Maintenance dose: 10-20 mg/kg (maximum: 2000 mg/dose and 4500 mg/day) starting at the next dosing interval determined by renal function  Pulse dose: fluctuating renal function, COY, ESRD  CRRT: 7.5-10 mg/kg q12h   Goal Vancomycin trough: 15-20 mcg/mL   Goal Vancomycin AUC: 400-600     Assessment/Plan:  Will initiate Vancomycin with a one time loading dose of 1000 mg x1. Pharmacy will continue to dose and manage Vancomycin if further consulted by inpatient hospitalist    Thank you for the consult.  Paxton Cohen, AmberD, BCPS  6/14/2025 at 9:31 PM

## 2025-06-15 NOTE — ED PROVIDER NOTES
disposition:  None      I had a goals of care discussion with the patient below is the patient's CODE STATUS.     Code Status:    Full code      Vitals Reviewed:    Vitals:    06/14/25 2300 06/14/25 2315 06/14/25 2345 06/14/25 2356   BP: 101/87 119/60 (!) 123/57    Pulse: 58 56 54 54   Resp: 15 25 24    Temp:       TempSrc:       SpO2: 97%  100%    Weight:           The patient was seen and examined.The results of pertinent diagnostic studies and exam findings were discussed. The patient’s provisional diagnosis and plan of care were discussed. Any medications were reviewed and indications and risks of medications were discussed with the patient.  ED Medications administered this visit:  (None if blank)  Medications   amiodarone (NEXTERONE) 360 mg in dextrose 5% 200 ml (0 mg/min IntraVENous Held 6/14/25 2355)   vancomycin (VANCOCIN) 1,000 mg in sodium chloride 0.9 % 250 mL IVPB (Gmnf9Lag) (1,000 mg IntraVENous New Bag 6/14/25 2332)   sodium chloride 0.9 % bolus 1,000 mL (1,000 mLs IntraVENous New Bag 6/14/25 2332)   ceFEPIme (MAXIPIME) 2,000 mg in sodium chloride 0.9 % 100 mL IVPB (addEASE) (0 mg IntraVENous Stopped 6/14/25 2227)         Responses to treatment:       PROCEDURES: (None if blank)  Procedures:       CRITICAL CARE: (None if blank)  CRITICAL CARE  I personally saw the patient and independently provided 64 minutes of non-concurrent critical care out of the total shared critical care time provided. This excludes seperately billable procedures. Critical care time was provided for goals of care as well as A-fib with RVR NSTEMI that required close evaluation and/or intervention with concern for potential patient decompensation.          DISCHARGE PRESCRIPTIONS: (None if blank)  New Prescriptions    No medications on file         I am the primary clinician of record.     FINAL IMPRESSION      1. Atrial fibrillation with RVR (HCC)    2. Acute on chronic congestive heart failure, unspecified heart failure type

## 2025-06-15 NOTE — PLAN OF CARE
Problem: Chronic Conditions and Co-morbidities  Goal: Patient's chronic conditions and co-morbidity symptoms are monitored and maintained or improved  Outcome: Progressing     Problem: ABCDS Injury Assessment  Goal: Absence of physical injury  Outcome: Progressing     Problem: Discharge Planning  Goal: Discharge to home or other facility with appropriate resources  Outcome: Progressing     Problem: Safety - Adult  Goal: Free from fall injury  Outcome: Progressing

## 2025-06-15 NOTE — PROGRESS NOTES
Patient admitted earlier this morning by my colleague and agree with assessment plan as documented.  Patient on evaluation was adamant that she wanted to go home.  Educated that she has a urinary tract infection is currently on 2 IV antibiotics and she was in A-fib with RVR earlier this morning.  She is pending a cardiology evaluation and will need her final urinary culture results for discharge.  This will likely take 48 hours.    Melchor Duarte MD  Hospitalist    
EDT    **SHARE this note so that the co-signing nurse can place an eSignature**    Nurse 2 eSignature: Electronically signed by Gracia Carrasco LPN on 6/15/25 at 2:47 AM EDT

## 2025-06-15 NOTE — DISCHARGE SUMMARY
[facility] Medicine Discharge Summary    Patient: Cara Chapin   : 1940     Hospital:  [facility]  Admit Date: 2025   Discharge Date:   6/15/25  Disposition:  Home   Code status:  Full  Condition at Discharge: Unstable No medically cleared for DC  Primary Care Provider: Eron Kevin MD    Admitting Provider: Lucrecia Mario MD  Discharge Provider: Gage Freed MD     Discharge Diagnoses:      Active Hospital Problems    Diagnosis     Atrial fibrillation with rapid ventricular response (HCC) [I48.91]        Presenting Admission History:      85 y.o. female who presented to [facility] with palpitations and chest pressure.  PMHx significant for symptomatic paroxysmal atrial fibrillation on Eliquis, known left bundle branch block (negative stress test from [place]), hypertension, rheumoatoid arthritis, and hypothyroidism. She presented to ED via EMS with complaints of palpitations and chest pressure for 20-30 minutes prior to arrival. EMS found the patient in ventricular tachycardia. She has an extensive history of ventricular tachycardia and atrial fibrillation, seen by her Cardiology recommending PPM for tachy-elliot syndrome, but patient deferred at last visit 2025. She is noncompliant with home Amiodarone dose, only takes it when she wants to due to her heart rate \"low.\" She denies shortness of breath, cough, fever, chills, abdominal pain, or recent illnesses. She does report urinary frequency/incontinence and wears a pad. She also reports recent bug bites on right forearm that itch.      ED workup- EKG Atrial fibrillation with rapid ventricular response with underlying left bundle andrea block, with evidence of a rate of 145, , QTc 500, no clinically significant ST segment elevation or depression. Amiodarone drip was ordered, but she converted on own. Repeat EKG shows SB rate of 58. CXR showed mild cardiomegaly. Troponin 15, 38, 66.

## 2025-06-15 NOTE — H&P
[facility] Medicine History & Physical    V 5.1    Date of Admission: 6/14/2025    Date of Service:  Pt seen/examined on 6/15     [x]Admitted to Inpatient with expected LOS greater than two midnights due to medical therapy.  []Placed in Observation status.    Chief Admission Complaint:  Palpitation, HR elevated, chest pressure     Presenting Admission History:      85 y.o. female who presented to [facility] with palpitations and chest pressure.  PMHx significant for symptomatic paroxysmal atrial fibrillation on Eliquis, known left bundle branch block (negative stress test from [place]), hypertension, rheumoatoid arthritis, and hypothyroidism. She presented to ED via EMS with complaints of palpitations and chest pressure for 20-30 minutes prior to arrival. EMS found the patient in ventricular tachycardia. She has an extensive history of ventricular tachycardia and atrial fibrillation, seen by her Cardiology recommending PPM for tachy-elliot syndrome, but patient deferred at last visit 5/13/2025. She is noncompliant with home Amiodarone dose, only takes it when she wants to due to her heart rate \"low.\" She denies shortness of breath, cough, fever, chills, abdominal pain, or recent illnesses. She does report urinary frequency/incontinence and wears a pad. She also reports recent bug bites on right forearm that itch.     ED workup- EKG Atrial fibrillation with rapid ventricular response with underlying left bundle andrea block, with evidence of a rate of 145, , QTc 500, no clinically significant ST segment elevation or depression. Amiodarone drip was ordered, but she converted on own. Repeat EKG shows SB rate of 58. CXR showed mild cardiomegaly. Troponin 15, 38, 66. Sodium 131, chloride 98. BNP 1078. Lactic acid level 2.1, received 1 liter bolus, repeat 1.2. UA concerning for UTI + leukocyte esterase, WBC 21-50, bacteria 2+, urine culture pending. Started on broad spectrum antibiotics.

## 2025-06-15 NOTE — ED NOTES
Cara Chapin is a 85 y.o. female admitted for  Principal Problem:    Atrial fibrillation with rapid ventricular response (HCC)  Resolved Problems:    * No resolved hospital problems. *  .   Patient Home via EMS transportation with   Chief Complaint   Patient presents with    Chest Pain     Pt presents in [facility] with chest pain x 20 minutes. She is A&O x 4, on RA and independent ambulatory.    .  Patient is alert and Person, Place, Time, and Situation  Patient's baseline mobility: Baseline Mobility: Independent   Code Status: Full Code   Cardiac Rhythm:  afib     Is patient on baseline Oxygen: no how many Liters:   Abnormal Assessment Findings: Pt presented in [facility]. She self convertd. She has since displayed afib (RVR sent her into [facility]?) and an elevated trop     Isolation: None      NIH Score:    C-SSRS: Risk of Suicide: No Risk  Bedside swallow:        Active LDA's:   Peripheral IV 06/14/25 Right Forearm (Active)       Peripheral IV 06/14/25 Left Antecubital (Active)     Patient admitted with a diaz: no If the diaz is chronic was it exchanged:  Reason for diaz:   Patient admitted with Central Line:  . PICC line placement confirmed: YES OR NO:[id number]}   Reason for Central line:   Was central line Inserted from an outside facility:        Family/Caregiver Present no Any Concerns: no   Restraints no  Sitter no         Vitals: MEWS Score: 4    Vitals:    06/14/25 2300 06/14/25 2315 06/14/25 2345 06/14/25 2356   BP: 101/87 119/60 (!) 123/57    Pulse: 58 56 54 54   Resp: 15 25 24    Temp:       TempSrc:       SpO2: 97%  100%    Weight:           Last documented pain score (0-10 scale) Pain Level: 0  Pain medication administered No.    Pertinent or High Risk Medications/Drips: No.    Pending Blood Product Administration: no    Abnormal labs:   Abnormal Labs Reviewed   CBC WITH AUTO DIFFERENTIAL - Abnormal; Notable for the following components:       Result Value    RBC 3.82 (*)     .8 (*)     MCH 34.4 (*)

## 2025-06-15 NOTE — CONSULTS
Pharmacy Note  Vancomycin Consult     Cara Chapin is a 85 y.o. female started on Vancomycin for sepsis; consult received from Dr. Mario to manage therapy.       Recent Labs     06/14/25 2125   CREATININE 0.6             Recent Labs     06/14/25 2125   WBC 6.3         Estimated Creatinine Clearance: 52 mL/min (based on SCr of 0.6 mg/dL).        Intake/Output Summary (Last 24 hours) at 6/15/2025 0430  Last data filed at 6/15/2025 0200      Gross per 24 hour   Intake 0 ml   Output 0 ml   Net 0 ml             Wt Readings from Last 1 Encounters:   06/15/25 48.3 kg (106 lb 8 oz)          Body mass index is 18.28 kg/m².     Loading dose (critically ill or in ICU, require dialysis or renal replacement therapy): Vancomycin 25 mg/kg IVPB x 1 (maximum 2500 mg).  Maintenance dose: 10-20 mg/kg (maximum: 2000 mg/dose and 4500 mg/day) starting at the next dosing interval determined by renal function  Pulse dose: fluctuating renal function, COY, ESRD  CRRT: 7.5-10 mg/kg q12h   Goal Vancomycin trough: 15-20 mcg/mL   Goal Vancomycin AUC: 400-600      Assessment/Plan:  Will initiate Vancomycin with a one time loading dose of 1000 mg x1, followed by 1250 mg IV every 24 hours. Calculated Vancomycin AUC = 476 mg/L*h with an estimated steady-state vancomycin trough = 14.4 mcg/mL. Vancomycin level ordered for 1400. Timing of trough level will be determined based on culture results, renal function, and clinical response.      Thank you for the consult.  Kevin Berry PharmD 6/15/2025  4:31 AM      Vancomycin Day of Therapy 1  Indication: sepsis  Current Dosing Method: Bayesian-Guided AUC Dosing  Therapeutic Goal: -600 mg/L*hr  Recent Labs     06/14/25 2125 06/15/25  0607 06/15/25  1354   VANCORANDOM  --   --  14.1   WBC 6.3 5.4  --    CREATININE 0.6 0.5*  --    Estimated Creatinine Clearance: 63 mL/min (A) (based on SCr of 0.5 mg/dL (L)).  Current Dose / Plan:   Decrease to Vancomycin 750 mg IV q12h.   Kinetics predict an AUC =

## 2025-06-15 NOTE — CONSULTS
[facility] - INITIAL CONSULTATION        CHIEF COMPLAINT  A-fib with RVR      HISTORY OF PRESENTING ILLNESS  Cara Chapin is a 85 y.o. patient with known paroxysmal A-fib, cardiomyopathy with reduced EF, hypertension, hyperlipidemia, history of VT, left bundle branch block who presented after home monitor showed heart rate of 160 bpm.  She reports minimal symptoms at the time.  EMS was called by her daughter and she was brought to the hospital relatively asymptomatic.  Before coming to the hospital, she took 200 mg amiodarone which she reports taking on as-needed basis rather than on a regular schedule.  She is a patient of Dr. Rao.  She has been in discussions with EP regarding AV node ablation and pacemaker placement but has not fully decided so far.  In the ED, she spontaneously converted to sinus bradycardia which is her baseline rhythm she reports that her sinus rate frequently is between 30 to 60 bpm.  Currently she denies any chest pain, palpitations, lightheadedness or other cardiac symptoms.  She has maintained sinus rhythm throughout since being on the floor.  Incidentally, she is found to have a UTI with sepsis and is being treated with IV antibiotics for that pending cultures although she denies any symptoms of UTI as well.      PAST MEDICAL HISTORY   has a past medical history of Allergic rhinitis, CHF (congestive heart failure), NYHA class I, acute on chronic, combined (HCC), Hyperlipidemia, Hypertension, Hypothyroidism, LBBB (left bundle branch block), Rheumatoid arthritis(714.0), and Ventricular tachycardia (HCC).    SURGICAL HISTORY   has a past surgical history that includes Colonoscopy ();  section; and joint replacement (Left, 13).     SOCIAL HISTORY   reports that she has quit smoking. She has never used smokeless tobacco. She reports current alcohol use of about 14.0 standard drinks of alcohol per week. She reports that she does not use drugs.     FAMILY

## 2025-06-16 ENCOUNTER — CARE COORDINATION (OUTPATIENT)
Dept: CASE MANAGEMENT | Age: 85
End: 2025-06-16

## 2025-06-16 LAB
BACTERIA UR CULT: NORMAL
T4 FREE SERPL-MCNC: 1.1 NG/DL (ref 0.9–1.8)
TSH SERPL DL<=0.005 MIU/L-ACNC: 8.49 UIU/ML (ref 0.27–4.2)

## 2025-06-16 NOTE — CARE COORDINATION
Care Transitions Note    Initial Call - Call within 2 business days of discharge: Yes    Attempted to reach patient for transitions of care follow up. Unable to reach patient.    Outreach Attempts:   Unable to leave message.     Patient: Cara Chapin    Patient : 1940   MRN: <[id number]>    Reason for Admission: Afib  Discharge Date: 6/15/25  RURS: Readmission Risk Score: 14.1    Last Discharge Facility       Date Complaint Diagnosis Description Type Department Provider    25 Chest Pain Atrial fibrillation with RVR (HCC) ... ED to Hosp-Admission (Discharged) (ADMITTED) Gage Jerez MD; [facility]...            Was this an external facility discharge? No    Follow Up Appointment:   Patient does not have a follow up appointment scheduled at time of call. Utr   Future Appointments         Provider Specialty Dept Phone    2025 1:00 PM Priya Altman, GRACIELA - CNP Cardiology [phone]    2025 2:45 PM PAZ Rao Jr., MD Cardiology [phone]            Plan for follow-up on next business day.      Dinora Adams RN

## 2025-06-17 ENCOUNTER — CARE COORDINATION (OUTPATIENT)
Dept: CASE MANAGEMENT | Age: 85
End: 2025-06-17

## 2025-06-17 DIAGNOSIS — I48.91 ATRIAL FIBRILLATION, UNSPECIFIED TYPE (HCC): Primary | ICD-10-CM

## 2025-06-17 PROCEDURE — 1111F DSCHRG MED/CURRENT MED MERGE: CPT | Performed by: FAMILY MEDICINE

## 2025-06-17 NOTE — CARE COORDINATION
Care Transitions Note    Initial Call - Call within 2 business days of discharge: Yes    Patient Current Location:  Home: [address] [place] [address] [address]  [facility] [address]    Care Transition Nurse contacted the patient by telephone to perform post hospital discharge assessment, verified name and  as identifiers.  Provided introduction to self, and explanation of the Care Transition Nurse role.    Patient: Cara Chapin    Patient : 1940   MRN: [id number]    Reason for Admission: CP, Afib w/RVR  Discharge Date: 6/15/25  RURS: Readmission Risk Score: 14.1      Last Discharge Facility       Date Complaint Diagnosis Description Type Department Provider    25 Chest Pain Atrial fibrillation with RVR (HCC) ... ED to Hosp-Admission (Discharged) (ADMITTED) Gage Jerez MD; [facility]...            Was this an external facility discharge? No    Additional needs identified to be addressed with provider   Standard priority: pt needs hospital f/u appt.              Method of communication with provider: chart routing.    Patients top risk factors for readmission: falls and medical condition-afib RVR    Interventions to address risk factors:   Education:    Review of patient management of conditions/medications:      Care Summary Note:   Pt states doing well, son will  ABx today. Had to leave AMA per pt due to her RA and needs to stay on a routine. Denies issues with urination. No CP and feels her Afib became an issue due to working in the yard, hot sun and not drinking fluids. She feels stress is causes an exacerbation. Will call her cardio today for appt. She states she can't see PCP due to moved to [place] and she doesn't drive there. Will route to PCP office for clarification and to call pt for f/u appt.     Care Transition Nurse reviewed discharge instructions with patient. The patient was given an opportunity to ask questions; all questions answered at this time.. The patient

## 2025-06-18 LAB
BACTERIA BLD CULT ORG #2: NORMAL
BACTERIA BLD CULT: NORMAL

## 2025-06-18 NOTE — TELEPHONE ENCOUNTER
Spoke to patient, she is able to get to [place] and not [place]. She does not want to make appt right now. She is scheduling to follow up with her cardiologist.

## 2025-06-24 ENCOUNTER — CARE COORDINATION (OUTPATIENT)
Dept: CASE MANAGEMENT | Age: 85
End: 2025-06-24

## 2025-06-24 NOTE — CARE COORDINATION
Care Transitions Note    Follow Up Call     Attempted to reach patient for transitions of care follow up.  Unable to reach patient.      Outreach Attempts:   Unable to leave message.     Care Summary Note: UTR      Follow Up Appointment:   Future Appointments         Provider Specialty Dept Phone    8/1/2025 1:00 PM Priya Altman APRN - CNP Cardiology [phone]    11/18/2025 2:45 PM PAZ Rao Jr., MD Cardiology [phone]            Plan for follow-up call in 11-14 days based on severity of symptoms and risk factors. Plan for next call: symptom management-   self management-   follow-up appointment-   medication management-     Dinora Adams RN

## 2025-06-24 NOTE — TELEPHONE ENCOUNTER
Patient was in the hospital last week and was treated for uti, she said they had told her that she may need additional abx.  Chart shows she was on IV abx and then left AMA, she was given Augmentin for 7 days which she said she finished yesterday.  She states she is still having some slight discomfort with urination and has small amount of blood in the urine.  She is wanting to know if you will prescribe more abx for her

## 2025-07-01 ENCOUNTER — CARE COORDINATION (OUTPATIENT)
Dept: CASE MANAGEMENT | Age: 85
End: 2025-07-01

## 2025-07-01 NOTE — CARE COORDINATION
Care Transitions Note    Follow Up Call     Patient Current Location:  Home: [address]  [facility] [address]    Care Transition Nurse contacted the patient by telephone. Verified name and  as identifiers.    Additional needs identified to be addressed with provider   No needs identified                 Method of communication with provider: none.    Care Summary Note:       SUMMARY  CTN spoke to the pt who reports the following:      -A-Fib:  Denies cp/SOB/LH/dizziness/heart palpitations and flutters  - taking Eliquis as prescribed / denies bleeding issues - Will continue to monitor and f/u with physician i  -Hx of CHF:  denies LE edema today - encouraged to wear neyda hose and elevate LE when possible - not on supplemental oxygen at baseline - Keep SAO2 > 92% - CTC provided Pt education on when and how to obtain daily weight, importance of daily weight, tracking information to share with physicians, when to call physician regarding weight changes, and importance of following fluid / sodium restrictions set by physician -  Will continue to monitor and f/u with physician as recommended  -Hx of HTN:  dtr checks BP for her - CTN provided education on importance of monitoring BP prior to taking HTN med - Will continue to monitor and f/u with physician as recommended.  -Assistive device:  will exercise caution when ambulating to prevent fall / injury    -Denies fever, chills, nausea, vomiting, cough, congestion, SOB / DB, wheezing, chest pain, LE edema / pain, feeling lightheaded / dizziness, heart palpitations / flutters, fatigue, weakness, falls and s/s of stroke.  -Denies issues with fluid intake (advised to follow fluid restriction recommended by physician d/t hx of HF), appetite, urination, and LBM \"this morning\" / daily BM  -Meds:  confirms taking meds as prescribed - med changes / antibiotic (see note above)  -HFU with pcp - Eron Kevin MD - DECLINED        Plan of care updates since last

## 2025-07-16 ENCOUNTER — CARE COORDINATION (OUTPATIENT)
Dept: CASE MANAGEMENT | Age: 85
End: 2025-07-16

## 2025-07-16 NOTE — CARE COORDINATION
Care Transitions Note    Final Call     Attempted to reach patient for transitions of care follow up.  Unable to reach patient.      Outreach Attempts:   HIPAA compliant voicemail left for patient.     Patient closed (unable to reach patient) from the Care Transitions program on 7/16/25 .  Patient/family utr.      Handoff:   Patient was not referred to the ACM team due to unable to contact patient.      Care Summary Note: utr    CT episode closed - CTN signed off pt.       Assessments:  utr    Upcoming Appointments:    Future Appointments         Provider Specialty Dept Phone    8/1/2025 1:00 PM Priya Altman, GRACIELA - CNP Cardiology [phone]    11/18/2025 2:45 PM PAZ Rao Jr., MD Cardiology [phone]            Dinora Adams RN

## 2025-08-07 ENCOUNTER — TELEMEDICINE (OUTPATIENT)
Dept: FAMILY MEDICINE CLINIC | Age: 85
End: 2025-08-07
Payer: MEDICARE

## 2025-08-07 DIAGNOSIS — J98.8 RESPIRATORY INFECTION: Primary | ICD-10-CM

## 2025-08-07 PROCEDURE — 98967 PH1 ASSMT&MGMT NQHP 11-20: CPT | Performed by: NURSE PRACTITIONER

## 2025-08-07 RX ORDER — BROMPHENIRAMINE MALEATE, PSEUDOEPHEDRINE HYDROCHLORIDE, AND DEXTROMETHORPHAN HYDROBROMIDE 2; 30; 10 MG/5ML; MG/5ML; MG/5ML
5 SYRUP ORAL 3 TIMES DAILY PRN
Qty: 75 ML | Refills: 0 | Status: SHIPPED | OUTPATIENT
Start: 2025-08-07 | End: 2025-08-12

## 2025-08-07 RX ORDER — AZITHROMYCIN 250 MG/1
TABLET, FILM COATED ORAL
Qty: 6 TABLET | Refills: 0 | Status: SHIPPED | OUTPATIENT
Start: 2025-08-07 | End: 2025-08-17

## 2025-08-07 RX ORDER — BENZONATATE 100 MG/1
100 CAPSULE ORAL 3 TIMES DAILY PRN
COMMUNITY

## 2025-08-07 ASSESSMENT — ENCOUNTER SYMPTOMS
ALLERGIC/IMMUNOLOGIC NEGATIVE: 1
CHEST TIGHTNESS: 0
GASTROINTESTINAL NEGATIVE: 1
EYES NEGATIVE: 1
COUGH: 1
WHEEZING: 0
SHORTNESS OF BREATH: 0